# Patient Record
Sex: MALE | Race: WHITE | NOT HISPANIC OR LATINO | Employment: FULL TIME | URBAN - METROPOLITAN AREA
[De-identification: names, ages, dates, MRNs, and addresses within clinical notes are randomized per-mention and may not be internally consistent; named-entity substitution may affect disease eponyms.]

---

## 2017-02-10 ENCOUNTER — HOSPITAL ENCOUNTER (EMERGENCY)
Facility: HOSPITAL | Age: 52
Discharge: HOME | End: 2017-02-10
Attending: EMERGENCY MEDICINE
Payer: COMMERCIAL

## 2017-02-10 ENCOUNTER — APPOINTMENT (EMERGENCY)
Dept: RADIOLOGY | Facility: HOSPITAL | Age: 52
End: 2017-02-10
Payer: COMMERCIAL

## 2017-02-10 VITALS
RESPIRATION RATE: 18 BRPM | TEMPERATURE: 98.2 F | HEART RATE: 116 BPM | WEIGHT: 179 LBS | SYSTOLIC BLOOD PRESSURE: 170 MMHG | DIASTOLIC BLOOD PRESSURE: 97 MMHG | OXYGEN SATURATION: 98 %

## 2017-02-10 DIAGNOSIS — S61.419A HAND LACERATION: Primary | ICD-10-CM

## 2017-02-10 DIAGNOSIS — S00.93XA HEAD CONTUSION: ICD-10-CM

## 2017-02-10 PROCEDURE — 90715 TDAP VACCINE 7 YRS/> IM: CPT | Performed by: EMERGENCY MEDICINE

## 2017-02-10 PROCEDURE — 73130 X-RAY EXAM OF HAND: CPT

## 2017-02-10 PROCEDURE — A9270 NON-COVERED ITEM OR SERVICE: HCPCS | Performed by: EMERGENCY MEDICINE

## 2017-02-10 PROCEDURE — 90471 IMMUNIZATION ADMIN: CPT

## 2017-02-10 PROCEDURE — 99283 EMERGENCY DEPT VISIT LOW MDM: CPT

## 2017-02-10 RX ORDER — GINSENG 100 MG
1 CAPSULE ORAL ONCE
Status: COMPLETED | OUTPATIENT
Start: 2017-02-10 | End: 2017-02-10

## 2017-02-10 RX ORDER — DIAPER,BRIEF,INFANT-TODD,DISP
1 EACH MISCELLANEOUS 3 TIMES DAILY
Qty: 14 G | Refills: 0 | Status: SHIPPED | OUTPATIENT
Start: 2017-02-10 | End: 2020-01-28

## 2017-02-10 RX ORDER — IBUPROFEN 600 MG/1
600 TABLET ORAL EVERY 6 HOURS PRN
Qty: 30 TABLET | Refills: 0 | Status: SHIPPED | OUTPATIENT
Start: 2017-02-10 | End: 2020-01-28

## 2017-02-10 RX ORDER — AMOXICILLIN AND CLAVULANATE POTASSIUM 875; 125 MG/1; MG/1
1 TABLET, FILM COATED ORAL EVERY 12 HOURS
Qty: 14 TABLET | Refills: 0 | Status: SHIPPED | OUTPATIENT
Start: 2017-02-10 | End: 2017-02-17

## 2017-02-10 RX ORDER — LIDOCAINE HYDROCHLORIDE 20 MG/ML
5 INJECTION, SOLUTION INFILTRATION; PERINEURAL ONCE
Status: COMPLETED | OUTPATIENT
Start: 2017-02-10 | End: 2017-02-10

## 2017-02-10 RX ADMIN — BACITRACIN ZINC 1 SMALL APPLICATION: 500 OINTMENT TOPICAL at 03:26

## 2017-02-10 RX ADMIN — LIDOCAINE HYDROCHLORIDE 5 ML: 20 INJECTION, SOLUTION INFILTRATION; PERINEURAL at 03:26

## 2017-02-10 RX ADMIN — TETANUS TOXOID, REDUCED DIPHTHERIA TOXOID AND ACELLULAR PERTUSSIS VACCINE, ADSORBED 0.5 ML: 5; 2.5; 8; 8; 2.5 SUSPENSION INTRAMUSCULAR at 03:20

## 2020-01-22 ENCOUNTER — HOSPITAL ENCOUNTER (EMERGENCY)
Facility: HOSPITAL | Age: 55
Discharge: HOME | End: 2020-01-22
Attending: EMERGENCY MEDICINE
Payer: COMMERCIAL

## 2020-01-22 ENCOUNTER — APPOINTMENT (EMERGENCY)
Dept: RADIOLOGY | Facility: HOSPITAL | Age: 55
End: 2020-01-22
Payer: COMMERCIAL

## 2020-01-22 VITALS
SYSTOLIC BLOOD PRESSURE: 155 MMHG | HEART RATE: 74 BPM | RESPIRATION RATE: 18 BRPM | OXYGEN SATURATION: 98 % | DIASTOLIC BLOOD PRESSURE: 70 MMHG | BODY MASS INDEX: 27.77 KG/M2 | HEIGHT: 72 IN | WEIGHT: 205 LBS | TEMPERATURE: 96.8 F

## 2020-01-22 DIAGNOSIS — M62.838 MUSCLE SPASMS OF NECK: ICD-10-CM

## 2020-01-22 DIAGNOSIS — M54.2 NECK PAIN: Primary | ICD-10-CM

## 2020-01-22 PROCEDURE — 72125 CT NECK SPINE W/O DYE: CPT

## 2020-01-22 PROCEDURE — 96372 THER/PROPH/DIAG INJ SC/IM: CPT

## 2020-01-22 PROCEDURE — 99284 EMERGENCY DEPT VISIT MOD MDM: CPT

## 2020-01-22 PROCEDURE — 99283 EMERGENCY DEPT VISIT LOW MDM: CPT | Performed by: EMERGENCY MEDICINE

## 2020-01-22 RX ORDER — DIAZEPAM 5 MG/1
10 TABLET ORAL ONCE
Status: COMPLETED | OUTPATIENT
Start: 2020-01-22 | End: 2020-01-22

## 2020-01-22 RX ORDER — KETOROLAC TROMETHAMINE 30 MG/ML
15 INJECTION, SOLUTION INTRAMUSCULAR; INTRAVENOUS ONCE
Status: COMPLETED | OUTPATIENT
Start: 2020-01-22 | End: 2020-01-22

## 2020-01-22 RX ORDER — NAPROXEN 500 MG/1
500 TABLET ORAL 2 TIMES DAILY WITH MEALS
Qty: 14 TABLET | Refills: 0 | Status: SHIPPED | OUTPATIENT
Start: 2020-01-22 | End: 2020-01-29 | Stop reason: HOSPADM

## 2020-01-22 RX ORDER — ONDANSETRON 4 MG/1
4 TABLET, ORALLY DISINTEGRATING ORAL ONCE
Status: COMPLETED | OUTPATIENT
Start: 2020-01-22 | End: 2020-01-22

## 2020-01-22 RX ORDER — DIAZEPAM 5 MG/1
5 TABLET ORAL EVERY 8 HOURS PRN
Qty: 12 TABLET | Refills: 0 | Status: SHIPPED | OUTPATIENT
Start: 2020-01-22 | End: 2020-01-29 | Stop reason: HOSPADM

## 2020-01-22 RX ADMIN — DIAZEPAM 10 MG: 5 TABLET ORAL at 14:10

## 2020-01-22 RX ADMIN — KETOROLAC TROMETHAMINE 15 MG: 30 INJECTION, SOLUTION INTRAMUSCULAR at 14:11

## 2020-01-22 RX ADMIN — ONDANSETRON 4 MG: 4 TABLET, ORALLY DISINTEGRATING ORAL at 14:48

## 2020-01-22 NOTE — ED PROVIDER NOTES
History  Chief Complaint   Patient presents with    Neck Pain     Patient here with c/o bilateral neck pain that radiates diown his back and arms  Patient also feels nauseated  pain started yesterday  Patient took Motrin with no relief  Movement makes the pain worse  Patient presents for evaluation of neck pain  States started on Monday but was worse today  Taking Ibuprofen without relief  Denies numbness or weakness  Denies trauma or injury  Pain worse with movement  No history of prior issues with the neck  No fever or chills  History provided by:  Patient   used: No    Neck Pain       Prior to Admission Medications   Prescriptions Last Dose Informant Patient Reported? Taking?   bacitracin ointment   No No   Sig: Apply 1 g topically 3 (three) times a day for 7 days   ibuprofen (MOTRIN) 600 mg tablet   No No   Sig: Take 1 tablet by mouth every 6 (six) hours as needed for mild pain for up to 10 days      Facility-Administered Medications: None       History reviewed  No pertinent past medical history  Past Surgical History:   Procedure Laterality Date    APPENDECTOMY      SPLENECTOMY         History reviewed  No pertinent family history  I have reviewed and agree with the history as documented  Social History     Tobacco Use    Smoking status: Current Some Day Smoker     Packs/day: 1 00     Types: Cigarettes    Smokeless tobacco: Never Used   Substance Use Topics    Alcohol use: No    Drug use: No        Review of Systems   Musculoskeletal: Positive for neck pain  All other systems reviewed and are negative  Physical Exam  Physical Exam   Constitutional: He is oriented to person, place, and time  No distress  Eyes: Pupils are equal, round, and reactive to light  EOM are normal    Neck:       Cardiovascular: Normal rate, regular rhythm and intact distal pulses  Pulmonary/Chest: Effort normal and breath sounds normal  No respiratory distress     Neurological: He is alert and oriented to person, place, and time  No cranial nerve deficit or sensory deficit  He exhibits normal muscle tone  Coordination normal    Skin: Capillary refill takes less than 2 seconds  He is not diaphoretic  Nursing note and vitals reviewed  Vital Signs  ED Triage Vitals   Temperature Pulse Respirations Blood Pressure SpO2   01/22/20 1342 01/22/20 1342 01/22/20 1342 01/22/20 1342 01/22/20 1342   (!) 96 8 °F (36 °C) 89 18 (!) 187/96 98 %      Temp Source Heart Rate Source Patient Position - Orthostatic VS BP Location FiO2 (%)   01/22/20 1342 01/22/20 1502 01/22/20 1342 01/22/20 1502 --   Tympanic Monitor Sitting Right arm       Pain Score       01/22/20 1342       Worst Possible Pain           Vitals:    01/22/20 1342 01/22/20 1502 01/22/20 1543   BP: (!) 187/96 165/79 155/70   Pulse: 89 80 74   Patient Position - Orthostatic VS: Sitting Lying Lying         Visual Acuity      ED Medications  Medications   ketorolac (TORADOL) injection 15 mg (15 mg Intramuscular Given 1/22/20 1411)   diazepam (VALIUM) tablet 10 mg (10 mg Oral Given 1/22/20 1410)   ondansetron (ZOFRAN-ODT) dispersible tablet 4 mg (4 mg Oral Given 1/22/20 1448)       Diagnostic Studies  Results Reviewed     None                 CT cervical spine without contrast   Final Result by Carmela Moreno MD (01/22 1501)      No cervical spine fracture or traumatic malalignment  Multilevel degenerative disc disease and facet arthropathy  Workstation performed: [id number]                    Procedures  Procedures         ED Course                               MDM  Number of Diagnoses or Management Options  Muscle spasms of neck:   Neck pain:   Diagnosis management comments: Pulse 98% on RA indicating adequate oxygenation    Ct results discussed with patient  Improvement in pain after medications  Advised precautions for muscle relaxer          Amount and/or Complexity of Data Reviewed  Tests in the radiology section of CPT®: ordered and reviewed  Decide to obtain previous medical records or to obtain history from someone other than the patient: yes  Review and summarize past medical records: yes    Patient Progress  Patient progress: improved        Disposition  Final diagnoses:   Neck pain   Muscle spasms of neck     Time reflects when diagnosis was documented in both MDM as applicable and the Disposition within this note     Time User Action Codes Description Comment    1/22/2020  4:01 PM Hillary Hollow Add [M54 2] Neck pain     1/22/2020  4:01 PM Hillary Hollow Add [C97 133] Muscle spasms of neck       ED Disposition     ED Disposition Condition Date/Time Comment    Discharge Stable Wed Jan 22, 2020  4:01 PM Renee Orourke discharge to home/self care  Follow-up Information     Follow up With Specialties Details Why Contact Info    Melvi Robert MD Orthopedic Surgery In 1 week  [employer] [employer]  [phone]            Discharge Medication List as of 1/22/2020  4:03 PM      START taking these medications    Details   diazepam (VALIUM) 5 mg tablet Take 1 tablet (5 mg total) by mouth every 8 (eight) hours as needed for muscle spasms for up to 12 doses, Starting Wed 1/22/2020, Normal      naproxen (NAPROSYN) 500 mg tablet Take 1 tablet (500 mg total) by mouth 2 (two) times a day with meals for 7 days, Starting Wed 1/22/2020, Until Wed 1/29/2020, Normal         CONTINUE these medications which have NOT CHANGED    Details   bacitracin ointment Apply 1 g topically 3 (three) times a day for 7 days, Starting 2/10/2017, Until Fri 2/17/17, Print      ibuprofen (MOTRIN) 600 mg tablet Take 1 tablet by mouth every 6 (six) hours as needed for mild pain for up to 10 days, Starting 2/10/2017, Until Mon 2/20/17, Print           No discharge procedures on file      ED Provider  Electronically Signed by           Loki Fletcher,   01/22/20 2432

## 2020-01-22 NOTE — DISCHARGE INSTRUCTIONS
Muscle Spasm   WHAT YOU NEED TO KNOW:   A muscle spasm is a sudden contraction of any muscle or group of muscles  A muscle cramp is a painful muscle spasm  Muscle cramps commonly occur after intense exercise or during pregnancy  They may also be caused by certain medications, dehydration, low calcium or magnesium levels, or another medical condition  DISCHARGE INSTRUCTIONS:   Medicines: You may need the following:  · NSAIDs  help decrease swelling and pain or fever  This medicine is available with or without a doctor's order  NSAIDs can cause stomach bleeding or kidney problems in certain people  If you take blood thinner medicine, always ask your healthcare provider if NSAIDs are safe for you  Always read the medicine label and follow directions  · Take your medicine as directed  Contact your healthcare provider if you think your medicine is not helping or if you have side effects  Tell him of her if you are allergic to any medicine  Keep a list of the medicines, vitamins, and herbs you take  Include the amounts, and when and why you take them  Bring the list or the pill bottles to follow-up visits  Carry your medicine list with you in case of an emergency  Follow up with your healthcare provider as directed: You may need other tests or treatment  You may also be referred to a physical therapist or other specialist  Write down your questions so you remember to ask them during your visits  Self-care:   · Stretch  your muscle to help relieve the cramp  It may be helpful to keep your muscle in the stretched position until the cramp is gone  · Apply heat  to help decrease pain and muscle spasms  Apply heat on the area for 20 to 30 minutes every 2 hours for as many days as directed  · Apply ice  to help decrease swelling and pain  Ice may also help prevent tissue damage  Use an ice pack, or put crushed ice in a plastic bag   Cover it with a towel and place it on your muscle for 15 to 20 minutes every hour or as directed  · Drink more liquids  to help prevent muscle cramps caused by dehydration  Sports drinks may help replace electrolytes you lose through sweat during exercise  Ask your healthcare provider how much liquid to drink each day and which liquids are best for you  · Eat healthy foods , such as fruits, vegetables, whole grains, low-fat dairy products, and lean proteins (meat, beans, and fish)  If you are pregnant, ask your healthcare provider about foods that are high in magnesium and sodium  They may help to relieve cramps during pregnancy  · Massage your muscle  to help relieve the cramp  · Take frequent deep breaths  until the cramp feels better  Lie down while you take the deep breaths so you do not get dizzy or lightheaded  Contact your healthcare provider if:   · You have signs of dehydration, such as a headache, dark yellow urine, dry eyes or mouth, or a fast heartbeat  · You have questions or concerns about your condition or care  Return to the emergency department if:   · You have warmth, swelling, or redness in the cramping muscle  · You have frequent or unrelieved muscle cramps in several different muscles  · You have muscle cramps with numbness, tingling, and burning in your hands and feet  © 2017 [address] Information is for End User's use only and may not be sold, redistributed or otherwise used for commercial purposes  All illustrations and images included in CareNotes® are the copyrighted property of [employer] , [employer]  or Lonny Thapa  The above information is an  only  It is not intended as medical advice for individual conditions or treatments  Talk to your doctor, nurse or pharmacist before following any medical regimen to see if it is safe and effective for you

## 2020-01-28 ENCOUNTER — HOSPITAL ENCOUNTER (OUTPATIENT)
Facility: HOSPITAL | Age: 55
Setting detail: OBSERVATION
Discharge: HOME | End: 2020-01-29
Attending: EMERGENCY MEDICINE | Admitting: INTERNAL MEDICINE
Payer: COMMERCIAL

## 2020-01-28 ENCOUNTER — APPOINTMENT (EMERGENCY)
Dept: RADIOLOGY | Facility: HOSPITAL | Age: 55
End: 2020-01-28
Payer: COMMERCIAL

## 2020-01-28 VITALS
BODY MASS INDEX: 27.77 KG/M2 | WEIGHT: 205 LBS | HEART RATE: 89 BPM | DIASTOLIC BLOOD PRESSURE: 121 MMHG | SYSTOLIC BLOOD PRESSURE: 166 MMHG | HEIGHT: 72 IN

## 2020-01-28 DIAGNOSIS — R20.2 NUMBNESS AND TINGLING: Primary | ICD-10-CM

## 2020-01-28 DIAGNOSIS — M47.22 CERVICAL SPONDYLOSIS WITH RADICULOPATHY: Primary | ICD-10-CM

## 2020-01-28 DIAGNOSIS — R51.9 HEADACHE: ICD-10-CM

## 2020-01-28 DIAGNOSIS — R03.0 ELEVATED BLOOD PRESSURE READING WITHOUT DIAGNOSIS OF HYPERTENSION: ICD-10-CM

## 2020-01-28 DIAGNOSIS — R03.0 ELEVATED BLOOD PRESSURE READING: ICD-10-CM

## 2020-01-28 DIAGNOSIS — E78.5 HYPERLIPIDEMIA: ICD-10-CM

## 2020-01-28 DIAGNOSIS — R20.0 NUMBNESS AND TINGLING: Primary | ICD-10-CM

## 2020-01-28 DIAGNOSIS — R20.2 ARM PARESTHESIA, RIGHT: ICD-10-CM

## 2020-01-28 DIAGNOSIS — M54.2 NECK PAIN: ICD-10-CM

## 2020-01-28 PROBLEM — Z72.0 TOBACCO USE: Status: ACTIVE | Noted: 2020-01-28

## 2020-01-28 LAB
ALBUMIN SERPL BCP-MCNC: 4 G/DL (ref 3.5–5)
ALP SERPL-CCNC: 100 U/L (ref 46–116)
ALT SERPL W P-5'-P-CCNC: 32 U/L (ref 12–78)
ANION GAP SERPL CALCULATED.3IONS-SCNC: 7 MMOL/L (ref 4–13)
APTT PPP: 29 SECONDS (ref 23–37)
AST SERPL W P-5'-P-CCNC: 20 U/L (ref 5–45)
BASOPHILS # BLD AUTO: 0.13 THOUSANDS/ΜL (ref 0–0.1)
BASOPHILS NFR BLD AUTO: 1 % (ref 0–1)
BILIRUB SERPL-MCNC: 0.3 MG/DL (ref 0.2–1)
BUN SERPL-MCNC: 15 MG/DL (ref 5–25)
CALCIUM SERPL-MCNC: 9.2 MG/DL (ref 8.3–10.1)
CHLORIDE SERPL-SCNC: 103 MMOL/L (ref 100–108)
CO2 SERPL-SCNC: 30 MMOL/L (ref 21–32)
CREAT SERPL-MCNC: 1.07 MG/DL (ref 0.6–1.3)
EOSINOPHIL # BLD AUTO: 0.43 THOUSAND/ΜL (ref 0–0.61)
EOSINOPHIL NFR BLD AUTO: 5 % (ref 0–6)
ERYTHROCYTE [DISTWIDTH] IN BLOOD BY AUTOMATED COUNT: 13.7 % (ref 11.6–15.1)
GFR SERPL CREATININE-BSD FRML MDRD: 78 ML/MIN/1.73SQ M
GLUCOSE SERPL-MCNC: 134 MG/DL (ref 65–140)
HCT VFR BLD AUTO: 50.3 % (ref 36.5–49.3)
HGB BLD-MCNC: 16.7 G/DL (ref 12–17)
IMM GRANULOCYTES # BLD AUTO: 0.04 THOUSAND/UL (ref 0–0.2)
IMM GRANULOCYTES NFR BLD AUTO: 0 % (ref 0–2)
INR PPP: 0.92 (ref 0.91–1.09)
LYMPHOCYTES # BLD AUTO: 3.3 THOUSANDS/ΜL (ref 0.6–4.47)
LYMPHOCYTES NFR BLD AUTO: 34 % (ref 14–44)
MAGNESIUM SERPL-MCNC: 2 MG/DL (ref 1.6–2.6)
MCH RBC QN AUTO: 30.8 PG (ref 26.8–34.3)
MCHC RBC AUTO-ENTMCNC: 33.2 G/DL (ref 31.4–37.4)
MCV RBC AUTO: 93 FL (ref 82–98)
MONOCYTES # BLD AUTO: 1.1 THOUSAND/ΜL (ref 0.17–1.22)
MONOCYTES NFR BLD AUTO: 12 % (ref 4–12)
NEUTROPHILS # BLD AUTO: 4.58 THOUSANDS/ΜL (ref 1.85–7.62)
NEUTS SEG NFR BLD AUTO: 48 % (ref 43–75)
NRBC BLD AUTO-RTO: 0 /100 WBCS
PLATELET # BLD AUTO: 525 THOUSANDS/UL (ref 149–390)
PMV BLD AUTO: 9.6 FL (ref 8.9–12.7)
POTASSIUM SERPL-SCNC: 3.7 MMOL/L (ref 3.5–5.3)
PROT SERPL-MCNC: 8 G/DL (ref 6.4–8.2)
PROTHROMBIN TIME: 10 SECONDS (ref 9.8–12)
RBC # BLD AUTO: 5.43 MILLION/UL (ref 3.88–5.62)
SODIUM SERPL-SCNC: 140 MMOL/L (ref 136–145)
TROPONIN I SERPL-MCNC: <0.02 NG/ML
TSH SERPL DL<=0.05 MIU/L-ACNC: 2.41 UIU/ML (ref 0.36–3.74)
WBC # BLD AUTO: 9.58 THOUSAND/UL (ref 4.31–10.16)

## 2020-01-28 PROCEDURE — 36415 COLL VENOUS BLD VENIPUNCTURE: CPT | Performed by: EMERGENCY MEDICINE

## 2020-01-28 PROCEDURE — 80053 COMPREHEN METABOLIC PANEL: CPT | Performed by: EMERGENCY MEDICINE

## 2020-01-28 PROCEDURE — 99220 PR INITIAL OBSERVATION CARE/DAY 70 MINUTES: CPT | Performed by: INTERNAL MEDICINE

## 2020-01-28 PROCEDURE — 99203 OFFICE O/P NEW LOW 30 MIN: CPT | Performed by: ORTHOPAEDIC SURGERY

## 2020-01-28 PROCEDURE — 85025 COMPLETE CBC W/AUTO DIFF WBC: CPT | Performed by: EMERGENCY MEDICINE

## 2020-01-28 PROCEDURE — 70498 CT ANGIOGRAPHY NECK: CPT

## 2020-01-28 PROCEDURE — 83735 ASSAY OF MAGNESIUM: CPT | Performed by: EMERGENCY MEDICINE

## 2020-01-28 PROCEDURE — 85730 THROMBOPLASTIN TIME PARTIAL: CPT | Performed by: EMERGENCY MEDICINE

## 2020-01-28 PROCEDURE — 99285 EMERGENCY DEPT VISIT HI MDM: CPT | Performed by: EMERGENCY MEDICINE

## 2020-01-28 PROCEDURE — 84443 ASSAY THYROID STIM HORMONE: CPT | Performed by: INTERNAL MEDICINE

## 2020-01-28 PROCEDURE — 96374 THER/PROPH/DIAG INJ IV PUSH: CPT

## 2020-01-28 PROCEDURE — 85610 PROTHROMBIN TIME: CPT | Performed by: EMERGENCY MEDICINE

## 2020-01-28 PROCEDURE — 70496 CT ANGIOGRAPHY HEAD: CPT

## 2020-01-28 PROCEDURE — 99285 EMERGENCY DEPT VISIT HI MDM: CPT

## 2020-01-28 PROCEDURE — 71045 X-RAY EXAM CHEST 1 VIEW: CPT

## 2020-01-28 PROCEDURE — 93005 ELECTROCARDIOGRAM TRACING: CPT

## 2020-01-28 PROCEDURE — 84484 ASSAY OF TROPONIN QUANT: CPT | Performed by: EMERGENCY MEDICINE

## 2020-01-28 PROCEDURE — 1111F DSCHRG MED/CURRENT MED MERGE: CPT | Performed by: ORTHOPAEDIC SURGERY

## 2020-01-28 RX ORDER — HYDRALAZINE HYDROCHLORIDE 20 MG/ML
10 INJECTION INTRAMUSCULAR; INTRAVENOUS ONCE
Status: COMPLETED | OUTPATIENT
Start: 2020-01-28 | End: 2020-01-28

## 2020-01-28 RX ORDER — TIZANIDINE 2 MG/1
2 TABLET ORAL 3 TIMES DAILY
Status: DISCONTINUED | OUTPATIENT
Start: 2020-01-28 | End: 2020-01-29 | Stop reason: HOSPADM

## 2020-01-28 RX ORDER — HYDROMORPHONE HCL/PF 1 MG/ML
1 SYRINGE (ML) INJECTION ONCE
Status: COMPLETED | OUTPATIENT
Start: 2020-01-28 | End: 2020-01-28

## 2020-01-28 RX ORDER — ACETAMINOPHEN 325 MG/1
650 TABLET ORAL EVERY 6 HOURS PRN
Status: DISCONTINUED | OUTPATIENT
Start: 2020-01-28 | End: 2020-01-29 | Stop reason: HOSPADM

## 2020-01-28 RX ORDER — HYDROCODONE BITARTRATE AND ACETAMINOPHEN 5; 325 MG/1; MG/1
1 TABLET ORAL EVERY 4 HOURS PRN
Status: DISCONTINUED | OUTPATIENT
Start: 2020-01-28 | End: 2020-01-29 | Stop reason: HOSPADM

## 2020-01-28 RX ORDER — ASPIRIN 81 MG/1
81 TABLET ORAL DAILY
Status: DISCONTINUED | OUTPATIENT
Start: 2020-01-29 | End: 2020-01-29 | Stop reason: HOSPADM

## 2020-01-28 RX ORDER — HYDRALAZINE HYDROCHLORIDE 20 MG/ML
10 INJECTION INTRAMUSCULAR; INTRAVENOUS EVERY 4 HOURS PRN
Status: DISCONTINUED | OUTPATIENT
Start: 2020-01-28 | End: 2020-01-29 | Stop reason: HOSPADM

## 2020-01-28 RX ADMIN — TIZANIDINE 2 MG: 2 TABLET ORAL at 21:07

## 2020-01-28 RX ADMIN — IOHEXOL 85 ML: 350 INJECTION, SOLUTION INTRAVENOUS at 16:28

## 2020-01-28 RX ADMIN — HYDRALAZINE HYDROCHLORIDE 10 MG: 20 INJECTION INTRAMUSCULAR; INTRAVENOUS at 19:40

## 2020-01-28 RX ADMIN — ENOXAPARIN SODIUM 40 MG: 40 INJECTION SUBCUTANEOUS at 21:08

## 2020-01-28 RX ADMIN — LOSARTAN POTASSIUM: 50 TABLET, FILM COATED ORAL at 21:07

## 2020-01-28 RX ADMIN — HYDROMORPHONE HYDROCHLORIDE 1 MG: 1 INJECTION, SOLUTION INTRAMUSCULAR; INTRAVENOUS; SUBCUTANEOUS at 16:07

## 2020-01-28 NOTE — ED PROVIDER NOTES
History  Chief Complaint   Patient presents with    High Blood Pressure     Pt was seen at orthopedic today and was told to come to ED for high blood pressure  Pt c/o right eye burning, headache, and tingling down right arm   Headache     48-year-old male presents with right-sided facial pain burning radiating down his right neck into his right arm which he reports has weakness and tingling and pain  He was seen in the ER a few days ago had a CT of the cervical spine without contrast which was unremarkable discharged home on steroids and Flexeril  He says these does did not improve his symptoms so he went to see an orthopedist who evaluated him in the office thought he was hypertensive and sent into the ED for further evaluation management  Patient denies any slurred speech expressive aphasia trouble getting words out no coordination difficulty no ambulatory difficulty no headache no head trauma on anticoagulation no other symptoms  History provided by:  Patient   used: No        Prior to Admission Medications   Prescriptions Last Dose Informant Patient Reported? Taking?   diazepam (VALIUM) 5 mg tablet Past Week at Unknown time  No Yes   Sig: Take 1 tablet (5 mg total) by mouth every 8 (eight) hours as needed for muscle spasms for up to 12 doses   naproxen (NAPROSYN) 500 mg tablet 1/27/2020 at Unknown time  No Yes   Sig: Take 1 tablet (500 mg total) by mouth 2 (two) times a day with meals for 7 days      Facility-Administered Medications: None       Past Medical History:   Diagnosis Date    Hyperlipidemia        Past Surgical History:   Procedure Laterality Date    APPENDECTOMY      SPLENECTOMY         Family History   Problem Relation Age of Onset    Dementia Father      I have reviewed and agree with the history as documented      Social History     Tobacco Use    Smoking status: Current Some Day Smoker     Packs/day: 1 00     Types: Cigarettes    Smokeless tobacco: Never Used Substance Use Topics    Alcohol use: Never     Frequency: Never    Drug use: No        Review of Systems   All other systems reviewed and are negative  Physical Exam  Physical Exam   Constitutional: He is oriented to person, place, and time  He appears well-developed and well-nourished  HENT:   Head: Normocephalic and atraumatic  Eyes: Pupils are equal, round, and reactive to light  EOM are normal    Neck: Normal range of motion  Neck supple  Cardiovascular: Normal rate and regular rhythm  Pulmonary/Chest: Effort normal and breath sounds normal    Abdominal: Soft  Bowel sounds are normal    Musculoskeletal: Normal range of motion  Neurological: He is alert and oriented to person, place, and time  He displays normal reflexes  No cranial nerve deficit or sensory deficit  He exhibits abnormal muscle tone  Coordination normal    Right upper extremity: motor strength decreased compared to left upper extremity, axillary nerve intact, radial pulses intact  Skin: Skin is warm and dry  Psychiatric: He has a normal mood and affect  Nursing note and vitals reviewed        Vital Signs  ED Triage Vitals [01/28/20 1600]   Temperature Pulse Respirations Blood Pressure SpO2   98 6 °F (37 °C) 91 18 (!) 207/111 100 %      Temp Source Heart Rate Source Patient Position - Orthostatic VS BP Location FiO2 (%)   Tympanic Monitor Sitting Left arm --      Pain Score       8           Vitals:    01/29/20 0349 01/29/20 0800 01/29/20 1540 01/29/20 1649   BP: 117/76 131/84 137/85    Pulse: 80 75  77   Patient Position - Orthostatic VS: Lying Lying Lying          Visual Acuity  Visual Acuity      Most Recent Value   L Pupil Size (mm)  3   R Pupil Size (mm)  3          ED Medications  Medications   HYDROmorphone (DILAUDID) injection 1 mg (1 mg Intravenous Given 1/28/20 1607)   iohexol (OMNIPAQUE) 350 MG/ML injection (MULTI-DOSE) 85 mL (85 mL Intravenous Given 1/28/20 1628)   hydrALAZINE (APRESOLINE) injection 10 mg (10 mg Intravenous Given 1/28/20 1940)       Diagnostic Studies  Results Reviewed     Procedure Component Value Units Date/Time    TSH, 3rd generation [[id number]]  (Normal) Collected:  01/28/20 1603    Lab Status:  Final result Specimen:  Blood from Arm, Right Updated:  01/28/20 2122     TSH 3RD GENERATON 2 406 uIU/mL     Narrative:       Patients undergoing fluorescein dye angiography may retain small amounts of fluorescein in the body for 48-72 hours post procedure  Samples containing fluorescein can produce falsely depressed TSH values  If the patient had this procedure,a specimen should be resubmitted post fluorescein clearance        Troponin I [[id number]]  (Normal) Collected:  01/28/20 1603    Lab Status:  Final result Specimen:  Blood from Arm, Right Updated:  01/28/20 1630     Troponin I <0 02 ng/mL     Comprehensive metabolic panel [[id number]] Collected:  01/28/20 1603    Lab Status:  Final result Specimen:  Blood from Arm, Right Updated:  01/28/20 1626     Sodium 140 mmol/L      Potassium 3 7 mmol/L      Chloride 103 mmol/L      CO2 30 mmol/L      ANION GAP 7 mmol/L      BUN 15 mg/dL      Creatinine 1 07 mg/dL      Glucose 134 mg/dL      Calcium 9 2 mg/dL      AST 20 U/L      ALT 32 U/L      Alkaline Phosphatase 100 U/L      Total Protein 8 0 g/dL      Albumin 4 0 g/dL      Total Bilirubin 0 30 mg/dL      eGFR 78 ml/min/1 73sq m     Narrative:       [facility] guidelines for Chronic Kidney Disease (CKD):     Stage 1 with normal or high GFR (GFR > 90 mL/min/1 73 square meters)    Stage 2 Mild CKD (GFR = 60-89 mL/min/1 73 square meters)    Stage 3A Moderate CKD (GFR = 45-59 mL/min/1 73 square meters)    Stage 3B Moderate CKD (GFR = 30-44 mL/min/1 73 square meters)    Stage 4 Severe CKD (GFR = 15-29 mL/min/1 73 square meters)    Stage 5 End Stage CKD (GFR <15 mL/min/1 73 square meters)  Note: GFR calculation is accurate only with a steady state creatinine    Magnesium [[id number]]  (Normal) Collected:  01/28/20 1603    Lab Status:  Final result Specimen:  Blood from Arm, Right Updated:  01/28/20 1626     Magnesium 2 0 mg/dL     Protime-INR [[id number]]  (Normal) Collected:  01/28/20 1603    Lab Status:  Final result Specimen:  Blood from Arm, Right Updated:  01/28/20 1622     Protime 10 0 seconds      INR 0 92    APTT [[id number]]  (Normal) Collected:  01/28/20 1603    Lab Status:  Final result Specimen:  Blood from Arm, Right Updated:  01/28/20 1622     PTT 29 seconds     CBC and differential [[id number]]  (Abnormal) Collected:  01/28/20 1603    Lab Status:  Final result Specimen:  Blood from Arm, Right Updated:  01/28/20 1608     WBC 9 58 Thousand/uL      RBC 5 43 Million/uL      Hemoglobin 16 7 g/dL      Hematocrit 50 3 %      MCV 93 fL      MCH 30 8 pg      MCHC 33 2 g/dL      RDW 13 7 %      MPV 9 6 fL      Platelets 441 Thousands/uL      nRBC 0 /100 WBCs      Neutrophils Relative 48 %      Immat GRANS % 0 %      Lymphocytes Relative 34 %      Monocytes Relative 12 %      Eosinophils Relative 5 %      Basophils Relative 1 %      Neutrophils Absolute 4 58 Thousands/µL      Immature Grans Absolute 0 04 Thousand/uL      Lymphocytes Absolute 3 30 Thousands/µL      Monocytes Absolute 1 10 Thousand/µL      Eosinophils Absolute 0 43 Thousand/µL      Basophils Absolute 0 13 Thousands/µL                  MRI brain wo contrast   Final Result by Lambert Boxer, MD (01/29 1111)      No acute disease  Parenchymal changes consistent with mild degree of chronic small vessel disease  Workstation performed: [id number]         MRI cervical spine wo contrast   Final Result by Lambert Boxer, MD (01/29 8819)      Mid cervical spondylosis and osteoarthritis  Stenosis left C5-6 and left C6-7 foramen, correlate for left C6 and left C7 radiculitis respectively    Patient reported symptoms contralateral                Workstation performed: [id number]         XR chest 1 view   Final Result by Faby Myers MD (01/28 4034)      No acute cardiopulmonary disease  Workstation performed: [id number]         CTA head and neck with and without contrast   Final Result by Sharath Love MD (01/28 1710)      No acute intracranial disease  No large vessel flow restrictive disease within the head or neck                    Workstation performed: [id number]                    Procedures  ECG 12 Lead Documentation Only  Date/Time: 1/28/2020 4:07 PM  Performed by: Elvira Cheatham DO  Authorized by: Elvira Cheatham DO     ECG reviewed by me, the ED Provider: yes    Patient location:  ED  Previous ECG:     Previous ECG:  Unavailable    Comparison to cardiac monitor: Yes    Interpretation:     Interpretation: normal    Rate:     ECG rate assessment: normal    Rhythm:     Rhythm: sinus rhythm    Ectopy:     Ectopy: none    QRS:     QRS axis:  Normal  Conduction:     Conduction: normal    ST segments:     ST segments:  Normal  T waves:     T waves: normal               ED Course                               MDM  Number of Diagnoses or Management Options  Headache:   Numbness and tingling:      Amount and/or Complexity of Data Reviewed  Clinical lab tests: ordered and reviewed  Tests in the radiology section of CPT®: ordered and reviewed  Tests in the medicine section of CPT®: ordered and reviewed    Patient Progress  Patient progress: stable        Disposition  Final diagnoses:   Numbness and tingling   Headache     Time reflects when diagnosis was documented in both MDM as applicable and the Disposition within this note     Time User Action Codes Description Comment    1/28/2020  5:45 PM Nisha Wallace Add [R20 0,  R20 2] Numbness and tingling     1/28/2020  5:45 PM Nisha Wallace Add [R51] Headache     1/29/2020  5:56 PM Bita Marci Add [M54 2] Neck pain     1/29/2020  5:56 PM Mariana Hodge Add [R03 0] Elevated blood pressure reading without diagnosis of hypertension     1/29/2020  5:56 PM Mariana Hodge Add [E78 5] Hyperlipidemia 1/29/2020  5:56 PM Ranjit Ortez Add [R20 2] Arm paresthesia, right       ED Disposition     ED Disposition Condition Date/Time Comment    Admit Stable Tue Jan 28, 2020  5:45 PM          Follow-up Information     Follow up With Specialties Details Why Contact Info    Pastor Peewee MD Neurology Schedule an appointment as soon as possible for a visit in 8 week(s)  [address] [facility]      Wayne Rush MD Neurosurgery Schedule an appointment as soon as possible for a visit in 1 month(s)  [address] [facility]  [address] [address] [address] Schedule an appointment as soon as possible for a visit in 1 week(s)  [facility] [address] Erika Rocha MD Anesthesiology, Pain Medicine Schedule an appointment as soon as possible for a visit  [facility]  [phone]            Discharge Medication List as of 1/29/2020  6:05 PM      START taking these medications    Details   acetaminophen (TYLENOL) 325 mg tablet Take 2 tablets (650 mg total) by mouth every 6 (six) hours as needed for mild pain, headaches or fever, Starting Wed 1/29/2020, No Print      aspirin (ECOTRIN LOW STRENGTH) 81 mg EC tablet Take 1 tablet (81 mg total) by mouth daily, Starting Thu 1/30/2020, Normal      atorvastatin (LIPITOR) 40 mg tablet Take 1 tablet (40 mg total) by mouth daily with dinner, Starting Thu 1/30/2020, Normal      butalbital-acetaminophen-caffeine (FIORICET,ESGIC) -40 mg per tablet Take 1 tablet by mouth every 4 (four) hours as needed for headaches or migraine for up to 28 days, Starting Wed 1/29/2020, Until Wed 2/26/2020, Normal      losartan-hydrochlorothiazide (HYZAAR) 50-12 5 mg per tablet Take 1 tablet by mouth daily, Starting Wed 1/29/2020, Normal      verapamil (CALAN-SR) 120 mg CR tablet Take 1 tablet (120 mg total) by mouth daily with dinner for 29 doses, Starting Thu 1/30/2020, Until Fri 2/28/2020, Normal         STOP taking these medications       diazepam (VALIUM) 5 mg tablet Comments:   Reason for Stopping:         naproxen (NAPROSYN) 500 mg tablet Comments:   Reason for Stopping:             Outpatient Discharge Orders   Ambulatory referral to Physical Therapy   Standing Status: Future Standing Exp  Date: 01/29/21      Ambulatory referral to Pain Management   Standing Status: Future Standing Exp   Date: 01/29/21      Discharge Diet     Activity as tolerated     Call provider for:  severe uncontrolled pain     Call provider for:  persistent dizziness or light-headedness       ED Provider  Electronically Signed by           Darian Deshpande DO  01/29/20 2159

## 2020-01-28 NOTE — PROGRESS NOTES
Assessment/Plan:  1  Cervical spondylosis with radiculopathy  Ambulatory referral to Physical Therapy    CANCELED: Ambulatory referral to Physical Therapy   2  Elevated blood pressure reading  Ambulatory referral to [facility] has right cervical radiculopathy and diffuse degenerative changes on his cervical spine imaging  He does have some numbness into the right hand but appropriate strength  I would like for him to begin conservative measures of physical therapy for the next 6 weeks  He can continue with Tylenol muscle relaxer as needed  With his elevated blood pressure I do not think it is safe does prescribed prednisone or an NSAID at this time  He also had elevated blood pressure on 2 separate readings in the office today  He does not have a primary care physician at this time and is not on any blood pressure medication  He was also experiencing pain behind the I and increased headaches with the symptoms I did send him to the emergency room for evaluation  He is also given referral to a PCP to establish care  He will follow up with me in 6 weeks for the neck  Subjective:   Renee Orourke is a 53 y o  male who presents to the office for evaluation for neck pain  He denies any particular injury or trauma but states that he has had severe discomfort in his neck for the last 2 weeks  The pain was so severe he went to the emergency room and had a CT of the spine which showed diffuse degenerative changes  He is also experiencing discomfort down the right arm  He describes an intermittent numbness and tingling into his right hand  He denies any trauma  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 and states that he has not had any heavy lifting that is abnormal   His pain today is constant and sharp and aching throughout the neck  It worsens with any movement of his cervical spine and improves with rest   He was given muscle relaxers was helped slightly    He has also been taking Tylenol  He is also mentioning today that he has headaches and pain behind his right eye  He denies any chest pain at this time  He does not have a current primary care physician  Review of Systems   Constitutional: Negative for chills, fever and unexpected weight change  HENT: Negative for hearing loss, nosebleeds and sore throat  Eyes: Negative for pain, redness and visual disturbance  Respiratory: Negative for cough, shortness of breath and wheezing  Cardiovascular: Negative for chest pain, palpitations and leg swelling  Gastrointestinal: Negative for abdominal pain, nausea and vomiting  Endocrine: Negative for polyphagia and polyuria  Genitourinary: Negative for dysuria and hematuria  Musculoskeletal:        See HPI   Skin: Negative for rash and wound  Neurological: Negative for dizziness, numbness and headaches  Psychiatric/Behavioral: Negative for decreased concentration and suicidal ideas  The patient is not nervous/anxious  History reviewed  No pertinent past medical history  Past Surgical History:   Procedure Laterality Date    APPENDECTOMY      SPLENECTOMY         History reviewed  No pertinent family history      Social History     Occupational History    Not on file   Tobacco Use    Smoking status: Current Some Day Smoker     Packs/day: 1 00     Types: Cigarettes    Smokeless tobacco: Never Used   Substance and Sexual Activity    Alcohol use: No    Drug use: No    Sexual activity: Not on file         Current Outpatient Medications:     diazepam (VALIUM) 5 mg tablet, Take 1 tablet (5 mg total) by mouth every 8 (eight) hours as needed for muscle spasms for up to 12 doses, Disp: 12 tablet, Rfl: 0    naproxen (NAPROSYN) 500 mg tablet, Take 1 tablet (500 mg total) by mouth 2 (two) times a day with meals for 7 days, Disp: 14 tablet, Rfl: 0    bacitracin ointment, Apply 1 g topically 3 (three) times a day for 7 days, Disp: 14 g, Rfl: 0   ibuprofen (MOTRIN) 600 mg tablet, Take 1 tablet by mouth every 6 (six) hours as needed for mild pain for up to 10 days, Disp: 30 tablet, Rfl: 0    No Known Allergies    Objective:  Vitals:    01/28/20 1456   BP: (!) 167/119   Pulse: 92       Ortho Exam    Physical Exam   Constitutional: He is oriented to person, place, and time  He appears well-developed and well-nourished  HENT:   Head: Normocephalic and atraumatic  Eyes: Pupils are equal, round, and reactive to light  Conjunctivae are normal    Neck: Muscular tenderness present  Neck rigidity present  Decreased range of motion present  Significant tenderness to palpation over right trapezius musculature  Positive Spurling's maneuver on the right    Decreased sensation to light touch over right dorsal hand compared to left  Strength 5/5 bilaterally  Cardiovascular: Normal rate and intact distal pulses  Pulmonary/Chest: Effort normal  No respiratory distress  Musculoskeletal:   As noted in HPI   Neurological: He is alert and oriented to person, place, and time  Skin: Skin is warm and dry  Psychiatric: He has a normal mood and affect  His behavior is normal    Nursing note and vitals reviewed  I have personally reviewed pertinent films in PACS and my interpretation is as follows:  CT of the cervical spine dated 1/22/2020 demonstrates multilevel degenerative disc disease and facet joint osteoarthritis  No evidence of significant disc herniation

## 2020-01-28 NOTE — LETTER
[address] [facility] [address]  [place] [address]  Dept: [phone]    January 29, 2020     Patient: Paul Lei   YOB: 1965   Date of Visit: 1/28/2020       To Whom it May Concern:    Paul Lei is under my professional care  He was seen in the hospital from 1/28/2020   to 01/29/20  He may return to work tomorrow as tolerated  If you have any questions or concerns, please don't hesitate to call           Sincerely,          LAUREN Butler

## 2020-01-29 ENCOUNTER — APPOINTMENT (OUTPATIENT)
Dept: RADIOLOGY | Facility: HOSPITAL | Age: 55
End: 2020-01-29
Payer: COMMERCIAL

## 2020-01-29 ENCOUNTER — APPOINTMENT (OUTPATIENT)
Dept: NON INVASIVE DIAGNOSTICS | Facility: HOSPITAL | Age: 55
End: 2020-01-29
Payer: COMMERCIAL

## 2020-01-29 VITALS
BODY MASS INDEX: 26.34 KG/M2 | OXYGEN SATURATION: 97 % | HEART RATE: 77 BPM | SYSTOLIC BLOOD PRESSURE: 137 MMHG | DIASTOLIC BLOOD PRESSURE: 85 MMHG | TEMPERATURE: 98.1 F | RESPIRATION RATE: 17 BRPM | WEIGHT: 194.45 LBS | HEIGHT: 72 IN

## 2020-01-29 LAB
ALBUMIN SERPL BCP-MCNC: 3.3 G/DL (ref 3.5–5)
ALP SERPL-CCNC: 92 U/L (ref 46–116)
ALT SERPL W P-5'-P-CCNC: 29 U/L (ref 12–78)
ANION GAP SERPL CALCULATED.3IONS-SCNC: 10 MMOL/L (ref 4–13)
AST SERPL W P-5'-P-CCNC: 31 U/L (ref 5–45)
BILIRUB SERPL-MCNC: 0.4 MG/DL (ref 0.2–1)
BUN SERPL-MCNC: 17 MG/DL (ref 5–25)
CALCIUM SERPL-MCNC: 8.7 MG/DL (ref 8.3–10.1)
CHLORIDE SERPL-SCNC: 105 MMOL/L (ref 100–108)
CHOLEST SERPL-MCNC: 203 MG/DL (ref 50–200)
CO2 SERPL-SCNC: 22 MMOL/L (ref 21–32)
CREAT SERPL-MCNC: 0.93 MG/DL (ref 0.6–1.3)
ERYTHROCYTE [DISTWIDTH] IN BLOOD BY AUTOMATED COUNT: 13.8 % (ref 11.6–15.1)
EST. AVERAGE GLUCOSE BLD GHB EST-MCNC: 108 MG/DL
GFR SERPL CREATININE-BSD FRML MDRD: 92 ML/MIN/1.73SQ M
GLUCOSE P FAST SERPL-MCNC: 95 MG/DL (ref 65–99)
GLUCOSE SERPL-MCNC: 95 MG/DL (ref 65–140)
HBA1C MFR BLD: 5.4 % (ref 4.2–6.3)
HCT VFR BLD AUTO: 48.9 % (ref 36.5–49.3)
HDLC SERPL-MCNC: 40 MG/DL
HGB BLD-MCNC: 16.2 G/DL (ref 12–17)
LDLC SERPL CALC-MCNC: 134 MG/DL (ref 0–100)
MCH RBC QN AUTO: 30.4 PG (ref 26.8–34.3)
MCHC RBC AUTO-ENTMCNC: 33.1 G/DL (ref 31.4–37.4)
MCV RBC AUTO: 92 FL (ref 82–98)
NONHDLC SERPL-MCNC: 163 MG/DL
PLATELET # BLD AUTO: 508 THOUSANDS/UL (ref 149–390)
PMV BLD AUTO: 9.6 FL (ref 8.9–12.7)
POTASSIUM SERPL-SCNC: 4.4 MMOL/L (ref 3.5–5.3)
PROT SERPL-MCNC: 7.5 G/DL (ref 6.4–8.2)
RBC # BLD AUTO: 5.33 MILLION/UL (ref 3.88–5.62)
SODIUM SERPL-SCNC: 137 MMOL/L (ref 136–145)
TRIGL SERPL-MCNC: 143 MG/DL
WBC # BLD AUTO: 7.92 THOUSAND/UL (ref 4.31–10.16)

## 2020-01-29 PROCEDURE — 93306 TTE W/DOPPLER COMPLETE: CPT

## 2020-01-29 PROCEDURE — 97110 THERAPEUTIC EXERCISES: CPT

## 2020-01-29 PROCEDURE — 99204 OFFICE O/P NEW MOD 45 MIN: CPT | Performed by: PSYCHIATRY & NEUROLOGY

## 2020-01-29 PROCEDURE — 72141 MRI NECK SPINE W/O DYE: CPT

## 2020-01-29 PROCEDURE — 93306 TTE W/DOPPLER COMPLETE: CPT | Performed by: INTERNAL MEDICINE

## 2020-01-29 PROCEDURE — 97163 PT EVAL HIGH COMPLEX 45 MIN: CPT

## 2020-01-29 PROCEDURE — 85027 COMPLETE CBC AUTOMATED: CPT | Performed by: INTERNAL MEDICINE

## 2020-01-29 PROCEDURE — 80053 COMPREHEN METABOLIC PANEL: CPT | Performed by: INTERNAL MEDICINE

## 2020-01-29 PROCEDURE — 80061 LIPID PANEL: CPT | Performed by: INTERNAL MEDICINE

## 2020-01-29 PROCEDURE — 83036 HEMOGLOBIN GLYCOSYLATED A1C: CPT | Performed by: INTERNAL MEDICINE

## 2020-01-29 PROCEDURE — 99220 PR INITIAL OBSERVATION CARE/DAY 70 MINUTES: CPT | Performed by: NURSE PRACTITIONER

## 2020-01-29 PROCEDURE — 70551 MRI BRAIN STEM W/O DYE: CPT

## 2020-01-29 RX ORDER — ACETAMINOPHEN 325 MG/1
650 TABLET ORAL EVERY 6 HOURS PRN
Qty: 30 TABLET | Refills: 0
Start: 2020-01-29

## 2020-01-29 RX ORDER — BUTALBITAL, ACETAMINOPHEN AND CAFFEINE 50; 325; 40 MG/1; MG/1; MG/1
1 TABLET ORAL EVERY 4 HOURS PRN
Status: DISCONTINUED | OUTPATIENT
Start: 2020-01-29 | End: 2020-01-29

## 2020-01-29 RX ORDER — ATORVASTATIN CALCIUM 40 MG/1
40 TABLET, FILM COATED ORAL
Qty: 30 TABLET | Refills: 0 | Status: SHIPPED | OUTPATIENT
Start: 2020-01-30 | End: 2020-02-05 | Stop reason: SDUPTHER

## 2020-01-29 RX ORDER — LOSARTAN POTASSIUM AND HYDROCHLOROTHIAZIDE 12.5; 5 MG/1; MG/1
1 TABLET ORAL DAILY
Qty: 30 TABLET | Refills: 0 | Status: SHIPPED | OUTPATIENT
Start: 2020-01-29 | End: 2020-02-05 | Stop reason: SDUPTHER

## 2020-01-29 RX ORDER — ATORVASTATIN CALCIUM 40 MG/1
40 TABLET, FILM COATED ORAL
Status: DISCONTINUED | OUTPATIENT
Start: 2020-01-29 | End: 2020-01-29 | Stop reason: HOSPADM

## 2020-01-29 RX ORDER — BUTALBITAL, ACETAMINOPHEN AND CAFFEINE 50; 325; 40 MG/1; MG/1; MG/1
1 TABLET ORAL EVERY 4 HOURS PRN
Qty: 15 TABLET | Refills: 0 | Status: SHIPPED | OUTPATIENT
Start: 2020-01-29 | End: 2020-02-26

## 2020-01-29 RX ORDER — BUTALBITAL, ACETAMINOPHEN AND CAFFEINE 50; 325; 40 MG/1; MG/1; MG/1
1 TABLET ORAL EVERY 4 HOURS PRN
Status: DISCONTINUED | OUTPATIENT
Start: 2020-01-29 | End: 2020-01-29 | Stop reason: HOSPADM

## 2020-01-29 RX ORDER — ASPIRIN 81 MG/1
81 TABLET ORAL DAILY
Qty: 30 TABLET | Refills: 0 | Status: SHIPPED | OUTPATIENT
Start: 2020-01-30 | End: 2021-11-09

## 2020-01-29 RX ADMIN — TIZANIDINE 2 MG: 2 TABLET ORAL at 15:42

## 2020-01-29 RX ADMIN — VERAPAMIL HYDROCHLORIDE 120 MG: 120 TABLET, FILM COATED, EXTENDED RELEASE ORAL at 15:42

## 2020-01-29 RX ADMIN — LOSARTAN POTASSIUM: 50 TABLET, FILM COATED ORAL at 09:50

## 2020-01-29 RX ADMIN — ATORVASTATIN CALCIUM 40 MG: 40 TABLET, FILM COATED ORAL at 15:42

## 2020-01-29 RX ADMIN — TIZANIDINE 2 MG: 2 TABLET ORAL at 09:51

## 2020-01-29 RX ADMIN — ACETAMINOPHEN 650 MG: 325 TABLET, FILM COATED ORAL at 04:46

## 2020-01-29 RX ADMIN — ASPIRIN 81 MG: 81 TABLET, COATED ORAL at 09:51

## 2020-01-29 RX ADMIN — ACETAMINOPHEN 650 MG: 325 TABLET, FILM COATED ORAL at 14:32

## 2020-01-29 RX ADMIN — ENOXAPARIN SODIUM 40 MG: 40 INJECTION SUBCUTANEOUS at 09:51

## 2020-01-29 NOTE — PHYSICAL THERAPY NOTE
PT deferred at this time due to MRI  Will re-attempt as patient available     Luther Triplett [id number]

## 2020-01-29 NOTE — ASSESSMENT & PLAN NOTE
· The patient started smoking approximately 15 years ago  · Advised cessation    Nicotine patch to be ordered

## 2020-01-29 NOTE — ASSESSMENT & PLAN NOTE
· Likely cluster headache  · MRI of the brain no acute findings  · Patient seen by Neurology, appreciate input  Started verapamil EC 120mg p o  Daily with dinner, Fioricet p r n  Continue on discharge  Follow-up Neurology in 8-10 weeks

## 2020-01-29 NOTE — ASSESSMENT & PLAN NOTE
· History of hyperlipidemia lost to follow-up  · Lipid panel - , total cholesterol 203  · Started on Lipitor 40 mg p o  Daily  Continue on discharge    · Will need repeat lipid panel, CMP in 6 weeks

## 2020-01-29 NOTE — UTILIZATION REVIEW
Initial Clinical Review    Admission: Date/Time/Statement:   Orders Placed This Encounter   Procedures    Place in Observation (expected length of stay for this patient is less than two midnights)     Standing Status:   Standing     Number of Occurrences:   1     Order Specific Question:   Admitting Physician     Answer:   Godwin Vizcaino [[id number]]     Order Specific Question:   Level of Care     Answer:   Med Surg [[id number]]     ED Arrival Information     Expected Arrival Acuity Means of Arrival Escorted By Service Admission Type    - 1/28/2020 15:42 Urgent Walk-In Family Member Hospitalist Urgent    Arrival Complaint    high bp        Chief Complaint   Patient presents with    High Blood Pressure     Pt was seen at orthopedic today and was told to come to ED for high blood pressure  Pt c/o right eye burning, headache, and tingling down right arm   Headache     Assessment/Plan: 53 y o  male who presents with elevated blood pressure  The patient last saw his PCP 2012 and after his alf has not followed up for any medical care  Patient was initially seen in the emergency department 1/22/2020 for neck pain and was referred to orthopedics  During office visit today he was noted to have elevated blood pressures and due to paresthesias of right arm and headache he was referred to the emergency department where his blood pressures remain elevated  The patient's right eye pain has been present for the last two days  He denies any history of elevated blood pressures  He denies any active chest pain or shortness of breath at this time  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   At time of examination he is hungry and is requesting two (2) dylan ch  Elevated blood pressure reading without diagnosis of hypertension  Assessment & Plan  · Elevated blood pressure without history of hypertension  · Blood pressure elevated noted on ER visits dating back to 2017    · Has not had a PCP since 2012  · Will start losartan/HCT  Hydralazine p r n     Tobacco use  Assessment & Plan  · The patient started smoking approximately 15 years ago  · Advised cessation  Nicotine patch to be ordered  Headache  Assessment & Plan  · Headache musculoskeletal vs secondary to elevated blood pressure  · Tylenol p r n  Darletta Pac · Will check MRI of brain and have neurology evaluate  Arm paresthesia, right  Assessment & Plan  · Right arm paresthesias  Appears secondary to cervical radiculopathy  · Will check MRI of cervical spine  · Add tizanidine  · Avoiding NSAIDs secondary to elevated blood pressure  Hyperlipidemia  Assessment & Plan  · History of hyperlipidemia lost to follow-up  · Check lipid profile in a m      ED Triage Vitals [01/28/20 1600]   Temperature Pulse Respirations Blood Pressure SpO2   98 6 °F (37 °C) 91 18 (!) 207/111 100 %      Temp Source Heart Rate Source Patient Position - Orthostatic VS BP Location FiO2 (%)   Tympanic Monitor Sitting Left arm --      Pain Score       8        Wt Readings from Last 1 Encounters:   01/28/20 88 2 kg (194 lb 7 1 oz)     Additional Vital Signs:   01/29/20 0800  97 8 °F (36 6 °C)  75  17  131/84  96 %  None (Room air)  Lying   01/29/20 0349  98 °F (36 7 °C)  80  18  117/76  96 %  None (Room air)  Lying   01/28/20 2340  97 7 °F (36 5 °C)  73  18  128/74  95 %  None (Room air)  Lying   01/28/20 2044  97 5 °F (36 4 °C)  74  18  138/103Abnormal   98 %  None (Room air)  Lying   01/28/20 1940        191/100Abnormal          01/28/20 1915    65  18  182/107Abnormal   99 %  None (Room air)  Lying   01/28/20 1730        184/119Abnormal          01/28/20 1722        184/119Abnormal              Pertinent Labs/Diagnostic Test Results:   1/28/20 cxr No acute cardiopulmonary disease    Results from last 7 days   Lab Units 01/29/20  0449 01/28/20  1603   WBC Thousand/uL 7 92 9 58   HEMOGLOBIN g/dL 16 2 16 7   HEMATOCRIT % 48 9 50 3*   PLATELETS Thousands/uL 508* 525*   NEUTROS ABS Thousands/µL  --  4 58     Results from last 7 days   Lab Units 01/29/20  0449 01/28/20  1603   SODIUM mmol/L 137 140   POTASSIUM mmol/L 4 4 3 7   CHLORIDE mmol/L 105 103   CO2 mmol/L 22 30   ANION GAP mmol/L 10 7   BUN mg/dL 17 15   CREATININE mg/dL 0 93 1 07   EGFR ml/min/1 73sq m 92 78   CALCIUM mg/dL 8 7 9 2   MAGNESIUM mg/dL  --  2 0     Results from last 7 days   Lab Units 01/29/20  0449 01/28/20  1603   AST U/L 31 20   ALT U/L 29 32   ALK PHOS U/L 92 100   TOTAL PROTEIN g/dL 7 5 8 0   ALBUMIN g/dL 3 3* 4 0   TOTAL BILIRUBIN mg/dL 0 40 0 30         Results from last 7 days   Lab Units 01/29/20  0449 01/28/20  1603   GLUCOSE RANDOM mg/dL 95 134     Results from last 7 days   Lab Units 01/28/20  1603   TROPONIN I ng/mL <0 02         Results from last 7 days   Lab Units 01/28/20  1603   PROTIME seconds 10 0   INR  0 92   PTT seconds 29     Results from last 7 days   Lab Units 01/28/20  1603   TSH 3RD GENERATON uIU/mL 2 406     ED Treatment:   Medication Administration from 01/28/2020 1542 to 01/28/2020 2033       Date/Time Order Dose Route Action Action by Comments     01/28/2020 1607 HYDROmorphone (DILAUDID) injection 1 mg 1 mg Intravenous Given Maryjane Paget, RN      01/28/2020 1628 iohexol (OMNIPAQUE) 350 MG/ML injection (MULTI-DOSE) 85 mL 85 mL Intravenous Given Bishop Bacon      01/28/2020 1940 hydrALAZINE (APRESOLINE) injection 10 mg 10 mg Intravenous Given Farshad Sosa RN         Past Medical History:   Diagnosis Date    Hyperlipidemia      Present on Admission:   Elevated blood pressure reading without diagnosis of hypertension   Hyperlipidemia   Arm paresthesia, right   Headache   Tobacco use      Admitting Diagnosis: High blood pressure [I10]  Numbness and tingling [R20 0, R20 2]  Headache [R51]  Age/Sex: 53 y o  male  Admission Orders:  Observation  Tele mon  Mri brain   Mri cervical spine   Scheduled Medications:    Medications:  aspirin 81 mg Oral Daily   enoxaparin 40 mg Subcutaneous Q24H [address]   losartan potassium-hydrochlorothiazide (HYZAAR 50/12  5) combination  Oral Daily   nicotine 1 patch Transdermal Daily   tiZANidine 2 mg Oral TID     Continuous IV Infusions:     PRN Meds:    acetaminophen 650 mg Oral Q6H PRN   hydrALAZINE 10 mg Intravenous Q4H PRN   HYDROcodone-acetaminophen 1 tablet Oral Q4H PRN   magnesium hydroxide 30 mL Oral BID PRN       IP CONSULT TO NEUROLOGY    Network Utilization Review Department  [email or web address]  ATTENTION: Please call with any questions or concerns to [phone] and carefully listen to the prompts so that you are directed to the right person  All voicemails are confidential   Cande Monroe all requests for admission clinical reviews, approved or denied determinations and any other requests to dedicated fax number below belonging to the campus where the patient is receiving treatment   List of dedicated fax numbers for the Facilities:  [address] DENIALS (Administrative/Medical Necessity) [phone]   [address] (Maternity/NICU/Pediatrics) [phone]   Debbie List [phone]   Lawrence Juares [phone]   Miesha Boston [phone]   Anastasia Parr [phone]   [address] [place] [address] [address] [place] [address] [phone]   [facility]  [phone]   [address] [facility]  [address] [facility] [address] [facility] [phone]

## 2020-01-29 NOTE — PLAN OF CARE
Problem: CARDIOVASCULAR - ADULT  Goal: Maintains optimal cardiac output and hemodynamic stability  Description  INTERVENTIONS:  - Monitor I/O, vital signs and rhythm  - Monitor for S/S and trends of decreased cardiac output  - Administer and titrate ordered vasoactive medications to optimize hemodynamic stability  - Assess quality of pulses, skin color and temperature  - Assess for signs of decreased coronary artery perfusion  - Instruct patient to report change in severity of symptoms  Outcome: Progressing  Goal: Absence of cardiac dysrhythmias or at baseline rhythm  Description  INTERVENTIONS:  - Continuous cardiac monitoring, vital signs, obtain 12 lead EKG if ordered  - Administer antiarrhythmic and heart rate control medications as ordered  - Monitor electrolytes and administer replacement therapy as ordered  Outcome: Progressing     Problem: DISCHARGE PLANNING  Goal: Discharge to home or other facility with appropriate resources  Description  INTERVENTIONS:  - Identify barriers to discharge w/patient and caregiver  - Arrange for needed discharge resources and transportation as appropriate  - Identify discharge learning needs (meds, wound care, etc )  - Arrange for interpretive services to assist at discharge as needed  - Refer to Case Management Department for coordinating discharge planning if the patient needs post-hospital services based on physician/advanced practitioner order or complex needs related to functional status, cognitive ability, or social support system  Outcome: Progressing     Problem: Knowledge Deficit  Goal: Patient/family/caregiver demonstrates understanding of disease process, treatment plan, medications, and discharge instructions  Description  Complete learning assessment and assess knowledge base    Interventions:  - Provide teaching at level of understanding  - Provide teaching via preferred learning methods  Outcome: Progressing

## 2020-01-29 NOTE — ASSESSMENT & PLAN NOTE
· Right arm paresthesias  Appears secondary to cervical radiculopathy  · Will check MRI of cervical spine  · Add tizanidine    · Avoiding NSAIDs secondary to elevated blood pressure

## 2020-01-29 NOTE — H&P
H&P- Art Howe 1965, 53 y o  male MRN: [id number]  Unit/Bed#: ED 03 Encounter: [id number]  Primary Care Provider: No primary care provider on file  Date and time admitted to hospital: 1/28/2020  3:53 PM        Assessment and Plan  * Elevated blood pressure reading without diagnosis of hypertension  Assessment & Plan  · Elevated blood pressure without history of hypertension  · Blood pressure elevated noted on ER visits dating back to 2017  · Has not had a PCP since 2012  · Will start losartan/HCT  Hydralazine p r n       Tobacco use  Assessment & Plan  · The patient started smoking approximately 15 years ago  · Advised cessation  Nicotine patch to be ordered    Headache  Assessment & Plan  · Headache musculoskeletal vs secondary to elevated blood pressure  · Tylenol p r n  Zhang Campanile · Will check MRI of brain and have neurology evaluate  Arm paresthesia, right  Assessment & Plan  · Right arm paresthesias  Appears secondary to cervical radiculopathy  · Will check MRI of cervical spine  · Add tizanidine  · Avoiding NSAIDs secondary to elevated blood pressure    Hyperlipidemia  Assessment & Plan  · History of hyperlipidemia lost to follow-up  · Check lipid profile in a m  VTE Prophylaxis: Enoxaparin (Lovenox)  Code Status:  Level one full code  Anticipated Length of Stay:  Patient will be admitted on an Observation basis with an anticipated length of stay of  less than 2 midnights  Justification for Hospital Stay: Elevated blood pressure reading without diagnosis of hypertension  Total Time for Visit, including Counseling / Coordination of Care: NA mins  Greater than 50% of this total time spent on direct patient counseling and coordination of care  Chief Complaint:     Chief Complaint   Patient presents with    High Blood Pressure     Pt was seen at orthopedic today and was told to come to ED for high blood pressure  Pt c/o right eye burning, headache, and tingling down right arm   Headache     History of Present Illness:    Suzi Martínez is a 53 y o  male who presents with elevated blood pressure  The patient last saw his PCP 2012 and after his group home has not followed up for any medical care  Patient was initially seen in the emergency department 1/22/2020 for neck pain and was referred to orthopedics  During office visit today he was noted to have elevated blood pressures and due to paresthesias of right arm and headache he was referred to the emergency department where his blood pressures remain elevated  The patient's right eye pain has been present for the last two days  He denies any history of elevated blood pressures  He denies any active chest pain or shortness of breath at this time  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   At time of examination he is hungry and is requesting two (2) dylan ch  Review of Systems:  History obtained from chart review and the patient  General ROS: negative for - chills or fever  Psychological ROS: negative for - hallucinations or hostility  Ophthalmic ROS: positive for - eye pain  Respiratory ROS: negative for - cough or shortness of breath  Cardiovascular ROS: negative for - chest pain  Gastrointestinal ROS: negative for - heartburn or melena  Genito-Urinary ROS: negative for - dysuria  Musculoskeletal ROS: positive for - pain in neck and paresthesias of right arm  Neurological ROS: positive for - headache, right eye pain  Paresthesia of right arm  Otherwise, all other 12 point review of systems normal     Past Medical and Surgical History:   Past Medical History:   Diagnosis Date    Hyperlipidemia      Past Surgical History:   Procedure Laterality Date    APPENDECTOMY      SPLENECTOMY       Meds/Allergies: Allergies: No Known Allergies  Prior to Admission Medications   Prescriptions Last Dose Informant Patient Reported?  Taking?   diazepam (VALIUM) 5 mg tablet   No No   Sig: Take 1 tablet (5 mg total) by mouth every 8 (eight) hours as needed for muscle spasms for up to 12 doses   naproxen (NAPROSYN) 500 mg tablet   No No   Sig: Take 1 tablet (500 mg total) by mouth 2 (two) times a day with meals for 7 days      Facility-Administered Medications: None     Social History:     Social History     Socioeconomic History    Marital status: Legally      Spouse name: Not on file    Number of children: Not on file    Years of education: Not on file    Highest education level: Not on file   Occupational History    Occupation: supervisor     Employer: [employer]   Social Needs    Financial resource strain: Not on file    Food insecurity:     Worry: Not on file     Inability: Not on file    Transportation needs:     Medical: Not on file     Non-medical: Not on file   Tobacco Use    Smoking status: Current Some Day Smoker     Packs/day: 1 00     Types: Cigarettes    Smokeless tobacco: Never Used   Substance and Sexual Activity    Alcohol use: No    Drug use: No    Sexual activity: Not on file   Lifestyle    Physical activity:     Days per week: Not on file     Minutes per session: Not on file    Stress: Not on file   Relationships    Social connections:     Talks on phone: Not on file     Gets together: Not on file     Attends Congregation service: Not on file     Active member of club or organization: Not on file     Attends meetings of clubs or organizations: Not on file     Relationship status: Not on file    Intimate partner violence:     Fear of current or ex partner: Not on file     Emotionally abused: Not on file     Physically abused: Not on file     Forced sexual activity: Not on file   Other Topics Concern    Not on file   Social History Narrative    Not on file     Patient Pre-hospital Living Situation:   Patient Pre-hospital Level of Mobility:   Patient Pre-hospital Diet Restrictions:     Family History:  Family History   Problem Relation Age of Onset    Dementia Father Physical Exam:   Vitals:   Blood Pressure: (!) 191/100 (01/28/20 1940)  Pulse: 65 (01/28/20 1915)  Temperature: 98 6 °F (37 °C) (01/28/20 1600)  Temp Source: Tympanic (01/28/20 1600)  Respirations: 18 (01/28/20 1915)  Weight - Scale: 88 3 kg (194 lb 9 6 oz) (01/28/20 1600)  SpO2: 99 % (01/28/20 1915)    General appearance: alert, appears stated age and cooperative  Skin: Skin color, texture, turgor normal  No rashes or lesions  Head: Normocephalic, without obvious abnormality, atraumatic  Eyes: conjunctivae/corneas clear  PERRL, EOM's intact  Lungs: clear to auscultation bilaterally  Heart: regular rate and rhythm  Abdomen: soft, non-tender; bowel sounds normal; no masses,  no organomegaly  Back: range of motion limited by cervical tenderness  Extremities: extremities normal, atraumatic, no cyanosis or edema  Neurologic: Grossly normal  Psychiatric:  Appropriate mood    Lab Results: I have personally reviewed pertinent reports  Results from last 7 days   Lab Units 01/28/20  1603   WBC Thousand/uL 9 58   HEMOGLOBIN g/dL 16 7   HEMATOCRIT % 50 3*   PLATELETS Thousands/uL 525*   NEUTROS PCT % 48   LYMPHS PCT % 34   MONOS PCT % 12   EOS PCT % 5     Results from last 7 days   Lab Units 01/28/20  1603   SODIUM mmol/L 140   POTASSIUM mmol/L 3 7   CHLORIDE mmol/L 103   CO2 mmol/L 30   ANION GAP mmol/L 7   BUN mg/dL 15   CREATININE mg/dL 1 07   CALCIUM mg/dL 9 2   ALBUMIN g/dL 4 0   TOTAL BILIRUBIN mg/dL 0 30   ALK PHOS U/L 100   ALT U/L 32   AST U/L 20   EGFR ml/min/1 73sq m 78   GLUCOSE RANDOM mg/dL 134     Results from last 7 days   Lab Units 01/28/20  1603   INR  0 92     Results from last 7 days   Lab Units 01/28/20  1603   TROPONIN I ng/mL <0 02               Imaging: I have personally reviewed pertinent films in PACS  Cta Head And Neck With And Without Contrast  Result Date: 1/28/2020  Impression: No acute intracranial disease  No large vessel flow restrictive disease within the head or neck   Workstation performed: [id number]     Xr Chest 1 View  Result Date: 1/28/2020  Impression: No acute cardiopulmonary disease  Workstation performed: [id number]       EKG, Pathology, and Other Studies Reviewed on Admission:   EKG  Result Date: 01/28/20  Personally reviewed strips with impression of:  Normal sinus rhythm 84 bpm    Allscripts/ Epic Records Reviewed: Yes    ** Please Note: This note has been constructed using a voice recognition system   **

## 2020-01-29 NOTE — ASSESSMENT & PLAN NOTE
· Right arm paresthesias with mild weakness for about 1 month  Appears secondary to cervical radiculopathy  · Patient reports cervical spine pain for about 1 week  · CTA head and neck unremarkable  · MRI of cervical spine - Mid cervical spondylosis and osteoarthritis  Stenosis left C5-6 and left C6-7 foramen, correlate for left C6 and left C7 radiculitis respectively  · MRI of the brain - no acute disease  · 2D echo with bubble study - normal EF,no RWMA,G1DD,no PFO  · Added tizanidine on admission  · Avoiding NSAIDs secondary to elevated blood pressure  · Patient seen by Neurology, appreciate input  Recommend baby aspirin 81 mg p o  Daily, Lipitor 40 mg p o  Daily, outpatient neck and right arm PT, follow-up in 8-10 weeks  · Communicated with Neurosurgery about MRI CS findings, agreeable with pain management, outpatient PT,Follow up as outpatient  · Follow-up PCP in 1 week  Patient will contact [facility] post discharge

## 2020-01-29 NOTE — ASSESSMENT & PLAN NOTE
· Elevated blood pressure without history of hypertension  · Blood pressure elevated noted on ER visits dating back to 2017  · Has not had a PCP since 2012  · Will start losartan/HCT  Hydralazine sharif Jacobs

## 2020-01-29 NOTE — DISCHARGE INSTRUCTIONS
Continue aspirin, Lipitor, losartan/HCT, verapamil, Fioricet p r n  Monitor BP at home,goal BP < 140/90  Hold Losartan/HCT for SBP < 130  Outpatient PT  FOLLOW-UP PCP in 1 week, follow up neurology in 8-10 weeks, follow-up pain management ASAP post discharge, follow-up neurosurgery in 1 month      Recommend repeat lipid panel, CMP in 6 weeks

## 2020-01-29 NOTE — ASSESSMENT & PLAN NOTE
· Headache musculoskeletal vs secondary to elevated blood pressure  · Tylenol shairf Avery · Will check MRI of brain and have neurology evaluate

## 2020-01-29 NOTE — ASSESSMENT & PLAN NOTE
· Elevated blood pressure without history of hypertension  · Blood pressure elevated noted on ER visits dating back to 2017  · Has not had a PCP since 2012  · Started on losartan/HCT  Hydralazine sharif Rockwell · SBP's 130 today  · Continue losartan/HCT on discharge  Added verapamil for headache by Neurology  Recommend to monitor blood pressure at home,hold losartan/HCT for SBP < 130  Goal BP < 140/90

## 2020-01-29 NOTE — ASSESSMENT & PLAN NOTE
· The patient started smoking approximately 15 years ago  · Advised cessation    Continue nicotine patch

## 2020-01-29 NOTE — DISCHARGE SUMMARY
Discharge- Paul Lei 1965, 53 y o  male MRN: [id number]    Unit/Bed#: [address] [place] [address] Encounter: [id number]    Primary Care Provider: No primary care provider on file  Date and time admitted to hospital: 1/28/2020  3:53 PM        * Elevated blood pressure reading without diagnosis of hypertension  Assessment & Plan  · Elevated blood pressure without history of hypertension  · Blood pressure elevated noted on ER visits dating back to 2017  · Has not had a PCP since 2012  · Started on losartan/HCT  Hydralazine p r n  Ijeoma Arbour · SBP's 130 today  · Continue losartan/HCT on discharge  Added verapamil for headache by Neurology  Recommend to monitor blood pressure at home,hold losartan/HCT for SBP < 130  Goal BP < 140/90    Arm paresthesia, right  Assessment & Plan  · Right arm paresthesias with mild weakness  Appears secondary to cervical radiculopathy  · CTA head and neck unremarkable  · MRI of cervical spine - Mid cervical spondylosis and osteoarthritis  Stenosis left C5-6 and left C6-7 foramen, correlate for left C6 and left C7 radiculitis respectively  · MRI of the brain - no acute disease  · 2D echo with bubble study - normal EF,no RWMA,G1DD,no PFO  · Added tizanidine on admission  · Avoiding NSAIDs secondary to elevated blood pressure  · Patient seen by Neurology, appreciate input  Recommend baby aspirin 81 mg p o  Daily, Lipitor 40 mg p o  Daily, outpatient neck and right arm PT, follow-up in 8-10 weeks  · Communicated with Neurosurgery about MRI CS findings, agreeable with pain management, outpatient PT,Follow up as outpatient  · Follow-up PCP in 1 week  Patient will contact [facility] post discharge  Headache  Assessment & Plan  · Likely cluster headache  · MRI of the brain no acute findings  · Patient seen by Neurology, appreciate input  Started verapamil EC 120mg p o  Daily with dinner, Fioricet p r n  Continue on discharge  Follow-up Neurology in 8-10 weeks      Tobacco use  Assessment & Plan  · The patient started smoking approximately 15 years ago  · Advised cessation  Continue nicotine patch    Hyperlipidemia  Assessment & Plan  · History of hyperlipidemia lost to follow-up  · Lipid panel - , total cholesterol 203  · Started on Lipitor 40 mg p o  Daily  Continue on discharge  · Will need repeat lipid panel, CMP in 6 weeks        Discharging Physician / Practitioner: LAUREN Mccoy  PCP: No primary care provider on file  Admission Date: 1/28/2020  Discharge Date: 01/29/20    Reason for Admission: High Blood Pressure (Pt was seen at orthopedic today and was told to come to ED for high blood pressure    Pt c/o right eye burning, headache, and tingling down right arm ) and Headache        Resolved Problems  Date Reviewed: 1/29/2020    None          Consultations During Hospital Stay:  IP CONSULT TO NEUROLOGY    Procedures Performed:     · none    Significant Findings / Test Results:     · As below  Results from last 7 days   Lab Units 01/29/20  0449 01/28/20  1603   WBC Thousand/uL 7 92 9 58   HEMOGLOBIN g/dL 16 2 16 7   PLATELETS Thousands/uL 508* 525*     Results from last 7 days   Lab Units 01/29/20  0449 01/28/20  1603   SODIUM mmol/L 137 140   POTASSIUM mmol/L 4 4 3 7   CHLORIDE mmol/L 105 103   CO2 mmol/L 22 30   BUN mg/dL 17 15   CREATININE mg/dL 0 93 1 07   CALCIUM mg/dL 8 7 9 2   TOTAL BILIRUBIN mg/dL 0 40 0 30   ALK PHOS U/L 92 100   ALT U/L 29 32   AST U/L 31 20     Results from last 7 days   Lab Units 01/28/20  1603   INR  0 92     Results from last 7 days   Lab Units 01/28/20  1603   TROPONIN I ng/mL <0 02     Lab Results   Component Value Date/Time    HGBA1C 5 4 01/29/2020 04:49 AM             Blood Culture: No results found for: BLOODCX  Urine Culture: No results found for: URINECX  Sputum Culture: No components found for: SPUTUMCX  Wound Culture: No results found for: WOUNDCULT     MRI brain wo contrast   Final Result by Ethel Huffman MD (01/29 1508)      No acute disease  Parenchymal changes consistent with mild degree of chronic small vessel disease  Workstation performed: [id number]         MRI cervical spine wo contrast   Final Result by Adrian Reis MD (01/29 1531)      Mid cervical spondylosis and osteoarthritis  Stenosis left C5-6 and left C6-7 foramen, correlate for left C6 and left C7 radiculitis respectively  Patient reported symptoms contralateral                Workstation performed: [id number]         XR chest 1 view   Final Result by Guilherme Dallas MD (01/28 1653)      No acute cardiopulmonary disease  Workstation performed: [id number]         CTA head and neck with and without contrast   Final Result by Adrian Reis MD (01/28 1710)      No acute intracranial disease  No large vessel flow restrictive disease within the head or neck  Workstation performed: [id number]                Incidental Findings:   · none     Test Results Pending at Discharge (will require follow up):   · none     Outpatient Tests Requested:  · none    Complications:  none    Reason for Admission:   Chief Complaint   Patient presents with    High Blood Pressure     Pt was seen at orthopedic today and was told to come to ED for high blood pressure  Pt c/o right eye burning, headache, and tingling down right arm  [facility] Headache       [facility] Hospital Course:     Per HPI: Simon Atwood is a [age] [place] y o  male patient with a PMH of hyperlipidemia, nicotine abuse who originally presented to the hospital on 1/28/2020 due to hypertension, right arm paresthesia with weakness,neck pain,headache  MRI of the brain no acute findings  MRI cervical spine as above  Lipid panel showed ,   A1C normal   Patient was seen by Neurology, suspect old infarct even though MRI  brain was negative  Recommend baby aspirin, Lipitor 40mg, verapamil for cluster headache and Fioricet p r n    Recommend referred to pain management, neck and right arm PT on discharge  Patient was started on hyzaar on admission for hypertension  's today  Patient was seen by PT, recommend outpatient PT  Communicated with Neurosurgery, recommend outpatient follow-up in 1 month, refer to pain management on discharge  Patient is cleared for discharge by Neurology, follow-up in 8-10 weeks  Patient does not have a PCP currently  Will contact [facility] for follow up appointment post discharge  Platelet counts elevated  Suspect 2/2 splenectomy  Started baby aspirin  Advised patient to monitor BP at home  Hold Hyzaar for SBP < 130  Goal BP < 140/90  Advised smoking cessation  Spoke to patient and family at bedside, all questions answered  Hospital Course:    Please see above list of diagnoses and related plan for additional information  Condition at Discharge: good       Discharge Day Visit / Exam:     Subjective:  Patient reports right numbness tingling mild weakness for about 1 month  Reports cervical spine pain for about 1 week and was seen in ED here  Was taking naproxen at home with relief  Reports right sided headache  Vitals: Blood Pressure: 137/85 (01/29/20 1540)  Pulse: 77 (01/29/20 1649)  Temperature: 98 1 °F (36 7 °C) (01/29/20 1649)  Temp Source: Oral (01/29/20 0800)  Respirations: 17 (01/29/20 0800)  Height: 6' (182 9 cm) (01/28/20 2044)  Weight - Scale: 88 2 kg (194 lb 7 1 oz) (01/28/20 2044)  SpO2: 97 % (01/29/20 1649)  Exam:   Physical Exam   Constitutional: He is oriented to person, place, and time  He appears well-developed and well-nourished  HENT:   Head: Normocephalic and atraumatic  Eyes: Pupils are equal, round, and reactive to light  EOM are normal    Neck: Normal range of motion  Neck supple  No JVD present  No tracheal deviation present  No thyromegaly present  Cardiovascular: Normal rate and regular rhythm  No murmur heard  Pulmonary/Chest: Effort normal and breath sounds normal  No respiratory distress   He has no wheezes  He has no rales  Abdominal: Soft  Bowel sounds are normal  He exhibits no distension  There is no tenderness  There is no guarding  Musculoskeletal: He exhibits no edema, tenderness or deformity  Cervical spine tender to touch  Neurological: He is alert and oriented to person, place, and time  RUE str 4/5,rest of extremity 5/5, follows commands  Skin: Skin is warm and dry  Psychiatric: He has a normal mood and affect  Judgment normal    Nursing note and vitals reviewed  Discharge instructions/Information to patient and family:   See after visit summary for information provided to patient and family  Provisions for Follow-Up Care:  See after visit summary for information related to follow-up care and any pertinent home health orders  Disposition:     Home    Planned Readmission: no     Discharge Statement:  I spent 40 minutes discharging the patient  This time was spent on the day of discharge  I had direct contact with the patient on the day of discharge  Greater than 50% of the total time was spent examining patient, answering all patient questions, arranging and discussing plan of care with patient as well as directly providing post-discharge instructions  Additional time then spent on discharge activities  Discharge Medications:  See after visit summary for reconciled discharge medications provided to patient and family        ** Please Note: This note has been constructed using a voice recognition system **

## 2020-01-29 NOTE — CONSULTS
[facility] 38   Neurology Initial Consult      Information obtained from: Patient  Chief Complaint   Patient presents with    High Blood Pressure     Pt was seen at orthopedic today and was told to come to ED for high blood pressure  Pt c/o right eye burning, headache, and tingling down right arm   Headache         Assessment/Plan:      1  Cluster Headache  2  Hypertension   3  Hyperlipidemia  4  Incidental Cervical radiculopathy  5  Right-sided paresthesia  6  Tobacco use     -MRI of Brain pending  -MRI of Cervical Spine pending  -Echo with shunt/bubble study re: poss  CVA  -Zanaflex 2 mg 3 times a day  -Verapamil EC 120mg daily with dinner  -Fioricet Q4hrs PRN cluster/migraine headache Max dose of 6/day  -losartan-HCTZ 50/12 5 mg daily  -baby aspirin daily  -nicotine patch to continue  -PTOT evaluation  -Obtain PCP for health maintenance    HPI:  Mr Ramesh Troy this is a 76-year-old male with medical comorbidities of hypertension, hyperlipidemia, gout and splenectomy secondary trauma came to the ER with significant neck pain numbness and tingling in the right upper extremity as well as ongoing headache  Patient reports headache began approximately 1 month ago he has no history of frequent headaches in the past and reports it to be a right frontal headache that involves his right eye  Patient reports that on 01/22/2020 he developed severe neck pain which he came to the ER for treatment, received a CT scan which showed cervical disc degeneration and was advised to follow-up with orthopedics  It was noted at that time patient had high blood pressure  Patient went on 01/28/2020 to see orthopedic physician and was advised to come to the ER due to significantly high blood pressure with ongoing symptoms such as paresthesia and headache  Patient presented to the ER with blood pressure of 207/111 ongoing reports of headache right eye burning and numbness and tingling down the right arm    Patient admitted at that time for further evaluation  Currently patient reports slight headache this morning but notes received medication which seems to be helping  Patient reported to be a burning sometimes throbbing pain that leads to the eye watering  Patient denies visual disturbance, photophobia, phonophobia, gait disturbances, lightheaded dizzy, nausea or vomiting  Patient notes that the headache has been consistently the same for the last approximately one-month it is worse when he bends over and during variable position changes  Patient has been using Tylenol extra-strength for the headache which does not seem to be helping  Patient noted having some nausea and vomiting when the neck pain developed but has not had any recently  Currently patient has ongoing right-sided paresthesia to the upper posterior portion of the right arm from the shoulder to the elbow as well as bilateral fingers which he feels is related to the headaches  Noted ongoing neck pain with limited range of motion          Past Medical History:   Diagnosis Date    Hyperlipidemia      -hypertension:  Patient notes having had hypertension since 2008 without medication  -gout:  Patient is having fairly recent onset of gout went to local urgent care for tx  -pneumonia    Past Surgical History:   Procedure Laterality Date    APPENDECTOMY      SPLENECTOMY         No Known Allergies      Current Facility-Administered Medications:     acetaminophen (TYLENOL) tablet 650 mg, 650 mg, Oral, Q6H PRN, Elliot Navarro DO, 650 mg at 01/29/20 0446    aspirin (ECOTRIN LOW STRENGTH) EC tablet 81 mg, 81 mg, Oral, Daily, Elliot Navarro DO, 81 mg at 01/29/20 0951    enoxaparin (LOVENOX) subcutaneous injection 40 mg, 40 mg, Subcutaneous, Q24H [address], Elliot Navarro DO, 40 mg at 01/29/20 0951    hydrALAZINE (APRESOLINE) injection 10 mg, 10 mg, Intravenous, Q4H PRN, Shiavm Owens DO    HYDROcodone-acetaminophen (NORCO) 5-325 mg per tablet 1 tablet, 1 tablet, Oral, Q4H PRN, Natividad Ramirez DO    losartan potassium-hydrochlorothiazide (HYZAAR 50/12  5) combination, , Oral, Daily, Elliot Navarro,     magnesium hydroxide (MILK OF MAGNESIA) 400 mg/5 mL oral suspension 30 mL, 30 mL, Oral, BID PRN, Natividad Ramirez, DO    nicotine (NICODERM CQ) 7 mg/24hr TD 24 hr patch 1 patch, 1 patch, Transdermal, Daily, Elliot Navarro DO    tiZANidine (ZANAFLEX) tablet 2 mg, 2 mg, Oral, TID, Elliot Navarro DO, 2 mg at 01/29/20 1946    Social History     Socioeconomic History    Marital status: Legally      Spouse name: Not on file    Number of children: Not on file    Years of education: Not on file    Highest education level: Not on file   Occupational History    Occupation: supervisor     Employer: [employer]   Social Needs    Financial resource strain: Not on file    Food insecurity:     Worry: Not on file     Inability: Not on file    Transportation needs:     Medical: Not on file     Non-medical: Not on file   Tobacco Use    Smoking status: Current Some Day Smoker     Packs/day: 1 00     Types: Cigarettes    Smokeless tobacco: Never Used   Substance and Sexual Activity    Alcohol use: Never     Frequency: Never    Drug use: No    Sexual activity: Not on file   Lifestyle    Physical activity:     Days per week: Not on file     Minutes per session: Not on file    Stress: Not on file   Relationships    Social connections:     Talks on phone: Not on file     Gets together: Not on file     Attends Mu-ism service: Not on file     Active member of club or organization: Not on file     Attends meetings of clubs or organizations: Not on file     Relationship status: Not on file    Intimate partner violence:     Fear of current or ex partner: Not on file     Emotionally abused: Not on file     Physically abused: Not on file     Forced sexual activity: Not on file   Other Topics Concern    Not on file   Social History Narrative    Not on file       Family History   Problem Relation Age of Onset    Dementia Father      Notes additional family history of hypertension and coronary artery disease colon and lung cancers    Review of systems:  Please see HPI for positive symptoms  No fever, no chills, no weight change  Ocular: no blurred vision  HEENT:  No sore throat, earache, or congestion  COR:  No chest pain  No palpitations  Lungs:  no sob, wheezing,   GI:  no  nausea, no vomiting, no diarrhea, no constipation, no anorexia  :  No dysuria, frequency, or urgency  No hematuria  Musculoskeletal:  No joint pain or swelling or edema  Skin:  No rash or itching  Psychiatric:  no anxiety, no depression  Endocrine:  No polyuria or polydipsia  Physical examination:  Vitals:    01/29/20 0800   BP: 131/84   Pulse: 75   Resp: 17   Temp: 97 8 °F (36 6 °C)   SpO2: 96%       GENERAL APPEARANCE:  The patient is alert, oriented  He is in no acute distress  HEENT:  Head is normocephalic  The sinuses are otherwise nontender  Pupils are equal and reactive  NECK:   tender to palpation range of motion limited to rotation flexion and extension    HEART:  Regular rate and rhythm  LUNGS:  clear to auscultation  No crackles or wheezes are heard  ABDOMEN:  Soft, nontender, nondistended with good bowel sounds heard  EXTREMITIES:  Without cyanosis, clubbing or edema  Mental status: The patient is alert, attentive, and oriented  Speech is clear and fluent, good repetition, comprehension, and naming  Cranial nerves:  CN II: Visual fields are full to confrontation  Fundoscopic exam is normal with sharp discs and no vascular changes    Pupils are 3 mm and briskly reactive to light  CN III, IV, VI: At primary gaze, there is no eye deviation  CN V: Facial sensation is intact to pinprick in all 3 divisions bilaterally  Corneal responses are intact  CN VII: Face is symmetric with normal eye closure and smile    CN VIII: Hearing is normal to rubbing fingers  CN IX, X: Palate elevates symmetrically  Phonation is normal   CN XI: Head turning and shoulder shrug are intact, limited to range of motion restrictions 2nd pain  CN XII: Tongue is midline with normal movements and no atrophy  Motor: There is chief pronator drift of out-stretched arms on right  Muscle bulk and tone are decreased with right upper extremity and right dorsal extension  Patient noted he is afraid to give full effort due to pain  Muscle exam  Arm Right Left Leg Right Left   Deltoid 4+/5 5/5 Iliopsoas 5/5 5/5   Biceps 4+/5 5/5 Quads 5/5 5/5   Triceps 4+/5 5/5 Hamstrings 5/5 5/5   Wrist Extension   Ankle Dorsi Flexion 5/5 5/5   Wrist Flexion   Ankle Plantar Flexion 4+/5 5/5   Interossei   Ankle Eversion     APB   Ankle Inversion       Reflexes   RJ BJ TJ KJ AJ Plantars Kenyon's   Right 2+ 2+ 2+ 2+ 2+ Downgoing Not present   Left 2+ 2+ 2+ 2+ 2+ Downgoing Not present     Sensory:  Light touch, pinprick, cold sense, and vibration sense are intact in fingers and toes however diminished in the right upper and lower extremity  Coordination:  Rapid alternating movements and fine finger movements are intact  There is no dysmetria on finger-to-nose and heel-knee-shin  There are no abnormal or extraneous movements  Romberg negative    Gait/Stance:  heel/toe walking and tandem gait is wobbly    Lab Results   Component Value Date    WBC 7 92 01/29/2020    HGB 16 2 01/29/2020    HCT 48 9 01/29/2020    MCV 92 01/29/2020     (H) 01/29/2020     Lab Results   Component Value Date    HGBA1C 5 4 01/29/2020     Lab Results   Component Value Date    ALT 29 01/29/2020    AST 31 01/29/2020    ALKPHOS 92 01/29/2020     Lab Results   Component Value Date    CALCIUM 8 7 01/29/2020    K 4 4 01/29/2020    CO2 22 01/29/2020     01/29/2020    BUN 17 01/29/2020    CREATININE 0 93 01/29/2020     Cholesterol 203        Review of reports and notes reveal:  Radiology  Cta Head And Neck With And Without Contrast Result Date: 1/28/2020 No acute intracranial disease  No large vessel flow restrictive disease within the head or neck  Ct Cervical Spine Without Contrast Result Date: 1/22/2020 No cervical spine fracture or traumatic malalignment  Multilevel degenerative disc disease and facet arthropathy  Independent Interpretation of images or specimens:  MRI brain: No acute disease, parenchymal changes consistent with mild degree of chronic small vessel disease  MRI of cervical spine: Mid Cervical spondylosis and osteoarthritis  Stenosis of Left C5-6 andC6-7 foramen, correlate for Left C6 and C7 radiculitis respectively  Echo:   -LV EF 55-60%  -Bubble study done, no R to L shunt noted  -B/L atrium normal in size    Thank you for this consult  Total time of encounter: 70  More than 50% of time was spent in counseling and coordination of care of patient

## 2020-01-29 NOTE — PHYSICAL THERAPY NOTE
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 independent with driving    Restrictions/Precautions   Other Precautions Pain  (R cervical pain and radicular R UE pain and nmbness)   General   Additional Pertinent History chart reviewed, patient admitted withHTN, tingling and numbness and headache  Patient with one month history of R upper arm numbness, just saw ortho doctor yesterday and ordered for out patient PT for radicular pain  now with headache, R eye pain and burning and headache  Family/Caregiver Present Yes   Cognition   Overall Cognitive Status WFL   Arousal/Participation Cooperative   Orientation Level Oriented X4   Following Commands Follows all commands and directions without difficulty   RLE Assessment   RLE Assessment   (ROM WFL, strength 4/5)   LLE Assessment   LLE Assessment   (ROM WFL, strength 4/5)   Coordination   Movements are Fluid and Coordinated 0   Coordination and Movement Description decreased coordination with history of R knee effusion as per patient   Bed Mobility   Supine to Sit 7  Independent   Sit to Supine 7  Independent   Additional Comments cervical ROM with 75% restriciton to R SB and R rotation   Posture poor with forward head and rounded shoulders and head down posturing at times with sitting and standing   Transfers   Sit to Stand 7  Independent   Stand to Sit 7  Independent   Ambulation/Elevation Gait Assistance 7  Independent   Additional items Verbal cues   Assistive Device   (none)   Distance 150 feet with change in direction, antalgic at times due to R knee dysfunction   Stair Management Assistance 7  Independent   Stair Management Technique One rail R;Alternating pattern   Number of Stairs 6   Balance   Static Sitting Good   Dynamic Sitting Good   Static Standing Good   Dynamic Standing Good   Ambulatory Good   Higher level balance   (decreased with heel/toe walking)   Activity Tolerance   Activity Tolerance Patient limited by pain   Nurse Made Aware yes   Assessment   Prognosis Good   Problem List Decreased strength;Decreased range of motion;Decreased endurance; Impaired balance;Decreased mobility; Decreased coordination;Pain   Assessment Patient seen for Physical Therapy evaluation  Patient admitted with Elevated blood pressure reading without diagnosis of hypertension  Comorbidities affecting patient's physical performance include: cervical pain and R UE radicular pain, headache, tobacco use, HTN, hyperlipidemia  Patient now presents as independent with gait and transfers but with functional limitations due to cervical pain and R UE radicular pain/numbness  Patient able to begin to centralize cervical pain with cervical retractions and side bending and will benefit from continued PT  Personal factors affecting patient at time of initial evaluation include: lives in two story house, stairs to enter home, inability to perform dynamic tasks in community, limited home support, inability to perform current job functions, inability to perform caregiver support/tasks and inability to perform physical activity   Prior to admission, patient was independent with functional mobility without assistive device, independent with ADLS, independent with IADLS, living in a multi-level home, ambulating household distance, ambulating community distances, works part time and home with family assist   Please find objective findings from Physical Therapy assessment regarding body systems outlined above with impairments and limitations including weakness, decreased ROM, impaired balance, decreased endurance, impaired coordination, gait deviations, pain, decreased activity tolerance, decreased functional mobility tolerance, altered sensation and orthopedic restrictions  The Barthel Index was used as a functional outcome tool presenting with a score of 85 today indicating marked limitations of functional mobility and ADLS  Patient's clinical presentation is currently unstable/unpredictable as seen in patient's presentation of vital sign response, changing level of pain, increased fall risk, new onset of impairment of functional mobility, decreased endurance and new onset of weakness  Pt would benefit from continued Physical Therapy treatment to address deficits as defined above and maximize level of functional mobility  As demonstrated by objective findings, the assigned level of complexity for this evaluation is high  Goals   Patient Goals decrease pain   STG Expiration Date 02/05/20   Short Term Goal #1 improve posture to good 80 % of the time   Short Term Goal #2 centralize pain to cervical area from R UE, initiate Home exercise program   LTG Expiration Date 02/12/20   Long Term Goal #1 demonstrate improved cervical posture 100% of the time   Long Term Goal #2 independent home exercise program for cervical care   Plan   Treatment/Interventions ADL retraining; Therapeutic exercise; Endurance training;Patient/family training;Equipment eval/education;Gait training; Compensatory technique education   PT Frequency 2-3x/wk   Recommendation   Recommendation Outpatient PT   Barthel Index   Feeding 10   Bathing 5   Grooming Score 5   Dressing Score 5   Bladder Score 10   Bowels Score 10   Toilet Use Score 10   Transfers (Bed/Chair) Score 15   Mobility (Level Surface) Score 10   Stairs Score 5   Barthel Index Score 85   Licensure   [place] License Number  [facility] PT [id number]     PT TREATMENT  Time In:1140  Time Out:1150  Total Time:10min       S:  Patient with R cervical and R upper arm pain and numbness  O:  -education for proper posture and body mechanics completed with use of lumbar roll as needed  -exercise completed: cervical retractions, R cervical sidebending with beginnings of centralization of cervical pain  A:  Patient will benefit from continued PT in out patient setting for cervical radicular pain  P:  Out patient PT    Leeroy Avalos, PT

## 2020-01-30 ENCOUNTER — TRANSCRIBE ORDERS (OUTPATIENT)
Dept: NEUROSURGERY | Facility: CLINIC | Age: 55
End: 2020-01-30

## 2020-01-30 DIAGNOSIS — M54.2 NECK PAIN: Primary | ICD-10-CM

## 2020-01-30 LAB
ATRIAL RATE: 84 BPM
P AXIS: 32 DEGREES
PR INTERVAL: 166 MS
QRS AXIS: -6 DEGREES
QRSD INTERVAL: 92 MS
QT INTERVAL: 376 MS
QTC INTERVAL: 444 MS
T WAVE AXIS: 36 DEGREES
VENTRICULAR RATE: 84 BPM

## 2020-01-30 PROCEDURE — 93010 ELECTROCARDIOGRAM REPORT: CPT | Performed by: INTERNAL MEDICINE

## 2020-02-05 ENCOUNTER — OFFICE VISIT (OUTPATIENT)
Dept: FAMILY MEDICINE CLINIC | Facility: CLINIC | Age: 55
End: 2020-02-05
Payer: COMMERCIAL

## 2020-02-05 VITALS
SYSTOLIC BLOOD PRESSURE: 122 MMHG | WEIGHT: 189 LBS | DIASTOLIC BLOOD PRESSURE: 84 MMHG | TEMPERATURE: 98.6 F | HEIGHT: 72 IN | RESPIRATION RATE: 16 BRPM | HEART RATE: 96 BPM | BODY MASS INDEX: 25.6 KG/M2

## 2020-02-05 DIAGNOSIS — R20.2 ARM PARESTHESIA, RIGHT: ICD-10-CM

## 2020-02-05 DIAGNOSIS — Z12.5 PROSTATE CANCER SCREENING: ICD-10-CM

## 2020-02-05 DIAGNOSIS — R51.9 HEADACHE: ICD-10-CM

## 2020-02-05 DIAGNOSIS — Z12.11 COLON CANCER SCREENING: ICD-10-CM

## 2020-02-05 DIAGNOSIS — R51.9 NONINTRACTABLE HEADACHE, UNSPECIFIED CHRONICITY PATTERN, UNSPECIFIED HEADACHE TYPE: ICD-10-CM

## 2020-02-05 DIAGNOSIS — E78.5 HYPERLIPIDEMIA: ICD-10-CM

## 2020-02-05 DIAGNOSIS — I10 ESSENTIAL HYPERTENSION: Primary | ICD-10-CM

## 2020-02-05 DIAGNOSIS — M47.812 OSTEOARTHRITIS OF CERVICAL SPINE, UNSPECIFIED SPINAL OSTEOARTHRITIS COMPLICATION STATUS: ICD-10-CM

## 2020-02-05 PROCEDURE — 3008F BODY MASS INDEX DOCD: CPT | Performed by: FAMILY MEDICINE

## 2020-02-05 PROCEDURE — 3074F SYST BP LT 130 MM HG: CPT | Performed by: FAMILY MEDICINE

## 2020-02-05 PROCEDURE — 3079F DIAST BP 80-89 MM HG: CPT | Performed by: FAMILY MEDICINE

## 2020-02-05 PROCEDURE — 4004F PT TOBACCO SCREEN RCVD TLK: CPT | Performed by: FAMILY MEDICINE

## 2020-02-05 PROCEDURE — 1111F DSCHRG MED/CURRENT MED MERGE: CPT | Performed by: FAMILY MEDICINE

## 2020-02-05 PROCEDURE — 99204 OFFICE O/P NEW MOD 45 MIN: CPT | Performed by: FAMILY MEDICINE

## 2020-02-05 RX ORDER — LOSARTAN POTASSIUM AND HYDROCHLOROTHIAZIDE 12.5; 5 MG/1; MG/1
1 TABLET ORAL DAILY
Qty: 90 TABLET | Refills: 1 | Status: SHIPPED | OUTPATIENT
Start: 2020-02-05 | End: 2020-08-17

## 2020-02-05 RX ORDER — ATORVASTATIN CALCIUM 40 MG/1
40 TABLET, FILM COATED ORAL
Qty: 90 TABLET | Refills: 1 | Status: SHIPPED | OUTPATIENT
Start: 2020-02-05 | End: 2020-08-17

## 2020-02-05 NOTE — PROGRESS NOTES
Assessment/Plan:    No problem-specific Assessment & Plan notes found for this encounter  New pt  Cont bp meds, f/u 1m after labs  Update labs and psa  Tolerating lipitor, cont meds and diet  Cont to avoid tobacco  Headaches-neuro f/u  Studies reviewed, can hold off on neurosurgery eval but if continued pain despite PT, then Dr Micheal Montes De Oca may suggest Dr Deysi eMnendez    I will refill verapamil/hyzaar/lipitor and monitor     Diagnoses and all orders for this visit:    Essential hypertension  -     Comprehensive metabolic panel; Future  -     losartan-hydrochlorothiazide (HYZAAR) 50-12 5 mg per tablet; Take 1 tablet by mouth daily    Arm paresthesia, right    Nonintractable headache, unspecified chronicity pattern, unspecified headache type    Osteoarthritis of cervical spine, unspecified spinal osteoarthritis complication status    Colon cancer screening  -     Ambulatory referral to Gastroenterology; Future    Hyperlipidemia  -     Lipid Panel with Direct LDL reflex; Future  -     atorvastatin (LIPITOR) 40 mg tablet; Take 1 tablet (40 mg total) by mouth daily with dinner    Headache  -     verapamil (CALAN-SR) 120 mg CR tablet; Take 1 tablet (120 mg total) by mouth daily with dinner for 29 doses  -     Ambulatory referral to Neurology; Future    Prostate cancer screening  -     PSA, Total Screen; Future          Return in about 5 weeks (around 3/11/2020) for Recheck  Subjective:      Patient ID: Renee Orourke is a 53 y o  male      Chief Complaint   Patient presents with   [facility] Follow-up     ED f/u [facility]        HPI  Used to go to [facility]  Last medication 2012  Was in [facility] tob 1/29/20    Started on chol meds  nanetteine SVETA    Saw neuro    Plans PT  Been to ortho already    Works at [employer]    Tolerating meds  Working on [employer] better  Less [employer]    The following portions of the patient's history were reviewed and updated as appropriate: allergies, current medications, past family history, past medical history, past social history, past surgical history and problem list     Review of Systems   Constitutional: Negative for chills and fever  Respiratory: Negative for shortness of breath  Cardiovascular: Negative for chest pain  Musculoskeletal: Positive for arthralgias  Negative for myalgias  Neurological: Negative for dizziness  Current Outpatient Medications   Medication Sig Dispense Refill    acetaminophen (TYLENOL) 325 mg tablet Take 2 tablets (650 mg total) by mouth every 6 (six) hours as needed for mild pain, headaches or fever 30 tablet 0    aspirin (ECOTRIN LOW STRENGTH) 81 mg EC tablet Take 1 tablet (81 mg total) by mouth daily 30 tablet 0    atorvastatin (LIPITOR) 40 mg tablet Take 1 tablet (40 mg total) by mouth daily with dinner 90 tablet 1    butalbital-acetaminophen-caffeine (FIORICET,ESGIC) -40 mg per tablet Take 1 tablet by mouth every 4 (four) hours as needed for headaches or migraine for up to 28 days 15 tablet 0    losartan-hydrochlorothiazide (HYZAAR) 50-12 5 mg per tablet Take 1 tablet by mouth daily 90 tablet 1    verapamil (CALAN-SR) 120 mg CR tablet Take 1 tablet (120 mg total) by mouth daily with dinner for 29 doses 90 tablet 1     No current facility-administered medications for this visit  Objective:    /84   Pulse 96   Temp 98 6 °F (37 °C)   Resp 16   Ht 6' (1 829 m)   Wt 85 7 kg (189 lb)   BMI 25 63 kg/m²        Physical Exam   Constitutional: He appears well-developed  No distress  HENT:   Head: Normocephalic  Right Ear: External ear normal    Left Ear: External ear normal    Mouth/Throat: No oropharyngeal exudate  Eyes: Conjunctivae are normal  No scleral icterus  Neck: Neck supple  No thyromegaly present  Cardiovascular: Normal rate, regular rhythm and intact distal pulses  Pulmonary/Chest: Effort normal and breath sounds normal  No respiratory distress  Abdominal: Soft   Bowel sounds are normal  He exhibits no distension and no mass  Musculoskeletal: He exhibits no edema or deformity  Lymphadenopathy:     He has no cervical adenopathy  Neurological: He is alert  He exhibits normal muscle tone  Skin: Skin is warm and dry  Capillary refill takes less than 2 seconds  No pallor  Psychiatric: His behavior is normal  Thought content normal    Nursing note and vitals reviewed  BMI Counseling: Body mass index is 25 63 kg/m²  The BMI is above normal  Nutrition recommendations include moderation in carbohydrate intake  Exercise recommendations include exercising 3-5 times per week  No pharmacotherapy was ordered  Tobacco Cessation Counseling: Tobacco cessation counseling was provided  The patient is sincerely urged to quit consumption of tobacco  He is ready to quit tobacco  Medication options not discussed            Jayla Thomason, DO

## 2020-02-25 ENCOUNTER — EVALUATION (OUTPATIENT)
Dept: PHYSICAL THERAPY | Facility: CLINIC | Age: 55
End: 2020-02-25
Payer: COMMERCIAL

## 2020-02-25 DIAGNOSIS — M54.2 NECK PAIN: Primary | ICD-10-CM

## 2020-02-25 DIAGNOSIS — M47.22 CERVICAL SPONDYLOSIS WITH RADICULOPATHY: ICD-10-CM

## 2020-02-25 PROCEDURE — 97162 PT EVAL MOD COMPLEX 30 MIN: CPT | Performed by: PHYSICAL THERAPIST

## 2020-02-25 NOTE — PROGRESS NOTES
PT Evaluation     Today's date: 2020  Patient name: Aby Rowe  : 1965  MRN: [id number]  Referring provider: Tere Espino DO  Dx:   Encounter Diagnosis     ICD-10-CM    1  Neck pain M54 2    2  Cervical spondylosis with radiculopathy M47 22 Ambulatory referral to Physical Therapy       Start Time: 930  Stop Time: 1015  Total time in clinic (min): 45 minutes    Assessment  Assessment details: Aby Rowe is a [address] [place] y o  male who presents with pain, decreased strength, decreased ROM and postural  dysfunction  Due to these impairments, patient has difficulty performing ADL's, work-related activities, ambulation, lifting/carrying, reaching  Patient's clinical presentation is consistent with their referring diagnosis of Cervical spondylosis with radiculopathy  Patient has been educated in home exercise program and plan of care   Patient would benefit from skilled physical therapy services to address their aforementioned functional limitations and progress towards prior level of function and independence with home exercise program      Impairments: abnormal or restricted ROM, activity intolerance, impaired physical strength, lacks appropriate home exercise program, pain with function, poor posture  and poor body mechanics  Understanding of Dx/Px/POC: good   Prognosis: good    Goals  Short Term Goals to be accomplished in 3 weeks:  STG1: Pt will be I with HEP  STG2: Pt will be I with posture management  STG3: Pt will demo Cervical AROM >50% improvement  STG4: Pt will demo 1/2 MMT grade inc in cervical stabilizers and shoulder strength  STG5: Pt will deny symptom radiation beyond shoulder at <50% intensity   STG6: Pt will deny sleep disturbance due to pain     Long Term Goals to be accomplished in 6 weeks:   LTG1: Pt will demo cervical stab/shoulder strength to WNL as per PLOF  LTG2: Pt will return to work duties as per PLOF pain free  LTG3: Pt will demo cervical AROM WNL      Plan  Plan details:  HEP development, stretching, strengthening, A/AA/PROM, joint mobilizations, posture education, STM/MI as needed to reduce muscle tension, muscle reeducation, PLOC discussed and agreed upon with patient  Patient would benefit from: PT eval and skilled physical therapy  Planned modality interventions: cryotherapy and thermotherapy: hydrocollator packs  Planned therapy interventions: manual therapy, neuromuscular re-education, self care, therapeutic activities, therapeutic exercise and home exercise program  Frequency: 2x week  Duration in weeks: 6  Treatment plan discussed with: patient        Subjective Evaluation    History of Present Illness  Mechanism of injury: Pt has history of neck and back pain  At onset he was experiencing neck pian and difficulty lifting in R arm  Pain onset 20 "I was at work" and he reports pain upon returning home to work  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[employer], 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  He is often at his desk for majority of his day "always doing paperwork " Pt reports his pain is in central neck and radiates to his R hand  He reports mobility in his neck is improving  He often struggles to visually scan as his mobility is limited while driving  Pt reports his pain is constant  He is not taking medication for pain  Pt reports heat is helpful  Pt reports his pain wakes him when sleeping  "It hurts a lot when I'm walking around at work and looking around "  Quality of life: good    Pain  Current pain ratin  At best pain ratin  At worst pain rating: 10          Objective     Concurrent Complaints  Positive for disturbed sleep       Postural Observations  Seated posture: poor  Standing posture: poor  Correction of posture: has no consistent effect        Neurological Testing     Sensation   Cervical/Thoracic   Left   Intact: light touch    Right   Intact: light touch    Reflexes   Left   Biceps (C5/C6): trace (1+)  Brachioradialis (C6): trace (1+)  Triceps (C7): trace (1+)    Right   Biceps (C5/C6): trace (1+)  Brachioradialis (C6): trace (1+)  Triceps (C7): trace (1+)    Active Range of Motion   Cervical/Thoracic Spine       Cervical  Subcranial protraction:  WFL   Subcranial retraction:   Restriction level: moderate  Flexion:  with pain Restriction level: minimal  Extension:  with pain Restriction level: maximal  Left lateral flexion:  with pain Restriction level: moderate  Right lateral flexion:  with pain Restriction level moderate  Left rotation:  with pain Restriction level: moderate  Right rotation:  with pain Restriction level: moderate    Additional Active Range of Motion Details  R shldr Flexion AROM Mod loss, PROM full painful  R shldr Abd AROM Mod loss, PROM full painful    Strength/Myotome Testing     Additional Strength Details  R shldr giveway strength throughout R UE, 4/5 MMT pre break  L shldr strength 4+/5 MMT throughout    General Comments:      Cervical/Thoracic Comments  Baseline: Distal symptoms to elbow 5/10  Rep cervical ret: Dec/B 4/10  Rep cervical ret/ext: C/B to upper R trapezius      Flowsheet Rows      Most Recent Value   PT/OT G-Codes   Current Score  39   Projected Score  60   FOTO information reviewed  Yes             Precautions: Standard  HTN   Hx TIA      Manual  2/25                                                                                 Exercise Diary  1            Posture Edu 10'            C/S rep ret 10x            C/S rep ret/ext  5x                                                                                                                                                                                                                            HEP Edu/initiated                Modalities

## 2020-02-28 ENCOUNTER — OFFICE VISIT (OUTPATIENT)
Dept: PHYSICAL THERAPY | Facility: CLINIC | Age: 55
End: 2020-02-28
Payer: COMMERCIAL

## 2020-02-28 DIAGNOSIS — M47.22 CERVICAL SPONDYLOSIS WITH RADICULOPATHY: ICD-10-CM

## 2020-02-28 DIAGNOSIS — M54.2 NECK PAIN: Primary | ICD-10-CM

## 2020-02-28 PROCEDURE — 97110 THERAPEUTIC EXERCISES: CPT | Performed by: PHYSICAL THERAPIST

## 2020-02-28 NOTE — PROGRESS NOTES
Daily Note     Today's date: 2020  Patient name: Tesfaye Ramírez  : 1965  MRN: [id number]  Referring provider: Cassidy Santos DO  Dx:   Encounter Diagnosis     ICD-10-CM    1  Neck pain M54 2    2  Cervical spondylosis with radiculopathy M47 22        Start Time: 930  Stop Time: 1030  Total time in clinic (min): 60 minutes    Subjective: Pt reports he "over did it" at work and was lifting and carrying "too much" so his shoulder pain 6/10 today  Objective: See treatment diary below  HEP reviewed technique incorrect, reviewed, corrected, progressed  Major manual cueing thorughout  Assessment: Tolerated treatment well  Patient demo poor ongoing postyre and poor carryover between sessions  His general response is fair at this time however continues to demo poor carryover between tasks  Plan: Continue per plan of care  Precautions: Standard  HTN   Hx TIA      Manual                                                                                  Exercise Diary  1 2           Posture Edu 10' Rev           C/S rep ret 10x 2x10           C/S rep ret/ext  5x 5x5                                                                            Wall slides  10x                                                                                                                                             HEP Edu/initiated Rev/updated               Modalities

## 2020-03-06 ENCOUNTER — OFFICE VISIT (OUTPATIENT)
Dept: PHYSICAL THERAPY | Facility: CLINIC | Age: 55
End: 2020-03-06
Payer: COMMERCIAL

## 2020-03-06 DIAGNOSIS — M47.22 CERVICAL SPONDYLOSIS WITH RADICULOPATHY: ICD-10-CM

## 2020-03-06 DIAGNOSIS — M54.2 NECK PAIN: Primary | ICD-10-CM

## 2020-03-06 PROCEDURE — 97112 NEUROMUSCULAR REEDUCATION: CPT | Performed by: PHYSICAL THERAPIST

## 2020-03-06 NOTE — PROGRESS NOTES
Daily Note     Today's date: 3/6/2020  Patient name: Mundo Kuo  : 1965  MRN: [id number]  Referring provider: Gracia Kim DO  Dx:   Encounter Diagnosis     ICD-10-CM    1  Neck pain M54 2    2  Cervical spondylosis with radiculopathy M47 22        Start Time: 845  Stop Time: 930  Total time in clinic (min): 45 minutes    Subjective: Pt reports his pain is significantly reduced only reaching 3/10 and "work is going good " He attributes dec pain to "pie in the face exercise " Pt sees MD will faiza PT if MD request PT continuation  Objective: See treatment diary below  Cervical AROM: Mod loss R lateral flexion, R rot  Cervical AROM inc after rep retraction ext  Intro manual retraction to inc self generated force effect  Moderate muscle guarding with manual techniques  Manual cues needed for seated technique intermittently  Assessment: Tolerated treatment well  Patient demo ongoing poor posture however generally good response and centralization with repeated cervical motions in saggittal plane  Plan: Pt will cont wiht est POC with MD clearance next week  Precautions: Standard  HTN   Hx TIA      Manual   3/6          Supine C/S Ret clin OP   KT                                                                  Exercise Diary  1 2 3          Posture Edu 10' Rev rev          C/S rep ret 10x 2x10 seated and standin at wall with pillow 2x10          C/S rep ret/ext  5x 5x5 3x5                                                                           Wall slides  10x rev                                                                                                                                            HEP Edu/initiated Rev/updated rev              Modalities

## 2020-03-12 PROBLEM — R97.20 ELEVATED PSA: Status: ACTIVE | Noted: 2020-03-12

## 2020-03-12 LAB
ALBUMIN SERPL-MCNC: 4.5 G/DL (ref 3.8–4.9)
ALBUMIN/GLOB SERPL: 2 {RATIO} (ref 1.2–2.2)
ALP SERPL-CCNC: 108 IU/L (ref 39–117)
ALT SERPL-CCNC: 44 IU/L (ref 0–44)
AST SERPL-CCNC: 25 IU/L (ref 0–40)
BILIRUB SERPL-MCNC: 0.2 MG/DL (ref 0–1.2)
BUN SERPL-MCNC: 16 MG/DL (ref 6–24)
BUN/CREAT SERPL: 16 (ref 9–20)
CALCIUM SERPL-MCNC: 9.9 MG/DL (ref 8.7–10.2)
CHLORIDE SERPL-SCNC: 100 MMOL/L (ref 96–106)
CHOLEST SERPL-MCNC: 148 MG/DL (ref 100–199)
CO2 SERPL-SCNC: 24 MMOL/L (ref 20–29)
CREAT SERPL-MCNC: 1 MG/DL (ref 0.76–1.27)
GLOBULIN SER-MCNC: 2.3 G/DL (ref 1.5–4.5)
GLUCOSE SERPL-MCNC: 78 MG/DL (ref 65–99)
HDLC SERPL-MCNC: 42 MG/DL
LDLC SERPL CALC-MCNC: 70 MG/DL (ref 0–99)
MICRODELETION SYND BLD/T FISH: NORMAL
POTASSIUM SERPL-SCNC: 4.6 MMOL/L (ref 3.5–5.2)
PROT SERPL-MCNC: 6.8 G/DL (ref 6–8.5)
PSA SERPL-MCNC: 8.9 NG/ML (ref 0–4)
SL AMB EGFR AFRICAN AMERICAN: 98 ML/MIN/1.73
SL AMB EGFR NON AFRICAN AMERICAN: 84 ML/MIN/1.73
SODIUM SERPL-SCNC: 141 MMOL/L (ref 134–144)
TRIGL SERPL-MCNC: 182 MG/DL (ref 0–149)

## 2020-03-13 ENCOUNTER — TELEPHONE (OUTPATIENT)
Dept: UROLOGY | Facility: MEDICAL CENTER | Age: 55
End: 2020-03-13

## 2020-03-13 ENCOUNTER — OFFICE VISIT (OUTPATIENT)
Dept: FAMILY MEDICINE CLINIC | Facility: CLINIC | Age: 55
End: 2020-03-13
Payer: COMMERCIAL

## 2020-03-13 VITALS
TEMPERATURE: 97.8 F | OXYGEN SATURATION: 98 % | RESPIRATION RATE: 16 BRPM | HEART RATE: 95 BPM | HEIGHT: 72 IN | DIASTOLIC BLOOD PRESSURE: 90 MMHG | BODY MASS INDEX: 26.55 KG/M2 | WEIGHT: 196 LBS | SYSTOLIC BLOOD PRESSURE: 124 MMHG

## 2020-03-13 DIAGNOSIS — Z72.0 TOBACCO USE: ICD-10-CM

## 2020-03-13 DIAGNOSIS — I10 ESSENTIAL HYPERTENSION: Primary | ICD-10-CM

## 2020-03-13 DIAGNOSIS — E78.5 HYPERLIPIDEMIA, UNSPECIFIED HYPERLIPIDEMIA TYPE: ICD-10-CM

## 2020-03-13 DIAGNOSIS — R97.20 ELEVATED PSA: ICD-10-CM

## 2020-03-13 PROCEDURE — 3074F SYST BP LT 130 MM HG: CPT | Performed by: FAMILY MEDICINE

## 2020-03-13 PROCEDURE — 99214 OFFICE O/P EST MOD 30 MIN: CPT | Performed by: FAMILY MEDICINE

## 2020-03-13 PROCEDURE — 3008F BODY MASS INDEX DOCD: CPT | Performed by: FAMILY MEDICINE

## 2020-03-13 PROCEDURE — 4004F PT TOBACCO SCREEN RCVD TLK: CPT | Performed by: FAMILY MEDICINE

## 2020-03-13 PROCEDURE — 3080F DIAST BP >= 90 MM HG: CPT | Performed by: FAMILY MEDICINE

## 2020-03-13 NOTE — TELEPHONE ENCOUNTER
Spoke with patient  Patient scheduled for 3/17/20 at 10:15 in the [address] [place] [address] office with Dr Justina Fung  Patient provided with office address and location

## 2020-03-13 NOTE — TELEPHONE ENCOUNTER
Please Triage -   New Patient-     What is the reason for the patients appointment? Elevated PSA 8 9 referred by PCP Dr Lovely Nelson       Do we accept the patient's insurance or is the patient Self-Pay? Provider:  [address]    Plan: [employer]  Member ID#: [id number]  Group#:[id number]  Subscriber:   Phone#: [phone]  Location:      Has the patient had any previous urologist(s)? NO       Have patient records been requested? NO       Has the patient had any outside testing done? YES       What is the patients location preference for an office visit? [facility]      Does the patient have a personal history of cancer? NO      (If no cancer hx   ) Is the patient ok with seeing Advanced Practitioner?  YES    Patient can be reached at : [phone]

## 2020-03-13 NOTE — PROGRESS NOTES
Assessment/Plan:    No problem-specific Assessment & Plan notes found for this encounter  htn better  Proceed with colonoscopy  Opts to see urology rather than repeat psa in 6m  htn improved, cont TLC efforts  Plans to smoke less     Diagnoses and all orders for this visit:    Essential hypertension  -     Comprehensive metabolic panel; Future    Hyperlipidemia, unspecified hyperlipidemia type    Tobacco use  -     CT lung screening program; Future    Elevated PSA  -     Ambulatory referral to Urology; Future        Return in about 6 months (around 9/13/2020) for Annual physical     Subjective:      Patient ID: Art Howe is a 53 y o  male  Chief Complaint   Patient presents with    Follow-up     follow up on blood pressure [id number]        HPI  Took this am bp 120/90 one time  Taking meds everyday  Taking all meds  No leg swelling  No constipation  Urine flow good  No prior PSA   Now 8 9  6 cigs/d currently, working on it    The following portions of the patient's history were reviewed and updated as appropriate: allergies, current medications, past family history, past medical history, past social history, past surgical history and problem list     Review of Systems   Respiratory: Negative for shortness of breath  Cardiovascular: Negative for chest pain  Neurological: Negative for dizziness           Current Outpatient Medications   Medication Sig Dispense Refill    acetaminophen (TYLENOL) 325 mg tablet Take 2 tablets (650 mg total) by mouth every 6 (six) hours as needed for mild pain, headaches or fever 30 tablet 0    aspirin (ECOTRIN LOW STRENGTH) 81 mg EC tablet Take 1 tablet (81 mg total) by mouth daily 30 tablet 0    atorvastatin (LIPITOR) 40 mg tablet Take 1 tablet (40 mg total) by mouth daily with dinner 90 tablet 1    losartan-hydrochlorothiazide (HYZAAR) 50-12 5 mg per tablet Take 1 tablet by mouth daily 90 tablet 1    verapamil (CALAN-SR) 120 mg CR tablet Take 1 tablet (120 mg total) by mouth daily with dinner for 29 doses 90 tablet 1     No current facility-administered medications for this visit  Objective:    /90   Pulse 95   Temp 97 8 °F (36 6 °C)   Resp 16   Ht 6' (1 829 m)   Wt 88 9 kg (196 lb)   SpO2 98%   BMI 26 58 kg/m²        Physical Exam   Constitutional: He appears well-developed  No distress  HENT:   Head: Normocephalic  Right Ear: External ear normal    Left Ear: External ear normal    Mouth/Throat: No oropharyngeal exudate  Eyes: Conjunctivae are normal  No scleral icterus  Neck: Neck supple  Cardiovascular: Normal rate, regular rhythm and intact distal pulses  Pulmonary/Chest: Effort normal and breath sounds normal  No respiratory distress  Abdominal: Soft  Bowel sounds are normal  He exhibits no mass  There is no tenderness  Musculoskeletal: He exhibits no edema or deformity  Lymphadenopathy:     He has no cervical adenopathy  Neurological: He is alert  Skin: Skin is warm and dry  No pallor  Psychiatric: His behavior is normal  Thought content normal    Nursing note and vitals reviewed                Josselyn Freitas DO

## 2020-03-16 NOTE — PATIENT INSTRUCTIONS
Prostate Biopsy   WHAT YOU NEED TO KNOW:   What do I need to know about a prostate biopsy? A prostate biopsy is a procedure to remove samples of tissue from your prostate gland  The prostate is a male sex gland that makes fluid found in semen  It is located just below the bladder  After the samples are removed, they are sent to a lab and tested for cancer  How do I prepare for a prostate biopsy? · Your healthcare provider will talk to you about how to prepare for this procedure  He may tell you not to eat or drink anything after midnight on the day of your surgery  You will need to stop taking blood thinners several days before your procedure  Examples of blood thinners include warfarin and NSAIDs  You may also need to stop taking herbal supplements before your procedure  Your healthcare provider will tell you what other medicines to take or not take on the day of your procedure  · You will be given an antibiotic to help prevent a bacterial infection  You may need to give yourself an enema (liquid medicine put in your rectum) to help empty your bowel before your procedure  What will happen during a prostate biopsy? · You may need to lie on your side with your knees pulled up toward your chest  Numbing cream or gel may be put into your rectum, or numbing medicine may be injected near your prostate  You may instead be given general anesthesia to keep you asleep and free from pain during the procedure  A biopsy sample may be taken through your rectum, urethra, or perineum  The perineum is the area between your scrotum and rectum  Most of the time, biopsy samples are taken through the rectum  · If your healthcare provider takes a sample through your rectum, he will insert a small ultrasound probe into your rectum  The ultrasound shows pictures of your prostate on a monitor, and is used to help guide the biopsy needle   Your healthcare provider will push the biopsy needle through the wall of your rectum and into your prostate gland  Your healthcare provider will remove between 6 to 12 samples of tissue from different areas of your prostate gland  The samples will be sent to a lab and tested for cancer  What will happen after a prostate biopsy? You may feel soreness at the biopsy site  You may need to take antibiotics for up to 2 days after your procedure to help prevent an infection  You may have some bleeding from your rectum  You may also have blood in your urine, bowel movements, or semen  What are the risks of a prostate biopsy? You may bleed more than expected or get an infection in your urinary tract or prostate gland  The infection may spread to your blood and the rest of your body  Your bladder may not empty completely when you urinate  You may need a catheter to help empty your bladder for a short period of time  Cancer cells may be missed during your biopsy procedure  You may need another prostate biopsy to check for cancer again  CARE AGREEMENT:   You have the right to help plan your care  Learn about your health condition and how it may be treated  Discuss treatment options with your caregivers to decide what care you want to receive  You always have the right to refuse treatment  The above information is an  only  It is not intended as medical advice for individual conditions or treatments  Talk to your doctor, nurse or pharmacist before following any medical regimen to see if it is safe and effective for you  © 2017 [employer] Information is for End User's use only and may not be sold, redistributed or otherwise used for commercial purposes  All illustrations and images included in CareNotes® are the copyrighted property of [employer]  or [employer]

## 2020-03-16 NOTE — PROGRESS NOTES
Problem List Items Addressed This Visit        Other    Elevated PSA - Primary    Relevant Orders    POCT urine dip            Discussion:    I discussed with the patient the discovery of the PSA molecule and its original use in determining the return of prostate cancer after definitive therapy  I described the normal function of the PSA molecule in the reproductive process and also discussed the detection of PSA in the blood  We discussed the controversial history of PSA screening for prostate cancer in the [place] as well as the risk of over detection and over treatment of prostate cancer by way of PSA screening  The patient understands that PSA blood testing is an imperfect way to screen for prostate cancer and that elevated PSA levels in the blood may also be caused by infection, inflammation, prostatic trauma or manipulation, urological procedures, or by benign prostatic enlargement  The role of the digital rectal examination in prostate cancer screening was also discussed and I discussed with him that there is large interobserver variability in the findings of digital rectal examination  We discussed the continued workup of elevated PSA or abnormal digital rectal examination in the form of the performance of a prostate biopsy  The preparation for, and steps of, an office-based transrectal ultrasound of prostate biopsy were described to the patient  Benefits of obtaining tissue for pathologic analysis were discussed with him and the risks of prostate biopsy were also discussed at length  These risks include but are not limited to infection, bleeding, pain, sepsis with need for admission to hospital, risk of change in sexual function, and risk of diagnosis with prostate cancer  Alternatives to prostate biopsy in the form of continued PSA and JENNIFER surveillance were also offered to him  All of his questions and concerns were answered and addressed with regard to that detailed above      His prostate is roughly 40 grams and smooth without nodules, he does have a family history of prostate cancer in his father, as such he wishes to proceed with biopsy  He will stop his aspirin a preparation intake antibiotics and enema as per usual       Assessment and plan:       Please see problem oriented charting for the assessment plan of today's urological complaints    Corina Dumont MD      Chief Complaint     Chief Complaint   Patient presents with    Elevated PSA         History of Present Illness     Jean Miller is a 53 y o  gentleman referred to me in consultation by Dr Jensen Page for elevated PSA  A copy of today's consultation has been sent to the referring provider  With regard to this complaint it is localized to the prostate  The quality is described as asymptomatic and the severity of this complaint is described as mild  These symptoms have been present for weeks and the timing is ongoing  Previous treatments include none and previous work-up includes evaluation by his primary care provider  The patient mentions nothing as aggravating and alleviating factors, respectively  The following associated signs and symptoms are mentioned:  Some anxiety regarding potentially having prostate cancer  His father does have a history of prostate cancer, he is unsure what treatment that he had  The patient does state that if he were to have prostate cancer he would want curative treatment if possible  No other urologic complaints today  The following portions of the patient's history were reviewed and updated as appropriate: allergies, current medications, past family history, past medical history, past social history, past surgical history and problem list             Detailed Urologic History     - please refer to HPI    Review of Systems     Review of Systems   Constitutional: Negative  HENT: Negative  Eyes: Negative  Respiratory: Negative  Cardiovascular: Negative  Gastrointestinal: Negative  Endocrine: Negative  Genitourinary: Negative  Musculoskeletal: Negative  Skin: Negative  Allergic/Immunologic: Negative  Neurological: Negative  Hematological: Negative  Psychiatric/Behavioral: Negative  Allergies     No Known Allergies    Physical Exam     Physical Exam   Constitutional: He is oriented to person, place, and time  He appears well-developed and well-nourished  No distress  HENT:   Head: Normocephalic and atraumatic  Eyes: Right eye exhibits no discharge  Left eye exhibits no discharge  Neck: No tracheal deviation present  Cardiovascular: Intact distal pulses  Pulmonary/Chest: Effort normal  No stridor  No respiratory distress  Abdominal: Soft  He exhibits no distension and no mass  There is no tenderness  There is no rebound and no guarding  No hernia  Genitourinary:   Genitourinary Comments: Prostate is 40 grams and smooth without nodules   Musculoskeletal: He exhibits no edema, tenderness or deformity  Neurological: He is alert and oriented to person, place, and time  No cranial nerve deficit  Coordination normal    Skin: Skin is warm and dry  No rash noted  He is not diaphoretic  No erythema  No pallor  Psychiatric: He has a normal mood and affect  His behavior is normal  Judgment and thought content normal    Nursing note and vitals reviewed            Vital Signs  Vitals:    03/17/20 1009   Weight: 88 kg (194 lb)   Height: 6' (1 829 m)         Current Medications       Current Outpatient Medications:     acetaminophen (TYLENOL) 325 mg tablet, Take 2 tablets (650 mg total) by mouth every 6 (six) hours as needed for mild pain, headaches or fever, Disp: 30 tablet, Rfl: 0    aspirin (ECOTRIN LOW STRENGTH) 81 mg EC tablet, Take 1 tablet (81 mg total) by mouth daily, Disp: 30 tablet, Rfl: 0    atorvastatin (LIPITOR) 40 mg tablet, Take 1 tablet (40 mg total) by mouth daily with dinner, Disp: 90 tablet, Rfl: 1   losartan-hydrochlorothiazide (HYZAAR) 50-12 5 mg per tablet, Take 1 tablet by mouth daily, Disp: 90 tablet, Rfl: 1    verapamil (CALAN-SR) 120 mg CR tablet, Take 1 tablet (120 mg total) by mouth daily with dinner for 29 doses, Disp: 90 tablet, Rfl: 1      Active Problems     Patient Active Problem List   Diagnosis    Essential hypertension    Hyperlipidemia    Arm paresthesia, right    Headache    Tobacco use    Osteoarthritis of cervical spine    Colon cancer screening    Elevated PSA         Past Medical History     Past Medical History:   Diagnosis Date    Hyperlipidemia          Surgical History     Past Surgical History:   Procedure Laterality Date    APPENDECTOMY      SPLENECTOMY           Family History     Family History   Problem Relation Age of Onset    Dementia Father          Social History     Social History     Social History     Tobacco Use   Smoking Status Current Some Day Smoker    Packs/day: 1 00    Types: Cigarettes   Smokeless Tobacco Never Used         Pertinent Lab Values     Lab Results   Component Value Date    CREATININE 1 00 03/11/2020       Lab Results   Component Value Date    PSA 8 9 (H) 03/11/2020             Pertinent Imaging      There is no pertinent urological imaging for my review

## 2020-03-17 ENCOUNTER — TELEPHONE (OUTPATIENT)
Dept: UROLOGY | Facility: CLINIC | Age: 55
End: 2020-03-17

## 2020-03-17 ENCOUNTER — CONSULT (OUTPATIENT)
Dept: UROLOGY | Facility: CLINIC | Age: 55
End: 2020-03-17
Payer: COMMERCIAL

## 2020-03-17 VITALS
DIASTOLIC BLOOD PRESSURE: 64 MMHG | WEIGHT: 194 LBS | HEIGHT: 72 IN | SYSTOLIC BLOOD PRESSURE: 112 MMHG | BODY MASS INDEX: 26.28 KG/M2

## 2020-03-17 DIAGNOSIS — Z72.0 TOBACCO USE: ICD-10-CM

## 2020-03-17 DIAGNOSIS — R97.20 ELEVATED PSA: Primary | ICD-10-CM

## 2020-03-17 LAB
SL AMB  POCT GLUCOSE, UA: NORMAL
SL AMB LEUKOCYTE ESTERASE,UA: NORMAL
SL AMB POCT BILIRUBIN,UA: NORMAL
SL AMB POCT BLOOD,UA: NORMAL
SL AMB POCT CLARITY,UA: CLEAR
SL AMB POCT COLOR,UA: YELLOW
SL AMB POCT KETONES,UA: NORMAL
SL AMB POCT NITRITE,UA: NORMAL
SL AMB POCT PH,UA: 7.5
SL AMB POCT SPECIFIC GRAVITY,UA: 1
SL AMB POCT URINE PROTEIN: NORMAL
SL AMB POCT UROBILINOGEN: 0.2

## 2020-03-17 PROCEDURE — 81002 URINALYSIS NONAUTO W/O SCOPE: CPT | Performed by: UROLOGY

## 2020-03-17 PROCEDURE — 99204 OFFICE O/P NEW MOD 45 MIN: CPT | Performed by: UROLOGY

## 2020-03-17 RX ORDER — CIPROFLOXACIN 500 MG/1
500 TABLET, FILM COATED ORAL EVERY 12 HOURS SCHEDULED
Qty: 2 TABLET | Refills: 0 | Status: SHIPPED | OUTPATIENT
Start: 2020-03-17 | End: 2020-03-18

## 2020-03-17 NOTE — TELEPHONE ENCOUNTER
Called and spoke with patient at this time  He reports he was 'worried about the antibiotics' as they weren't called in right away, but he already picked them up  Patient had no further questions  Confirmed his arrival time for tomorrow's prostate biopsy appointment

## 2020-03-17 NOTE — TELEPHONE ENCOUNTER
Please call patient back about his antibiotics for his biopsy for tomorrow  He can be reached at [phone]

## 2020-03-17 NOTE — LETTER
March 17, 2020     Kirk Gilman DO  [address] [facility] [place] [address]    Patient: Pierre Ocasio   YOB: 1965   Date of Visit: 3/17/2020       Dear Dr Jordy Garcia: Thank you for referring Pierre Ocasio to me for evaluation  Below are my notes for this consultation  If you have questions, please do not hesitate to call me  I look forward to following your patient along with you  Sincerely,        Wade Milian MD        CC: No Recipients  Wade Milian MD  3/17/2020 10:32 AM  Sign at close encounter       Problem List Items Addressed This Visit        Other    Elevated PSA - Primary    Relevant Orders    POCT urine dip            Discussion:    I discussed with the patient the discovery of the PSA molecule and its original use in determining the return of prostate cancer after definitive therapy  I described the normal function of the PSA molecule in the reproductive process and also discussed the detection of PSA in the blood  We discussed the controversial history of PSA screening for prostate cancer in the [place] as well as the risk of over detection and over treatment of prostate cancer by way of PSA screening  The patient understands that PSA blood testing is an imperfect way to screen for prostate cancer and that elevated PSA levels in the blood may also be caused by infection, inflammation, prostatic trauma or manipulation, urological procedures, or by benign prostatic enlargement  The role of the digital rectal examination in prostate cancer screening was also discussed and I discussed with him that there is large interobserver variability in the findings of digital rectal examination  We discussed the continued workup of elevated PSA or abnormal digital rectal examination in the form of the performance of a prostate biopsy  The preparation for, and steps of, an office-based transrectal ultrasound of prostate biopsy were described to the patient  Benefits of obtaining tissue for pathologic analysis were discussed with him and the risks of prostate biopsy were also discussed at length  These risks include but are not limited to infection, bleeding, pain, sepsis with need for admission to hospital, risk of change in sexual function, and risk of diagnosis with prostate cancer  Alternatives to prostate biopsy in the form of continued PSA and JENNIFER surveillance were also offered to him  All of his questions and concerns were answered and addressed with regard to that detailed above  His prostate is roughly 40 grams and smooth without nodules, he does have a family history of prostate cancer in his father, as such he wishes to proceed with biopsy  He will stop his aspirin a preparation intake antibiotics and enema as per usual       Assessment and plan:       Please see problem oriented charting for the assessment plan of today's urological complaints    Ely Oneil MD      Chief Complaint     Chief Complaint   Patient presents with    Elevated PSA         History of Present Illness     Lemuel Tomlinson is a 53 y o  gentleman referred to me in consultation by Dr Adrian Coreas for elevated PSA  A copy of today's consultation has been sent to the referring provider  With regard to this complaint it is localized to the prostate  The quality is described as asymptomatic and the severity of this complaint is described as mild  These symptoms have been present for weeks and the timing is ongoing  Previous treatments include none and previous work-up includes evaluation by his primary care provider  The patient mentions nothing as aggravating and alleviating factors, respectively  The following associated signs and symptoms are mentioned:  Some anxiety regarding potentially having prostate cancer  His father does have a history of prostate cancer, he is unsure what treatment that he had    The patient does state that if he were to have prostate cancer he would want curative treatment if possible  No other urologic complaints today  The following portions of the patient's history were reviewed and updated as appropriate: allergies, current medications, past family history, past medical history, past social history, past surgical history and problem list             Detailed Urologic History     - please refer to HPI    Review of Systems     Review of Systems   Constitutional: Negative  HENT: Negative  Eyes: Negative  Respiratory: Negative  Cardiovascular: Negative  Gastrointestinal: Negative  Endocrine: Negative  Genitourinary: Negative  Musculoskeletal: Negative  Skin: Negative  Allergic/Immunologic: Negative  Neurological: Negative  Hematological: Negative  Psychiatric/Behavioral: Negative  Allergies     No Known Allergies    Physical Exam     Physical Exam   Constitutional: He is oriented to person, place, and time  He appears well-developed and well-nourished  No distress  HENT:   Head: Normocephalic and atraumatic  Eyes: Right eye exhibits no discharge  Left eye exhibits no discharge  Neck: No tracheal deviation present  Cardiovascular: Intact distal pulses  Pulmonary/Chest: Effort normal  No stridor  No respiratory distress  Abdominal: Soft  He exhibits no distension and no mass  There is no tenderness  There is no rebound and no guarding  No hernia  Genitourinary:   Genitourinary Comments: Prostate is 40 grams and smooth without nodules   Musculoskeletal: He exhibits no edema, tenderness or deformity  Neurological: He is alert and oriented to person, place, and time  No cranial nerve deficit  Coordination normal    Skin: Skin is warm and dry  No rash noted  He is not diaphoretic  No erythema  No pallor  Psychiatric: He has a normal mood and affect  His behavior is normal  Judgment and thought content normal    Nursing note and vitals reviewed            Vital Signs  Vitals:    03/17/20 1009   Weight: 88 kg (194 lb)   Height: 6' (1 829 m)         Current Medications       Current Outpatient Medications:     acetaminophen (TYLENOL) 325 mg tablet, Take 2 tablets (650 mg total) by mouth every 6 (six) hours as needed for mild pain, headaches or fever, Disp: 30 tablet, Rfl: 0    aspirin (ECOTRIN LOW STRENGTH) 81 mg EC tablet, Take 1 tablet (81 mg total) by mouth daily, Disp: 30 tablet, Rfl: 0    atorvastatin (LIPITOR) 40 mg tablet, Take 1 tablet (40 mg total) by mouth daily with dinner, Disp: 90 tablet, Rfl: 1    losartan-hydrochlorothiazide (HYZAAR) 50-12 5 mg per tablet, Take 1 tablet by mouth daily, Disp: 90 tablet, Rfl: 1    verapamil (CALAN-SR) 120 mg CR tablet, Take 1 tablet (120 mg total) by mouth daily with dinner for 29 doses, Disp: 90 tablet, Rfl: 1      Active Problems     Patient Active Problem List   Diagnosis    Essential hypertension    Hyperlipidemia    Arm paresthesia, right    Headache    Tobacco use    Osteoarthritis of cervical spine    Colon cancer screening    Elevated PSA         Past Medical History     Past Medical History:   Diagnosis Date    Hyperlipidemia          Surgical History     Past Surgical History:   Procedure Laterality Date    APPENDECTOMY      SPLENECTOMY           Family History     Family History   Problem Relation Age of Onset    Dementia Father          Social History     Social History     Social History     Tobacco Use   Smoking Status Current Some Day Smoker    Packs/day: 1 00    Types: Cigarettes   Smokeless Tobacco Never Used         Pertinent Lab Values     Lab Results   Component Value Date    CREATININE 1 00 03/11/2020       Lab Results   Component Value Date    PSA 8 9 (H) 03/11/2020             Pertinent Imaging      There is no pertinent urological imaging for my review

## 2020-03-17 NOTE — TELEPHONE ENCOUNTER
Per DV, Return in about 2 weeks (around 3/31/2020) for prostate biopsy  Please assist with scheduling

## 2020-03-17 NOTE — PROGRESS NOTES
Office TRUS-guided Prostate Biopsy Procedure Note    Indication    Elevated PSA    Informed consent   The risks, benefits and alternatives to TRUS-guided prostate biopsy were conveyed to the patient prior to performing the procedure  A discussion of the risks of the procedure included, but was not limited to: pain, hematuria, hematochezia, hematospermia, infection, and the possibility of a non-diagnostic biopsy  The patient was given the opportunity to have his questions answered and there was no perceived barrier to education  Antibiotic prophylaxis   The patient received the following antibiotics at least 30 minutes prior to undergoing biopsy: Ciprofloxacin 500 mg PO  The patient was instructed to continue taking the antibiotics as prescribed for a total of 1 days, including the day of biopsy  Rectal cleansing  The patient was instructed to perform an evacuating rectal enema 1-2 hours prior to biopsy  Local anesthesia  Topical 2% lidocaine jelly was applied liberally to the anus and rectum and allowed to dwell for at least 5 min prior to starting the procedure  After insertion of the TRUS probe, 10 mL of 2% lidocaine solution was injected with ultrasound guidance at the  junction of the prostate and seminal vesicles  The anesthetic was allowed to dwell for at least 2 minutes prior to biopsy  Transrectal ultrasonography  The patient was placed in the left lateral decubitus position  After an attentive digital rectal examination, a 7 5 mHz sidefire ultrasound probe was gently inserted into the rectum and biplanar imaging of the prostate was done with the findings noted below  Images were taken of any abnormal findings and also to document prostate size      Bladder  The bladder base appeared normal     Prostate      Ultrasound size measurements:  -Volume:  124 89 cm3    Ultrasound findings:  -Cysts: None  -Masses: None  -Median lobe: absent    Clinical stage (assuming a positive biopsy): -T1c     TRUS-guided needle biopsy  Using an 18 gauge biopsy needle and ultrasound guidance, the following biopsies were taken:    1 core(s) from the left lateral base  1 core(s) from the left lateral mid-gland  1 core(s) from the right middle base  1 core(s) from the right lateral base  1 core(s) from the left lateral mid-gland  1 core(s) from the left middle mid-gland  1 core(s) from the right middle mid-gland  1 core(s) from the right lateral mid-gland  1 core(s) from the left lateral apex  1 core(s) from the left middle apex  1 core(s) from the right middle apex  1 core(s) from the right lateral apex    Total number of cores: 12                Complications  There were no procedural complications  Disposition  The patient was dismissed to home    Post-procedure instructions: Today he underwent an uncomplicated transrectal ultrasound-guided biopsy of the prostate, following a periprosthetic nerve block  I reviewed the normal postprocedure a course including bleeding per rectum, hematuria, and hematospermia  I instructed him to complete his course of antibiotics as prescribed  Instructed him to call with fever greater than 101, chills, nausea, vomiting, and poorly controlled pain  His followup was scheduled in approximately 2 weeks' time to review the pathology  Biopsy prostate     Date/Time 3/18/2020 10:14 AM     Performed by  Dm Calvo MD     Authorized by Dm Calvo MD      Universal Protocol Consent: Verbal consent obtained  Written consent obtained    Risks and benefits: risks, benefits and alternatives were discussed  Consent given by: patient  Patient understanding: patient states understanding of the procedure being performed  Patient consent: the patient's understanding of the procedure matches consent given  Procedure consent: procedure consent matches procedure scheduled  Relevant documents: relevant documents present and verified  Test results: test results available and properly labeled  Site marked: the operative site was not marked  Radiology Images displayed and confirmed  If images not available, report reviewed: imaging studies available  Required items: required blood products, implants, devices, and special equipment available  Patient identity confirmed: verbally with patient and provided demographic data        Preparation: Patient was prepped and draped in the usual sterile fashion  Local anesthesia used: yes     Anesthesia   Local anesthesia used: yes  Local Anesthetic: lidocaine 2% without epinephrine     Sedation   Patient sedated: no        Specimen: yes    Culture: no   Procedure Details   Procedure Notes: Office TRUS-guided Prostate Biopsy Procedure Note    Indication    Elevated PSA    Informed consent   The risks, benefits and alternatives to TRUS-guided prostate biopsy were conveyed to the patient prior to performing the procedure  A discussion of the risks of the procedure included, but was not limited to: pain, hematuria, hematochezia, hematospermia, infection, and the possibility of a non-diagnostic biopsy  The patient was given the opportunity to have his questions answered and there was no perceived barrier to education  Antibiotic prophylaxis   The patient received the following antibiotics at least 30 minutes prior to undergoing biopsy: Ciprofloxacin 500 mg PO  The patient was instructed to continue taking the antibiotics as prescribed for a total of 1 days, including the day of biopsy  Rectal cleansing  The patient was instructed to perform an evacuating rectal enema 1-2 hours prior to biopsy  Local anesthesia  Topical 2% lidocaine jelly was applied liberally to the anus and rectum and allowed to dwell for at least 5 min prior to starting the procedure  After insertion of the TRUS probe, 10 mL of 2% lidocaine solution was injected with ultrasound guidance at the  junction of the prostate and seminal vesicles   The anesthetic was allowed to dwell for at least 2 minutes prior to biopsy  Transrectal ultrasonography  The patient was placed in the left lateral decubitus position  After an attentive digital rectal examination, a 7 5 mHz sidefire ultrasound probe was gently inserted into the rectum and biplanar imaging of the prostate was done with the findings noted below  Images were taken of any abnormal findings and also to document prostate size  Bladder  The bladder base appeared normal     Prostate      Ultrasound size measurements:  -Volume:  124 89 cm3    Ultrasound findings:  -Cysts: None  -Masses: None  -Median lobe: absent    Clinical stage (assuming a positive biopsy):   -T1c     TRUS-guided needle biopsy  Using an 18 gauge biopsy needle and ultrasound guidance, the following biopsies were taken:    1 core(s) from the left lateral base  1 core(s) from the left lateral mid-gland  1 core(s) from the right middle base  1 core(s) from the right lateral base  1 core(s) from the left lateral mid-gland  1 core(s) from the left middle mid-gland  1 core(s) from the right middle mid-gland  1 core(s) from the right lateral mid-gland  1 core(s) from the left lateral apex  1 core(s) from the left middle apex  1 core(s) from the right middle apex  1 core(s) from the right lateral apex    Total number of cores: 12                Complications  There were no procedural complications  Disposition  The patient was dismissed to home    Post-procedure instructions: Today he underwent an uncomplicated transrectal ultrasound-guided biopsy of the prostate, following a periprosthetic nerve block  I reviewed the normal postprocedure a course including bleeding per rectum, hematuria, and hematospermia  I instructed him to complete his course of antibiotics as prescribed  Instructed him to call with fever greater than 101, chills, nausea, vomiting, and poorly controlled pain   His followup was scheduled in approximately 2 weeks' time to review the pathology    Patient tolerance: Patient tolerated the procedure well with no immediate complications

## 2020-03-17 NOTE — PATIENT INSTRUCTIONS
Prostate Biopsy   WHAT YOU NEED TO KNOW:   A prostate biopsy is a procedure to remove samples of tissue from your prostate gland  The prostate is a male sex gland that makes fluid found in semen  It is located just below the bladder  After the samples are removed, they are sent to a lab and tested for cancer  DISCHARGE INSTRUCTIONS:   Seek care immediately if:   · You have heavy bleeding from your rectum  · You urinate very little or not at all  · You have pain from your procedure that gets worse, even after you take pain medicine  Contact your healthcare provider if:   · You have a fever or chills  · You feel pain or burning when you urinate  · Your urine is cloudy or smells bad  · You have questions or concerns about your condition or care  Medicines:  · Medicines  can help decrease pain  You may need medicine to prevent or treat a bacterial infection  Ask how to take pain medicine safely  · Take your medicine as directed  Contact your healthcare provider if you think your medicine is not helping or if you have side effects  Tell him or her if you are allergic to any medicine  Keep a list of the medicines, vitamins, and herbs you take  Include the amounts, and when and why you take them  Bring the list or the pill bottles to follow-up visits  Carry your medicine list with you in case of an emergency  Follow up with your healthcare provider or urologist as directed: You may need to return for more tests or procedures  Write down your questions so you remember to ask them during your visits  © 2017 [employer] Information is for End User's use only and may not be sold, redistributed or otherwise used for commercial purposes  All illustrations and images included in CareNotes® are the copyrighted property of [employer]  or [employer]  The above information is an  only   It is not intended as medical advice for individual conditions or treatments  Talk to your doctor, nurse or pharmacist before following any medical regimen to see if it is safe and effective for you

## 2020-03-18 ENCOUNTER — PROCEDURE VISIT (OUTPATIENT)
Dept: UROLOGY | Facility: CLINIC | Age: 55
End: 2020-03-18
Payer: COMMERCIAL

## 2020-03-18 VITALS
HEART RATE: 79 BPM | BODY MASS INDEX: 26.28 KG/M2 | DIASTOLIC BLOOD PRESSURE: 98 MMHG | HEIGHT: 72 IN | WEIGHT: 194 LBS | SYSTOLIC BLOOD PRESSURE: 156 MMHG

## 2020-03-18 DIAGNOSIS — R97.20 ELEVATED PSA: Primary | ICD-10-CM

## 2020-03-18 PROCEDURE — G0416 PROSTATE BIOPSY, ANY MTHD: HCPCS | Performed by: PATHOLOGY

## 2020-03-18 PROCEDURE — 3008F BODY MASS INDEX DOCD: CPT | Performed by: INTERNAL MEDICINE

## 2020-03-18 PROCEDURE — 76942 ECHO GUIDE FOR BIOPSY: CPT | Performed by: UROLOGY

## 2020-03-18 PROCEDURE — 88344 IMHCHEM/IMCYTCHM EA MLT ANTB: CPT | Performed by: PATHOLOGY

## 2020-03-18 PROCEDURE — 55700 PR BIOPSY OF PROSTATE,NEEDLE/PUNCH: CPT | Performed by: UROLOGY

## 2020-03-18 NOTE — LETTER
March 18, 2020     Kirk Gilman, DO  [facility] [place] [address]    Patient: Pierre Ocasio   YOB: 1965   Date of Visit: 3/18/2020       Dear Dr Jordy Garcia: Thank you for referring Pierre Ocasio to me for evaluation  Below are my notes for this consultation  If you have questions, please do not hesitate to call me  I look forward to following your patient along with you  Sincerely,        Wade Milian MD        CC: No Recipients  Wade Milian MD  3/18/2020 10:15 AM  Sign at close encounter  Office TRUS-guided Prostate Biopsy Procedure Note    Indication    Elevated PSA    Informed consent   The risks, benefits and alternatives to TRUS-guided prostate biopsy were conveyed to the patient prior to performing the procedure  A discussion of the risks of the procedure included, but was not limited to: pain, hematuria, hematochezia, hematospermia, infection, and the possibility of a non-diagnostic biopsy  The patient was given the opportunity to have his questions answered and there was no perceived barrier to education  Antibiotic prophylaxis   The patient received the following antibiotics at least 30 minutes prior to undergoing biopsy: Ciprofloxacin 500 mg PO  The patient was instructed to continue taking the antibiotics as prescribed for a total of 1 days, including the day of biopsy  Rectal cleansing  The patient was instructed to perform an evacuating rectal enema 1-2 hours prior to biopsy  Local anesthesia  Topical 2% lidocaine jelly was applied liberally to the anus and rectum and allowed to dwell for at least 5 min prior to starting the procedure  After insertion of the TRUS probe, 10 mL of 2% lidocaine solution was injected with ultrasound guidance at the  junction of the prostate and seminal vesicles  The anesthetic was allowed to dwell for at least 2 minutes prior to biopsy      Transrectal ultrasonography  The patient was placed in the left lateral decubitus position  After an attentive digital rectal examination, a 7 5 mHz sidefire ultrasound probe was gently inserted into the rectum and biplanar imaging of the prostate was done with the findings noted below  Images were taken of any abnormal findings and also to document prostate size  Bladder  The bladder base appeared normal     Prostate      Ultrasound size measurements:  -Volume:  124 89 cm3    Ultrasound findings:  -Cysts: None  -Masses: None  -Median lobe: absent    Clinical stage (assuming a positive biopsy):   -T1c     TRUS-guided needle biopsy  Using an 18 gauge biopsy needle and ultrasound guidance, the following biopsies were taken:    1 core(s) from the left lateral base  1 core(s) from the left lateral mid-gland  1 core(s) from the right middle base  1 core(s) from the right lateral base  1 core(s) from the left lateral mid-gland  1 core(s) from the left middle mid-gland  1 core(s) from the right middle mid-gland  1 core(s) from the right lateral mid-gland  1 core(s) from the left lateral apex  1 core(s) from the left middle apex  1 core(s) from the right middle apex  1 core(s) from the right lateral apex    Total number of cores: 12                Complications  There were no procedural complications  Disposition  The patient was dismissed to home    Post-procedure instructions: Today he underwent an uncomplicated transrectal ultrasound-guided biopsy of the prostate, following a periprosthetic nerve block  I reviewed the normal postprocedure a course including bleeding per rectum, hematuria, and hematospermia  I instructed him to complete his course of antibiotics as prescribed  Instructed him to call with fever greater than 101, chills, nausea, vomiting, and poorly controlled pain  His followup was scheduled in approximately 2 weeks' time to review the pathology            Biopsy prostate     Date/Time 3/18/2020 10:14 AM     Performed by  Marcelo Morfin MD Authorized by Tresa Loaiza MD      Universal Protocol Consent: Verbal consent obtained  Written consent obtained  Risks and benefits: risks, benefits and alternatives were discussed  Consent given by: patient  Patient understanding: patient states understanding of the procedure being performed  Patient consent: the patient's understanding of the procedure matches consent given  Procedure consent: procedure consent matches procedure scheduled  Relevant documents: relevant documents present and verified  Test results: test results available and properly labeled  Site marked: the operative site was not marked  Radiology Images displayed and confirmed  If images not available, report reviewed: imaging studies available  Required items: required blood products, implants, devices, and special equipment available  Patient identity confirmed: verbally with patient and provided demographic data        Preparation: Patient was prepped and draped in the usual sterile fashion  Local anesthesia used: yes     Anesthesia   Local anesthesia used: yes  Local Anesthetic: lidocaine 2% without epinephrine     Sedation   Patient sedated: no        Specimen: yes    Culture: no   Procedure Details   Procedure Notes: Office TRUS-guided Prostate Biopsy Procedure Note    Indication    Elevated PSA    Informed consent   The risks, benefits and alternatives to TRUS-guided prostate biopsy were conveyed to the patient prior to performing the procedure  A discussion of the risks of the procedure included, but was not limited to: pain, hematuria, hematochezia, hematospermia, infection, and the possibility of a non-diagnostic biopsy  The patient was given the opportunity to have his questions answered and there was no perceived barrier to education  Antibiotic prophylaxis   The patient received the following antibiotics at least 30 minutes prior to undergoing biopsy: Ciprofloxacin 500 mg PO   The patient was instructed to continue taking the antibiotics as prescribed for a total of 1 days, including the day of biopsy  Rectal cleansing  The patient was instructed to perform an evacuating rectal enema 1-2 hours prior to biopsy  Local anesthesia  Topical 2% lidocaine jelly was applied liberally to the anus and rectum and allowed to dwell for at least 5 min prior to starting the procedure  After insertion of the TRUS probe, 10 mL of 2% lidocaine solution was injected with ultrasound guidance at the  junction of the prostate and seminal vesicles  The anesthetic was allowed to dwell for at least 2 minutes prior to biopsy  Transrectal ultrasonography  The patient was placed in the left lateral decubitus position  After an attentive digital rectal examination, a 7 5 mHz sidefire ultrasound probe was gently inserted into the rectum and biplanar imaging of the prostate was done with the findings noted below  Images were taken of any abnormal findings and also to document prostate size  Bladder  The bladder base appeared normal     Prostate      Ultrasound size measurements:  -Volume:  124 89 cm3    Ultrasound findings:  -Cysts: None  -Masses: None  -Median lobe: absent    Clinical stage (assuming a positive biopsy):   -T1c     TRUS-guided needle biopsy  Using an 18 gauge biopsy needle and ultrasound guidance, the following biopsies were taken:    1 core(s) from the left lateral base  1 core(s) from the left lateral mid-gland  1 core(s) from the right middle base  1 core(s) from the right lateral base  1 core(s) from the left lateral mid-gland  1 core(s) from the left middle mid-gland  1 core(s) from the right middle mid-gland  1 core(s) from the right lateral mid-gland  1 core(s) from the left lateral apex  1 core(s) from the left middle apex  1 core(s) from the right middle apex    1 core(s) from the right lateral apex    Total number of cores: 12                Complications  There were no procedural complications  Disposition  The patient was dismissed to home    Post-procedure instructions: Today he underwent an uncomplicated transrectal ultrasound-guided biopsy of the prostate, following a periprosthetic nerve block  I reviewed the normal postprocedure a course including bleeding per rectum, hematuria, and hematospermia  I instructed him to complete his course of antibiotics as prescribed  Instructed him to call with fever greater than 101, chills, nausea, vomiting, and poorly controlled pain  His followup was scheduled in approximately 2 weeks' time to review the pathology    Patient tolerance: Patient tolerated the procedure well with no immediate complications

## 2020-03-29 NOTE — PROGRESS NOTES
Virtual Regular Visit    Problem List Items Addressed This Visit        Other    Elevated PSA - Primary    Relevant Orders    PSA, total and free      Other Visit Diagnoses     History of needle biopsy of prostate with negative result        Relevant Orders    PSA, total and free    Benign prostatic hyperplasia with urinary obstruction        Relevant Medications    finasteride (PROSCAR) 5 mg tablet        Assessment/plan/discussion:    Bro Gross has done well after his biopsy, he did have some bleeding after the biopsy, in addition to worsening difficulties urinating  These have been getting better over time, I did review with him that he has a 124 gram prostate and that he will likely benefit from finasteride going forward  We reviewed the risks and benefits of this medication and this was sent to his pharmacy  I did tell him that there is a roughly 15% chance that his negative prostate biopsy results are false negatives, as such he will require long-term PSA follow-up which he is amenable to  I have ordered a PSA, free and total to be performed in 6 months, of note we do expect this to decreased by potentially 50% given that we are starting a 5 alpha reductase inhibitor today  He does have some signs of inflammation and prostatitis on his prostate biopsy, this is also a potentially benign cause of PSA elevation, as is his very large prostate  All of his questions and concerns were answered and addressed, he will see us in 6 months with a PSA free and total prior         Reason for visit is review prostate biopsy results, history of enlarged prostate, history of elevated PSA    Encounter provider Lesli Cesar MD    Provider located at [address] [place] [id number]      Recent Visits  No visits were found meeting these conditions     Showing recent visits within past 7 days and meeting all other requirements Today's Visits  Date Type Provider Dept   03/30/20 Telemedicine Sirisha Rosales MD [facility] today's visits and meeting all other requirements     Future Appointments  No visits were found meeting these conditions  Showing future appointments within next 150 days and meeting all other requirements        The patient was identified by name and date of birth  Lara Acevedo was informed that this is a telemedicine visit and that the visit is being conducted through [employer]  My office door was closed  No one else was in the room  He acknowledged consent and understanding of privacy and security of the video platform  The patient has agreed to participate and understands they can discontinue the visit at any time  Please note that today's visit is being conducted virtually due to the COVID-19 outbreak    Patient is aware this is a billable service  Subjective  Lara Acevedo is a 53 y o  male with a history of elevated PSA as well as lower urinary tract symptoms  His prostate biopsy volume was seen to be 124 grams, indicating significant enlargement of the prostate volume  He did well after his biopsy, no fevers, no chills, some slight bleeding, this is improving, he has been having more difficulty urinating since undergoing biopsy  I counseled him on his options for difficulty urinating, including the addition of a 5 alpha reductase inhibitor, and he wishes to start this medication  No other urologic complaints today  He is amenable to ongoing PSA follow-up      The following portions of the patient's history were reviewed and updated as appropriate: allergies, current medications, past family history, past medical history, past social history, past surgical history and problem list       Past Medical History:   Diagnosis Date    Hyperlipidemia        Past Surgical History:   Procedure Laterality Date    APPENDECTOMY      SPLENECTOMY         Current Outpatient Medications   Medication Sig Dispense Refill    acetaminophen (TYLENOL) 325 mg tablet Take 2 tablets (650 mg total) by mouth every 6 (six) hours as needed for mild pain, headaches or fever 30 tablet 0    aspirin (ECOTRIN LOW STRENGTH) 81 mg EC tablet Take 1 tablet (81 mg total) by mouth daily 30 tablet 0    atorvastatin (LIPITOR) 40 mg tablet Take 1 tablet (40 mg total) by mouth daily with dinner 90 tablet 1    bisacodyl (FLEET) 10 MG/30ML ENEM Insert 30 mL (10 mg total) into the rectum once for 1 dose Use this enema the morning of your prostate biopsy to cleanse your rectum of stool burden  1 enema 0    finasteride (PROSCAR) 5 mg tablet Take 1 tablet (5 mg total) by mouth daily 90 tablet 3    losartan-hydrochlorothiazide (HYZAAR) 50-12 5 mg per tablet Take 1 tablet by mouth daily 90 tablet 1    verapamil (CALAN-SR) 120 mg CR tablet Take 1 tablet (120 mg total) by mouth daily with dinner for 29 doses 90 tablet 1     No current facility-administered medications for this visit  No Known Allergies    Review of Systems   Constitutional: Negative  HENT: Negative  Eyes: Negative  Respiratory: Negative  Cardiovascular: Negative  Gastrointestinal: Negative  Endocrine: Negative  Genitourinary: Positive for difficulty urinating and frequency  Musculoskeletal: Negative  Skin: Negative  Allergic/Immunologic: Negative  Neurological: Negative  Hematological: Negative  Psychiatric/Behavioral: Negative  Physical Exam the patient does not have access to a video camera, however he did sound well, he was speaking with full sentences, and without evidence of shortness of breath or increased work of breathing    I spent 15 minutes with the patient during this visit

## 2020-03-29 NOTE — PATIENT INSTRUCTIONS
PROSTATE CANCER SCREENING OVERVIEW    Prostate cancer screening involves testing for prostate cancer in men who have no symptoms of the disease  This testing can find cancer at an early stage  However, medical experts agree that prostate cancer screening should not be routinely ordered for all men and that screening can lead to both benefits and harms  This article is designed to review the advantages and disadvantages of prostate cancer screening  You should talk with your health care provider to decide what is best in your individual situation  WHAT IS PROSTATE CANCER? Prostate cancer is a cancer of the prostate, a small gland in men that is located below the bladder and in front of the rectum (figure 1)  The prostate produces fluid that helps carry sperm during ejaculation  Although many men are diagnosed with prostate cancer, most of them do not die from their cancer  Prostate cancer often grows so slowly that many men die of other causes before they even develop symptoms of prostate cancer  PROSTATE CANCER RISK FACTORS  Age -- All men are at risk for prostate cancer, but the risk greatly increases with older age  Prostate cancer is rarely found in men younger than 48years old  Ethnic background --  men develop prostate cancer more often than white and  men   men also are more likely to die of prostate cancer than white or  men  Family medical history -- Men who have a first-degree relative (a father or brother) with prostate cancer are more likely to develop the disease  Men with female relatives with breast cancer related to the breast cancer gene (BRCA) may also be more likely to develop prostate cancer  Diet -- A diet high in animal fat or low in vegetables may increase a man's risk of prostate cancer      PROSTATE CANCER SCREENING TESTS  Prostate cancer screening involves blood test that measures prostate-specific antigen (PSA)  Prostate-specific antigen (PSA) -- PSA is a protein produced by the prostate  The PSA test measures the amount of PSA in a sample of blood  Although many men with prostate cancer have an elevated PSA concentration, a high level does not necessarily mean there is a cancer  The most common cause for an elevated PSA is benign prostatic hyperplasia (BPH), a noncancerous enlargement of the prostate  Other causes include prostate infection (prostatitis), trauma (bicycle riding), and sexual activity  You should avoid ejaculating or riding a bike for at least 48 hours before having a PSA test  (See "Patient education: Benign prostatic hyperplasia (BPH) (Beyond the Basics)"  )    Rectal examination -- A rectal examination is sometimes recommended, along with measurement of the PSA, to screen for prostate cancer  However, studies have not shown that rectal examination is an effective screening test for prostate cancer when used alone  If the PSA test is positive -- A positive PSA test is not a reason to panic; noncancerous conditions are the most common causes for an abnormal test, particularly for PSA tests  On the other hand, a positive test should not be ignored  The first step in evaluating an elevated PSA is usually to repeat the test   You should avoid ejaculating and riding a bike for at least 48 hours before repeating the test  If the PSA remains elevated, a prostate biopsy or other testing is usually recommended  Prostate biopsy -- A prostate biopsy involves having a rectal ultrasound and use of a needle to obtain tissue samples from the prostate gland  The biopsy is usually performed in the office by a urologist (a doctor who specializes in treatment of urinary, bladder, and prostate issues)  After the procedure, most men feel sore and you may see some blood in the urine or semen, this can last for up to 4-6 weeks  Biopsies can rarely cause serious infections   Sometimes biopsies are guided by magnetic resonance imaging (MRI)  PROS AND CONS OF PROSTATE CANCER SCREENING    There are a number of arguments for and against prostate cancer screening  Arguments for screening -- Experts in favor of prostate cancer screening cite the following arguments:    ?Results from a large  study of prostate cancer screening found that men who had prostate-specific antigen (PSA) testing had a 20 percent lower chance of dying from prostate cancer after 13 years compared with men who did not have prostate cancer screening [1]  Men who had PSA testing also had a 30 percent lower chance of developing metastatic disease (cancer that has spread to other parts of the body) [2]  In another  study of prostate cancer screening, the mortality benefit was even larger to prostate cancer screening  (the Vanderbilt trial)    ? A substantial number of men die from prostate cancer every year and many more suffer from the complications of advanced disease  ? For men with an aggressive prostate cancer, the best chance for curing it is by finding it at an early stage and then treating it with surgery or radiation  Studies have shown that men who have prostate cancer detected by PSA screening tend to have earlier-stage cancer than men who have a cancer detected by other means  (See "Patient education: Treatment for advanced prostate cancer (Beyond the Basics)" and "Patient education: Prostate cancer treatment; stage I to III cancer (Beyond the Basics)" )    ? The five-year survival for men who have prostate cancer confined to the prostate gland (early stage) is nearly 100 percent; this drops to 27 percent for men whose cancer has spread to other areas of the body  However, many early-stage cancers are not aggressive, and the five-year survival for those will be nearly 100 percent even without any treatment [3]  ?The available screening tests are not perfect, but they are easy to perform and have fair accuracy    Arguments against screening -- Other arguments have also been made against screening:    ?Even though the  study found a benefit of prostate cancer screening, PSA testing prevented only about one prostate cancer death for every 1000 screened men after 13 years [1]  Furthermore, 76 percent of men with an abnormal PSA who had a prostate biopsy did not have prostate cancer  However, in the [place] study, the number needed to screen and treat was much lower indicating that prostate cancer screening is ineffective screening measure which decreases the morbidity and mortality of prostate cancer  ?Many prostate cancers detected with screening are unlikely to cause death or disability  Thus, a number of men will be diagnosed with cancer and potentially suffer the side effects of cancer treatment for cancers that never would have been found without prostate cancer screening  In other words, even if screening finds a cancer early, it is not clear in all cases that the cancer must be treated  IS PROSTATE CANCER SCREENING RIGHT FOR ME? The answer to this question is not the same for everyone  The table includes some questions you can ask yourself when weighing the potential risk and benefits of screening (table 1)  Professional organizations -- Major medical associations and societies, including the [employer] Task Force [4], American Cancer Society [5], American Urological Association [6], and many  cancer societies, agree that men should discuss screening with their health care providers  Men should be informed about the benefits and risks of prostate cancer screening and treatment and make decisions that best reflect their personal values and preferences  Age to first consider screening -- Screening discussions should begin at age 48 years for men at average risk for developing prostate cancer   Men with risk factors for prostate cancer (such as black men or men with a father or brother who had prostate cancer) may want to begin screening discussions at age 36 to 39 years  How often to perform screening -- Once screening begins, it should occur every two to four years (if continued testing is desired) and should include a PSA blood test   Age to stop screening -- Guidelines suggest stopping screening after age 71, though some experts would continue offering screening to very healthy men beyond that age  Screening not recommended -- Screening is generally not recommended for men whose life expectancy, or ability to undergo curative treatment, is limited by serious health problems  In these situations, the potential benefits of screening are outweighed by the likely harms  WHERE TO GET MORE INFORMATION  Your healthcare provider is the best source of information for questions and concerns related to your medical problem  This article will be updated as needed on our web site (www [facility]/patients)  Related topics for patients, as well as selected articles written for healthcare professionals, are also available  Some of the most relevant are listed below  Patient level information -- [employer] offers two types of patient education materials  The Basics -- The Basics patient education pieces answer the four or five key questions a patient might have about a given condition  These articles are best for patients who want a general overview and who prefer short, easy-to-read materials  Patient education: Prostate cancer screening (PSA tests) (The Basics)  Patient education: Prostate cancer (The Basics)  Patient education: Cancer screening (The Basics)  Patient education: Choosing treatment for low-risk localized prostate cancer (The Basics)  Beyond the Basics -- Beyond the Basics patient education pieces are longer, more sophisticated, and more detailed  These articles are best for patients who want in-depth information and are comfortable with some medical jargon    Patient education: Treatment for advanced prostate cancer (Beyond the Basics)  Patient education: Prostate cancer treatment; stage I to III cancer (Beyond the Basics)  Patient education: Benign prostatic hyperplasia (BPH) (Beyond the Basics)  Professional level information -- Professional level articles are designed to keep doctors and other health professionals up-to-date on the latest medical findings  These articles are thorough, long, and complex, and they contain multiple references to the research on which they are based  Professional level articles are best for people who are comfortable with a lot of medical terminology and who want to read the same materials their doctors are reading  Measurement of prostate-specific antigen  Screening for prostate cancer  The following organizations also provide reliable health information  ? [employer] Flood, Gisell  3-818-3-CANCER  (www cancer gov/types/prostate)  ? American Society for Clinical Oncology (patient information website)  (www cancer  net)  ? [address] [place] [address] (patient and caregiver information website)  (www nccn org/patients)  ? [address]  1-687-BVG-2345  ([employer])  ? [employer]  (www [employer] gov/healthtopics  html)  ? [employer] Prostate Cancer Education and Support  (www ustoo  org/Detection-PSA-And-JENNIFER)

## 2020-03-30 ENCOUNTER — TELEMEDICINE (OUTPATIENT)
Dept: UROLOGY | Facility: CLINIC | Age: 55
End: 2020-03-30
Payer: COMMERCIAL

## 2020-03-30 DIAGNOSIS — R97.20 ELEVATED PSA: Primary | ICD-10-CM

## 2020-03-30 DIAGNOSIS — Z98.890 HISTORY OF NEEDLE BIOPSY OF PROSTATE WITH NEGATIVE RESULT: ICD-10-CM

## 2020-03-30 DIAGNOSIS — N13.8 BENIGN PROSTATIC HYPERPLASIA WITH URINARY OBSTRUCTION: ICD-10-CM

## 2020-03-30 DIAGNOSIS — N40.1 BENIGN PROSTATIC HYPERPLASIA WITH URINARY OBSTRUCTION: ICD-10-CM

## 2020-03-30 PROCEDURE — 99213 OFFICE O/P EST LOW 20 MIN: CPT | Performed by: UROLOGY

## 2020-03-30 RX ORDER — FINASTERIDE 5 MG/1
5 TABLET, FILM COATED ORAL DAILY
Qty: 90 TABLET | Refills: 3 | Status: SHIPPED | OUTPATIENT
Start: 2020-03-30 | End: 2021-03-15

## 2020-03-30 NOTE — LETTER
March 30, 2020     Melanie RicheyerDO  [facility] [place] [address]    Patient: Mundo Kuo   YOB: 1965   Date of Visit: 3/30/2020       Dear Dr Kolby Corado: Thank you for referring Mundo Kuo to me for evaluation  Below are my notes for this consultation  If you have questions, please do not hesitate to call me  I look forward to following your patient along with you  Sincerely,        Jimmy Siddiqui MD        CC: No Recipients  Jimmy Siddiqui MD  3/30/2020  2:38 PM  Sign at close encounter    Virtual Regular Visit    Problem List Items Addressed This Visit        Other    Elevated PSA - Primary    Relevant Orders    PSA, total and free      Other Visit Diagnoses     History of needle biopsy of prostate with negative result        Relevant Orders    PSA, total and free    Benign prostatic hyperplasia with urinary obstruction        Relevant Medications    finasteride (PROSCAR) 5 mg tablet        Assessment/plan/discussion:    Michael Vasquez has done well after his biopsy, he did have some bleeding after the biopsy, in addition to worsening difficulties urinating  These have been getting better over time, I did review with him that he has a 124 gram prostate and that he will likely benefit from finasteride going forward  We reviewed the risks and benefits of this medication and this was sent to his pharmacy  I did tell him that there is a roughly 15% chance that his negative prostate biopsy results are false negatives, as such he will require long-term PSA follow-up which he is amenable to  I have ordered a PSA, free and total to be performed in 6 months, of note we do expect this to decreased by potentially 50% given that we are starting a 5 alpha reductase inhibitor today  He does have some signs of inflammation and prostatitis on his prostate biopsy, this is also a potentially benign cause of PSA elevation, as is his very large prostate      All of his questions and concerns were answered and addressed, he will see us in 6 months with a PSA free and total prior         Reason for visit is review prostate biopsy results, history of enlarged prostate, history of elevated PSA    Encounter provider Lesli Cesar MD    Provider located at [address] [place] [id number]      Recent Visits  No visits were found meeting these conditions  Showing recent visits within past 7 days and meeting all other requirements     Today's Visits  Date Type Provider Dept   03/30/20 Telemedicine Lesli Cesar MD [facility] today's visits and meeting all other requirements     Future Appointments  No visits were found meeting these conditions  Showing future appointments within next 150 days and meeting all other requirements        The patient was identified by name and date of birth  Mary Johnson was informed that this is a telemedicine visit and that the visit is being conducted through [employer]  My office door was closed  No one else was in the room  He acknowledged consent and understanding of privacy and security of the video platform  The patient has agreed to participate and understands they can discontinue the visit at any time  Please note that today's visit is being conducted virtually due to the COVID-19 outbreak    Patient is aware this is a billable service  Subjective  Mary Johnson is a 53 y o  male with a history of elevated PSA as well as lower urinary tract symptoms  His prostate biopsy volume was seen to be 124 grams, indicating significant enlargement of the prostate volume  He did well after his biopsy, no fevers, no chills, some slight bleeding, this is improving, he has been having more difficulty urinating since undergoing biopsy    I counseled him on his options for difficulty urinating, including the addition of a 5 alpha reductase inhibitor, and he wishes to start this medication  No other urologic complaints today  He is amenable to ongoing PSA follow-up  The following portions of the patient's history were reviewed and updated as appropriate: allergies, current medications, past family history, past medical history, past social history, past surgical history and problem list       Past Medical History:   Diagnosis Date    Hyperlipidemia        Past Surgical History:   Procedure Laterality Date    APPENDECTOMY      SPLENECTOMY         Current Outpatient Medications   Medication Sig Dispense Refill    acetaminophen (TYLENOL) 325 mg tablet Take 2 tablets (650 mg total) by mouth every 6 (six) hours as needed for mild pain, headaches or fever 30 tablet 0    aspirin (ECOTRIN LOW STRENGTH) 81 mg EC tablet Take 1 tablet (81 mg total) by mouth daily 30 tablet 0    atorvastatin (LIPITOR) 40 mg tablet Take 1 tablet (40 mg total) by mouth daily with dinner 90 tablet 1    bisacodyl (FLEET) 10 MG/30ML ENEM Insert 30 mL (10 mg total) into the rectum once for 1 dose Use this enema the morning of your prostate biopsy to cleanse your rectum of stool burden  1 enema 0    finasteride (PROSCAR) 5 mg tablet Take 1 tablet (5 mg total) by mouth daily 90 tablet 3    losartan-hydrochlorothiazide (HYZAAR) 50-12 5 mg per tablet Take 1 tablet by mouth daily 90 tablet 1    verapamil (CALAN-SR) 120 mg CR tablet Take 1 tablet (120 mg total) by mouth daily with dinner for 29 doses 90 tablet 1     No current facility-administered medications for this visit  No Known Allergies    Review of Systems   Constitutional: Negative  HENT: Negative  Eyes: Negative  Respiratory: Negative  Cardiovascular: Negative  Gastrointestinal: Negative  Endocrine: Negative  Genitourinary: Positive for difficulty urinating and frequency  Musculoskeletal: Negative  Skin: Negative  Allergic/Immunologic: Negative      Neurological: Negative  Hematological: Negative  Psychiatric/Behavioral: Negative  Physical Exam the patient does not have access to a video camera, however he did sound well, he was speaking with full sentences, and without evidence of shortness of breath or increased work of breathing    I spent 15 minutes with the patient during this visit

## 2020-04-22 ENCOUNTER — TELEPHONE (OUTPATIENT)
Dept: GASTROENTEROLOGY | Facility: AMBULARY SURGERY CENTER | Age: 55
End: 2020-04-22

## 2020-04-24 ENCOUNTER — TELEMEDICINE (OUTPATIENT)
Dept: GASTROENTEROLOGY | Facility: AMBULARY SURGERY CENTER | Age: 55
End: 2020-04-24
Payer: COMMERCIAL

## 2020-04-24 DIAGNOSIS — Z11.59 SPECIAL SCREENING EXAMINATION FOR VIRAL DISEASE: ICD-10-CM

## 2020-04-24 DIAGNOSIS — K92.1 HEMATOCHEZIA: ICD-10-CM

## 2020-04-24 DIAGNOSIS — K21.9 GASTROESOPHAGEAL REFLUX DISEASE, ESOPHAGITIS PRESENCE NOT SPECIFIED: ICD-10-CM

## 2020-04-24 DIAGNOSIS — Z12.11 COLON CANCER SCREENING: Primary | ICD-10-CM

## 2020-04-24 PROCEDURE — 99213 OFFICE O/P EST LOW 20 MIN: CPT | Performed by: INTERNAL MEDICINE

## 2020-04-28 ENCOUNTER — TELEPHONE (OUTPATIENT)
Dept: GASTROENTEROLOGY | Facility: AMBULARY SURGERY CENTER | Age: 55
End: 2020-04-28

## 2020-04-29 ENCOUNTER — HOSPITAL ENCOUNTER (OUTPATIENT)
Dept: RADIOLOGY | Facility: HOSPITAL | Age: 55
Discharge: HOME | End: 2020-04-29
Attending: FAMILY MEDICINE
Payer: COMMERCIAL

## 2020-04-29 DIAGNOSIS — Z72.0 TOBACCO USE: ICD-10-CM

## 2020-04-29 PROCEDURE — G0297 LDCT FOR LUNG CA SCREEN: HCPCS

## 2020-05-01 PROBLEM — J43.8 OTHER EMPHYSEMA (HCC): Status: ACTIVE | Noted: 2020-05-01

## 2020-06-01 DIAGNOSIS — Z12.11 SPECIAL SCREENING FOR MALIGNANT NEOPLASMS, COLON: Primary | ICD-10-CM

## 2020-06-05 ENCOUNTER — DOCUMENTATION (OUTPATIENT)
Dept: URGENT CARE | Facility: CLINIC | Age: 55
End: 2020-06-05

## 2020-06-05 DIAGNOSIS — Z11.59 SPECIAL SCREENING EXAMINATION FOR VIRAL DISEASE: ICD-10-CM

## 2020-06-05 PROCEDURE — U0003 INFECTIOUS AGENT DETECTION BY NUCLEIC ACID (DNA OR RNA); SEVERE ACUTE RESPIRATORY SYNDROME CORONAVIRUS 2 (SARS-COV-2) (CORONAVIRUS DISEASE [COVID-19]), AMPLIFIED PROBE TECHNIQUE, MAKING USE OF HIGH THROUGHPUT TECHNOLOGIES AS DESCRIBED BY CMS-2020-01-R: HCPCS

## 2020-06-07 LAB — SARS-COV-2 RNA SPEC QL NAA+PROBE: NOT DETECTED

## 2020-06-08 ENCOUNTER — ANESTHESIA EVENT (OUTPATIENT)
Dept: GASTROENTEROLOGY | Facility: AMBULARY SURGERY CENTER | Age: 55
End: 2020-06-08

## 2020-06-09 ENCOUNTER — HOSPITAL ENCOUNTER (OUTPATIENT)
Dept: GASTROENTEROLOGY | Facility: AMBULARY SURGERY CENTER | Age: 55
Setting detail: OUTPATIENT SURGERY
Discharge: HOME | End: 2020-06-09
Attending: INTERNAL MEDICINE
Payer: COMMERCIAL

## 2020-06-09 ENCOUNTER — ANESTHESIA (OUTPATIENT)
Dept: GASTROENTEROLOGY | Facility: AMBULARY SURGERY CENTER | Age: 55
End: 2020-06-09

## 2020-06-09 VITALS
RESPIRATION RATE: 18 BRPM | HEIGHT: 72 IN | HEART RATE: 64 BPM | TEMPERATURE: 96.6 F | WEIGHT: 187 LBS | BODY MASS INDEX: 25.33 KG/M2 | SYSTOLIC BLOOD PRESSURE: 113 MMHG | DIASTOLIC BLOOD PRESSURE: 69 MMHG | OXYGEN SATURATION: 99 %

## 2020-06-09 DIAGNOSIS — K21.9 GASTROESOPHAGEAL REFLUX DISEASE, ESOPHAGITIS PRESENCE NOT SPECIFIED: ICD-10-CM

## 2020-06-09 DIAGNOSIS — Z12.11 COLON CANCER SCREENING: ICD-10-CM

## 2020-06-09 PROCEDURE — 43239 EGD BIOPSY SINGLE/MULTIPLE: CPT | Performed by: INTERNAL MEDICINE

## 2020-06-09 PROCEDURE — 88305 TISSUE EXAM BY PATHOLOGIST: CPT | Performed by: PATHOLOGY

## 2020-06-09 PROCEDURE — 45385 COLONOSCOPY W/LESION REMOVAL: CPT | Performed by: INTERNAL MEDICINE

## 2020-06-09 PROCEDURE — NC001 PR NO CHARGE: Performed by: INTERNAL MEDICINE

## 2020-06-09 RX ORDER — PANTOPRAZOLE SODIUM 40 MG/1
40 TABLET, DELAYED RELEASE ORAL DAILY
Qty: 90 TABLET | Refills: 0 | Status: SHIPPED | OUTPATIENT
Start: 2020-06-09 | End: 2020-09-02 | Stop reason: SDUPTHER

## 2020-06-09 RX ORDER — PROPOFOL 10 MG/ML
INJECTION, EMULSION INTRAVENOUS CONTINUOUS PRN
Status: DISCONTINUED | OUTPATIENT
Start: 2020-06-09 | End: 2020-06-09 | Stop reason: SURG

## 2020-06-09 RX ORDER — GLYCOPYRROLATE 0.2 MG/ML
INJECTION INTRAMUSCULAR; INTRAVENOUS AS NEEDED
Status: DISCONTINUED | OUTPATIENT
Start: 2020-06-09 | End: 2020-06-09 | Stop reason: SURG

## 2020-06-09 RX ORDER — PROPOFOL 10 MG/ML
INJECTION, EMULSION INTRAVENOUS AS NEEDED
Status: DISCONTINUED | OUTPATIENT
Start: 2020-06-09 | End: 2020-06-09 | Stop reason: SURG

## 2020-06-09 RX ORDER — SODIUM CHLORIDE, SODIUM LACTATE, POTASSIUM CHLORIDE, CALCIUM CHLORIDE 600; 310; 30; 20 MG/100ML; MG/100ML; MG/100ML; MG/100ML
125 INJECTION, SOLUTION INTRAVENOUS CONTINUOUS
Status: DISCONTINUED | OUTPATIENT
Start: 2020-06-09 | End: 2020-06-13 | Stop reason: HOSPADM

## 2020-06-09 RX ADMIN — GLYCOPYRROLATE 0.2 MG: 0.2 INJECTION, SOLUTION INTRAMUSCULAR; INTRAVENOUS at 08:58

## 2020-06-09 RX ADMIN — PHENYLEPHRINE HYDROCHLORIDE 100 MCG: 10 INJECTION INTRAVENOUS at 08:56

## 2020-06-09 RX ADMIN — PROPOFOL 100 MG: 10 INJECTION, EMULSION INTRAVENOUS at 08:41

## 2020-06-09 RX ADMIN — SODIUM CHLORIDE, SODIUM LACTATE, POTASSIUM CHLORIDE, AND CALCIUM CHLORIDE 125 ML/HR: .6; .31; .03; .02 INJECTION, SOLUTION INTRAVENOUS at 08:22

## 2020-06-09 RX ADMIN — PROPOFOL 150 MCG/KG/MIN: 10 INJECTION, EMULSION INTRAVENOUS at 08:41

## 2020-06-23 ENCOUNTER — TELEPHONE (OUTPATIENT)
Dept: UROLOGY | Facility: MEDICAL CENTER | Age: 55
End: 2020-06-23

## 2020-06-24 DIAGNOSIS — R31.0 GROSS HEMATURIA: Primary | ICD-10-CM

## 2020-08-15 DIAGNOSIS — R51.9 HEADACHE: ICD-10-CM

## 2020-08-15 DIAGNOSIS — I10 ESSENTIAL HYPERTENSION: ICD-10-CM

## 2020-08-15 DIAGNOSIS — E78.5 HYPERLIPIDEMIA: ICD-10-CM

## 2020-08-17 RX ORDER — ATORVASTATIN CALCIUM 40 MG/1
TABLET, FILM COATED ORAL
Qty: 90 TABLET | Refills: 1 | Status: SHIPPED | OUTPATIENT
Start: 2020-08-17 | End: 2021-01-19 | Stop reason: HOSPADM

## 2020-08-17 RX ORDER — LOSARTAN POTASSIUM AND HYDROCHLOROTHIAZIDE 12.5; 5 MG/1; MG/1
TABLET ORAL
Qty: 90 TABLET | Refills: 1 | Status: SHIPPED | OUTPATIENT
Start: 2020-08-17 | End: 2020-12-29

## 2020-08-31 DIAGNOSIS — K21.9 GASTROESOPHAGEAL REFLUX DISEASE, ESOPHAGITIS PRESENCE NOT SPECIFIED: ICD-10-CM

## 2020-09-02 RX ORDER — PANTOPRAZOLE SODIUM 40 MG/1
TABLET, DELAYED RELEASE ORAL
Qty: 90 TABLET | Refills: 0 | Status: SHIPPED | OUTPATIENT
Start: 2020-09-02 | End: 2020-12-28 | Stop reason: SDUPTHER

## 2020-09-04 ENCOUNTER — TELEPHONE (OUTPATIENT)
Dept: NEUROLOGY | Facility: CLINIC | Age: 55
End: 2020-09-04

## 2020-09-04 NOTE — TELEPHONE ENCOUNTER
Received Fax requesting Medical Records with signed Shakir Hira  Scanned into chart and faxed to [phone] on 9/4/20

## 2020-09-14 LAB
ALBUMIN SERPL-MCNC: 4.7 G/DL (ref 3.8–4.9)
ALBUMIN/GLOB SERPL: 1.7 {RATIO} (ref 1.2–2.2)
ALP SERPL-CCNC: 132 IU/L (ref 39–117)
ALT SERPL-CCNC: 25 IU/L (ref 0–44)
AST SERPL-CCNC: 21 IU/L (ref 0–40)
BILIRUB SERPL-MCNC: 0.5 MG/DL (ref 0–1.2)
BUN SERPL-MCNC: 14 MG/DL (ref 6–24)
BUN/CREAT SERPL: 11 (ref 9–20)
CALCIUM SERPL-MCNC: 10.4 MG/DL (ref 8.7–10.2)
CHLORIDE SERPL-SCNC: 99 MMOL/L (ref 96–106)
CO2 SERPL-SCNC: 25 MMOL/L (ref 20–29)
CREAT SERPL-MCNC: 1.26 MG/DL (ref 0.76–1.27)
GLOBULIN SER-MCNC: 2.7 G/DL (ref 1.5–4.5)
GLUCOSE SERPL-MCNC: 103 MG/DL (ref 65–99)
POTASSIUM SERPL-SCNC: 4.6 MMOL/L (ref 3.5–5.2)
PROT SERPL-MCNC: 7.4 G/DL (ref 6–8.5)
SL AMB EGFR AFRICAN AMERICAN: 74 ML/MIN/1.73
SL AMB EGFR NON AFRICAN AMERICAN: 64 ML/MIN/1.73
SODIUM SERPL-SCNC: 136 MMOL/L (ref 134–144)

## 2020-09-15 ENCOUNTER — OFFICE VISIT (OUTPATIENT)
Dept: FAMILY MEDICINE CLINIC | Facility: CLINIC | Age: 55
End: 2020-09-15
Payer: COMMERCIAL

## 2020-09-15 VITALS
HEART RATE: 72 BPM | WEIGHT: 198 LBS | HEIGHT: 71 IN | SYSTOLIC BLOOD PRESSURE: 122 MMHG | TEMPERATURE: 96.8 F | RESPIRATION RATE: 18 BRPM | BODY MASS INDEX: 27.72 KG/M2 | DIASTOLIC BLOOD PRESSURE: 76 MMHG

## 2020-09-15 DIAGNOSIS — Z00.00 HEALTHCARE MAINTENANCE: Primary | ICD-10-CM

## 2020-09-15 DIAGNOSIS — I10 ESSENTIAL HYPERTENSION: ICD-10-CM

## 2020-09-15 DIAGNOSIS — Z72.0 TOBACCO USE: ICD-10-CM

## 2020-09-15 DIAGNOSIS — D12.6 TUBULAR ADENOMA OF COLON: ICD-10-CM

## 2020-09-15 DIAGNOSIS — E78.5 HYPERLIPIDEMIA, UNSPECIFIED HYPERLIPIDEMIA TYPE: ICD-10-CM

## 2020-09-15 DIAGNOSIS — K22.70 BARRETT'S ESOPHAGUS WITHOUT DYSPLASIA: ICD-10-CM

## 2020-09-15 DIAGNOSIS — R73.03 PREDIABETES: ICD-10-CM

## 2020-09-15 DIAGNOSIS — Z23 IMMUNIZATION DUE: ICD-10-CM

## 2020-09-15 PROCEDURE — 90682 RIV4 VACC RECOMBINANT DNA IM: CPT

## 2020-09-15 PROCEDURE — 90471 IMMUNIZATION ADMIN: CPT

## 2020-09-15 PROCEDURE — 99396 PREV VISIT EST AGE 40-64: CPT | Performed by: FAMILY MEDICINE

## 2020-09-15 PROCEDURE — 3725F SCREEN DEPRESSION PERFORMED: CPT | Performed by: FAMILY MEDICINE

## 2020-09-15 NOTE — PROGRESS NOTES
Assessment/Plan:    No problem-specific Assessment & Plan notes found for this encounter  barretts on egd, repeat 2y  Tubular adenomas in colon, repeat 1y  Prostate bx neg  cpe done  q6m f/u  tob discouraged    Htn/chol stable  Use of statins for the purposes of CVD/CVA risks reduction was advised according to USPSTF guidelines along with appropriate continued liver monitoring  cpe today     Diagnoses and all orders for this visit:    Healthcare maintenance    Essential hypertension    Hyperlipidemia, unspecified hyperlipidemia type  -     Lipid Panel with Direct LDL reflex; Future    Tobacco use    Prediabetes  -     Comprehensive metabolic panel; Future  -     Hemoglobin A1C; Future    Immunization due  -     influenza vaccine, quadrivalent, recombinant, PF, 0 5 mL, for patients 18 yr+ (FLUBLOK)    Crews's esophagus without dysplasia    Tubular adenoma of colon          Return in about 6 months (around 3/15/2021) for Recheck  Subjective:      Patient ID: Rupa Solis is a 53 y o  male  Chief Complaint   Patient presents with    Physical Exam     [facility] l[facility]       HPI  Taking all meds  Follows diet, avoid fried, sea salt, exercising, no wt loss, no smaller in waist  Urine flow good  3 cigs/d, not every day  Prediabetes new  Now 79  Saw urology for elevated psa, dx with BPH, bx neg    The following portions of the patient's history were reviewed and updated as appropriate: allergies, current medications, past family history, past medical history, past social history, past surgical history and problem list     Review of Systems   Respiratory: Negative for shortness of breath  Gastrointestinal: Negative for abdominal pain           Current Outpatient Medications   Medication Sig Dispense Refill    acetaminophen (TYLENOL) 325 mg tablet Take 2 tablets (650 mg total) by mouth every 6 (six) hours as needed for mild pain, headaches or fever 30 tablet 0    aspirin (ECOTRIN LOW STRENGTH) 81 mg EC tablet Take 1 tablet (81 mg total) by mouth daily (Patient taking differently: Take 81 mg by mouth every morning ) 30 tablet 0    atorvastatin (LIPITOR) 40 mg tablet TAKE 1 TABLET BY MOUTH DAILY WITH DINNER 90 tablet 1    finasteride (PROSCAR) 5 mg tablet Take 1 tablet (5 mg total) by mouth daily (Patient taking differently: Take 5 mg by mouth every morning ) 90 tablet 3    losartan-hydrochlorothiazide (HYZAAR) 50-12 5 mg per tablet TAKE 1 TABLET BY MOUTH EVERY DAY 90 tablet 1    pantoprazole (PROTONIX) 40 mg tablet TAKE 1 TABLET BY MOUTH EVERY DAY 90 tablet 0    verapamil (CALAN-SR) 120 mg CR tablet TAKE 1 TABLET (120 MG TOTAL) BY MOUTH DAILY WITH DINNER 90 tablet 1     No current facility-administered medications for this visit  Objective:    /76   Pulse 72   Temp (!) 96 8 °F (36 °C)   Resp 18   Ht 5' 10 5" (1 791 m)   Wt 89 8 kg (198 lb)   BMI 28 01 kg/m²        Physical Exam  Vitals signs and nursing note reviewed  Constitutional:       Appearance: Normal appearance  He is well-developed  He is obese  He is not ill-appearing  HENT:      Head: Normocephalic  Right Ear: Tympanic membrane normal       Left Ear: Tympanic membrane normal    Eyes:      General: No scleral icterus  Conjunctiva/sclera: Conjunctivae normal    Neck:      Musculoskeletal: Neck supple  Cardiovascular:      Rate and Rhythm: Normal rate and regular rhythm  Heart sounds: No murmur  Pulmonary:      Effort: Pulmonary effort is normal  No respiratory distress  Abdominal:      Palpations: Abdomen is soft  There is no mass  Tenderness: There is no abdominal tenderness  Musculoskeletal:         General: No deformity  Skin:     General: Skin is warm and dry  Coloration: Skin is not jaundiced or pale  Neurological:      Mental Status: He is alert  Motor: No weakness  Gait: Gait normal    Psychiatric:         Behavior: Behavior normal          Thought Content:  Thought content normal          BMI Counseling: Body mass index is 28 01 kg/m²  The BMI is above normal  Nutrition recommendations include decreasing portion sizes and moderation in carbohydrate intake  Exercise recommendations include exercising 3-5 times per week  No pharmacotherapy was ordered              Stan Carver DO

## 2020-09-28 ENCOUNTER — TELEPHONE (OUTPATIENT)
Dept: UROLOGY | Facility: CLINIC | Age: 55
End: 2020-09-28

## 2020-09-28 ENCOUNTER — TELEPHONE (OUTPATIENT)
Dept: UROLOGY | Facility: MEDICAL CENTER | Age: 55
End: 2020-09-28

## 2020-09-28 NOTE — TELEPHONE ENCOUNTER
Attempted to call, and left a detailed message for patient, ok per communication consent  Reminded patient to get urine studies done prior to upcoming appointment on 10/02  Office number provided in message

## 2020-09-28 NOTE — TELEPHONE ENCOUNTER
Patient called to get his lab order faxed to [facility]   Sent to fax number [phone] and patient email on file

## 2020-10-02 ENCOUNTER — OFFICE VISIT (OUTPATIENT)
Dept: UROLOGY | Facility: CLINIC | Age: 55
End: 2020-10-02
Payer: COMMERCIAL

## 2020-10-02 VITALS
HEART RATE: 80 BPM | TEMPERATURE: 97.9 F | BODY MASS INDEX: 27.22 KG/M2 | RESPIRATION RATE: 18 BRPM | SYSTOLIC BLOOD PRESSURE: 120 MMHG | WEIGHT: 201 LBS | DIASTOLIC BLOOD PRESSURE: 72 MMHG | HEIGHT: 72 IN

## 2020-10-02 DIAGNOSIS — N40.1 BENIGN PROSTATIC HYPERPLASIA WITH LOWER URINARY TRACT SYMPTOMS, SYMPTOM DETAILS UNSPECIFIED: ICD-10-CM

## 2020-10-02 DIAGNOSIS — R97.20 ELEVATED PSA: Primary | ICD-10-CM

## 2020-10-02 LAB
APPEARANCE UR: CLEAR
BACTERIA UR CULT: NORMAL
BACTERIA URNS QL MICRO: NORMAL
BILIRUB UR QL STRIP: NEGATIVE
COLOR UR: YELLOW
EPI CELLS #/AREA URNS HPF: NORMAL /HPF (ref 0–10)
GLUCOSE UR QL: NEGATIVE
HGB UR QL STRIP: NEGATIVE
KETONES UR QL STRIP: NEGATIVE
LEUKOCYTE ESTERASE UR QL STRIP: NEGATIVE
Lab: NO GROWTH
MICRO URNS: NORMAL
MICRO URNS: NORMAL
MUCOUS THREADS URNS QL MICRO: PRESENT
NITRITE UR QL STRIP: NEGATIVE
PH UR STRIP: 6.5 [PH] (ref 5–7.5)
PROT UR QL STRIP: NEGATIVE
RBC #/AREA URNS HPF: NORMAL /HPF (ref 0–2)
SP GR UR: 1.01 (ref 1–1.03)
UROBILINOGEN UR STRIP-ACNC: 0.2 MG/DL (ref 0.2–1)
WBC #/AREA URNS HPF: NORMAL /HPF (ref 0–5)

## 2020-10-02 PROCEDURE — 99213 OFFICE O/P EST LOW 20 MIN: CPT | Performed by: PHYSICIAN ASSISTANT

## 2020-10-02 PROCEDURE — 4004F PT TOBACCO SCREEN RCVD TLK: CPT | Performed by: PHYSICIAN ASSISTANT

## 2020-10-02 PROCEDURE — 3074F SYST BP LT 130 MM HG: CPT | Performed by: PHYSICIAN ASSISTANT

## 2020-10-02 PROCEDURE — 3078F DIAST BP <80 MM HG: CPT | Performed by: PHYSICIAN ASSISTANT

## 2020-10-05 ENCOUNTER — TELEPHONE (OUTPATIENT)
Dept: UROLOGY | Facility: MEDICAL CENTER | Age: 55
End: 2020-10-05

## 2020-10-05 RX ORDER — TAMSULOSIN HYDROCHLORIDE 0.4 MG/1
0.4 CAPSULE ORAL
Qty: 30 CAPSULE | Refills: 3 | Status: SHIPPED | OUTPATIENT
Start: 2020-10-05 | End: 2021-03-15 | Stop reason: SDUPTHER

## 2020-11-06 ENCOUNTER — TELEPHONE (OUTPATIENT)
Dept: GASTROENTEROLOGY | Facility: AMBULARY SURGERY CENTER | Age: 55
End: 2020-11-06

## 2020-12-28 ENCOUNTER — TELEPHONE (OUTPATIENT)
Dept: GASTROENTEROLOGY | Facility: AMBULARY SURGERY CENTER | Age: 55
End: 2020-12-28

## 2020-12-28 DIAGNOSIS — K21.9 GASTROESOPHAGEAL REFLUX DISEASE: ICD-10-CM

## 2020-12-28 RX ORDER — PANTOPRAZOLE SODIUM 40 MG/1
40 TABLET, DELAYED RELEASE ORAL DAILY
Qty: 90 TABLET | Refills: 0 | Status: SHIPPED | OUTPATIENT
Start: 2020-12-28 | End: 2020-12-30

## 2020-12-29 ENCOUNTER — OFFICE VISIT (OUTPATIENT)
Dept: FAMILY MEDICINE CLINIC | Facility: CLINIC | Age: 55
End: 2020-12-29
Payer: COMMERCIAL

## 2020-12-29 VITALS
BODY MASS INDEX: 27.22 KG/M2 | SYSTOLIC BLOOD PRESSURE: 106 MMHG | OXYGEN SATURATION: 94 % | DIASTOLIC BLOOD PRESSURE: 60 MMHG | RESPIRATION RATE: 20 BRPM | HEART RATE: 96 BPM | WEIGHT: 201 LBS | HEIGHT: 72 IN | TEMPERATURE: 98.2 F

## 2020-12-29 DIAGNOSIS — R55 SYNCOPE, UNSPECIFIED SYNCOPE TYPE: Primary | ICD-10-CM

## 2020-12-29 DIAGNOSIS — M25.552 HIP PAIN, BILATERAL: ICD-10-CM

## 2020-12-29 DIAGNOSIS — I10 ESSENTIAL HYPERTENSION: ICD-10-CM

## 2020-12-29 DIAGNOSIS — M25.551 HIP PAIN, BILATERAL: ICD-10-CM

## 2020-12-29 DIAGNOSIS — E78.5 HYPERLIPIDEMIA, UNSPECIFIED HYPERLIPIDEMIA TYPE: ICD-10-CM

## 2020-12-29 DIAGNOSIS — Z72.0 TOBACCO USE: ICD-10-CM

## 2020-12-29 PROBLEM — M79.10 MYALGIA: Status: ACTIVE | Noted: 2020-12-29

## 2020-12-29 PROCEDURE — 3078F DIAST BP <80 MM HG: CPT | Performed by: FAMILY MEDICINE

## 2020-12-29 PROCEDURE — 4004F PT TOBACCO SCREEN RCVD TLK: CPT | Performed by: FAMILY MEDICINE

## 2020-12-29 PROCEDURE — 3008F BODY MASS INDEX DOCD: CPT | Performed by: FAMILY MEDICINE

## 2020-12-29 PROCEDURE — 3074F SYST BP LT 130 MM HG: CPT | Performed by: FAMILY MEDICINE

## 2020-12-29 PROCEDURE — 99214 OFFICE O/P EST MOD 30 MIN: CPT | Performed by: FAMILY MEDICINE

## 2020-12-29 RX ORDER — LOSARTAN POTASSIUM 50 MG/1
50 TABLET ORAL DAILY
Qty: 90 TABLET | Refills: 1 | Status: SHIPPED | OUTPATIENT
Start: 2020-12-29 | End: 2021-01-29

## 2020-12-30 DIAGNOSIS — K21.9 GASTROESOPHAGEAL REFLUX DISEASE: ICD-10-CM

## 2020-12-30 DIAGNOSIS — K22.70 BARRETT'S ESOPHAGUS WITHOUT DYSPLASIA: Primary | ICD-10-CM

## 2020-12-30 RX ORDER — PANTOPRAZOLE SODIUM 40 MG/1
40 TABLET, DELAYED RELEASE ORAL DAILY
Qty: 90 TABLET | Refills: 3 | Status: ON HOLD | OUTPATIENT
Start: 2020-12-30 | End: 2021-01-19 | Stop reason: SDUPTHER

## 2020-12-30 RX ORDER — PANTOPRAZOLE SODIUM 40 MG/1
TABLET, DELAYED RELEASE ORAL
Qty: 1 TABLET | Refills: 0 | OUTPATIENT
Start: 2020-12-30

## 2020-12-31 ENCOUNTER — HOSPITAL ENCOUNTER (OUTPATIENT)
Dept: RADIOLOGY | Facility: HOSPITAL | Age: 55
Discharge: HOME | End: 2020-12-31
Attending: FAMILY MEDICINE
Payer: COMMERCIAL

## 2020-12-31 ENCOUNTER — TRANSCRIBE ORDERS (OUTPATIENT)
Dept: ADMINISTRATIVE | Facility: HOSPITAL | Age: 55
End: 2020-12-31

## 2020-12-31 DIAGNOSIS — M25.551 HIP PAIN, BILATERAL: ICD-10-CM

## 2020-12-31 DIAGNOSIS — M25.552 HIP PAIN, BILATERAL: ICD-10-CM

## 2020-12-31 PROCEDURE — 73522 X-RAY EXAM HIPS BI 3-4 VIEWS: CPT

## 2021-01-06 PROBLEM — M16.0 OSTEOARTHRITIS, HIP, BILATERAL: Status: ACTIVE | Noted: 2021-01-06

## 2021-01-15 ENCOUNTER — APPOINTMENT (EMERGENCY)
Dept: RADIOLOGY | Facility: HOSPITAL | Age: 56
DRG: 812 | End: 2021-01-15
Payer: COMMERCIAL

## 2021-01-15 ENCOUNTER — HOSPITAL ENCOUNTER (INPATIENT)
Facility: HOSPITAL | Age: 56
LOS: 4 days | Discharge: HOME | DRG: 812 | End: 2021-01-19
Attending: EMERGENCY MEDICINE | Admitting: FAMILY MEDICINE
Payer: COMMERCIAL

## 2021-01-15 DIAGNOSIS — Z72.0 TOBACCO USE: ICD-10-CM

## 2021-01-15 DIAGNOSIS — D64.9 SYMPTOMATIC ANEMIA: Primary | ICD-10-CM

## 2021-01-15 DIAGNOSIS — D50.9 IRON DEFICIENCY ANEMIA, UNSPECIFIED IRON DEFICIENCY ANEMIA TYPE: ICD-10-CM

## 2021-01-15 DIAGNOSIS — K22.70 BARRETT'S ESOPHAGUS WITHOUT DYSPLASIA: ICD-10-CM

## 2021-01-15 PROBLEM — N40.0 BPH (BENIGN PROSTATIC HYPERPLASIA): Status: ACTIVE | Noted: 2021-01-15

## 2021-01-15 LAB
ABO GROUP BLD: NORMAL
ABO GROUP BLD: NORMAL
ALBUMIN SERPL BCP-MCNC: 3.6 G/DL (ref 3.5–5)
ALP SERPL-CCNC: 97 U/L (ref 46–116)
ALT SERPL W P-5'-P-CCNC: 38 U/L (ref 12–78)
ANION GAP SERPL CALCULATED.3IONS-SCNC: 10 MMOL/L (ref 4–13)
AST SERPL W P-5'-P-CCNC: 19 U/L (ref 5–45)
ATRIAL RATE: 85 BPM
BASOPHILS # BLD AUTO: 0.11 THOUSANDS/ΜL (ref 0–0.1)
BASOPHILS NFR BLD AUTO: 2 % (ref 0–1)
BILIRUB SERPL-MCNC: 0.2 MG/DL (ref 0.2–1)
BLD GP AB SCN SERPL QL: NEGATIVE
BUN SERPL-MCNC: 18 MG/DL (ref 5–25)
CALCIUM SERPL-MCNC: 8.6 MG/DL (ref 8.3–10.1)
CHLORIDE SERPL-SCNC: 104 MMOL/L (ref 100–108)
CO2 SERPL-SCNC: 25 MMOL/L (ref 21–32)
CREAT SERPL-MCNC: 1.16 MG/DL (ref 0.6–1.3)
D DIMER PPP FEU-MCNC: <0.27 UG/ML FEU
EOSINOPHIL # BLD AUTO: 0.55 THOUSAND/ΜL (ref 0–0.61)
EOSINOPHIL NFR BLD AUTO: 9 % (ref 0–6)
ERYTHROCYTE [DISTWIDTH] IN BLOOD BY AUTOMATED COUNT: 15.1 % (ref 11.6–15.1)
FERRITIN SERPL-MCNC: 3 NG/ML (ref 8–388)
FLUAV RNA RESP QL NAA+PROBE: NEGATIVE
FLUBV RNA RESP QL NAA+PROBE: NEGATIVE
FOLATE SERPL-MCNC: 7.8 NG/ML (ref 3.1–17.5)
GFR SERPL CREATININE-BSD FRML MDRD: 70 ML/MIN/1.73SQ M
GLUCOSE SERPL-MCNC: 106 MG/DL (ref 65–140)
HCT VFR BLD AUTO: 23.3 % (ref 36.5–49.3)
HCT VFR BLD AUTO: 25.3 % (ref 36.5–49.3)
HGB BLD-MCNC: 6.4 G/DL (ref 12–17)
HGB BLD-MCNC: 7.1 G/DL (ref 12–17)
IMM GRANULOCYTES # BLD AUTO: 0.02 THOUSAND/UL (ref 0–0.2)
IMM GRANULOCYTES NFR BLD AUTO: 0 % (ref 0–2)
IRON SATN MFR SERPL: 1 %
IRON SERPL-MCNC: 6 UG/DL (ref 65–175)
LDH SERPL-CCNC: 203 U/L (ref 81–234)
LYMPHOCYTES # BLD AUTO: 2.14 THOUSANDS/ΜL (ref 0.6–4.47)
LYMPHOCYTES NFR BLD AUTO: 34 % (ref 14–44)
MCH RBC QN AUTO: 19.9 PG (ref 26.8–34.3)
MCHC RBC AUTO-ENTMCNC: 27.5 G/DL (ref 31.4–37.4)
MCV RBC AUTO: 73 FL (ref 82–98)
MONOCYTES # BLD AUTO: 0.73 THOUSAND/ΜL (ref 0.17–1.22)
MONOCYTES NFR BLD AUTO: 12 % (ref 4–12)
NEUTROPHILS # BLD AUTO: 2.72 THOUSANDS/ΜL (ref 1.85–7.62)
NEUTS SEG NFR BLD AUTO: 43 % (ref 43–75)
NRBC BLD AUTO-RTO: 0 /100 WBCS
NT-PROBNP SERPL-MCNC: 84 PG/ML
P AXIS: 38 DEGREES
PLATELET # BLD AUTO: 766 THOUSANDS/UL (ref 149–390)
PMV BLD AUTO: 8.9 FL (ref 8.9–12.7)
POTASSIUM SERPL-SCNC: 3.6 MMOL/L (ref 3.5–5.3)
PR INTERVAL: 172 MS
PROT SERPL-MCNC: 7.2 G/DL (ref 6.4–8.2)
QRS AXIS: 37 DEGREES
QRSD INTERVAL: 90 MS
QT INTERVAL: 372 MS
QTC INTERVAL: 442 MS
RBC # BLD AUTO: 3.21 MILLION/UL (ref 3.88–5.62)
RETICS # AUTO: NORMAL 10*3/UL (ref 14356–105094)
RETICS # CALC: 1.28 % (ref 0.37–1.87)
RH BLD: NEGATIVE
RH BLD: NEGATIVE
RSV RNA RESP QL NAA+PROBE: NEGATIVE
SARS-COV-2 RNA RESP QL NAA+PROBE: NEGATIVE
SODIUM SERPL-SCNC: 139 MMOL/L (ref 136–145)
SPECIMEN EXPIRATION DATE: NORMAL
T WAVE AXIS: 49 DEGREES
TIBC SERPL-MCNC: 489 UG/DL (ref 250–450)
TROPONIN I SERPL-MCNC: <0.02 NG/ML
TROPONIN I SERPL-MCNC: <0.02 NG/ML
VENTRICULAR RATE: 85 BPM
VIT B12 SERPL-MCNC: 264 PG/ML (ref 100–900)
WBC # BLD AUTO: 6.27 THOUSAND/UL (ref 4.31–10.16)

## 2021-01-15 PROCEDURE — 70450 CT HEAD/BRAIN W/O DYE: CPT

## 2021-01-15 PROCEDURE — 30233N1 TRANSFUSION OF NONAUTOLOGOUS RED BLOOD CELLS INTO PERIPHERAL VEIN, PERCUTANEOUS APPROACH: ICD-10-PCS | Performed by: EMERGENCY MEDICINE

## 2021-01-15 PROCEDURE — 85018 HEMOGLOBIN: CPT | Performed by: FAMILY MEDICINE

## 2021-01-15 PROCEDURE — G1004 CDSM NDSC: HCPCS

## 2021-01-15 PROCEDURE — 84484 ASSAY OF TROPONIN QUANT: CPT | Performed by: PHYSICIAN ASSISTANT

## 2021-01-15 PROCEDURE — 36415 COLL VENOUS BLD VENIPUNCTURE: CPT | Performed by: PHYSICIAN ASSISTANT

## 2021-01-15 PROCEDURE — 93010 ELECTROCARDIOGRAM REPORT: CPT | Performed by: INTERNAL MEDICINE

## 2021-01-15 PROCEDURE — 36430 TRANSFUSION BLD/BLD COMPNT: CPT

## 2021-01-15 PROCEDURE — 99223 1ST HOSP IP/OBS HIGH 75: CPT | Performed by: FAMILY MEDICINE

## 2021-01-15 PROCEDURE — P9016 RBC LEUKOCYTES REDUCED: HCPCS

## 2021-01-15 PROCEDURE — 83550 IRON BINDING TEST: CPT | Performed by: PHYSICIAN ASSISTANT

## 2021-01-15 PROCEDURE — 86920 COMPATIBILITY TEST SPIN: CPT

## 2021-01-15 PROCEDURE — 93005 ELECTROCARDIOGRAM TRACING: CPT

## 2021-01-15 PROCEDURE — C9113 INJ PANTOPRAZOLE SODIUM, VIA: HCPCS | Performed by: FAMILY MEDICINE

## 2021-01-15 PROCEDURE — 85379 FIBRIN DEGRADATION QUANT: CPT | Performed by: PHYSICIAN ASSISTANT

## 2021-01-15 PROCEDURE — 84484 ASSAY OF TROPONIN QUANT: CPT | Performed by: FAMILY MEDICINE

## 2021-01-15 PROCEDURE — 83540 ASSAY OF IRON: CPT | Performed by: PHYSICIAN ASSISTANT

## 2021-01-15 PROCEDURE — 86900 BLOOD TYPING SEROLOGIC ABO: CPT | Performed by: PHYSICIAN ASSISTANT

## 2021-01-15 PROCEDURE — 0241U HB NFCT DS VIR RESP RNA 4 TRGT: CPT | Performed by: PHYSICIAN ASSISTANT

## 2021-01-15 PROCEDURE — 85014 HEMATOCRIT: CPT | Performed by: FAMILY MEDICINE

## 2021-01-15 PROCEDURE — 99291 CRITICAL CARE FIRST HOUR: CPT | Performed by: PHYSICIAN ASSISTANT

## 2021-01-15 PROCEDURE — 82728 ASSAY OF FERRITIN: CPT | Performed by: PHYSICIAN ASSISTANT

## 2021-01-15 PROCEDURE — 85045 AUTOMATED RETICULOCYTE COUNT: CPT | Performed by: PHYSICIAN ASSISTANT

## 2021-01-15 PROCEDURE — 86901 BLOOD TYPING SEROLOGIC RH(D): CPT | Performed by: PHYSICIAN ASSISTANT

## 2021-01-15 PROCEDURE — 85025 COMPLETE CBC W/AUTO DIFF WBC: CPT | Performed by: PHYSICIAN ASSISTANT

## 2021-01-15 PROCEDURE — 80053 COMPREHEN METABOLIC PANEL: CPT | Performed by: PHYSICIAN ASSISTANT

## 2021-01-15 PROCEDURE — 82746 ASSAY OF FOLIC ACID SERUM: CPT | Performed by: PHYSICIAN ASSISTANT

## 2021-01-15 PROCEDURE — 99285 EMERGENCY DEPT VISIT HI MDM: CPT

## 2021-01-15 PROCEDURE — 83880 ASSAY OF NATRIURETIC PEPTIDE: CPT | Performed by: PHYSICIAN ASSISTANT

## 2021-01-15 PROCEDURE — 83615 LACTATE (LD) (LDH) ENZYME: CPT | Performed by: FAMILY MEDICINE

## 2021-01-15 PROCEDURE — 71045 X-RAY EXAM CHEST 1 VIEW: CPT

## 2021-01-15 PROCEDURE — 82607 VITAMIN B-12: CPT | Performed by: PHYSICIAN ASSISTANT

## 2021-01-15 PROCEDURE — 86850 RBC ANTIBODY SCREEN: CPT | Performed by: PHYSICIAN ASSISTANT

## 2021-01-15 RX ORDER — SODIUM CHLORIDE 9 MG/ML
75 INJECTION, SOLUTION INTRAVENOUS CONTINUOUS
Status: DISCONTINUED | OUTPATIENT
Start: 2021-01-15 | End: 2021-01-19 | Stop reason: HOSPADM

## 2021-01-15 RX ORDER — TAMSULOSIN HYDROCHLORIDE 0.4 MG/1
0.4 CAPSULE ORAL
Status: DISCONTINUED | OUTPATIENT
Start: 2021-01-16 | End: 2021-01-19 | Stop reason: HOSPADM

## 2021-01-15 RX ORDER — FINASTERIDE 5 MG/1
5 TABLET, FILM COATED ORAL EVERY MORNING
Status: DISCONTINUED | OUTPATIENT
Start: 2021-01-16 | End: 2021-01-15

## 2021-01-15 RX ORDER — NICOTINE 21 MG/24HR
1 PATCH, TRANSDERMAL 24 HOURS TRANSDERMAL DAILY
Status: DISCONTINUED | OUTPATIENT
Start: 2021-01-16 | End: 2021-01-19 | Stop reason: HOSPADM

## 2021-01-15 RX ORDER — ACETAMINOPHEN 325 MG/1
650 TABLET ORAL EVERY 6 HOURS PRN
Status: DISCONTINUED | OUTPATIENT
Start: 2021-01-15 | End: 2021-01-19 | Stop reason: HOSPADM

## 2021-01-15 RX ORDER — PANTOPRAZOLE SODIUM 40 MG/1
40 INJECTION, POWDER, FOR SOLUTION INTRAVENOUS EVERY 12 HOURS SCHEDULED
Status: DISCONTINUED | OUTPATIENT
Start: 2021-01-15 | End: 2021-01-19 | Stop reason: HOSPADM

## 2021-01-15 RX ADMIN — SODIUM CHLORIDE 75 ML/HR: 0.9 INJECTION, SOLUTION INTRAVENOUS at 20:36

## 2021-01-15 RX ADMIN — PANTOPRAZOLE SODIUM 40 MG: 40 INJECTION, POWDER, FOR SOLUTION INTRAVENOUS at 22:29

## 2021-01-15 RX ADMIN — ACETAMINOPHEN 650 MG: 325 TABLET, FILM COATED ORAL at 23:46

## 2021-01-15 NOTE — LETTER
[address] [facility] [address]  [address] [address]armiento  [place] [address]  Dept: [phone]    January 19, 2021     Patient: Di Rojas   YOB: 1965   Date of Visit: 1/15/2021       To Whom it May Concern:    Di Rojas is under my professional care  He was seen in the hospital from 1/15/2021   to 01/19/21  He may return to work on February 1st without limitations  If you have any questions or concerns, please don't hesitate to call           Sincerely,          Chris Jesus MD

## 2021-01-15 NOTE — ED PROVIDER NOTES
History  Chief Complaint   Patient presents with    Shortness of Breath     intermittent shortness of breath and chest heaviness for a couple of weeks, also periodic leg pains in different parts of legs  had exam by dr Erica Can a couple of weeks ago  no ekg ordered  pt states passed out a couple of weeks ago     64 y/o male, h/o HTN/HLD/GERD etc, presenting today with shortness of breath intermittently over the past 4 weeks  Patient states that when he becomes short of breath he will also get diaphoretic  He has had 2 syncopal episodes, 1 occurring 2 weeks ago after he stood up quickly and felt lightheaded and another occurrence yesterday while he was at work where he suddenly became diaphoretic and had chest pain and tightness  Patient states that he then blacked out, unsure if he hit his head  Denies nausea,vomiting, diarrhea, constipation, abdominal pain, flank pain, numbness, paresthesias, changes in urination etc          Prior to Admission Medications   Prescriptions Last Dose Informant Patient Reported?  Taking?   acetaminophen (TYLENOL) 325 mg tablet  Self No No   Sig: Take 2 tablets (650 mg total) by mouth every 6 (six) hours as needed for mild pain, headaches or fever   aspirin (ECOTRIN LOW STRENGTH) 81 mg EC tablet  Self No No   Sig: Take 1 tablet (81 mg total) by mouth daily   atorvastatin (LIPITOR) 40 mg tablet Not Taking at Unknown time Self No No   Sig: TAKE 1 TABLET BY MOUTH DAILY WITH DINNER   Patient not taking: Reported on 1/15/2021   finasteride (PROSCAR) 5 mg tablet   No No   Sig: Take 1 tablet (5 mg total) by mouth daily   Patient taking differently: Take 5 mg by mouth every morning    losartan (COZAAR) 50 mg tablet   No No   Sig: Take 1 tablet (50 mg total) by mouth daily   pantoprazole (PROTONIX) 40 mg tablet   No No   Sig: Take 1 tablet (40 mg total) by mouth daily 30-60 min before breakfast   tamsulosin (FLOMAX) 0 4 mg   No No   Sig: Take 1 capsule (0 4 mg total) by mouth daily with dinner   verapamil (CALAN-SR) 120 mg CR tablet  Self No No   Sig: TAKE 1 TABLET (120 MG TOTAL) BY MOUTH DAILY WITH DINNER      Facility-Administered Medications: None       Past Medical History:   Diagnosis Date    Colon polyp     Elevated PSA     Enlarged prostate     Full dentures     GERD (gastroesophageal reflux disease)     Hemorrhoid     Hyperlipidemia     Hypertension     Sleep apnea     No CPAP    Smoker     Wears glasses        Past Surgical History:   Procedure Laterality Date    APPENDECTOMY      COLONOSCOPY      EGD      FOOT SURGERY Right     bone removed    OTHER SURGICAL HISTORY      bone removal right arm-abnormal growth of surface bone    PROSTATE BIOPSY      SPLENECTOMY      ruptured       Family History   Problem Relation Age of Onset    Dementia Father     Heart disease Mother     Hypertension Mother     Hyperlipidemia Mother     Hypertension Sister     Hypertension Brother     No Known Problems Daughter     No Known Problems Son     Cancer Paternal Grandfather         prostate    No Known Problems Son      I have reviewed and agree with the history as documented  E-Cigarette/Vaping    E-Cigarette Use Never User      E-Cigarette/Vaping Substances     Social History     Tobacco Use    Smoking status: Current Some Day Smoker     Packs/day: 0 50     Years: 10 00     Pack years: 5 00     Types: Cigarettes    Smokeless tobacco: Never Used   Substance Use Topics    Alcohol use: Never     Frequency: Never    Drug use: No       Review of Systems   Constitutional: Positive for fatigue  Negative for activity change, appetite change, chills, diaphoresis, fever and unexpected weight change  HENT: Negative  Eyes: Negative  Respiratory: Positive for chest tightness and shortness of breath  Negative for apnea, cough, choking, wheezing and stridor  Cardiovascular: Positive for chest pain  Negative for palpitations and leg swelling  Gastrointestinal: Negative  Genitourinary: Negative  Musculoskeletal: Negative  Skin: Negative  Neurological: Positive for syncope, weakness and light-headedness  Negative for dizziness, tremors, seizures, facial asymmetry, speech difficulty, numbness and headaches  All other systems reviewed and are negative  Physical Exam  Physical Exam  Vitals signs and nursing note reviewed  Constitutional:       General: He is not in acute distress  Appearance: He is well-developed  He is not diaphoretic  HENT:      Head: Normocephalic and atraumatic  Right Ear: External ear normal       Left Ear: External ear normal       Nose: Nose normal       Mouth/Throat:      Pharynx: No oropharyngeal exudate  Eyes:      General: No scleral icterus  Right eye: No discharge  Left eye: No discharge  Conjunctiva/sclera: Conjunctivae normal       Pupils: Pupils are equal, round, and reactive to light  Neck:      Musculoskeletal: Normal range of motion and neck supple  Cardiovascular:      Rate and Rhythm: Normal rate and regular rhythm  Pulses: Normal pulses  Heart sounds: Normal heart sounds  No murmur  No friction rub  No gallop  Pulmonary:      Effort: Pulmonary effort is normal  No respiratory distress  Breath sounds: Normal breath sounds  No stridor  No wheezing, rhonchi or rales  Comments: spo2 is 100% indicating adequate oxygenation  Clear BS in all lung fields  Chest:      Chest wall: No tenderness  Abdominal:      General: Abdomen is flat  Bowel sounds are normal  There is no distension  Palpations: Abdomen is soft  There is no mass  Tenderness: There is no abdominal tenderness  There is no guarding or rebound  Hernia: No hernia is present  Genitourinary:     Rectum: Guaiac result negative  Comments: Rectal exam performed with Mateus Anderson RN in room    No stool in the rectal vault, guaiac was negative  Musculoskeletal:         General: No swelling, tenderness, deformity or signs of injury  Right lower leg: No edema  Left lower leg: No edema  Comments: No calf swelling or asymmetries  No tenderness    Lymphadenopathy:      Cervical: No cervical adenopathy  Skin:     General: Skin is warm and dry  Capillary Refill: Capillary refill takes less than 2 seconds  Coloration: Skin is pale  Findings: No erythema or rash  Neurological:      General: No focal deficit present  Mental Status: He is alert and oriented to person, place, and time  Mental status is at baseline  Psychiatric:         Mood and Affect: Mood normal          Behavior: Behavior normal          Thought Content: Thought content normal          Judgment: Judgment normal          Vital Signs  ED Triage Vitals [01/15/21 1303]   Temperature Pulse Respirations Blood Pressure SpO2   97 6 °F (36 4 °C) 93 (!) 24 139/78 100 %      Temp Source Heart Rate Source Patient Position - Orthostatic VS BP Location FiO2 (%)   Tympanic Monitor Sitting Right arm --      Pain Score       9           Vitals:    01/15/21 1303 01/15/21 1508   BP: 139/78 126/57   Pulse: 93 79   Patient Position - Orthostatic VS: Sitting Sitting         Visual Acuity      ED Medications  Medications - No data to display    Diagnostic Studies  Results Reviewed     Procedure Component Value Units Date/Time    COVID19, Influenza A/B, RSV PCR, [facility] [[id number]]  (Normal) Collected: 01/15/21 1407    Lab Status: Final result Specimen: Nasopharyngeal Swab Updated: 01/15/21 1507     SARS-CoV-2 Negative     INFLUENZA A PCR Negative     INFLUENZA B PCR Negative     RSV PCR Negative    Narrative: This test has been authorized by FDA under an EUA (Emergency Use Assay) for use by authorized laboratories  Clinical caution and judgement should be used with the interpretation of these results with consideration of the clinical impression and other laboratory testing    Testing reported as "Positive" or "Negative" has been proven to be accurate according to standard laboratory validation requirements  All testing is performed with control materials showing appropriate reactivity at standard intervals  Folate [[id number]] Collected: 01/15/21 1407    Lab Status: In process Specimen: Blood from Arm, Right Updated: 01/15/21 1447    Ferritin [[id number]] Collected: 01/15/21 1407    Lab Status: In process Specimen: Blood from Arm, Right Updated: 01/15/21 1447    Iron Saturation % [[id number]] Collected: 01/15/21 1407    Lab Status: In process Specimen: Blood from Arm, Right Updated: 01/15/21 1447    Vitamin B12 [[id number]] Collected: 01/15/21 1407    Lab Status:  In process Specimen: Blood from Arm, Right Updated: 01/15/21 1447    NT-BNP PRO [[id number]]  (Normal) Collected: 01/15/21 1407    Lab Status: Final result Specimen: Blood from Arm, Right Updated: 01/15/21 1443     NT-proBNP 84 pg/mL     Reticulocytes [[id number]]     Lab Status: No result Specimen: Blood     Troponin I [[id number]]  (Normal) Collected: 01/15/21 1407    Lab Status: Final result Specimen: Blood from Arm, Right Updated: 01/15/21 1441     Troponin I <0 02 ng/mL     Comprehensive metabolic panel [[id number]] Collected: 01/15/21 1407    Lab Status: Final result Specimen: Blood from Arm, Right Updated: 01/15/21 1436     Sodium 139 mmol/L      Potassium 3 6 mmol/L      Chloride 104 mmol/L      CO2 25 mmol/L      ANION GAP 10 mmol/L      BUN 18 mg/dL      Creatinine 1 16 mg/dL      Glucose 106 mg/dL      Calcium 8 6 mg/dL      AST 19 U/L      ALT 38 U/L      Alkaline Phosphatase 97 U/L      Total Protein 7 2 g/dL      Albumin 3 6 g/dL      Total Bilirubin 0 20 mg/dL      eGFR 70 ml/min/1 73sq m     Narrative:      [place] guidelines for Chronic Kidney Disease (CKD):     Stage 1 with normal or high GFR (GFR > 90 mL/min/1 73 square meters)    Stage 2 Mild CKD (GFR = 60-89 mL/min/1 73 square meters)    Stage 3A Moderate CKD (GFR = 45-59 mL/min/1 73 square meters)    Stage 3B Moderate CKD (GFR = 30-44 mL/min/1 73 square meters)    Stage 4 Severe CKD (GFR = 15-29 mL/min/1 73 square meters)    Stage 5 End Stage CKD (GFR <15 mL/min/1 73 square meters)  Note: GFR calculation is accurate only with a steady state creatinine    D-Dimer [[id number]]  (Normal) Collected: 01/15/21 1407    Lab Status: Final result Specimen: Blood from Arm, Right Updated: 01/15/21 1432     D-Dimer, Quant <0 27 ug/ml FEU     CBC and differential [[id number]]  (Abnormal) Collected: 01/15/21 1407    Lab Status: Final result Specimen: Blood from Arm, Right Updated: 01/15/21 1423     WBC 6 27 Thousand/uL      RBC 3 21 Million/uL      Hemoglobin 6 4 g/dL      Hematocrit 23 3 %      MCV 73 fL      MCH 19 9 pg      MCHC 27 5 g/dL      RDW 15 1 %      MPV 8 9 fL      Platelets 150 Thousands/uL      nRBC 0 /100 WBCs      Neutrophils Relative 43 %      Immat GRANS % 0 %      Lymphocytes Relative 34 %      Monocytes Relative 12 %      Eosinophils Relative 9 %      Basophils Relative 2 %      Neutrophils Absolute 2 72 Thousands/µL      Immature Grans Absolute 0 02 Thousand/uL      Lymphocytes Absolute 2 14 Thousands/µL      Monocytes Absolute 0 73 Thousand/µL      Eosinophils Absolute 0 55 Thousand/µL      Basophils Absolute 0 11 Thousands/µL                  CT head without contrast   Final Result by Leandro Evans MD (01/15 1435)      No acute intracranial CT abnormality              Workstation performed: [id number]         XR chest 1 view    (Results Pending)              Procedures  CriticalCare Time  Performed by: Shannon Faye PA-C  Authorized by: Shannon Faye PA-C     Critical care provider statement:     Critical care time (minutes):  45    Critical care time was exclusive of:  Separately billable procedures and treating other patients    Critical care was necessary to treat or prevent imminent or life-threatening deterioration of the following conditions:  Circulatory failure Critical care was time spent personally by me on the following activities:  Blood draw for specimens, obtaining history from patient or surrogate, development of treatment plan with patient or surrogate, discussions with consultants, discussions with primary provider, evaluation of patient's response to treatment, examination of patient, ordering and performing treatments and interventions, ordering and review of laboratory studies, ordering and review of radiographic studies, re-evaluation of patient's condition and review of old charts    I assumed direction of critical care for this patient from another provider in my specialty: yes               ED Course  ED Course as of Jesus 15 1541   Fri Jesus 15, 2021   1435 Patient has a negative guaiac however poor sample  No stool in the rectal vault      1436 Nurse aware of orders and low Hg      1442 Paged hospitalist                                 SBIRT 20yo+      Most Recent Value   SBIRT (24 yo +)   In order to provide better care to our patients, we are screening all of our patients for alcohol and drug use  Would it be okay to ask you these screening questions? No Filed at: 01/15/2021 1411                    MDM  Number of Diagnoses or Management Options  Symptomatic anemia:   Diagnosis management comments: Blood consent obtained from the patient  He has not had any rectal bleeding or dark sticky stools  Has had worsening fatigue and shortness of breath accompanied with chest pain over the past 4 weeks that has worsened over the past 2 days and has also had multiple syncopal episodes  Will admit for symptomatic anemia         Amount and/or Complexity of Data Reviewed  Clinical lab tests: ordered and reviewed  Tests in the radiology section of CPT®: ordered and reviewed  Review and summarize past medical records: yes  Discuss the patient with other providers: yes (Dr Stephany Gomez)  Independent visualization of images, tracings, or specimens: yes        Disposition  Final diagnoses:   Symptomatic anemia     Time reflects when diagnosis was documented in both MDM as applicable and the Disposition within this note     Time User Action Codes Description Comment    1/15/2021  3:31 PM Rosita Silva [D64 9] Symptomatic anemia       ED Disposition     ED Disposition Condition Date/Time Comment    Admit Stable Fri Jesus 15, 2021  3:31 PM Case was discussed with Dr Nuvia Beckford and the patient's admission status was agreed to be Admission Status: inpatient status to the service of Dr Nuvia Beckford   Follow-up Information    None         Patient's Medications   Discharge Prescriptions    No medications on file     No discharge procedures on file      PDMP Review     None          ED Provider  Electronically Signed by           Arlin Vasquez PA-C  [facility]15/21 7020 [facility]  01/15/21 2341

## 2021-01-15 NOTE — H&P
History and Physical - [facility]    Patient Information: David Torrez 63 y o  male MRN: [id number]  Unit/Bed#: ED 12 Encounter: [id number]  Admitting Physician: Sophia Thompson DO  PCP: Pedro Weiss DO  Date of Admission:  01/15/21        Hospital Problem List:     Principal Problem:    Symptomatic anemia  Active Problems:    Essential hypertension    Tobacco use    Gastroesophageal reflux disease    BPH (benign prostatic hyperplasia)      Assessment/Plan:    * Symptomatic anemia  Assessment & Plan  Patient presents with 4 weeks of progressive dyspnea on exertion along with some chest tightness due to dyspnea, lightheadedness and 2 brief episodes of syncope  Hemoglobin on arrival 6 4   16 2 -1 year ago  Patient has been ordered 2 unit PRBC transfusion  Repeat H/H 2 hours post transfusion  Get repeat lab work in a   Rectal exam done in the ER was negative  Patient had EGD and colonoscopy in May of 2020 and was recommended to get repeat colonoscopy in 1 year  Check hemolysis smear, Katlyn, LDH, haptoglobin, iron panel  GI consult as he may need colonoscopy sooner due to anemia    BPH (benign prostatic hyperplasia)  Assessment & Plan  Continue Proscar    Gastroesophageal reflux disease  Assessment & Plan  Patient will be on IV Protonix b i d  For now    Tobacco use  Assessment & Plan  Smoking cessation  Nicotine patch    Essential hypertension  Assessment & Plan  Hold antihypertensives for now as blood pressures are low normal          VTE Prophylaxis: Pharmacologic VTE Prophylaxis contraindicated due to Symptomatic anemia  / sequential compression device   Code Status: Level 1 - Full Code    Anticipated Length of Stay:  Patient will be admitted on an Inpatient basis with an anticipated length of stay of  atleast 2 midnights  Justification for Hospital Stay:  Symptomatic anemia    Total Time for Visit, including Counseling / Coordination of Care: 45 minutes    Greater than 50% of this total time spent on direct patient counseling and coordination of care  Chief Complaint:     Shortness of Breath (intermittent shortness of breath and chest heaviness for a couple of weeks, also periodic leg pains in different parts of legs  had exam by dr Darling Guerrero a couple of weeks ago  no ekg ordered  pt states passed out a couple of weeks ago)    of Present Illness:    Emelina Olivo is a 63 y o  male who presents with progressive worsening shortness of breath with exertion over the last 4 weeks  Patient reports 1 episode of syncope about 2 weeks ago after he stood up quickly and felt lightheaded  Patient saw his primary care doctor and was notified that his blood pressure was on the low side and states that he was advised to hold this losartan  Patient had another episode of syncope yesterday while he was at work  Denies any head trauma  States that these episodes lasted for a few seconds  Patient also reports some occasional numbness of his fingers  Reports chest tightness due to shortness of breath  Denies any melena, hematemesis, hematuria  Reports occasional hemorrhoidal bleeding which is no worse than his baseline    Review of Systems:    Review of Systems   Constitutional: Negative for appetite change, chills and fever  HENT: Negative for congestion and trouble swallowing  Eyes: Negative for photophobia and visual disturbance  Respiratory: Positive for chest tightness and shortness of breath  Cardiovascular: Negative for leg swelling  Gastrointestinal: Positive for anal bleeding (occasional)  Negative for abdominal pain, diarrhea, nausea and vomiting  Genitourinary: Positive for difficulty urinating (chronic)  Negative for dysuria, frequency and hematuria  Musculoskeletal: Negative for back pain  Skin: Negative for wound  Neurological: Positive for syncope, light-headedness and headaches  Hematological: Does not bruise/bleed easily     Psychiatric/Behavioral: Negative for agitation  Past Medical and Surgical History:     Past Medical History:   Diagnosis Date    Colon polyp     Elevated PSA     Enlarged prostate     Full dentures     GERD (gastroesophageal reflux disease)     Hemorrhoid     Hyperlipidemia     Hypertension     Sleep apnea     No CPAP    Smoker     Wears glasses        Past Surgical History:   Procedure Laterality Date    APPENDECTOMY      COLONOSCOPY      EGD      FOOT SURGERY Right     bone removed    OTHER SURGICAL HISTORY      bone removal right arm-abnormal growth of surface bone    PROSTATE BIOPSY      SPLENECTOMY      ruptured       Meds/Allergies:    PTA meds:   Prior to Admission Medications   Prescriptions Last Dose Informant Patient Reported?  Taking?   acetaminophen (TYLENOL) 325 mg tablet  Self No No   Sig: Take 2 tablets (650 mg total) by mouth every 6 (six) hours as needed for mild pain, headaches or fever   aspirin (ECOTRIN LOW STRENGTH) 81 mg EC tablet  Self No No   Sig: Take 1 tablet (81 mg total) by mouth daily   atorvastatin (LIPITOR) 40 mg tablet Not Taking at Unknown time Self No No   Sig: TAKE 1 TABLET BY MOUTH DAILY WITH DINNER   Patient not taking: Reported on 1/15/2021   finasteride (PROSCAR) 5 mg tablet   No No   Sig: Take 1 tablet (5 mg total) by mouth daily   Patient taking differently: Take 5 mg by mouth every morning    losartan (COZAAR) 50 mg tablet   No No   Sig: Take 1 tablet (50 mg total) by mouth daily   pantoprazole (PROTONIX) 40 mg tablet   No No   Sig: Take 1 tablet (40 mg total) by mouth daily 30-60 min before breakfast   tamsulosin (FLOMAX) 0 4 mg   No No   Sig: Take 1 capsule (0 4 mg total) by mouth daily with dinner   verapamil (CALAN-SR) 120 mg CR tablet  Self No No   Sig: TAKE 1 TABLET (120 MG TOTAL) BY MOUTH DAILY WITH DINNER      Facility-Administered Medications: None       Allergies: No Known Allergies  History:     Marital Status: Legally      Substance Use History:   Social History Substance and Sexual Activity   Alcohol Use Never    Frequency: Never     Social History     Tobacco Use   Smoking Status Current Some Day Smoker    Packs/day: 0 50    Years: 10 00    Pack years: 5 00    Types: Cigarettes   Smokeless Tobacco Never Used     Social History     Substance and Sexual Activity   Drug Use No       Family History:    Family History   Problem Relation Age of Onset    Dementia Father     Heart disease Mother     Hypertension Mother     Hyperlipidemia Mother     Hypertension Sister     Hypertension Brother     No Known Problems Daughter     No Known Problems Son     Cancer Paternal Grandfather         prostate    No Known Problems Son        Physical Exam:     Vitals:   Blood Pressure: 117/72 (01/15/21 1730)  Pulse: 78 (01/15/21 1730)  Temperature: 98 °F (36 7 °C) (01/15/21 1723)  Temp Source: Tympanic (01/15/21 1723)  Respirations: 18 (01/15/21 1730)  Weight - Scale: 91 6 kg (202 lb) (01/15/21 1303)  SpO2: 99 % (01/15/21 1730)    Physical Exam  Constitutional:       General: He is not in acute distress  Appearance: He is well-developed  He is not diaphoretic  HENT:      Head: Normocephalic and atraumatic  Eyes:      General: No scleral icterus  Cardiovascular:      Rate and Rhythm: Normal rate and regular rhythm  Pulmonary:      Effort: Pulmonary effort is normal  No respiratory distress  Breath sounds: Normal breath sounds  No wheezing or rales  Abdominal:      General: Bowel sounds are normal  There is no distension  Palpations: Abdomen is soft  Tenderness: There is no abdominal tenderness  Musculoskeletal:      Right lower leg: No edema  Left lower leg: No edema  Skin:     Comments: No ecchymoses noted on back, abdomen, chest   Neurological:      General: No focal deficit present  Mental Status: He is alert and oriented to person, place, and time  Lab Results: I have personally reviewed pertinent reports  Results from last 7 days   Lab Units 01/15/21  1407   WBC Thousand/uL 6 27   HEMOGLOBIN g/dL 6 4*   HEMATOCRIT % 23 3*   PLATELETS Thousands/uL 766*   NEUTROS PCT % 43   LYMPHS PCT % 34   MONOS PCT % 12   EOS PCT % 9*     Results from last 7 days   Lab Units 01/15/21  1407   POTASSIUM mmol/L 3 6   CHLORIDE mmol/L 104   CO2 mmol/L 25   BUN mg/dL 18   CREATININE mg/dL 1 16   CALCIUM mg/dL 8 6   ALK PHOS U/L 97   ALT U/L 38   AST U/L 19           Imaging: I have personally reviewed pertinent reports  Ct Head Without Contrast    Result Date: 1/15/2021  Narrative: CT BRAIN - WITHOUT CONTRAST INDICATION:   Syncope, recurrent syncope x 2  COMPARISON:  Head CT 1/28/2020 TECHNIQUE:  CT examination of the brain was performed  In addition to axial images, sagittal and coronal 2D reformatted images were created and submitted for interpretation  Radiation dose length product (DLP) for this visit:  921 72 mGy-cm   This examination, like all CT scans performed in the [facility], was performed utilizing techniques to minimize radiation dose exposure, including the use of iterative  reconstruction and automated exposure control  IMAGE QUALITY:  Diagnostic  FINDINGS: PARENCHYMA:  No intracranial masslike lesion, mass effect or midline shift  No CT signs of acute infarction  No acute parenchymal hemorrhage  VENTRICLES AND EXTRA-AXIAL SPACES:  Normal for the patient's age  VISUALIZED ORBITS AND PARANASAL SINUSES:  Unremarkable  CALVARIUM AND EXTRACRANIAL SOFT TISSUES:  Normal      Impression: No acute intracranial CT abnormality  Workstation performed: [id number]     Xr Chest 1 View    Result Date: 1/15/2021  Narrative: CHEST INDICATION:  Shortness of breath  COMPARISON:  1/28/2020, CT chest 4/29/2020 EXAM PERFORMED/VIEWS:  XR CHEST 1 VIEW FINDINGS:  Monitoring leads and clips project over the chest  Cardiomediastinal silhouette appears unremarkable  The lungs are clear    No pneumothorax or pleural effusion  Osseous structures appear within normal limits for patient age  Surgical clips left upper quadrant  Impression: No acute cardiopulmonary disease  Workstation performed: [id number]     Xr Hips Bilateral 3-4 Vw W Pelvis If Performed    Result Date: 1/6/2021  Narrative: BILATERAL HIPS AND PELVIS INDICATION:   M25 551: Pain in right hip M25 552: Pain in left hip  COMPARISON:  None VIEWS:  XR HIPS BILATERAL 3-4 VW W PELVIS IF PERFORMED Images: 5  FINDINGS: The bony pelvis appears intact  Visualized lower lumbar spine is unremarkable  LEFT HIP: There is no acute fracture or dislocation  Mild left hip osteoarthritis is seen  No lytic or blastic osseous lesion  Soft tissues are unremarkable  RIGHT HIP: There is no acute fracture or dislocation  Mild right hip osteoarthritis is seen  No lytic or blastic osseous lesion  Soft tissues are unremarkable  Impression: No acute osseous abnormality  Degenerative changes as described  Workstation performed: [id number]       XR chest 1 view   Final Result      No acute cardiopulmonary disease  Workstation performed: [id number]         CT head without contrast   Final Result      No acute intracranial CT abnormality  Workstation performed: [id number]             EKG, Pathology, and Other Studies Reviewed on Admission:   · No ekg noted    Allscripts/EPIC Records Reviewed: Yes     ** Please Note: "This note has been constructed using a voice recognition system  Therefore there may be syntax, spelling, and/or grammatical errors   Please call if you have any questions  "**

## 2021-01-15 NOTE — ED NOTES
Attempted to perform orthostatics with patient, got very dizzy when standing        Parag Leslie RN  01/15/21 4635

## 2021-01-15 NOTE — ED NOTES
Dinner order placed for patient, per patient "whatever you want, as long as it doesn't have cheese"     Libby Chou, RN  01/15/21 9659

## 2021-01-16 LAB
ABO GROUP BLD BPU: NORMAL
ANION GAP SERPL CALCULATED.3IONS-SCNC: 9 MMOL/L (ref 4–13)
BPU ID: NORMAL
BUN SERPL-MCNC: 16 MG/DL (ref 5–25)
CALCIUM SERPL-MCNC: 8.4 MG/DL (ref 8.3–10.1)
CHLORIDE SERPL-SCNC: 105 MMOL/L (ref 100–108)
CO2 SERPL-SCNC: 24 MMOL/L (ref 21–32)
CREAT SERPL-MCNC: 1.12 MG/DL (ref 0.6–1.3)
CROSSMATCH: NORMAL
ERYTHROCYTE [DISTWIDTH] IN BLOOD BY AUTOMATED COUNT: 18 % (ref 11.6–15.1)
GFR SERPL CREATININE-BSD FRML MDRD: 73 ML/MIN/1.73SQ M
GLUCOSE SERPL-MCNC: 87 MG/DL (ref 65–140)
HCT VFR BLD AUTO: 25.7 % (ref 36.5–49.3)
HCT VFR BLD AUTO: 26.8 % (ref 36.5–49.3)
HGB BLD-MCNC: 7.3 G/DL (ref 12–17)
HGB BLD-MCNC: 7.5 G/DL (ref 12–17)
MAGNESIUM SERPL-MCNC: 1.9 MG/DL (ref 1.6–2.6)
MCH RBC QN AUTO: 21.2 PG (ref 26.8–34.3)
MCHC RBC AUTO-ENTMCNC: 28.4 G/DL (ref 31.4–37.4)
MCV RBC AUTO: 75 FL (ref 82–98)
PLATELET # BLD AUTO: 690 THOUSANDS/UL (ref 149–390)
PMV BLD AUTO: 9.1 FL (ref 8.9–12.7)
POTASSIUM SERPL-SCNC: 3.8 MMOL/L (ref 3.5–5.3)
RBC # BLD AUTO: 3.45 MILLION/UL (ref 3.88–5.62)
SODIUM SERPL-SCNC: 138 MMOL/L (ref 136–145)
UNIT DISPENSE STATUS: NORMAL
UNIT PRODUCT CODE: NORMAL
UNIT RH: NORMAL
WBC # BLD AUTO: 8.66 THOUSAND/UL (ref 4.31–10.16)

## 2021-01-16 PROCEDURE — C9113 INJ PANTOPRAZOLE SODIUM, VIA: HCPCS | Performed by: FAMILY MEDICINE

## 2021-01-16 PROCEDURE — 80048 BASIC METABOLIC PNL TOTAL CA: CPT | Performed by: FAMILY MEDICINE

## 2021-01-16 PROCEDURE — 85027 COMPLETE CBC AUTOMATED: CPT | Performed by: FAMILY MEDICINE

## 2021-01-16 PROCEDURE — 85014 HEMATOCRIT: CPT | Performed by: FAMILY MEDICINE

## 2021-01-16 PROCEDURE — 99222 1ST HOSP IP/OBS MODERATE 55: CPT | Performed by: INTERNAL MEDICINE

## 2021-01-16 PROCEDURE — 83735 ASSAY OF MAGNESIUM: CPT | Performed by: FAMILY MEDICINE

## 2021-01-16 PROCEDURE — 99232 SBSQ HOSP IP/OBS MODERATE 35: CPT | Performed by: FAMILY MEDICINE

## 2021-01-16 PROCEDURE — 85018 HEMOGLOBIN: CPT | Performed by: FAMILY MEDICINE

## 2021-01-16 RX ORDER — POLYETHYLENE GLYCOL 3350 17 G/17G
238 POWDER, FOR SOLUTION ORAL ONCE
Status: COMPLETED | OUTPATIENT
Start: 2021-01-17 | End: 2021-01-17

## 2021-01-16 RX ADMIN — TAMSULOSIN HYDROCHLORIDE 0.4 MG: 0.4 CAPSULE ORAL at 16:17

## 2021-01-16 RX ADMIN — ACETAMINOPHEN 650 MG: 325 TABLET, FILM COATED ORAL at 21:56

## 2021-01-16 RX ADMIN — SODIUM CHLORIDE 75 ML/HR: 0.9 INJECTION, SOLUTION INTRAVENOUS at 22:03

## 2021-01-16 RX ADMIN — SODIUM CHLORIDE 75 ML/HR: 0.9 INJECTION, SOLUTION INTRAVENOUS at 08:07

## 2021-01-16 RX ADMIN — PANTOPRAZOLE SODIUM 40 MG: 40 INJECTION, POWDER, FOR SOLUTION INTRAVENOUS at 21:56

## 2021-01-16 RX ADMIN — IRON SUCROSE 200 MG: 20 INJECTION, SOLUTION INTRAVENOUS at 10:59

## 2021-01-16 RX ADMIN — PANTOPRAZOLE SODIUM 40 MG: 40 INJECTION, POWDER, FOR SOLUTION INTRAVENOUS at 08:07

## 2021-01-16 RX ADMIN — ACETAMINOPHEN 650 MG: 325 TABLET, FILM COATED ORAL at 08:07

## 2021-01-16 NOTE — PLAN OF CARE
Problem: Potential for Falls  Goal: Patient will remain free of falls  Description: INTERVENTIONS:  - Assess patient frequently for physical needs  -  Identify cognitive and physical deficits and behaviors that affect risk of falls    -  [place] fall precautions as indicated by assessment   - Educate patient/family on patient safety including physical limitations  - Instruct patient to call for assistance with activity based on assessment  - Modify environment to reduce risk of injury  - Consider OT/PT consult to assist with strengthening/mobility  Outcome: Progressing     Problem: Prexisting or High Potential for Compromised Skin Integrity  Goal: Skin integrity is maintained or improved  Description: INTERVENTIONS:  - Identify patients at risk for skin breakdown  - Assess and monitor skin integrity  - Assess and monitor nutrition and hydration status  - Monitor labs   - Assess for incontinence   - Turn and reposition patient  - Assist with mobility/ambulation  - Relieve pressure over bony prominences  - Avoid friction and shearing  - Provide appropriate hygiene as needed including keeping skin clean and dry  - Evaluate need for skin moisturizer/barrier cream  - Collaborate with interdisciplinary team   - Patient/family teaching  - Consider wound care consult   Outcome: Progressing     Problem: PAIN - ADULT  Goal: Verbalizes/displays adequate comfort level or baseline comfort level  Description: Interventions:  - Encourage patient to monitor pain and request assistance  - Assess pain using appropriate pain scale  - Administer analgesics based on type and severity of pain and evaluate response  - Implement non-pharmacological measures as appropriate and evaluate response  - Consider cultural and social influences on pain and pain management  - Notify physician/advanced practitioner if interventions unsuccessful or patient reports new pain  Outcome: Progressing     Problem: HEMATOLOGIC - ADULT  Goal: Maintains hematologic stability  Description: INTERVENTIONS  - Assess for signs and symptoms of bleeding or hemorrhage  - Monitor labs  - Administer supportive blood products/factors as ordered and appropriate  Outcome: Progressing

## 2021-01-16 NOTE — ASSESSMENT & PLAN NOTE
Patient presents with 4 weeks of progressive dyspnea on exertion along with some chest tightness due to dyspnea, lightheadedness and 2 brief episodes of syncope  Hemoglobin on arrival 6 4   16 2 -1 year ago  Patient has received total of 2 units PRBC transfusion with improved hemoglobin  Iron panel shows iron 6, ferritin 3, iron sat 1, TIBC 489, suggestive of iron deficiency anemia  Patient was given 3 dose of IV Venofer during the hospital stay  Rectal exam done in the ER was negative  Patient had EGD and colonoscopy in May of 2020 and was recommended to get repeat colonoscopy in 1 year  LDH, vitamin B12, folate level within normal limits  Colonoscopy showed 1 sessile polyp measuring 5-9 millimeters in the descending colon which was completely moved and 1 clip was placed, external and internal hemorrhoids  EGD:  3 millimeter angiitis Ophelia and 4th part of duodenum with no bleeding which was treated with APC  Follow-up with GI for outpatient capsule endoscopy     celiac serologies were also tested which are pending at the current time  Patient will also follow-up with Hematology to receive Venofer 200 milligram IV weekly for 4 doses  Repeat H and H in 3 days

## 2021-01-16 NOTE — PLAN OF CARE
Problem: Potential for Falls  Goal: Patient will remain free of falls  Description: INTERVENTIONS:  - Assess patient frequently for physical needs  -  Identify cognitive and physical deficits and behaviors that affect risk of falls    -  [place] fall precautions as indicated by assessment   - Educate patient/family on patient safety including physical limitations  - Instruct patient to call for assistance with activity based on assessment  - Modify environment to reduce risk of injury  - Consider OT/PT consult to assist with strengthening/mobility  Outcome: Progressing     Problem: Prexisting or High Potential for Compromised Skin Integrity  Goal: Skin integrity is maintained or improved  Description: INTERVENTIONS:  - Identify patients at risk for skin breakdown  - Assess and monitor skin integrity  - Assess and monitor nutrition and hydration status  - Monitor labs   - Assess for incontinence   - Turn and reposition patient  - Assist with mobility/ambulation  - Relieve pressure over bony prominences  - Avoid friction and shearing  - Provide appropriate hygiene as needed including keeping skin clean and dry  - Evaluate need for skin moisturizer/barrier cream  - Collaborate with interdisciplinary team   - Patient/family teaching  - Consider wound care consult   Outcome: Progressing     Problem: PAIN - ADULT  Goal: Verbalizes/displays adequate comfort level or baseline comfort level  Description: Interventions:  - Encourage patient to monitor pain and request assistance  - Assess pain using appropriate pain scale  - Administer analgesics based on type and severity of pain and evaluate response  - Implement non-pharmacological measures as appropriate and evaluate response  - Consider cultural and social influences on pain and pain management  - Notify physician/advanced practitioner if interventions unsuccessful or patient reports new pain  Outcome: Progressing     Problem: HEMATOLOGIC - ADULT  Goal: Maintains hematologic stability  Description: INTERVENTIONS  - Assess for signs and symptoms of bleeding or hemorrhage  - Monitor labs  - Administer supportive blood products/factors as ordered and appropriate  Outcome: Progressing

## 2021-01-16 NOTE — PROGRESS NOTES
[facility] Progress Note  Patient: Reinaldo Jose 63 y o  male   MRN: [id number]  PCP: Maik Kumar DO  Unit/Bed#: 2 [place] [address] Encounter: [id number]  Date Of Visit: 01/16/21    Problem List:    Principal Problem:    Symptomatic anemia  Active Problems:    Essential hypertension    Tobacco use    Gastroesophageal reflux disease    BPH (benign prostatic hyperplasia)      Assessment & Plan:    * Symptomatic anemia  Assessment & Plan  Patient presents with 4 weeks of progressive dyspnea on exertion along with some chest tightness due to dyspnea, lightheadedness and 2 brief episodes of syncope  Hemoglobin on arrival 6 4   16 2 -1 year ago  Patient been 1 unit PRBC transfusion  Repeat Hb today 7 3  Iron panel shows iron 6, ferritin 3, iron sat 1, TIBC 489, suggestive of iron deficiency anemia  Patient started on IV venofer daily  Will need hematology eval as well  Get repeat lab work later today and again in a m  Rectal exam done in the ER was negative  Patient had EGD and colonoscopy in May of 2020 and was recommended to get repeat colonoscopy in 1 year  Pending hemolysis smear, Katlyn, LDH, haptoglobin  GI consult as he may need colonoscopy sooner due to anemia    BPH (benign prostatic hyperplasia)  Assessment & Plan  Continue Proscar  Gastroesophageal reflux disease  Assessment & Plan  Patient on IV Protonix b i d  For now    Tobacco use  Assessment & Plan  Smoking cessation  Nicotine patch  Essential hypertension  Assessment & Plan  antihypertensives were held as some blood pressures were on low normal side yesterday  BPs stable        VTE Pharmacologic Prophylaxis:   Pharmacologic: Pharmacologic VTE Prophylaxis contraindicated due to Severe anemia  Mechanical VTE Prophylaxis in Place: Yes    Patient Centered Rounds: I have performed bedside rounds with nursing staff today      Discussions with Specialists or Other Care Team Provider: yes - GI    Education and Discussions with Family / Patient: yes    Time Spent for Care: 30 minutes  More than 50% of total time spent on counseling and coordination of care as described above  Current Length of Stay: 1 day(s)    Current Patient Status: Inpatient   Certification Statement: The patient will continue to require additional inpatient hospital stay due to Severe anemia requiring IV Venofer, GI evaluation    Discharge Plan: home    Code Status: Level 1 - Full Code      Subjective:   Patient reports feeling better today  Denies any nausea, vomiting    Objective:     Vitals:   Temp (24hrs), Av °F (36 7 °C), Min:97 3 °F (36 3 °C), Max:98 3 °F (36 8 °C)    Temp:  [97 3 °F (36 3 °C)-98 3 °F (36 8 °C)] 98 2 °F (36 8 °C)  HR:  [57-93] 77  Resp:  [17-24] 20  BP: (105-139)/(57-81) 131/81  SpO2:  [94 %-100 %] 96 %  Body mass index is 27 4 kg/m²  Input and Output Summary (last 24 hours): Intake/Output Summary (Last 24 hours) at 2021 0936  Last data filed at 2021 0648  Gross per 24 hour   Intake 1365 ml   Output 780 ml   Net 585 ml       Physical Exam:     Physical Exam  Constitutional:       General: He is not in acute distress  Appearance: He is well-developed  He is not diaphoretic  HENT:      Head: Normocephalic and atraumatic  Eyes:      General:         Right eye: No discharge  Left eye: No discharge  Cardiovascular:      Rate and Rhythm: Normal rate and regular rhythm  Pulmonary:      Effort: Pulmonary effort is normal  No respiratory distress  Breath sounds: Normal breath sounds  No wheezing or rales  Abdominal:      General: Bowel sounds are normal  There is no distension  Palpations: Abdomen is soft  Tenderness: There is abdominal tenderness (mild epigastric)  There is no guarding  Musculoskeletal:      Right lower leg: No edema  Left lower leg: No edema  Neurological:      Mental Status: He is alert and oriented to person, place, and time           Additional Data: Labs:    Results from last 7 days   Lab Units 01/16/21  0459  01/15/21  1407   WBC Thousand/uL 8 66  --  6 27   HEMOGLOBIN g/dL 7 3*   < > 6 4*   HEMATOCRIT % 25 7*   < > 23 3*   PLATELETS Thousands/uL 690*  --  766*   NEUTROS PCT %  --   --  43   LYMPHS PCT %  --   --  34   MONOS PCT %  --   --  12   EOS PCT %  --   --  9*    < > = values in this interval not displayed  Results from last 7 days   Lab Units 01/16/21  0459 01/15/21  1407   POTASSIUM mmol/L 3 8 3 6   CHLORIDE mmol/L 105 104   CO2 mmol/L 24 25   BUN mg/dL 16 18   CREATININE mg/dL 1 12 1 16   CALCIUM mg/dL 8 4 8 6   ALK PHOS U/L  --  97   ALT U/L  --  38   AST U/L  --  19           * I Have Reviewed All Lab Data Listed Above  * Additional Pertinent Lab Tests Reviewed: [facility] Admission Reviewed      Imaging:  Imaging Reports Reviewed Today Include: cT head, CXR  Imaging Personally Reviewed by Myself Includes:  N/A    Recent Cultures (last 7 days):           Last 24 Hours Medication List:   Current Facility-Administered Medications   Medication Dose Route Frequency Provider Last Rate    acetaminophen  650 mg Oral Q6H PRN Jetty LAUREN Cruz      iron sucrose  200 mg Intravenous Daily Love Revankar, DO      nicotine  1 patch Transdermal Daily Love Revankar, DO      pantoprazole  40 mg Intravenous Q12H [address] Love Revankar, DO      pneumococcal 23-valent polysaccharide vaccine  0 5 mL Subcutaneous Prior to discharge Love Revankar, DO      sodium chloride  75 mL/hr Intravenous Continuous Love Revankar, DO 75 mL/hr (01/16/21 0807)    tamsulosin  0 4 mg Oral Daily With Brittney-Francois Revankar, DO            Today, Patient Was Seen By: Jose Eduardo Valles DO    ** Please Note: "This note has been constructed using a voice recognition system  Therefore there may be syntax, spelling, and/or grammatical errors   Please call if you have any questions  "**

## 2021-01-16 NOTE — UTILIZATION REVIEW
Initial Clinical Review    Admission: Date/Time/Statement:   Admission Orders (From admission, onward)     Ordered        01/15/21 1531  Inpatient Admission  Once                   Orders Placed This Encounter   Procedures    Inpatient Admission     Standing Status:   Standing     Number of Occurrences:   1     Order Specific Question:   Level of Care     Answer:   Med Surg [16]     Order Specific Question:   Estimated length of stay     Answer:   More than 2 Midnights     Order Specific Question:   Certification     Answer:   I certify that inpatient services are medically necessary for this patient for a duration of greater than two midnights  See H&P and MD Progress Notes for additional information about the patient's course of treatment  ED Arrival Information     Expected Arrival Acuity Means of Arrival Escorted By Service Admission Type    - 1/15/2021 12:50 Emergent Walk-In Spouse General Medicine Emergency    Arrival Complaint    leg pain, breathing problems, right arm pain        Chief Complaint   Patient presents with    Shortness of Breath     intermittent shortness of breath and chest heaviness for a couple of weeks, also periodic leg pains in different parts of legs  had exam by dr Olga Graham a couple of weeks ago  no ekg ordered  pt states passed out a couple of weeks ago     Assessment/Plan:     63year old male presents to ed from home for evaluation and treatment of shortness of breath, chest heaviness  HE reports 2 episodes of syncope  PMHX:  HTM, GERD  Clinical assessment significant for skin pale, hb 6 4, negative guaiac   Admit to inpatient of anemia of unknown origin ( previous hb 1 year ago was 16 2)  Plan includes:  Trend hb, transfuse 2 units, consult gastroenterology, obtain iron panel, iv iron sucrose, iv fluids 75/hr, iv protonix, clear liquid diet, orthostatic bp, stool for occult blood        1-15  3:15 pm   Episode of dizziness when attempting orthostatics      ED Triage Vitals [01/15/21 1303]   97 6 °F (36 4 °C) 93 (!) 24 139/78 100 %      Tympanic Monitor         Pain Score       9          01/15/21 91 6 kg (202 lb)     Additional Vital Signs:    Date/Time  Temp  Pulse  Resp  BP  MAP (mmHg)  SpO2  O2 Device   01/16/21 08:05:36  98 2 °F (36 8 °C)  77    131/81  98  96 %     01/16/21 03:34:31  98 1 °F (36 7 °C)  73  20  118/68  85  94 %     01/15/21 23:17:27  98 3 °F (36 8 °C)  57  18  109/70  83  98 %     01/15/21 2021  98 1 °F (36 7 °C)  75  20  105/71    98 %  None (Room air)   01/15/21 20:20:37  98 1 °F (36 7 °C)  71  20  105/71  82  98 %     01/15/21 18:48:18  97 3 °F (36 3 °C)  Abnormal   65  18  106/72  83  98 %  None (Room air)   01/15/21 1800    74  17  116/70    99 %  None (Room air)   01/15/21 1730    78  18  117/72  90  99 %     01/15/21 1723  98 °F (36 7 °C)  59  18  106/63         01/15/21 1707  97 9 °F (36 6 °C)  83  19  105/65    99 %  None (Room air)   01/15/21 1508    79  19  126/57    100 %  None (Room air)                   Pertinent Labs/Diagnostic Test Results:     Collection Time Result Time Vent R Atrial R VT Int  QRSD Int  QT Int  QTC Int  P Axis QRS Axis T Wave Ax    01/15/21 13:02:57 01/15/21 14:26:59 85 85 172 90 372 442 38 37 49         Normal sinus rhythm   Normal ECG   When compared with ECG of 28-JAN-2020 16:01,   No significant change was found         XR chest 1 view    (01/15 1615)      No acute cardiopulmonary disease  CT head without contrast    (01/15 1435)      No acute intracranial CT abnormality          Results from last 7 days   Lab Units 01/15/21  1407   SARS-COV-2  Negative     Results from last 7 days   Lab Units 01/16/21  0459 01/15/21  2233 01/15/21  1407   WBC Thousand/uL 8 66  --  6 27   HEMOGLOBIN g/dL 7 3* 7 1* 6 4*   HEMATOCRIT % 25 7* 25 3* 23 3*   PLATELETS Thousands/uL 690*  --  766*   NEUTROS ABS Thousands/µL  --   --  2 72     Results from last 7 days   Lab Units 01/15/21  1454   RETIC CT ABS  40,600   RETIC CT PCT % 1 28     Results from last 7 days   Lab Units 01/16/21  0459 01/15/21  1407   SODIUM mmol/L 138 139   POTASSIUM mmol/L 3 8 3 6   CHLORIDE mmol/L 105 104   CO2 mmol/L 24 25   ANION GAP mmol/L 9 10   BUN mg/dL 16 18   CREATININE mg/dL 1 12 1 16   EGFR ml/min/1 73sq m 73 70   CALCIUM mg/dL 8 4 8 6   MAGNESIUM mg/dL 1 9  --      Results from last 7 days   Lab Units 01/15/21  1407   AST U/L 19   ALT U/L 38   ALK PHOS U/L 97   TOTAL PROTEIN g/dL 7 2   ALBUMIN g/dL 3 6   TOTAL BILIRUBIN mg/dL 0 20         Results from last 7 days   Lab Units 01/16/21  0459 01/15/21  1407   GLUCOSE RANDOM mg/dL 87 106     Results from last 7 days   Lab Units 01/15/21  2042 01/15/21  1407   TROPONIN I ng/mL <0 02 <0 02     Results from last 7 days   Lab Units 01/15/21  1407   D-DIMER QUANTITATIVE ug/ml FEU <0 27         Results from last 7 days   Lab Units 01/15/21  1407   NT-PRO BNP pg/mL 84     Results from last 7 days   Lab Units 01/15/21  1407   FERRITIN ng/mL 3*         Results from last 7 days   Lab Units 01/15/21  1407   INFLUENZA A PCR  Negative   INFLUENZA B PCR  Negative   RSV PCR  Negative     ED Treatment:   Medication Administration from 01/15/2021 1250 to 01/15/2021 1810     None        Past Medical History:   Diagnosis    Colon polyp    Elevated PSA    Enlarged prostate    Full dentures    GERD (gastroesophageal reflux disease)    Hemorrhoid    Hyperlipidemia    Hypertension    Sleep apnea    No CPAP    Smoker    Wears glasses     Present on Admission:   Essential hypertension   Gastroesophageal reflux disease   Symptomatic anemia   Tobacco use   BPH (benign prostatic hyperplasia)      Admitting Diagnosis:   Shortness of breath [R06 02]  Symptomatic anemia [D64 9]      Age/Sex: 63 y o  male     Admission Orders:    Scheduled Medications:  iron sucrose, 200 mg, Intravenous, Daily  nicotine, 1 patch, Transdermal, Daily  pantoprazole, 40 mg, Intravenous, Q12H KATIUSKA  tamsulosin, 0 4 mg, Oral, Daily With Dinner      Continuous IV Infusions:  sodium chloride, 75 mL/hr, Intravenous, Continuous      PRN Meds:  acetaminophen, 650 mg, Oral, Q6H PRN  pneumococcal 23-valent polysaccharide vaccine, 0 5 mL, Subcutaneous, Prior to discharge        IP CONSULT TO GASTROENTEROLOGY    Network Utilization Review Department  ATTENTION: Please call with any questions or concerns to [phone] and carefully listen to the prompts so that you are directed to the right person  All voicemails are confidential   Lucaslysaleem Wolfe all requests for admission clinical reviews, approved or denied determinations and any other requests to dedicated fax number below belonging to the campus where the patient is receiving treatment   List of dedicated fax numbers for the Facilities:  [address] DENIALS (Administrative/Medical Necessity) [phone]   [address] (Maternity/NICU/Pediatrics) [phone] [address] [place] [address] Dr Ledezma [facility] Michi Malin 1274 (  [address]) [address] [facility] [address] [place] [address] [place] [address] [phone]

## 2021-01-16 NOTE — CONSULTS
Consultation - [facility]) [facility]  Madalyn Leger 63 y o  male MRN: [id number]  Unit/Bed#: 2 [place] [address] Encounter: [id number]        Consults    ASSESSMENT/PLAN:       1  Symptomatic iron deficiency anemia without any overt bleeding-tested heme-negative in the emergency room  Hemoglobin went from 16/1 year ago to 6 4 on this admission  He has received 2 units of blood transfusion with most recent hemoglobin being 7 3  Suspect etiology is likely multifactorial, would recommend Hematology workup as well, awaiting results of the hemolysis labs  Regardless, given suboptimal prep on the colonoscopy, the degree of blood loss, would recommend repeat endoscopic evaluation at this time   -will proceed with both EGD and colonoscopy on Monday  If both EGD and colonoscopy are unremarkable, would recommend small bowel evaluation with capsule endoscopy  -would also recommend Hematology evaluation at this time   -continue pantoprazole 40 mg on daily basis  -continue to monitor the color of the stool   -can start on GERD diet today, clear liquid diet tomorrow in anticipation of EGD and colonoscopy on Monday  The plan was discussed with the patient and he agrees  ______________________________________________________________________    Reason for Consult / Principal Problem: Symptomatic anemia    HPI: Madalyn Leger is a 63y o  year old male who presents with Symptomatic anemia  50-year-old male with history of hypertension, hyperlipidemia, presented to the hospital with worsening shortness of breath and dyspnea on exertion for the past 4 weeks  Patient also reported 1 episode of syncope several weeks ago and has been feeling lightheaded  He was seen by his PCP who informed him that his blood pressure was low  He had a 2nd episode of dizziness yesterday bring him to the ER  Does report chest tightness but denies any hematemesis, coffee-ground emesis or melena  Patient denies any new medications    Does not recall any significant NSAID use  He underwent screening colonoscopy and EGD for symptoms of GERD by me in June of 2020  EGD was notable for moderate gastritis and irregular squamocolumnar junction  Patient was recommended PPI  Biopsies did not show any evidence of Crews's esophagus however given the irregular squamocolumnar junction, repeat EGD was recommended at 2 year interval     He also underwent colonoscopy with removal of 3 tubular adenomatous polyps and several hyperplastic polyps  The prep of the colonoscopy was limited the patient was recommended a repeat colonoscopy in 1 year interval   He denies any weight loss, no fever, chills  Review of Systems: The remainder of the review of systems was negative except for the pertinent positives noted in HPI       Historical Information   Past Medical History:   Diagnosis Date    Colon polyp     Elevated PSA     Enlarged prostate     Full dentures     GERD (gastroesophageal reflux disease)     Hemorrhoid     Hyperlipidemia     Hypertension     Sleep apnea     No CPAP    Smoker     Wears glasses      Past Surgical History:   Procedure Laterality Date    APPENDECTOMY      COLONOSCOPY      EGD      FOOT SURGERY Right     bone removed    OTHER SURGICAL HISTORY      bone removal right arm-abnormal growth of surface bone    PROSTATE BIOPSY      SPLENECTOMY      ruptured     Social History   Social History     Substance and Sexual Activity   Alcohol Use Never    Frequency: Never     Social History     Substance and Sexual Activity   Drug Use No     Social History     Tobacco Use   Smoking Status Current Some Day Smoker    Packs/day: 0 50    Years: 10 00    Pack years: 5 00    Types: Cigarettes   Smokeless Tobacco Never Used     Family History   Problem Relation Age of Onset    Dementia Father     Heart disease Mother     Hypertension Mother     Hyperlipidemia Mother     Hypertension Sister     Hypertension Brother     No Known Problems Daughter     No Known Problems Son     Cancer Paternal Grandfather         prostate    No Known Problems Son        Meds/Allergies     Medications Prior to Admission   Medication    acetaminophen (TYLENOL) 325 mg tablet    aspirin (ECOTRIN LOW STRENGTH) 81 mg EC tablet    finasteride (PROSCAR) 5 mg tablet    losartan (COZAAR) 50 mg tablet    pantoprazole (PROTONIX) 40 mg tablet    tamsulosin (FLOMAX) 0 4 mg    verapamil (CALAN-SR) 120 mg CR tablet    atorvastatin (LIPITOR) 40 mg tablet     Current Facility-Administered Medications   Medication Dose Route Frequency    acetaminophen (TYLENOL) tablet 650 mg  650 mg Oral Q6H PRN    iron sucrose (VENOFER) 200 mg in sodium chloride 0 9 % 100 mL IVPB  200 mg Intravenous Daily    nicotine (NICODERM CQ) 14 mg/24hr TD 24 hr patch 1 patch  1 patch Transdermal Daily    pantoprazole (PROTONIX) injection 40 mg  40 mg Intravenous Q12H [facility]    pneumococcal 23-valent polysaccharide vaccine (PNEUMOVAX-23) injection 0 5 mL  0 5 mL Subcutaneous Prior to discharge    sodium chloride 0 9 % infusion  75 mL/hr Intravenous Continuous    tamsulosin (FLOMAX) capsule 0 4 mg  0 4 mg Oral Daily With Dinner       No Known Allergies    Objective     Blood pressure 131/81, pulse 77, temperature 98 2 °F (36 8 °C), temperature source Oral, resp  rate 18, weight 91 6 kg (202 lb), SpO2 96 %        Intake/Output Summary (Last 24 hours) at 1/16/2021 1400  Last data filed at 1/16/2021 0648  Gross per 24 hour   Intake 1365 ml   Output 780 ml   Net 585 ml       PHYSICAL EXAM     GEN: well nourished, well developed, no acute distress  HEENT: anicteric, MMM, no cervical or supraclavicular lymphadenopathy  CV: RRR, no m/r/g  CHEST: CTA b/l, no WRR  ABD: +BS, soft, NT/ND, no hepatosplenomegaly  EXT: no c/c/e  SKIN: no rashes,  NEURO: aaox3    Lab Results:   Admission on 01/15/2021   Component Date Value    WBC 01/15/2021 6 27     RBC 01/15/2021 3 21*    Hemoglobin 01/15/2021 6 4*    Hematocrit 01/15/2021 23 3*    MCV 01/15/2021 73*    MCH 01/15/2021 19 9*    MCHC 01/15/2021 27 5*    RDW 01/15/2021 15 1     MPV 01/15/2021 8 9     Platelets 18/62/4691 766*    nRBC 01/15/2021 0     Neutrophils Relative 01/15/2021 43     Immat GRANS % 01/15/2021 0     Lymphocytes Relative 01/15/2021 34     Monocytes Relative 01/15/2021 12     Eosinophils Relative 01/15/2021 9*    Basophils Relative 01/15/2021 2*    Neutrophils Absolute 01/15/2021 2 72     Immature Grans Absolute 01/15/2021 0 02     Lymphocytes Absolute 01/15/2021 2 14     Monocytes Absolute 01/15/2021 0 73     Eosinophils Absolute 01/15/2021 0 55     Basophils Absolute 01/15/2021 0 11*    Sodium 01/15/2021 139     Potassium 01/15/2021 3 6     Chloride 01/15/2021 104     CO2 01/15/2021 25     ANION GAP 01/15/2021 10     BUN 01/15/2021 18     Creatinine 01/15/2021 1 16     Glucose 01/15/2021 106     Calcium 01/15/2021 8 6     AST 01/15/2021 19     ALT 01/15/2021 38     Alkaline Phosphatase 01/15/2021 97     Total Protein 01/15/2021 7 2     Albumin 01/15/2021 3 6     Total Bilirubin 01/15/2021 0 20     eGFR 01/15/2021 70     Troponin I 01/15/2021 <0 02     NT-proBNP 01/15/2021 84     D-Dimer, Quant 01/15/2021 <0 27     SARS-CoV-2 01/15/2021 Negative     INFLUENZA A PCR 01/15/2021 Negative     INFLUENZA B PCR 01/15/2021 Negative     RSV PCR 01/15/2021 Negative     Ventricular Rate 01/15/2021 85     Atrial Rate 01/15/2021 85     DC Interval 01/15/2021 172     QRSD Interval 01/15/2021 90     QT Interval 01/15/2021 372     QTC Interval 01/15/2021 442     P Axis 01/15/2021 38     QRS Axis 01/15/2021 37     T Wave Axis 01/15/2021 49     ABO Grouping 01/15/2021 B     Rh Factor 01/15/2021 Negative     Antibody Screen 01/15/2021 Negative     Specimen Expiration Date 01/15/2021 [id number]     Unit Product Code 01/16/2021 [id number]     Unit Number 01/16/2021 [id number]     Unit ABO 01/16/2021 B     Unit [facility] 01/16/2021 NEG     Crossmatch 01/16/2021 Compatible     Unit Dispense Status 01/16/2021 Presumed Trans     ABO Grouping 01/15/2021 B     Rh Factor 01/15/2021 Negative     Retic Ct Abs 01/15/2021 40,600     Retic Ct Pct 01/15/2021 1 28     Folate 01/15/2021 7 8     Vitamin B-12 01/15/2021 264     Iron Saturation 01/15/2021 1     TIBC 01/15/2021 489*    Iron 01/15/2021 6*    Ferritin 01/15/2021 3*    LD 01/15/2021 203     Troponin I 01/15/2021 <0 02     Hemoglobin 01/15/2021 7 1*    Hematocrit 01/15/2021 25 3*    Sodium 01/16/2021 138     Potassium 01/16/2021 3 8     Chloride 01/16/2021 105     CO2 01/16/2021 24     ANION GAP 01/16/2021 9     BUN 01/16/2021 16     Creatinine 01/16/2021 1 12     Glucose 01/16/2021 87     Calcium 01/16/2021 8 4     eGFR 01/16/2021 73     Magnesium 01/16/2021 1 9     WBC 01/16/2021 8 66     RBC 01/16/2021 3 45*    Hemoglobin 01/16/2021 7 3*    Hematocrit 01/16/2021 25 7*    MCV 01/16/2021 75*    MCH 01/16/2021 21 2*    MCHC 01/16/2021 28 4*    RDW 01/16/2021 18 0*    Platelets 32/90/9123 690*    MPV 01/16/2021 9 1      Imaging Studies: I have personally reviewed pertinent films in PACS

## 2021-01-16 NOTE — ASSESSMENT & PLAN NOTE
antihypertensives were held initially as some blood pressures were on low normal side      Blood pressures have improved and medications were restarted  Continue Cozaar and verapamil

## 2021-01-17 LAB
ANION GAP SERPL CALCULATED.3IONS-SCNC: 8 MMOL/L (ref 4–13)
BUN SERPL-MCNC: 12 MG/DL (ref 5–25)
CALCIUM SERPL-MCNC: 7.8 MG/DL (ref 8.3–10.1)
CHLORIDE SERPL-SCNC: 108 MMOL/L (ref 100–108)
CO2 SERPL-SCNC: 24 MMOL/L (ref 21–32)
CREAT SERPL-MCNC: 1.03 MG/DL (ref 0.6–1.3)
ERYTHROCYTE [DISTWIDTH] IN BLOOD BY AUTOMATED COUNT: 17.8 % (ref 11.6–15.1)
GFR SERPL CREATININE-BSD FRML MDRD: 81 ML/MIN/1.73SQ M
GLUCOSE SERPL-MCNC: 84 MG/DL (ref 65–140)
HCT VFR BLD AUTO: 25.7 % (ref 36.5–49.3)
HCT VFR BLD AUTO: 29.9 % (ref 36.5–49.3)
HGB BLD-MCNC: 7.2 G/DL (ref 12–17)
HGB BLD-MCNC: 8.5 G/DL (ref 12–17)
MCH RBC QN AUTO: 20.9 PG (ref 26.8–34.3)
MCHC RBC AUTO-ENTMCNC: 28 G/DL (ref 31.4–37.4)
MCV RBC AUTO: 75 FL (ref 82–98)
PLATELET # BLD AUTO: 657 THOUSANDS/UL (ref 149–390)
PMV BLD AUTO: 9.2 FL (ref 8.9–12.7)
POTASSIUM SERPL-SCNC: 3.7 MMOL/L (ref 3.5–5.3)
RBC # BLD AUTO: 3.44 MILLION/UL (ref 3.88–5.62)
SODIUM SERPL-SCNC: 140 MMOL/L (ref 136–145)
WBC # BLD AUTO: 7.01 THOUSAND/UL (ref 4.31–10.16)

## 2021-01-17 PROCEDURE — P9016 RBC LEUKOCYTES REDUCED: HCPCS

## 2021-01-17 PROCEDURE — 85018 HEMOGLOBIN: CPT | Performed by: PHYSICIAN ASSISTANT

## 2021-01-17 PROCEDURE — 30233N1 TRANSFUSION OF NONAUTOLOGOUS RED BLOOD CELLS INTO PERIPHERAL VEIN, PERCUTANEOUS APPROACH: ICD-10-PCS | Performed by: FAMILY MEDICINE

## 2021-01-17 PROCEDURE — 99232 SBSQ HOSP IP/OBS MODERATE 35: CPT | Performed by: FAMILY MEDICINE

## 2021-01-17 PROCEDURE — 85014 HEMATOCRIT: CPT | Performed by: PHYSICIAN ASSISTANT

## 2021-01-17 PROCEDURE — C9113 INJ PANTOPRAZOLE SODIUM, VIA: HCPCS | Performed by: FAMILY MEDICINE

## 2021-01-17 PROCEDURE — 80048 BASIC METABOLIC PNL TOTAL CA: CPT | Performed by: FAMILY MEDICINE

## 2021-01-17 PROCEDURE — 85027 COMPLETE CBC AUTOMATED: CPT | Performed by: FAMILY MEDICINE

## 2021-01-17 RX ORDER — FINASTERIDE 5 MG/1
5 TABLET, FILM COATED ORAL EVERY MORNING
Status: DISCONTINUED | OUTPATIENT
Start: 2021-01-17 | End: 2021-01-19 | Stop reason: HOSPADM

## 2021-01-17 RX ORDER — ACETAMINOPHEN 325 MG/1
650 TABLET ORAL ONCE
Status: COMPLETED | OUTPATIENT
Start: 2021-01-17 | End: 2021-01-17

## 2021-01-17 RX ADMIN — PANTOPRAZOLE SODIUM 40 MG: 40 INJECTION, POWDER, FOR SOLUTION INTRAVENOUS at 09:06

## 2021-01-17 RX ADMIN — FINASTERIDE 5 MG: 5 TABLET, FILM COATED ORAL at 11:33

## 2021-01-17 RX ADMIN — TAMSULOSIN HYDROCHLORIDE 0.4 MG: 0.4 CAPSULE ORAL at 17:39

## 2021-01-17 RX ADMIN — ACETAMINOPHEN 650 MG: 325 TABLET, FILM COATED ORAL at 09:07

## 2021-01-17 RX ADMIN — ACETAMINOPHEN 650 MG: 325 TABLET, FILM COATED ORAL at 17:38

## 2021-01-17 RX ADMIN — IRON SUCROSE 200 MG: 20 INJECTION, SOLUTION INTRAVENOUS at 09:08

## 2021-01-17 RX ADMIN — POLYETHYLENE GLYCOL 3350 238 G: 17 POWDER, FOR SOLUTION ORAL at 16:12

## 2021-01-17 RX ADMIN — PANTOPRAZOLE SODIUM 40 MG: 40 INJECTION, POWDER, FOR SOLUTION INTRAVENOUS at 21:49

## 2021-01-17 NOTE — PROGRESS NOTES
[facility] Progress Note  Patient: Art Howe 63 y o  male   MRN: [id number]  PCP: Tamiko Mireles DO  Unit/Bed#: 2 [place] [address] Encounter: [id number]  Date Of Visit: 01/17/21    Problem List:    Principal Problem:    Symptomatic anemia  Active Problems:    Essential hypertension    Tobacco use    Gastroesophageal reflux disease    BPH (benign prostatic hyperplasia)      Assessment & Plan:    * Symptomatic anemia  Assessment & Plan  Patient presents with 4 weeks of progressive dyspnea on exertion along with some chest tightness due to dyspnea, lightheadedness and 2 brief episodes of syncope  Hemoglobin on arrival 6 4   16 2 -1 year ago  Patient been 1 unit PRBC transfusion  Repeat Hb today 7 2  Patient still with some shortness of breath and lightheadedness when getting out of bed  Will transfuse 1 unit PRBC today  Iron panel shows iron 6, ferritin 3, iron sat 1, TIBC 489, suggestive of iron deficiency anemia  Patient started on IV venofer daily  Will need hematology eval as well  Get repeat lab work later today and again in a m  Rectal exam done in the ER was negative  Patient had EGD and colonoscopy in May of 2020 and was recommended to get repeat colonoscopy in 1 year  Pending hemolysis smear, Katlyn, LDH, haptoglobin  GI consult - plan is for EGD, colonoscopy for further evaluation of anemia    BPH (benign prostatic hyperplasia)  Assessment & Plan  Continue flomax    Gastroesophageal reflux disease  Assessment & Plan  Patient on IV Protonix b i d  For now  Tobacco use  Assessment & Plan  Smoking cessation  Nicotine patch    Essential hypertension  Assessment & Plan  antihypertensives were held as some blood pressures were on low normal side    BPs stable        VTE Pharmacologic Prophylaxis:   Pharmacologic: Pharmacologic VTE Prophylaxis contraindicated due to Severe anemia  Mechanical VTE Prophylaxis in Place: Yes    Patient Centered Rounds: I have performed bedside rounds with nursing staff today  Discussions with Specialists or Other Care Team Provider: yes - GI    Education and Discussions with Family / Patient: yes    Time Spent for Care: 35 min  More than 50% of total time spent on counseling and coordination of care as described above  Current Length of Stay: 2 day(s)    Current Patient Status: Inpatient   Certification Statement: The patient will continue to require additional inpatient hospital stay due to Severe anemia requiring IV Venofer, GI evaluation    Discharge Plan: home    Code Status: Level 1 - Full Code      Subjective:   Still with some shortness of breath all the chest tightness has resolved  Overall feels better compared to before  Lightheadedness when getting out of bed    Objective:     Vitals:   Temp (24hrs), Av 8 °F (36 6 °C), Min:97 3 °F (36 3 °C), Max:98 1 °F (36 7 °C)    Temp:  [97 3 °F (36 3 °C)-98 1 °F (36 7 °C)] 97 3 °F (36 3 °C)  HR:  [62-80] 62  Resp:  [16-18] 18  BP: (111-145)/(66-86) 145/86  SpO2:  [94 %-99 %] 99 %  Body mass index is 27 4 kg/m²  Input and Output Summary (last 24 hours): Intake/Output Summary (Last 24 hours) at 2021 1544  Last data filed at 2021 1401  Gross per 24 hour   Intake 1043 75 ml   Output 2700 ml   Net -1656 25 ml       Physical Exam:     Physical Exam  Constitutional:       General: He is not in acute distress  Appearance: He is well-developed  He is not diaphoretic  HENT:      Head: Normocephalic and atraumatic  Eyes:      General:         Right eye: No discharge  Left eye: No discharge  Cardiovascular:      Rate and Rhythm: Normal rate and regular rhythm  Pulmonary:      Effort: Pulmonary effort is normal  No respiratory distress  Breath sounds: Normal breath sounds  No wheezing or rales  Abdominal:      General: Bowel sounds are normal  There is no distension  Palpations: Abdomen is soft  Tenderness: There is abdominal tenderness (mild epigastric)   There is no guarding  Musculoskeletal:      Right lower leg: No edema  Left lower leg: No edema  Neurological:      Mental Status: He is alert and oriented to person, place, and time  Additional Data:     Labs:    Results from last 7 days   Lab Units 01/17/21  0512  01/15/21  1407   WBC Thousand/uL 7 01   < > 6 27   HEMOGLOBIN g/dL 7 2*   < > 6 4*   HEMATOCRIT % 25 7*   < > 23 3*   PLATELETS Thousands/uL 657*   < > 766*   NEUTROS PCT %  --   --  43   LYMPHS PCT %  --   --  34   MONOS PCT %  --   --  12   EOS PCT %  --   --  9*    < > = values in this interval not displayed  Results from last 7 days   Lab Units 01/17/21  0512  01/15/21  1407   POTASSIUM mmol/L 3 7   < > 3 6   CHLORIDE mmol/L 108   < > 104   CO2 mmol/L 24   < > 25   BUN mg/dL 12   < > 18   CREATININE mg/dL 1 03   < > 1 16   CALCIUM mg/dL 7 8*   < > 8 6   ALK PHOS U/L  --   --  97   ALT U/L  --   --  38   AST U/L  --   --  19    < > = values in this interval not displayed  * I Have Reviewed All Lab Data Listed Above  * Additional Pertinent Lab Tests Reviewed:  [facility] Admission Reviewed      Imaging:  Imaging Reports Reviewed Today Include: cT head, CXR  Imaging Personally Reviewed by Myself Includes:  N/A    Recent Cultures (last 7 days):           Last 24 Hours Medication List:   Current Facility-Administered Medications   Medication Dose Route Frequency Provider Last Rate    acetaminophen  650 mg Oral Q6H PRN Meriam Pouch, CRNP      acetaminophen  650 mg Oral Once Love Revankar, DO      finasteride  5 mg Oral QAM Love Revankar, DO      iron sucrose  200 mg Intravenous Daily Love Revankar, DO 0 mg (01/16/21 1159)    nicotine  1 patch Transdermal Daily Love Revankar, DO      pantoprazole  40 mg Intravenous Q12H [address] Love Revankar, DO      pneumococcal 23-valent polysaccharide vaccine  0 5 mL Subcutaneous Prior to discharge Love Revankar, DO      polyethylene glycol  238 g Oral Once Chauncey Orozco MD      sodium chloride  75 mL/hr Intravenous Continuous Love Jerez DO 75 mL/hr (01/16/21 2203)    tamsulosin  0 4 mg Oral Daily With Dinner Love Jerez DO            Today, Patient Was Seen By: Jose Velez DO    ** Please Note: "This note has been constructed using a voice recognition system  Therefore there may be syntax, spelling, and/or grammatical errors   Please call if you have any questions  "**

## 2021-01-18 ENCOUNTER — ANESTHESIA (INPATIENT)
Dept: PERIOP | Facility: HOSPITAL | Age: 56
DRG: 812 | End: 2021-01-18
Payer: COMMERCIAL

## 2021-01-18 ENCOUNTER — ANESTHESIA EVENT (INPATIENT)
Dept: PERIOP | Facility: HOSPITAL | Age: 56
DRG: 812 | End: 2021-01-18
Payer: COMMERCIAL

## 2021-01-18 ENCOUNTER — APPOINTMENT (INPATIENT)
Dept: PERIOP | Facility: HOSPITAL | Age: 56
DRG: 812 | End: 2021-01-18
Attending: INTERNAL MEDICINE
Payer: COMMERCIAL

## 2021-01-18 VITALS — HEART RATE: 94 BPM

## 2021-01-18 LAB
ABO GROUP BLD BPU: NORMAL
ANION GAP SERPL CALCULATED.3IONS-SCNC: 8 MMOL/L (ref 4–13)
BPU ID: NORMAL
BUN SERPL-MCNC: 9 MG/DL (ref 5–25)
CALCIUM SERPL-MCNC: 8.2 MG/DL (ref 8.3–10.1)
CHLORIDE SERPL-SCNC: 108 MMOL/L (ref 100–108)
CO2 SERPL-SCNC: 25 MMOL/L (ref 21–32)
CREAT SERPL-MCNC: 1.07 MG/DL (ref 0.6–1.3)
CROSSMATCH: NORMAL
ERYTHROCYTE [DISTWIDTH] IN BLOOD BY AUTOMATED COUNT: 19 % (ref 11.6–15.1)
GFR SERPL CREATININE-BSD FRML MDRD: 77 ML/MIN/1.73SQ M
GLUCOSE SERPL-MCNC: 83 MG/DL (ref 65–140)
HCT VFR BLD AUTO: 28.9 % (ref 36.5–49.3)
HGB BLD-MCNC: 8.2 G/DL (ref 12–17)
MCH RBC QN AUTO: 21.6 PG (ref 26.8–34.3)
MCHC RBC AUTO-ENTMCNC: 28.4 G/DL (ref 31.4–37.4)
MCV RBC AUTO: 76 FL (ref 82–98)
PLATELET # BLD AUTO: 603 THOUSANDS/UL (ref 149–390)
PMV BLD AUTO: 8.8 FL (ref 8.9–12.7)
POTASSIUM SERPL-SCNC: 3.8 MMOL/L (ref 3.5–5.3)
RBC # BLD AUTO: 3.79 MILLION/UL (ref 3.88–5.62)
SODIUM SERPL-SCNC: 141 MMOL/L (ref 136–145)
UNIT DISPENSE STATUS: NORMAL
UNIT PRODUCT CODE: NORMAL
UNIT RH: NORMAL
WBC # BLD AUTO: 8.62 THOUSAND/UL (ref 4.31–10.16)

## 2021-01-18 PROCEDURE — 99231 SBSQ HOSP IP/OBS SF/LOW 25: CPT | Performed by: INTERNAL MEDICINE

## 2021-01-18 PROCEDURE — 99232 SBSQ HOSP IP/OBS MODERATE 35: CPT | Performed by: FAMILY MEDICINE

## 2021-01-18 PROCEDURE — 88305 TISSUE EXAM BY PATHOLOGIST: CPT | Performed by: PATHOLOGY

## 2021-01-18 PROCEDURE — 45385 COLONOSCOPY W/LESION REMOVAL: CPT | Performed by: INTERNAL MEDICINE

## 2021-01-18 PROCEDURE — 0DBM8ZX EXCISION OF DESCENDING COLON, VIA NATURAL OR ARTIFICIAL OPENING ENDOSCOPIC, DIAGNOSTIC: ICD-10-PCS | Performed by: INTERNAL MEDICINE

## 2021-01-18 PROCEDURE — 0DJ08ZZ INSPECTION OF UPPER INTESTINAL TRACT, VIA NATURAL OR ARTIFICIAL OPENING ENDOSCOPIC: ICD-10-PCS | Performed by: INTERNAL MEDICINE

## 2021-01-18 PROCEDURE — C9113 INJ PANTOPRAZOLE SODIUM, VIA: HCPCS | Performed by: FAMILY MEDICINE

## 2021-01-18 PROCEDURE — 44369 SMALL BOWEL ENDOSCOPY: CPT | Performed by: INTERNAL MEDICINE

## 2021-01-18 PROCEDURE — 85027 COMPLETE CBC AUTOMATED: CPT | Performed by: FAMILY MEDICINE

## 2021-01-18 PROCEDURE — 80048 BASIC METABOLIC PNL TOTAL CA: CPT | Performed by: FAMILY MEDICINE

## 2021-01-18 RX ORDER — PROPOFOL 10 MG/ML
INJECTION, EMULSION INTRAVENOUS CONTINUOUS PRN
Status: DISCONTINUED | OUTPATIENT
Start: 2021-01-18 | End: 2021-01-19

## 2021-01-18 RX ORDER — ONDANSETRON 2 MG/ML
4 INJECTION INTRAMUSCULAR; INTRAVENOUS ONCE AS NEEDED
Status: CANCELLED | OUTPATIENT
Start: 2021-01-18

## 2021-01-18 RX ORDER — GLYCOPYRROLATE 0.2 MG/ML
INJECTION INTRAMUSCULAR; INTRAVENOUS AS NEEDED
Status: DISCONTINUED | OUTPATIENT
Start: 2021-01-18 | End: 2021-01-19

## 2021-01-18 RX ORDER — PROPOFOL 10 MG/ML
INJECTION, EMULSION INTRAVENOUS AS NEEDED
Status: DISCONTINUED | OUTPATIENT
Start: 2021-01-18 | End: 2021-01-19

## 2021-01-18 RX ORDER — SODIUM CHLORIDE, SODIUM LACTATE, POTASSIUM CHLORIDE, CALCIUM CHLORIDE 600; 310; 30; 20 MG/100ML; MG/100ML; MG/100ML; MG/100ML
INJECTION, SOLUTION INTRAVENOUS CONTINUOUS PRN
Status: DISCONTINUED | OUTPATIENT
Start: 2021-01-18 | End: 2021-01-19

## 2021-01-18 RX ORDER — LOSARTAN POTASSIUM 50 MG/1
50 TABLET ORAL DAILY
Status: DISCONTINUED | OUTPATIENT
Start: 2021-01-18 | End: 2021-01-19 | Stop reason: HOSPADM

## 2021-01-18 RX ADMIN — FINASTERIDE 5 MG: 5 TABLET, FILM COATED ORAL at 08:13

## 2021-01-18 RX ADMIN — PANTOPRAZOLE SODIUM 40 MG: 40 INJECTION, POWDER, FOR SOLUTION INTRAVENOUS at 21:43

## 2021-01-18 RX ADMIN — LIDOCAINE HYDROCHLORIDE 40 MG: 20 INJECTION, SOLUTION INTRAVENOUS at 12:57

## 2021-01-18 RX ADMIN — GLYCOPYRROLATE 0.2 MG: 0.2 INJECTION, SOLUTION INTRAMUSCULAR; INTRAVENOUS at 12:53

## 2021-01-18 RX ADMIN — PROPOFOL 140 MCG/KG/MIN: 10 INJECTION, EMULSION INTRAVENOUS at 12:57

## 2021-01-18 RX ADMIN — SODIUM CHLORIDE 75 ML/HR: 0.9 INJECTION, SOLUTION INTRAVENOUS at 04:45

## 2021-01-18 RX ADMIN — IRON SUCROSE 200 MG: 20 INJECTION, SOLUTION INTRAVENOUS at 08:13

## 2021-01-18 RX ADMIN — PANTOPRAZOLE SODIUM 40 MG: 40 INJECTION, POWDER, FOR SOLUTION INTRAVENOUS at 08:13

## 2021-01-18 RX ADMIN — SODIUM CHLORIDE, SODIUM LACTATE, POTASSIUM CHLORIDE, AND CALCIUM CHLORIDE: .6; .31; .03; .02 INJECTION, SOLUTION INTRAVENOUS at 13:59

## 2021-01-18 RX ADMIN — LOSARTAN POTASSIUM 50 MG: 50 TABLET, FILM COATED ORAL at 16:16

## 2021-01-18 RX ADMIN — VERAPAMIL HYDROCHLORIDE 120 MG: 120 TABLET, FILM COATED, EXTENDED RELEASE ORAL at 16:15

## 2021-01-18 RX ADMIN — TAMSULOSIN HYDROCHLORIDE 0.4 MG: 0.4 CAPSULE ORAL at 16:15

## 2021-01-18 RX ADMIN — SODIUM CHLORIDE, SODIUM LACTATE, POTASSIUM CHLORIDE, AND CALCIUM CHLORIDE: .6; .31; .03; .02 INJECTION, SOLUTION INTRAVENOUS at 12:52

## 2021-01-18 RX ADMIN — SODIUM CHLORIDE 75 ML/HR: 0.9 INJECTION, SOLUTION INTRAVENOUS at 21:55

## 2021-01-18 RX ADMIN — PROPOFOL 150 MG: 10 INJECTION, EMULSION INTRAVENOUS at 12:57

## 2021-01-18 NOTE — CONSULTS
Yan Burgos  1965    HEMATOLOGY/ONCOLOGY CONSULTATION REPORT    DISCUSSION/SUMMARY:    62-year-old male admitted with worsening shortness of breath and dyspnea on exertion  Workup is consistent with iron deficiency anemia  Patient has been seen by GI and workup is in process  It needs to be determined if the anemia is from chronic blood loss or inadequate absorption  For the time being, the hemoglobin level can be monitored  Hematology will discuss IV iron with the patient which can begin while he is admitted and continue in the outpatient setting  Anything is possible but the recent laboratory test results are not (at all) consistent with hemolysis  Will follow with you  Please do not hesitate to contact me if you have any questions or need additional information  Thank you for this consult     _________________________________________________________________________________    Chief Complaint   Patient presents with    Shortness of Breath     intermittent shortness of breath and chest heaviness for a couple of weeks, also periodic leg pains in different parts of legs  had exam by dr Acosta Prudent a couple of weeks ago  no ekg ordered  pt states passed out a couple of weeks ago     History of Present Illness:  62-year-old male admitted on January 15, 2021 with progressive shortness of breath and progressive lower extremity achiness  Part of the workup demonstrated anemia  Mr Isabell Hood was off the floor      Review of systems:  Not obtainable at this time    Patient Active Problem List   Diagnosis    Essential hypertension    Hyperlipidemia    Arm paresthesia, right    Headache    Tobacco use    Osteoarthritis of cervical spine    Colon cancer screening    Elevated PSA    Gastroesophageal reflux disease    Hematochezia    Other emphysema ([address] )    Prediabetes    Healthcare maintenance    Crews's esophagus without dysplasia    Tubular adenoma of colon    Syncope    Myalgia    Hip pain, bilateral    Osteoarthritis, hip, bilateral    Symptomatic anemia    BPH (benign prostatic hyperplasia)     Past Medical History:   Diagnosis Date    Colon polyp     Elevated PSA     Enlarged prostate     Full dentures     GERD (gastroesophageal reflux disease)     Hemorrhoid     Hyperlipidemia     Hypertension     Sleep apnea     No CPAP    Smoker     Wears glasses      Past Surgical History:   Procedure Laterality Date    APPENDECTOMY      COLONOSCOPY      EGD      FOOT SURGERY Right     bone removed    OTHER SURGICAL HISTORY      bone removal right arm-abnormal growth of surface bone    PROSTATE BIOPSY      SPLENECTOMY      ruptured     Family History   Problem Relation Age of Onset    Dementia Father     Heart disease Mother     Hypertension Mother     Hyperlipidemia Mother     Hypertension Sister     Hypertension Brother     No Known Problems Daughter     No Known Problems Son     Cancer Paternal Grandfather         prostate    No Known Problems Son      Social History     Socioeconomic History    Marital status: Legally      Spouse name: Not on file    Number of children: Not on file    Years of education: Not on file    Highest education level: Not on file   Occupational History    Occupation: supervisor     Employer: [employer]   Social Needs    Financial resource strain: Not on file    Food insecurity     Worry: Not on file     Inability: Not on file    Transportation needs     Medical: Not on file     Non-medical: Not on file   Tobacco Use    Smoking status: Current Some Day Smoker     Packs/day: 0 50     Years: 10 00     Pack years: 5 00     Types: Cigarettes    Smokeless tobacco: Never Used   Substance and Sexual Activity    Alcohol use: Never     Frequency: Never    Drug use: No    Sexual activity: Not on file   Lifestyle    Physical activity     Days per week: Not on file     Minutes per session: Not on file    Stress: Not on file Relationships    Social connections     Talks on phone: Not on file     Gets together: Not on file     Attends Restorationism service: Not on file     Active member of club or organization: Not on file     Attends meetings of clubs or organizations: Not on file     Relationship status: Not on file    Intimate partner violence     Fear of current or ex partner: Not on file     Emotionally abused: Not on file     Physically abused: Not on file     Forced sexual activity: Not on file   Other Topics Concern    Not on file   Social History Narrative    Not on file       Current Facility-Administered Medications:     acetaminophen (TYLENOL) tablet 650 mg, 650 mg, Oral, Q6H PRN, Meriam Pouch, CRNP, 650 mg at 01/17/21 0907    finasteride (PROSCAR) tablet 5 mg, 5 mg, Oral, QAM, Love Revankar, DO, 5 mg at 01/18/21 0813    losartan (COZAAR) tablet 50 mg, 50 mg, Oral, Daily, Love Revankar, DO    nicotine (NICODERM CQ) 14 mg/24hr TD 24 hr patch 1 patch, 1 patch, Transdermal, Daily, Love Revankar, DO    pantoprazole (PROTONIX) injection 40 mg, 40 mg, Intravenous, Q12H [place], Love Revankar, DO, 40 mg at 01/18/21 0813    pneumococcal 23-valent polysaccharide vaccine (PNEUMOVAX-23) injection 0 5 mL, 0 5 mL, Subcutaneous, Prior to discharge, Love Revankar, DO    sodium chloride 0 9 % infusion, 75 mL/hr, Intravenous, Continuous, Love Revankar, DO, Last Rate: 75 mL/hr at 01/18/21 0445, 75 mL/hr at 01/18/21 0445    tamsulosin (FLOMAX) capsule 0 4 mg, 0 4 mg, Oral, Daily With Dinner, Love Revankar, DO, 0 4 mg at 01/17/21 1739    verapamil (CALAN-SR) CR tablet 120 mg, 120 mg, Oral, Daily With Dinner, Love Revankar, DO    No Known Allergies    Vitals:    01/18/21 0811   BP: 124/78   Pulse: 60   Resp: 18   Temp: 98 2 °F (36 8 °C)   SpO2: 96%   Rest of physical exam deferred    Labs      01/18/2021 BUN = 9 creatinine = 1 07    01/15/2021 reticulocyte count = 1 28% LDH = 203 vitamin B12 = 264 folate = 7 8 iron = 6 = decreased TIBC = 489 = decreased iron saturation = 1% ferritin = 3 = decreased D-dimer < 0 2 calcium = 8 6 AST = 19 ALT = 38 alkaline phosphatase = 97 total bilirubin = 0 27    Imaging    01/15/2021 CT scan of the head without contrast   Impression stated no acute intracranial CT abnormality  01/15/2021 chest x-ray one view  Impression stated no acute cardiopulmonary disease

## 2021-01-18 NOTE — ANESTHESIA POSTPROCEDURE EVALUATION
Post-Op Assessment Note    CV Status:  Stable  Pain Score: 0    Pain management: adequate     Mental Status:  Sleepy   Hydration Status:  Stable and euvolemic   PONV Controlled:  None   Airway Patency:  Patent       Staff: CRNA         No complications documented      /61 (01/18/21 1402)    Temp  36 0C   Pulse 91 (01/18/21 1402)   Resp 16 (01/18/21 1402)    SpO2 100 % (01/18/21 1402)

## 2021-01-18 NOTE — PROGRESS NOTES
[facility] Progress Note  Patient: Simon Atwood 63 y o  male   MRN: [id number]  PCP: Sandoval Rivas DO  Unit/Bed#: [address] [place] [address] Encounter: [id number]  Date Of Visit: 01/18/21    Problem List:    Principal Problem:    Symptomatic anemia  Active Problems:    Essential hypertension    Tobacco use    Gastroesophageal reflux disease    BPH (benign prostatic hyperplasia)      Assessment & Plan:    * Symptomatic anemia  Assessment & Plan  Patient presents with 4 weeks of progressive dyspnea on exertion along with some chest tightness due to dyspnea, lightheadedness and 2 brief episodes of syncope  Hemoglobin on arrival 6 4   16 2 -1 year ago  Patient has received total of 2 units PRBC transfusion  Repeat Hb today 8 2  Iron panel shows iron 6, ferritin 3, iron sat 1, TIBC 489, suggestive of iron deficiency anemia  Patient started on IV venofer daily  hematology eval pending  Get repeat lab work again in a   Rectal exam done in the ER was negative  Patient had EGD and colonoscopy in May of 2020 and was recommended to get repeat colonoscopy in 1 year  LDH, vitamin B12, folate level within normal limits  Pending hemolysis smear, Katlyn, haptoglobin  GI consult - plan is for EGD, colonoscopy today for further evaluation of anemia    BPH (benign prostatic hyperplasia)  Assessment & Plan  Continue flomax, proscar    Gastroesophageal reflux disease  Assessment & Plan  Patient on IV Protonix b i d  For now    Tobacco use  Assessment & Plan  Smoking cessation  Nicotine patch  Essential hypertension  Assessment & Plan  antihypertensives were held initially as some blood pressures were on low normal side  Blood pressures have improved    Restart Cozaar and verapamil as he uses at home        VTE Pharmacologic Prophylaxis:   Pharmacologic: Pharmacologic VTE Prophylaxis contraindicated due to Severe anemia  Mechanical VTE Prophylaxis in Place: Yes    Patient Centered Rounds: I have performed bedside rounds with nursing staff today  Discussions with Specialists or Other Care Team Provider: yes - GI    Education and Discussions with Family / Patient: yes    Time Spent for Care: 30 min  More than 50% of total time spent on counseling and coordination of care as described above  Current Length of Stay: 3 day(s)    Current Patient Status: Inpatient   Certification Statement: The patient will continue to require additional inpatient hospital stay due to Severe anemia requiring EGD/colonoscopy, IV Venofer, hematology eval    Discharge Plan: home    Code Status: Level 1 - Full Code      Subjective:   Reports feeling better  Shortness of breath has significantly improved  No further chest tightness  Objective:     Vitals:   Temp (24hrs), Av 1 °F (36 7 °C), Min:97 3 °F (36 3 °C), Max:99 1 °F (37 3 °C)    Temp:  [97 3 °F (36 3 °C)-99 1 °F (37 3 °C)] 98 2 °F (36 8 °C)  HR:  [60-74] 60  Resp:  [17-18] 18  BP: (124-151)/() 124/78  SpO2:  [96 %-99 %] 96 %  Body mass index is 27 4 kg/m²  Input and Output Summary (last 24 hours): Intake/Output Summary (Last 24 hours) at 2021 0939  Last data filed at 2021 0601  Gross per 24 hour   Intake 1720 ml   Output 1500 ml   Net 220 ml       Physical Exam:     Physical Exam  Constitutional:       General: He is not in acute distress  Appearance: He is well-developed  He is not diaphoretic  HENT:      Head: Normocephalic and atraumatic  Eyes:      General:         Right eye: No discharge  Left eye: No discharge  Cardiovascular:      Rate and Rhythm: Normal rate and regular rhythm  Pulmonary:      Effort: Pulmonary effort is normal  No respiratory distress  Breath sounds: Normal breath sounds  No wheezing or rales  Abdominal:      General: Bowel sounds are normal  There is no distension  Palpations: Abdomen is soft  Tenderness: There is no abdominal tenderness  Musculoskeletal:      Right lower leg: No edema  Left lower leg: No edema  Neurological:      Mental Status: He is alert and oriented to person, place, and time  Additional Data:     Labs:    Results from last 7 days   Lab Units 01/18/21  0443  01/15/21  1407   WBC Thousand/uL 8 62   < > 6 27   HEMOGLOBIN g/dL 8 2*   < > 6 4*   HEMATOCRIT % 28 9*   < > 23 3*   PLATELETS Thousands/uL 603*   < > 766*   NEUTROS PCT %  --   --  43   LYMPHS PCT %  --   --  34   MONOS PCT %  --   --  12   EOS PCT %  --   --  9*    < > = values in this interval not displayed  Results from last 7 days   Lab Units 01/18/21  0443  01/15/21  1407   POTASSIUM mmol/L 3 8   < > 3 6   CHLORIDE mmol/L 108   < > 104   CO2 mmol/L 25   < > 25   BUN mg/dL 9   < > 18   CREATININE mg/dL 1 07   < > 1 16   CALCIUM mg/dL 8 2*   < > 8 6   ALK PHOS U/L  --   --  97   ALT U/L  --   --  38   AST U/L  --   --  19    < > = values in this interval not displayed  * I Have Reviewed All Lab Data Listed Above  * Additional Pertinent Lab Tests Reviewed:  Marvin Gold Admission Reviewed      Imaging:  Imaging Reports Reviewed Today Include: cT head, CXR  Imaging Personally Reviewed by Myself Includes:  N/A    Recent Cultures (last 7 days):           Last 24 Hours Medication List:   Current Facility-Administered Medications   Medication Dose Route Frequency Provider Last Rate    acetaminophen  650 mg Oral Q6H PRN LAUREN Garcia      finasteride  5 mg Oral QAM Love Revankar, DO      losartan  50 mg Oral Daily Love Revankar, DO      nicotine  1 patch Transdermal Daily Love Revankar, DO      pantoprazole  40 mg Intravenous Q12H [address] Love Revankar, DO      pneumococcal 23-valent polysaccharide vaccine  0 5 mL Subcutaneous Prior to discharge Love Revankar, DO      sodium chloride  75 mL/hr Intravenous Continuous Love Revankar, DO 75 mL/hr (01/18/21 0445)    tamsulosin  0 4 mg Oral Daily With [employer] Revankar, DO      verapamil  120 mg Oral Daily With Yennifer Wilson DO            Today, Patient Was Seen By: Felicia Woodard DO    ** Please Note: "This note has been constructed using a voice recognition system  Therefore there may be syntax, spelling, and/or grammatical errors   Please call if you have any questions  "**

## 2021-01-18 NOTE — ANESTHESIA PREPROCEDURE EVALUATION
Procedure:  COLONOSCOPY  EGD WITH PUSH ENTEROSCOPY    Relevant Problems   ANESTHESIA (within normal limits)      CARDIO   (+) Essential hypertension   (+) Hyperlipidemia      ENDO (within normal limits)      GI/HEPATIC   (+) Gastroesophageal reflux disease      /RENAL   (+) BPH (benign prostatic hyperplasia)      HEMATOLOGY   (+) Symptomatic anemia      MUSCULOSKELETAL   (+) Osteoarthritis of cervical spine   (+) Osteoarthritis, hip, bilateral      NEURO/PSYCH   (+) Arm paresthesia, right   (+) Headache      PULMONARY   (+) Other emphysema (HCC)        Physical Exam    Airway    Mallampati score: I  TM Distance: >3 FB  Neck ROM: full     Dental   lower dentures and upper dentures,     Cardiovascular  Rhythm: regular, Rate: normal,     Pulmonary  Breath sounds clear to auscultation,     Other Findings        Anesthesia Plan  ASA Score- 4     Anesthesia Type- IV sedation with anesthesia with ASA Monitors  Additional Monitors:   Airway Plan:           Plan Factors-    Chart reviewed  EKG reviewed  Existing labs reviewed  Patient summary reviewed  Patient is a current smoker  Patient instructed to abstain from smoking on day of procedure  Patient did not smoke on day of surgery  Obstructive sleep apnea risk education given perioperatively  Induction- intravenous  Postoperative Plan-     Informed Consent- Anesthetic plan and risks discussed with patient  I personally reviewed this patient with the CRNA  Discussed and agreed on the Anesthesia Plan with the CRNA  Moe Dubose

## 2021-01-19 ENCOUNTER — TRANSITIONAL CARE MANAGEMENT (OUTPATIENT)
Dept: FAMILY MEDICINE CLINIC | Facility: CLINIC | Age: 56
End: 2021-01-19

## 2021-01-19 ENCOUNTER — TELEPHONE (OUTPATIENT)
Dept: SURGICAL ONCOLOGY | Facility: CLINIC | Age: 56
End: 2021-01-19

## 2021-01-19 VITALS
DIASTOLIC BLOOD PRESSURE: 68 MMHG | HEIGHT: 72 IN | HEART RATE: 77 BPM | WEIGHT: 202 LBS | SYSTOLIC BLOOD PRESSURE: 111 MMHG | BODY MASS INDEX: 27.36 KG/M2 | TEMPERATURE: 98.5 F | RESPIRATION RATE: 16 BRPM | OXYGEN SATURATION: 95 %

## 2021-01-19 DIAGNOSIS — D50.0 IRON DEFICIENCY ANEMIA DUE TO CHRONIC BLOOD LOSS: Primary | ICD-10-CM

## 2021-01-19 DIAGNOSIS — D64.9 SYMPTOMATIC ANEMIA: ICD-10-CM

## 2021-01-19 LAB
ANION GAP SERPL CALCULATED.3IONS-SCNC: 10 MMOL/L (ref 4–13)
BUN SERPL-MCNC: 10 MG/DL (ref 5–25)
CALCIUM SERPL-MCNC: 8.4 MG/DL (ref 8.3–10.1)
CHLORIDE SERPL-SCNC: 108 MMOL/L (ref 100–108)
CO2 SERPL-SCNC: 22 MMOL/L (ref 21–32)
CREAT SERPL-MCNC: 1.11 MG/DL (ref 0.6–1.3)
ERYTHROCYTE [DISTWIDTH] IN BLOOD BY AUTOMATED COUNT: 19.4 % (ref 11.6–15.1)
GFR SERPL CREATININE-BSD FRML MDRD: 74 ML/MIN/1.73SQ M
GLUCOSE SERPL-MCNC: 86 MG/DL (ref 65–140)
HCT VFR BLD AUTO: 27.5 % (ref 36.5–49.3)
HGB BLD-MCNC: 7.6 G/DL (ref 12–17)
MCH RBC QN AUTO: 21.5 PG (ref 26.8–34.3)
MCHC RBC AUTO-ENTMCNC: 27.6 G/DL (ref 31.4–37.4)
MCV RBC AUTO: 78 FL (ref 82–98)
PLATELET # BLD AUTO: 569 THOUSANDS/UL (ref 149–390)
PMV BLD AUTO: 9 FL (ref 8.9–12.7)
POTASSIUM SERPL-SCNC: 3.4 MMOL/L (ref 3.5–5.3)
RBC # BLD AUTO: 3.54 MILLION/UL (ref 3.88–5.62)
SODIUM SERPL-SCNC: 140 MMOL/L (ref 136–145)
WBC # BLD AUTO: 13.1 THOUSAND/UL (ref 4.31–10.16)

## 2021-01-19 PROCEDURE — 90471 IMMUNIZATION ADMIN: CPT | Performed by: FAMILY MEDICINE

## 2021-01-19 PROCEDURE — 99231 SBSQ HOSP IP/OBS SF/LOW 25: CPT | Performed by: PHYSICIAN ASSISTANT

## 2021-01-19 PROCEDURE — 90732 PPSV23 VACC 2 YRS+ SUBQ/IM: CPT | Performed by: FAMILY MEDICINE

## 2021-01-19 PROCEDURE — 80048 BASIC METABOLIC PNL TOTAL CA: CPT | Performed by: FAMILY MEDICINE

## 2021-01-19 PROCEDURE — C9113 INJ PANTOPRAZOLE SODIUM, VIA: HCPCS | Performed by: FAMILY MEDICINE

## 2021-01-19 PROCEDURE — 99232 SBSQ HOSP IP/OBS MODERATE 35: CPT | Performed by: INTERNAL MEDICINE

## 2021-01-19 PROCEDURE — 86677 HELICOBACTER PYLORI ANTIBODY: CPT | Performed by: INTERNAL MEDICINE

## 2021-01-19 PROCEDURE — 85027 COMPLETE CBC AUTOMATED: CPT | Performed by: FAMILY MEDICINE

## 2021-01-19 PROCEDURE — 99239 HOSP IP/OBS DSCHRG MGMT >30: CPT | Performed by: INTERNAL MEDICINE

## 2021-01-19 RX ORDER — POTASSIUM CHLORIDE 20 MEQ/1
40 TABLET, EXTENDED RELEASE ORAL ONCE
Status: COMPLETED | OUTPATIENT
Start: 2021-01-19 | End: 2021-01-19

## 2021-01-19 RX ORDER — PANTOPRAZOLE SODIUM 40 MG/1
40 TABLET, DELAYED RELEASE ORAL 2 TIMES DAILY
Qty: 90 TABLET | Refills: 0 | Status: SHIPPED | OUTPATIENT
Start: 2021-01-19 | End: 2021-08-14 | Stop reason: SDUPTHER

## 2021-01-19 RX ORDER — NICOTINE 21 MG/24HR
1 PATCH, TRANSDERMAL 24 HOURS TRANSDERMAL DAILY
Qty: 28 PATCH | Refills: 0 | Status: SHIPPED | OUTPATIENT
Start: 2021-01-20 | End: 2021-04-25

## 2021-01-19 RX ORDER — SODIUM CHLORIDE 9 MG/ML
20 INJECTION, SOLUTION INTRAVENOUS ONCE
Status: CANCELLED | OUTPATIENT
Start: 2021-01-25

## 2021-01-19 RX ADMIN — LOSARTAN POTASSIUM 50 MG: 50 TABLET, FILM COATED ORAL at 08:32

## 2021-01-19 RX ADMIN — FINASTERIDE 5 MG: 5 TABLET, FILM COATED ORAL at 08:32

## 2021-01-19 RX ADMIN — PNEUMOCOCCAL VACCINE POLYVALENT 0.5 ML
25; 25; 25; 25; 25; 25; 25; 25; 25; 25; 25; 25; 25; 25; 25; 25; 25; 25; 25; 25; 25; 25; 25 INJECTION, SOLUTION INTRAMUSCULAR; SUBCUTANEOUS at 14:15

## 2021-01-19 RX ADMIN — ACETAMINOPHEN 650 MG: 325 TABLET, FILM COATED ORAL at 05:00

## 2021-01-19 RX ADMIN — PANTOPRAZOLE SODIUM 40 MG: 40 INJECTION, POWDER, FOR SOLUTION INTRAVENOUS at 08:32

## 2021-01-19 RX ADMIN — POTASSIUM CHLORIDE 40 MEQ: 1500 TABLET, EXTENDED RELEASE ORAL at 09:37

## 2021-01-19 NOTE — PROGRESS NOTES
Progress Note - Di Rojas 63 y o  male MRN: [id number]    Unit/Bed#: 2 [place] [address] Encounter: [id number]        Subjective:     Patient reports that he is passing gas, but no BM since bowel preparation  ROS: As noted in the HPI, otherwise all others negative  Objective:     Vitals: Blood pressure 111/68, pulse 77, temperature 98 5 °F (36 9 °C), temperature source Oral, resp  rate 16, height 6' (1 829 m), weight 91 6 kg (202 lb), SpO2 95 %  ,Body mass index is 27 4 kg/m²  Intake/Output Summary (Last 24 hours) at 1/19/2021 0942  Last data filed at 1/19/2021 0503  Gross per 24 hour   Intake 1000 ml   Output 400 ml   Net 600 ml       Physical Exam:     General Appearance: Alert and oriented x 3  In no respiratory distress  Lungs: Clear to auscultation bilaterally, no rales or rhonchi  Heart: Regular rate and rhythm, S1, S2 normal, no murmur, click, rub or gallop  Abdomen: Soft, non-tender, mildly distended; bowel sounds normal; no masses or no organomegaly  Extremities: No cyanosis, edema    Invasive Devices     Peripheral Intravenous Line            Peripheral IV 01/17/21 Right Forearm 1 day                Lab Results:  Results from last 7 days   Lab Units 01/19/21  0506  01/15/21  1407   WBC Thousand/uL 13 10*   < > 6 27   HEMOGLOBIN g/dL 7 6*   < > 6 4*   HEMATOCRIT % 27 5*   < > 23 3*   PLATELETS Thousands/uL 569*   < > 766*   NEUTROS PCT %  --   --  43   LYMPHS PCT %  --   --  34   MONOS PCT %  --   --  12   EOS PCT %  --   --  9*    < > = values in this interval not displayed  Results from last 7 days   Lab Units 01/19/21  0506  01/15/21  1407   POTASSIUM mmol/L 3 4*   < > 3 6   CHLORIDE mmol/L 108   < > 104   CO2 mmol/L 22   < > 25   BUN mg/dL 10   < > 18   CREATININE mg/dL 1 11   < > 1 16   CALCIUM mg/dL 8 4   < > 8 6   ALK PHOS U/L  --   --  97   ALT U/L  --   --  38   AST U/L  --   --  19    < > = values in this interval not displayed                 Imaging Studies: I have personally reviewed pertinent imaging studies  Ct Head Without Contrast    Result Date: 1/15/2021  Impression: No acute intracranial CT abnormality  Workstation performed: [id number]     Xr Chest 1 View    Result Date: 1/15/2021  Impression: No acute cardiopulmonary disease  Workstation performed: [id number]         Assessment and Plan:     1) Symptomatic iron deficiency anemia - Hemoglobin on admission 6 4  He has not experienced any overt GI blood loss to his knowledge  Yesterday, we performed EGD with push enteroscopy and colonoscopy  EGD with a single possible AVM in the duodenum that was treated  Colonoscopy was poor prep, but no active bleeding was seen  Hematology was consulted during this admission, we appreciate their recommendations  His hemoglobin today is 7 6    He has received a total of 2 units of packed red blood cells since admission    - VCE as an outpatient  - Follow-up with heme as an outpatient for IV iron   - Continue to monitor hemoglobin closely and transfuse as necessary with a goal hemoglobin above 7-8

## 2021-01-19 NOTE — TELEPHONE ENCOUNTER
New Patient Encounter    New Patient Intake Form   Patient Details:  Paul Lei  1965  [id number]    Background Information:  [facility] [place] starts by opening a telephone encounter and gathering the following information   Who is calling to schedule? If not self, relationship to patient? Charissa Mcleod   Referring Provider DERICK MUÑOZ f/u   What is the diagnosis? anemia   Is this diagnosis confirmed? Yes   When was the diagnosis? 1/2021   Is there a confirmed diagnosis from a biopsy/tissue reviewed by pathology? Were outside slides requested? No   Is patient aware of diagnosis? Yes   Is there a personal history and what kind? Is there a family history and what kind? Reason for visit? New Diagnosis   Have you had any imaging or labs done? If so: when, where? yes  [facility]   Are records in [employer]? yes   If patient has a prior history of breast cancer were old records obtained? NA   Was the patient told to bring a disk? no   Does the patient smoke or Vape? no   If yes, how many packs or cartridges per day? Scheduling Information:   Preferred [place]:  Lennie Vasquez     Are there any dates/time the patient cannot be seen? Miscellaneous:    After completing the above information, please route to Financial Counselor and the appropriate Nurse Navigator for review

## 2021-01-19 NOTE — DISCHARGE INSTRUCTIONS
Anemia   WHAT YOU NEED TO KNOW:   Anemia is a low number of red blood cells or a low amount of hemoglobin in your red blood cells  Hemoglobin is a protein that helps carry oxygen throughout your body  Red blood cells use iron to create hemoglobin  Anemia may develop if your body does not have enough iron  It may also develop if your body does not make enough red blood cells or they die faster than your body can make them  DISCHARGE INSTRUCTIONS:   Call 911 or have someone call 911 for any of the following:   · You lose consciousness  · You have severe chest pain  Seek care immediately if:   · You have dark or bloody bowel movements  Contact your healthcare provider if:   · Your symptoms are worse, even after treatment  · You have questions or concerns about your condition or care  Medicines:   · Iron or folic acid supplements  help increase your red blood cell and hemoglobin levels  · Vitamin B12 injections  may help boost your red blood cell level and decrease your symptoms  Ask your healthcare provider how to inject B12  · Take your medicine as directed  Contact your healthcare provider if you think your medicine is not helping or if you have side effects  Tell him of her if you are allergic to any medicine  Keep a list of the medicines, vitamins, and herbs you take  Include the amounts, and when and why you take them  Bring the list or the pill bottles to follow-up visits  Carry your medicine list with you in case of an emergency  Prevent anemia:  Eat healthy foods rich in iron and vitamin C  Nuts, meat, dark leafy green vegetables, and beans are high in iron and protein  Vitamin C helps your body absorb iron  Foods rich in vitamin C include oranges and other citrus fruits  Ask your healthcare provider for a list of other foods that are high in iron or vitamin C  Ask if you need to be on a special diet     Follow up with your healthcare provider as directed:  Write down your questions so you remember to ask them during your visits  © Copyright [address] Information is for End User's use only and may not be sold, redistributed or otherwise used for commercial purposes  All illustrations and images included in CareNotes® are the copyrighted property of A D A M [employer]  or Kush Mora  The above information is an  only  It is not intended as medical advice for individual conditions or treatments  Talk to your doctor, nurse or pharmacist before following any medical regimen to see if it is safe and effective for you

## 2021-01-19 NOTE — PLAN OF CARE
Problem: Potential for Falls  Goal: Patient will remain free of falls  Description: INTERVENTIONS:  - Assess patient frequently for physical needs  -  Identify cognitive and physical deficits and behaviors that affect risk of falls    -  [place] fall precautions as indicated by assessment   - Educate patient/family on patient safety including physical limitations  - Instruct patient to call for assistance with activity based on assessment  - Modify environment to reduce risk of injury  - Consider OT/PT consult to assist with strengthening/mobility  Outcome: Progressing     Problem: Prexisting or High Potential for Compromised Skin Integrity  Goal: Skin integrity is maintained or improved  Description: INTERVENTIONS:  - Identify patients at risk for skin breakdown  - Assess and monitor skin integrity  - Assess and monitor nutrition and hydration status  - Monitor labs   - Assess for incontinence   - Turn and reposition patient  - Assist with mobility/ambulation  - Relieve pressure over bony prominences  - Avoid friction and shearing  - Provide appropriate hygiene as needed including keeping skin clean and dry  - Evaluate need for skin moisturizer/barrier cream  - Collaborate with interdisciplinary team   - Patient/family teaching  - Consider wound care consult   Outcome: Progressing     Problem: PAIN - ADULT  Goal: Verbalizes/displays adequate comfort level or baseline comfort level  Description: Interventions:  - Encourage patient to monitor pain and request assistance  - Assess pain using appropriate pain scale  - Administer analgesics based on type and severity of pain and evaluate response  - Implement non-pharmacological measures as appropriate and evaluate response  - Consider cultural and social influences on pain and pain management  - Notify physician/advanced practitioner if interventions unsuccessful or patient reports new pain  Outcome: Progressing     Problem: HEMATOLOGIC - ADULT  Goal: Maintains hematologic stability  Description: INTERVENTIONS  - Assess for signs and symptoms of bleeding or hemorrhage  - Monitor labs  - Administer supportive blood products/factors as ordered and appropriate  Outcome: Progressing

## 2021-01-19 NOTE — PROGRESS NOTES
Medical Oncology/Hematology Progress Note  Paul Lei, 63 y o , 1965  2 Mets[address] 68 206/2 Metsa 68 206, [id number]  01/19/21    Assessment and Plan:    1  Iron Deficiency Anemia  Venofer 200 mg IV daily x 3 days has been implemented  I had an extensive discussion today with the patient regarding diagnosis of iron deficiency anemia  With patient's hemoglobin in the 7 gram/deciliter range and with recent episodes of syncope, I do not believe it would be in the patient's best interest for him to push himself after discharge  I recommended that he not return to work until February 1st     IV iron supplementation is absolutely necessary as the patient has no iron stores  Three doses of IV Venofer having given I do not recommend additional IV iron at this time as these doses or three days in a row  Patient will need some time for his body to absorb a medication that has been given to him  I will make his 1st outpatient IV Venofer appointment for January 25th/26th to continue with supplementation  Regimen:  Venofer 200 mg IV weekly x 4 doses  H/H with each infusion  Follow up in the office in 2 weeks/blood work observation will continue off of IV infusions  Patient was advised to go for CBC on January 21st to continue to monitor hemoglobin  If patient is symptomatic such as shortness of breath with ambulation repeat syncopal episodes, phone call to the office would be necessary for subsequent blood work  As long as the patient is not bleeding, hemoglobin should start rising with the amount of iron that has been given to the patient  2  Thrombocytosis reactive + history of splenectomy, due to traumatic causes  Patient's platelet count is elevated which would be reactive in iron deficiency anemia  However, patient's baseline is also mildly elevated which is likely secondary to history of splenectomy    I reviewed this with the patient today no additional workup is necessary at this time       _____________________________________________________________    Reason for Consultation:  Iron deficiency anemia    Chief Complaint   Patient presents with    Shortness of Breath     intermittent shortness of breath and chest heaviness for a couple of weeks, also periodic leg pains in different parts of legs  had exam by dr Ruthy Khoury a couple of weeks ago  no ekg ordered  pt states passed out a couple of weeks ago       History of present illness: This is a 65-year-old male who was admitted to the emergency room after having several weeks of shortness of breath, three episodes of syncope at home  Patient's past medical history significant for 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  Patient had been previously worked up for abnormalities by primary care doctor who recommended that the patient follow-up with GI as well as with Cardiology for a stress test   Unfortunately, patient's symptoms progressed and he presented to the emergency room  Blood work demonstrated hemoglobin in the 7 gram/deciliter range  Moreover, the patient was found to have a significant iron deficiency with a ferritin of three and an iron saturation of 1%  Patient underwent three Venofer infusions daily in the hospital   Patient's hemoglobin is in the 7 gram/deciliter range  Patient does not remember any bowel changes, blood in the toilet  Patient does note that he had a colonoscopy and endoscopy in May that was negative for any abnormalities  GI has seen the patient in the hospital   Patient underwent colonoscopy in EGD that demonstrated questionable AVM in the small intestine  This area was cauterized  This area was not bleeding  Capsule endoscopy will be recommended as an outpatient  Interval history:  Patient has not spent much time out of bed  Previously, very weak  Worried about performance status in the event she goes home      Review of Systems   Constitutional: Positive for activity change and fatigue  Negative for appetite change and fever  HENT: Negative for nosebleeds  Respiratory: Negative for cough, choking and shortness of breath  Cardiovascular: Negative for chest pain, palpitations and leg swelling  Gastrointestinal: Positive for abdominal pain (Mild left-sided abdominal pain no guarding  )  Negative for abdominal distention, anal bleeding, blood in stool, constipation, diarrhea, nausea and vomiting  Endocrine: Negative for cold intolerance  Genitourinary: Negative for hematuria  Musculoskeletal: Negative for myalgias  Skin: Negative for color change, pallor and rash  Allergic/Immunologic: Negative for immunocompromised state  Neurological: Negative for headaches  Hematological: Negative for adenopathy  Does not bruise/bleed easily  All other systems reviewed and are negative  All other review of systems were negative      Past medical history:   Past Medical History:   Diagnosis Date    Colon polyp     Elevated PSA     Enlarged prostate     Full dentures     GERD (gastroesophageal reflux disease)     Hemorrhoid     Hyperlipidemia     Hypertension     Sleep apnea     No CPAP    Smoker     Wears glasses      Past surgical history:   Past Surgical History:   Procedure Laterality Date    APPENDECTOMY      COLONOSCOPY      EGD      FOOT SURGERY Right     bone removed    OTHER SURGICAL HISTORY      bone removal right arm-abnormal growth of surface bone    PROSTATE BIOPSY      SPLENECTOMY      ruptured       Allergies: No Known Allergies    Home medications:   Medications Prior to Admission   Medication    acetaminophen (TYLENOL) 325 mg tablet    aspirin (ECOTRIN LOW STRENGTH) 81 mg EC tablet    finasteride (PROSCAR) 5 mg tablet    losartan (COZAAR) 50 mg tablet    pantoprazole (PROTONIX) 40 mg tablet    tamsulosin (FLOMAX) 0 4 mg    verapamil (CALAN-SR) 120 mg CR tablet    atorvastatin (LIPITOR) 40 mg tablet       Hospital medications: Current Facility-Administered Medications:     acetaminophen (TYLENOL) tablet 650 mg, 650 mg, Oral, Q6H PRN, Brinda Maloney, JOESPHNP, 650 mg at 01/19/21 0500    finasteride (PROSCAR) tablet 5 mg, 5 mg, Oral, QAM, Love Revankar, DO, 5 mg at 01/19/21 0832    losartan (COZAAR) tablet 50 mg, 50 mg, Oral, Daily, Love Revankar, DO, 50 mg at 01/19/21 0832    nicotine (NICODERM CQ) 14 mg/24hr TD 24 hr patch 1 patch, 1 patch, Transdermal, Daily, Love Revankar, DO    pantoprazole (PROTONIX) injection 40 mg, 40 mg, Intravenous, Q12H [facility], Love Revankar, DO, 40 mg at 01/19/21 0832    pneumococcal 23-valent polysaccharide vaccine (PNEUMOVAX-23) injection 0 5 mL, 0 5 mL, Subcutaneous, Prior to discharge, Love Revankar, DO    potassium chloride (K-DUR,KLOR-CON) CR tablet 40 mEq, 40 mEq, Oral, Once, May Bonilla MD    sodium chloride 0 9 % infusion, 75 mL/hr, Intravenous, Continuous, Love Revankar, DO, Last Rate: 75 mL/hr at 01/18/21 2155, 75 mL/hr at 01/18/21 2155    tamsulosin (FLOMAX) capsule 0 4 mg, 0 4 mg, Oral, Daily With Dinner, Love Revankar, DO, 0 4 mg at 01/18/21 1615    verapamil (CALAN-SR) CR tablet 120 mg, 120 mg, Oral, Daily With Dinner, Love Revankar, DO, 120 mg at 01/18/21 1615    Facility-Administered Medications Ordered in Other Encounters:     glycopyrrolate (ROBINUL) injection, , , PRN, Ike Taylor CRNA, 0 2 mg at 01/18/21 1253    lactated ringers infusion, , , Continuous PRN, Ike Taylor CRNA, New Bag at 01/18/21 1359    lidocaine (cardiac) injection, , , PRN, Ike Taylor CRNA, 40 mg at 01/18/21 1257    propofol (DIPRIVAN) 1000 mg in 100 mL infusion (premix), , Intravenous, Continuous PRN, Ike Taylor CRNA, Stopped at 01/18/21 1351    propofol (DIPRIVAN) 200 MG/20ML bolus injection, , Intravenous, PRN, Ike Taylor CRNA, 150 mg at 01/18/21 1257    Social history:   Social History     Tobacco Use    Smoking status: Current Some Day Smoker     Packs/day: 0 50     Years: 10 00     Pack years: 5 00     Types: Cigarettes    Smokeless tobacco: Never Used   Substance Use Topics    Alcohol use: Never     Frequency: Never    Drug use: No       Family history:   Family History   Problem Relation Age of Onset    Dementia Father     Heart disease Mother     Hypertension Mother     Hyperlipidemia Mother     Hypertension Sister     Hypertension Brother     No Known Problems Daughter     No Known Problems Son     Cancer Paternal Grandfather         prostate    No Known Problems Son        Vitals:  Vitals:    01/19/21 0757   BP: 111/68   Pulse: 77   Resp: 16   Temp: 98 5 °F (36 9 °C)   SpO2: 95%       Physical Exam    Labs and Pertinent Reports Reviewed    Component      Latest Ref Rng & Units 1/15/2021 1/16/2021 1/16/2021 1/17/2021          10:33 PM  4:59 AM  3:51 PM  5:12 AM   Hemoglobin      12 0 - 17 0 g/dL 7 1 (L) 7 3 (L) 7 5 (L) 7 2 (L)   HCT      36 5 - 49 3 % 25 3 (L) 25 7 (L) 26 8 (L) 25 7 (L)     Component      Latest Ref Rng & Units 1/17/2021 1/18/2021 1/19/2021          11:31 PM     Hemoglobin      12 0 - 17 0 g/dL 8 5 (L) 8 2 (L) 7 6 (L)   HCT      36 5 - 49 3 % 29 9 (L) 28 9 (L) 27 5 (L)     1/15/2021:  reticulocyte count = 1 28% LDH = 203 vitamin B12 = 264 folate = 7 8 iron = 6 = decreased TIBC = 489 = decreased iron saturation = 1% ferritin = 3 = decreased D-dimer < 0 2 calcium = 8 6 AST = 19 ALT = 38 alkaline phosphatase = 97 total bilirubin = 0 27    Please note: This report has been generated by voice recognition software system  Therefore, there may be syntax, spelling and/or grammatical errors  Please call if you have any questions

## 2021-01-19 NOTE — CASE MANAGEMENT
LOS: 3 DAYS  UNPLANNED RA RISK SCORE: 10  RA: NO  BUNDLE: NO    CM met with patient and discussed dc planning and the role of cm  Patient with his girlfriend and his son  He is independent of all ADl's and declines the need for any services at discharge  He works full time and drives himself to all appointments and to work  He has no DME  No POA or LW and he is not interested in info at this time  Has med coverage and uses the [facility] in [place]  His PCP is Dr Emeli Lowry  He will have a ride home with his girlfriend at [place]      CM to follow

## 2021-01-19 NOTE — NURSING NOTE
Patient IV removed  No bleeding noted  Patient readied for discharge with PCA supervision  Patient AVS reviewed in detail  Patient instructed to stop taking lipitor  No questions at this time  RN reviewed all instructions with patient, including when to call the primary care provider and signs and symptoms of anemia, included in AVS  Patient verbalized understanding  All follow up appointments reviewed with patient  Patient wheeled down for discharge via wheelchair with PCA

## 2021-01-19 NOTE — DISCHARGE SUMMARY
[facility] 1965[facility] 63 [facility]  [facility] MRN: [id number]    Unit/Bed#: [address] [place] [address] Encounter: [id number]    Primary Care Provider: Jayla Thomason DO   Date and time admitted to hospital: 1/15/2021  1:04 PM        * Symptomatic anemia  Assessment & Plan  Patient presents with 4 weeks of progressive dyspnea on exertion along with some chest tightness due to dyspnea, lightheadedness and 2 brief episodes of syncope  Hemoglobin on arrival 6 4   16 2 -1 year ago  Patient has received total of 2 units PRBC transfusion with improved hemoglobin  Iron panel shows iron 6, ferritin 3, iron sat 1, TIBC 489, suggestive of iron deficiency anemia  Patient was given 3 dose of IV Venofer during the hospital stay  Rectal exam done in the ER was negative  Patient had EGD and colonoscopy in May of 2020 and was recommended to get repeat colonoscopy in 1 year  LDH, vitamin B12, folate level within normal limits  Colonoscopy showed 1 sessile polyp measuring 5-9 millimeters in the descending colon which was completely moved and 1 clip was placed, external and internal hemorrhoids  EGD:  3 millimeter angiitis Ophelia and 4th part of duodenum with no bleeding which was treated with APC  Follow-up with GI for outpatient capsule endoscopy  celiac serologies were also tested which are pending at the current time  Patient will also follow-up with Hematology to receive Venofer 200 milligram IV weekly for 4 doses  Repeat H and H in 3 days    Essential hypertension  Assessment & Plan  antihypertensives were held initially as some blood pressures were on low normal side  Blood pressures have improved and medications were restarted  Continue Cozaar and verapamil    BPH (benign prostatic hyperplasia)  Assessment & Plan  Continue flomax, proscar    Gastroesophageal reflux disease  Assessment & Plan  Continue Protonix b i d  Tobacco use  Assessment & Plan  Smoking cessation  Nicotine patch ordered            Discharging Physician / Practitioner: Donny Shankar MD  PCP: Max Soria DO  Admission Date:   Admission Orders (From admission, onward)     Ordered        01/15/21 1531  Inpatient Admission  Once                   Discharge Date: 01/19/21    Resolved Problems  Date Reviewed: 1/19/2021    None          Consultations During Hospital Stay:  · Hematology/oncology and Gastroenterology    Procedures Performed:   · Colonoscopy:  1 sessile polyp measuring 5-9 millimeter in the descending colon which was removed EN bloc and 1 clip was placed  Small flat external internal hemorrhoids  · EGD with push enteroscopy-3 millimeter angiectasia in the 4th part of the duodenum which was treated with APC       Outpatient Tests Requested:  · CBC in 3 days  Follow-up with GI for outpatient capsule endoscopy  Follow-up with hematology for outpatient iron infusions    Complications:  None    Reason for Admission:  Shortness of breath    Hospital Course:     Emelina Olivo is a 63 y o  male patient with past medical history of hypertension, hyperlipidemia, GERD, BPH who originally presented to the hospital on 1/15/2021 due to progressively worsening shortness of breath on exertion, episode of syncope about 2 weeks before admission  In the ED patient's workup showed a hemoglobin level of 6 4  Patient was given 2 units of PRBC transfusion  Patient was seen in consult with GI  Patient's iron panel showed significant iron deficiency anemia and patient was given IV Venofer during the hospital stay  Patient later underwent EGD and colonoscopy which showed no evidence of active bleeding  Patient's hemoglobin improved post transfusion but did drop to 7 6  Patient continued remained stable without any further symptoms  Patient was discharged home with outpatient follow-up with GI and Hematology  Please see above list of diagnoses and related plan for additional information       Condition at Discharge: stable     Discharge Day Visit / Exam:     Subjective:  Patient is feeling well  Denies any headache, dizziness, shortness of breath, chest pain  Vitals: Blood Pressure: 111/68 (01/19/21 0757)  Pulse: 77 (01/19/21 0757)  Temperature: 98 5 °F (36 9 °C) (01/19/21 0757)  Temp Source: Oral (01/19/21 0757)  Respirations: 16 (01/19/21 0757)  Height: 6' (182 9 cm) (01/18/21 1142)  Weight - Scale: 91 6 kg (202 lb) (01/18/21 1142)  SpO2: 95 % (01/19/21 0757)  Exam:   Physical Exam  Constitutional:       Appearance: Normal appearance  HENT:      Head: Normocephalic and atraumatic  Eyes:      Extraocular Movements: Extraocular movements intact  Pupils: Pupils are equal, round, and reactive to light  Neck:      Musculoskeletal: Normal range of motion and neck supple  Cardiovascular:      Rate and Rhythm: Normal rate and regular rhythm  Heart sounds: No murmur  No gallop  Pulmonary:      Effort: Pulmonary effort is normal       Breath sounds: Normal breath sounds  Abdominal:      General: Bowel sounds are normal       Palpations: Abdomen is soft  Tenderness: There is no abdominal tenderness  Musculoskeletal: Normal range of motion  General: No swelling or deformity  Skin:     General: Skin is warm and dry  Neurological:      General: No focal deficit present  Mental Status: He is alert  Discussion with Family:  Significant other- Elvira Sotelo    Discharge instructions/Information to patient and family:   See after visit summary for information provided to patient and family  Provisions for Follow-Up Care:  See after visit summary for information related to follow-up care and any pertinent home health orders  Disposition:     Home      Planned Readmission: No     Discharge Statement:  I spent 35 minutes discharging the patient  This time was spent on the day of discharge  I had direct contact with the patient on the day of discharge   Greater than 50% of the total time was spent examining patient, answering all patient questions, arranging and discussing plan of care with patient as well as directly providing post-discharge instructions  Additional time then spent on discharge activities  Discharge Medications:  See after visit summary for reconciled discharge medications provided to patient and family        ** Please Note: This note has been constructed using a voice recognition system **

## 2021-01-20 LAB
H PYLORI IGG SER IA-ACNC: 0.67 INDEX VALUE (ref 0–0.79)
H PYLORI IGM SER-ACNC: <9 UNITS (ref 0–8.9)

## 2021-01-21 ENCOUNTER — TELEPHONE (OUTPATIENT)
Dept: GASTROENTEROLOGY | Facility: AMBULARY SURGERY CENTER | Age: 56
End: 2021-01-21

## 2021-01-21 NOTE — TELEPHONE ENCOUNTER
Hello,    Pt called back for results  Results were given patient demonstrated understanding       Thank you

## 2021-01-21 NOTE — TELEPHONE ENCOUNTER
----- Message from [facility], MD sent at 1/20/2021  1:51 PM EST -----  Please let patient know that colonoscopy biopsy show:  -1 polyp that was removed which is precancerous but benign    Recommend repeat colonoscopy in 1 year due to poor bowel prep      Thank you

## 2021-01-22 ENCOUNTER — TELEPHONE (OUTPATIENT)
Dept: HEMATOLOGY ONCOLOGY | Facility: MEDICAL CENTER | Age: 56
End: 2021-01-22

## 2021-01-22 LAB
BASOPHILS # BLD AUTO: 0.2 X10E3/UL (ref 0–0.2)
BASOPHILS NFR BLD AUTO: 3 %
EOSINOPHIL # BLD AUTO: 0.7 X10E3/UL (ref 0–0.4)
EOSINOPHIL NFR BLD AUTO: 8 %
ERYTHROCYTE [DISTWIDTH] IN BLOOD BY AUTOMATED COUNT: 22.2 % (ref 11.6–15.4)
HCT VFR BLD AUTO: 32.5 % (ref 37.5–51)
HGB BLD-MCNC: 9.8 G/DL (ref 13–17.7)
LYMPHOCYTES # BLD AUTO: 1.7 X10E3/UL (ref 0.7–3.1)
LYMPHOCYTES NFR BLD AUTO: 21 %
MCH RBC QN AUTO: 23 PG (ref 26.6–33)
MCHC RBC AUTO-ENTMCNC: 30.2 G/DL (ref 31.5–35.7)
MCV RBC AUTO: 76 FL (ref 79–97)
MONOCYTES # BLD AUTO: 0.9 X10E3/UL (ref 0.1–0.9)
MONOCYTES NFR BLD AUTO: 11 %
MORPHOLOGY BLD-IMP: ABNORMAL
NEUTROPHILS # BLD AUTO: 4.7 X10E3/UL (ref 1.4–7)
NEUTROPHILS NFR BLD AUTO: 57 %
PLATELET # BLD AUTO: 585 X10E3/UL (ref 150–450)
RBC # BLD AUTO: 4.26 X10E6/UL (ref 4.14–5.8)
WBC # BLD AUTO: 8.2 X10E3/UL (ref 3.4–10.8)

## 2021-01-25 ENCOUNTER — TELEPHONE (OUTPATIENT)
Dept: HEMATOLOGY ONCOLOGY | Facility: CLINIC | Age: 56
End: 2021-01-25

## 2021-01-25 ENCOUNTER — OFFICE VISIT (OUTPATIENT)
Dept: HEMATOLOGY ONCOLOGY | Facility: MEDICAL CENTER | Age: 56
End: 2021-01-25
Payer: COMMERCIAL

## 2021-01-25 ENCOUNTER — HOSPITAL ENCOUNTER (OUTPATIENT)
Dept: RADIOLOGY | Facility: HOSPITAL | Age: 56
Discharge: HOME | End: 2021-01-25
Payer: COMMERCIAL

## 2021-01-25 ENCOUNTER — HOSPITAL ENCOUNTER (OUTPATIENT)
Dept: INFUSION CENTER | Facility: HOSPITAL | Age: 56
Discharge: HOME | End: 2021-01-25
Attending: INTERNAL MEDICINE
Payer: COMMERCIAL

## 2021-01-25 VITALS
HEART RATE: 104 BPM | HEIGHT: 72 IN | DIASTOLIC BLOOD PRESSURE: 88 MMHG | WEIGHT: 202 LBS | BODY MASS INDEX: 27.36 KG/M2 | OXYGEN SATURATION: 99 % | TEMPERATURE: 96.2 F | RESPIRATION RATE: 18 BRPM | SYSTOLIC BLOOD PRESSURE: 130 MMHG

## 2021-01-25 DIAGNOSIS — M79.89 PAIN AND SWELLING OF RIGHT UPPER EXTREMITY: ICD-10-CM

## 2021-01-25 DIAGNOSIS — M79.601 PAIN AND SWELLING OF RIGHT UPPER EXTREMITY: ICD-10-CM

## 2021-01-25 DIAGNOSIS — D75.838 SECONDARY THROMBOCYTOSIS: ICD-10-CM

## 2021-01-25 DIAGNOSIS — D64.9 SYMPTOMATIC ANEMIA: ICD-10-CM

## 2021-01-25 DIAGNOSIS — D50.0 IRON DEFICIENCY ANEMIA DUE TO CHRONIC BLOOD LOSS: Primary | ICD-10-CM

## 2021-01-25 LAB
ACANTHOCYTES BLD QL SMEAR: PRESENT
ANISOCYTOSIS BLD QL SMEAR: PRESENT
BASOPHILS # BLD MANUAL: 0.26 THOUSAND/UL (ref 0–0.1)
BASOPHILS NFR MAR MANUAL: 4 % (ref 0–1)
EOSINOPHIL # BLD MANUAL: 0.19 THOUSAND/UL (ref 0–0.4)
EOSINOPHIL NFR BLD MANUAL: 3 % (ref 0–6)
ERYTHROCYTE [DISTWIDTH] IN BLOOD BY AUTOMATED COUNT: 23.6 % (ref 11.6–15.1)
HCT VFR BLD AUTO: 37.2 % (ref 36.5–49.3)
HGB BLD-MCNC: 10.4 G/DL (ref 12–17)
HYPERCHROMIA BLD QL SMEAR: PRESENT
LYMPHOCYTES # BLD AUTO: 2.43 THOUSAND/UL (ref 0.6–4.47)
LYMPHOCYTES # BLD AUTO: 38 % (ref 14–44)
MCH RBC QN AUTO: 22.3 PG (ref 26.8–34.3)
MCHC RBC AUTO-ENTMCNC: 28 G/DL (ref 31.4–37.4)
MCV RBC AUTO: 80 FL (ref 82–98)
MICROCYTES BLD QL AUTO: PRESENT
MONOCYTES # BLD AUTO: 0.89 THOUSAND/UL (ref 0–1.22)
MONOCYTES NFR BLD: 14 % (ref 4–12)
NEUTROPHILS # BLD MANUAL: 2.62 THOUSAND/UL (ref 1.85–7.62)
NEUTS BAND NFR BLD MANUAL: 2 % (ref 0–8)
NEUTS SEG NFR BLD AUTO: 39 % (ref 43–75)
NRBC BLD AUTO-RTO: 0 /100 WBCS
PLATELET # BLD AUTO: 597 THOUSANDS/UL (ref 149–390)
PLATELET BLD QL SMEAR: ABNORMAL
PMV BLD AUTO: 9.4 FL (ref 8.9–12.7)
POIKILOCYTOSIS BLD QL SMEAR: PRESENT
RBC # BLD AUTO: 4.67 MILLION/UL (ref 3.88–5.62)
RBC MORPH BLD: PRESENT
SCHISTOCYTES BLD QL SMEAR: PRESENT
TOTAL CELLS COUNTED SPEC: 100
WBC # BLD AUTO: 6.39 THOUSAND/UL (ref 4.31–10.16)

## 2021-01-25 PROCEDURE — 85027 COMPLETE CBC AUTOMATED: CPT | Performed by: PHYSICIAN ASSISTANT

## 2021-01-25 PROCEDURE — 99215 OFFICE O/P EST HI 40 MIN: CPT | Performed by: PHYSICIAN ASSISTANT

## 2021-01-25 PROCEDURE — 4004F PT TOBACCO SCREEN RCVD TLK: CPT | Performed by: PHYSICIAN ASSISTANT

## 2021-01-25 PROCEDURE — 85007 BL SMEAR W/DIFF WBC COUNT: CPT | Performed by: PHYSICIAN ASSISTANT

## 2021-01-25 PROCEDURE — 96365 THER/PROPH/DIAG IV INF INIT: CPT

## 2021-01-25 PROCEDURE — 93971 EXTREMITY STUDY: CPT | Performed by: SURGERY

## 2021-01-25 PROCEDURE — 93971 EXTREMITY STUDY: CPT

## 2021-01-25 RX ORDER — SODIUM CHLORIDE 9 MG/ML
20 INJECTION, SOLUTION INTRAVENOUS ONCE
Status: COMPLETED | OUTPATIENT
Start: 2021-01-25 | End: 2021-01-25

## 2021-01-25 RX ORDER — SODIUM CHLORIDE 9 MG/ML
20 INJECTION, SOLUTION INTRAVENOUS ONCE
Status: CANCELLED | OUTPATIENT
Start: 2021-02-01

## 2021-01-25 RX ADMIN — SODIUM CHLORIDE 20 ML/HR: 9 INJECTION, SOLUTION INTRAVENOUS at 12:50

## 2021-01-25 RX ADMIN — IRON SUCROSE 200 MG: 20 INJECTION, SOLUTION INTRAVENOUS at 12:51

## 2021-01-25 NOTE — PLAN OF CARE
Problem: Potential for Falls  Goal: Patient will remain free of falls  Description: INTERVENTIONS:  - Assess patient frequently for physical needs  -  Identify cognitive and physical deficits and behaviors that affect risk of falls    -  [place] fall precautions as indicated by assessment   - Educate patient/family on patient safety including physical limitations  - Instruct patient to call for assistance with activity based on assessment  - Modify environment to reduce risk of injury  Outcome: Progressing

## 2021-01-25 NOTE — PROGRESS NOTES
[address] HEMATOLOGY ONCOLOGY [facility]  [facility] [address]  Progress Note  Wily Penaloza, 1965, [id number]  1/25/2021    Assessment/Plan:  1  Iron deficiency anemia due to chronic blood loss;  2  Symptomatic anemia  Patient continues to feel symptomatic with weakness, muscle ache and pain  Patient notes that he was very exhausted after cleaning his house  Patient and I reviewed that he should be doing very light work at this time  Blood work will be drawn with each infusion  Patient is scheduled for a capsule endoscopy, in the 1st week of February  Patient will be starting on Venofer infusions today  Patient understands that it could take a couple weeks before he feels back to his baseline  Also explained to the patient that over time, his hemoglobin will come up  He just needs to be patient  We will check back in with him in four weeks  We will have serial H&H is completed during this period of observation  As long as hemoglobin hematocrit continue to elevate than weekly blood lower main not necessarily be indicated  Regimen:  Venofer 200 mg IV weekly x 4 doses  H/H with each infusion  3  Secondary thrombocytosis  Due to history to history of splenectomy plus reactive thrombocytosis due to blood-loss anemia/iron deficiency  Patient will have H&H is drawn with each infusion  Patient will come back to the office in approximately one month  We will restrain the patient's platelet count in approximately six weeks with full CBC  This will be arranged upon follow-up  4  Pain and swelling of right upper extremity  Patient complains of right upper extremity swelling and tenderness since the removal of IV  It is possible that the patient has a provoked clot in this extremity  There is no pitting edema in extremity but I am concern for underlying dysfunction  Patient will go for stat ultrasound today      Obviously the right side should be avoided with IV infusion today  - VAS upper limb venous duplex scan, unilateral/limited; Future    The patient is scheduled for follow-up in approximately 4 weeks with Dr Marisela Sullivan  Patient voiced agreement and understanding to the above  Patient knows to call the Hematology/Oncology office with any questions and concerns regarding the above  Goals and Barriers:    Current Goal:   Prolong Survival from Cancer  Barriers: None  Patient's Capacity to Self Care:  Patient able to self care   -------------------------------------------------------------------------------------------------------    Chief Complaint   Patient presents with    Follow-up     Iron deficiency anemia       History of present illness/Cancer History: This is a 50-year-old male who was admitted to the emergency room after having several weeks of shortness of breath, three episodes of syncope at home in January 2021  Patient's past medical history significant for 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      Patient had been previously worked up for abnormalities by primary care doctor who recommended that the patient follow-up with GI as well as with Cardiology for a stress test   Unfortunately, patient's symptoms progressed and he presented to the emergency room  Blood work demonstrated hemoglobin in the 7 gram/deciliter range  Moreover, the patient was found to have a significant iron deficiency with a ferritin of three and an iron saturation of 1%  Patient underwent three Venofer infusions daily in the hospital   Patient's hemoglobin is in the 7 gram/deciliter range        Patient does not remember any bowel changes, blood in the toilet  Patient does note that he had a colonoscopy and endoscopy in May that was negative for any abnormalities      GI has seen the patient in the hospital   Patient underwent colonoscopy in EGD that demonstrated questionable AVM in the small intestine    This area was cauterized  This area was not bleeding  Capsule endoscopy will be recommended as an outpatient  Interval history:   Continued fatigue  Patient felt really good on Thursday but over the weekend over did it and now has more symptoms  Blood work is due to be drawn today during iron infusion  Review of Systems   Constitutional: Positive for fatigue  Negative for activity change, appetite change, chills, fever and unexpected weight change  HENT: Negative for hearing loss, mouth sores, nosebleeds, sore throat, trouble swallowing and voice change  Eyes: Negative for visual disturbance  Respiratory: Negative for cough and shortness of breath  Cardiovascular: Negative for chest pain, palpitations and leg swelling  Gastrointestinal: Negative for abdominal pain, blood in stool, constipation, diarrhea, nausea and vomiting  Endocrine: Negative for cold intolerance  Genitourinary: Negative for decreased urine volume, dysuria and hematuria  Musculoskeletal: Negative for arthralgias and myalgias  Skin: Negative for rash  Neurological: Negative for dizziness, weakness, numbness and headaches  Hematological: Negative for adenopathy  Does not bruise/bleed easily  Psychiatric/Behavioral: Negative for dysphoric mood           Current Outpatient Medications:     acetaminophen (TYLENOL) 325 mg tablet, Take 2 tablets (650 mg total) by mouth every 6 (six) hours as needed for mild pain, headaches or fever, Disp: 30 tablet, Rfl: 0    aspirin (ECOTRIN LOW STRENGTH) 81 mg EC tablet, Take 1 tablet (81 mg total) by mouth daily, Disp: 30 tablet, Rfl: 0    losartan (COZAAR) 50 mg tablet, Take 1 tablet (50 mg total) by mouth daily, Disp: 90 tablet, Rfl: 1    nicotine (NICODERM CQ) 14 mg/24hr TD 24 hr patch, Place 1 patch on the skin daily, Disp: 28 patch, Rfl: 0    pantoprazole (PROTONIX) 40 mg tablet, Take 1 tablet (40 mg total) by mouth 2 (two) times a day 30-60 min before breakfast, Disp: 90 tablet, Rfl: 0    tamsulosin (FLOMAX) 0 4 mg, Take 1 capsule (0 4 mg total) by mouth daily with dinner, Disp: 30 capsule, Rfl: 3    verapamil (CALAN-SR) 120 mg CR tablet, TAKE 1 TABLET (120 MG TOTAL) BY MOUTH DAILY WITH DINNER, Disp: 90 tablet, Rfl: 1    finasteride (PROSCAR) 5 mg tablet, Take 1 tablet (5 mg total) by mouth daily (Patient taking differently: Take 5 mg by mouth every morning Pt said he is still taking this), Disp: 90 tablet, Rfl: 3    No Known Allergies    Advance Directive and Living Will:        Objective:   /88 (BP Location: Left arm, Patient Position: Sitting, Cuff Size: Adult)   Pulse 104   Temp (!) 96 2 °F (35 7 °C) (Temporal)   Resp 18   Ht 6' (1 829 m)   Wt 91 6 kg (202 lb)   SpO2 99%   BMI 27 40 kg/m²   Wt Readings from Last 6 Encounters:   01/25/21 91 6 kg (202 lb)   01/18/21 91 6 kg (202 lb)   12/29/20 91 2 kg (201 lb)   10/02/20 91 2 kg (201 lb)   09/15/20 89 8 kg (198 lb)   06/08/20 84 8 kg (187 lb)       Physical Exam  Constitutional:       General: He is not in acute distress  Appearance: He is well-developed  HENT:      Head: Normocephalic and atraumatic  Right Ear: External ear normal       Left Ear: External ear normal       Nose: Nose normal    Eyes:      General: No scleral icterus  Conjunctiva/sclera: Conjunctivae normal    Cardiovascular:      Rate and Rhythm: Normal rate  Pulmonary:      Effort: No respiratory distress  Abdominal:      General: There is no distension  Palpations: Abdomen is soft  Musculoskeletal:         General: Swelling (right mid to proximal forearm) present  Skin:     Findings: No rash (on exposed skin  )  Neurological:      Mental Status: He is alert and oriented to person, place, and time  Psychiatric:         Thought Content:  Thought content normal          Pertinent Laboratory Results and Imaging Review:  Orders Only on 01/21/2021   Component Date Value Ref Range Status    White Blood Cell Count 01/21/2021 8 2 3 4 - 10 8 x10E3/uL Final    Red Blood Cell Count 01/21/2021 4 26  4 14 - 5 80 x10E6/uL Final    Comment: Polychromasia present  Microcytes present  Hypochromasia present  Anisocytosis present   Hemoglobin 01/21/2021 9 8* 13 0 - 17 7 g/dL Final    HCT 01/21/2021 32 5* 37 5 - 51 0 % Final    MCV 01/21/2021 76* 79 - 97 fL Final    MCH 01/21/2021 23 0* 26 6 - 33 0 pg Final    MCHC 01/21/2021 30 2* 31 5 - 35 7 g/dL Final    RDW 01/21/2021 22 2* 11 6 - 15 4 % Final    Platelet Count 10/41/4377 585* 150 - 450 x10E3/uL Final    Neutrophils 01/21/2021 57  Not Estab  % Final    Lymphocytes 01/21/2021 21  Not Estab  % Final    Monocytes 01/21/2021 11  Not Estab  % Final    Eosinophils 01/21/2021 8  Not Estab  % Final    Basophils PCT 01/21/2021 3  Not Estab  % Final    Neutrophils (Absolute) 01/21/2021 4 7  1 4 - 7 0 x10E3/uL Final    Lymphocytes (Absolute) 01/21/2021 1 7  0 7 - 3 1 x10E3/uL Final    Monocytes (Absolute) 01/21/2021 0 9  0 1 - 0 9 x10E3/uL Final    Eosinophils (Absolute) 01/21/2021 0 7* 0 0 - 0 4 x10E3/uL Final    Basophils ABS 01/21/2021 0 2  0 0 - 0 2 x10E3/uL Final    Hematology Comments 01/21/2021 Note:   Final    Comment: Manual differential was performed  A hand-written panel/profile was received from your office  In  accordance with the [facility] Ambiguous Test Code Policy dated July 5609, we have assigned CBC with Differential/Platelet, Test Code  #[id number] to this request  If this is not the testing you wished to  receive on this specimen, please contact the [facility] Department to clarify the test order  We  appreciate your business  Admission on 01/15/2021, Discharged on 01/19/2021   Component Date Value Ref Range Status    WBC 01/15/2021 6 27  4 31 - 10 16 Thousand/uL Final    RBC 01/15/2021 3 21* 3 88 - 5 62 Million/uL Final    Hemoglobin 01/15/2021 6 4* 12 0 - 17 0 g/dL Final    Verified by repeat analysis    This result has been called to DR CARMENCITA CARRANZA by Dario Siddiqui on 01 15 2021 at 1423, and has been read back   Hematocrit 01/15/2021 23 3* 36 5 - 49 3 % Final    MCV 01/15/2021 73* 82 - 98 fL Final    MCH 01/15/2021 19 9* 26 8 - 34 3 pg Final    MCHC 01/15/2021 27 5* 31 4 - 37 4 g/dL Final    RDW 01/15/2021 15 1  11 6 - 15 1 % Final    MPV 01/15/2021 8 9  8 9 - 12 7 fL Final    Platelets 57/14/1630 766* 149 - 390 Thousands/uL Final    nRBC 01/15/2021 0  /100 WBCs Final    Neutrophils Relative 01/15/2021 43  43 - 75 % Final    Immat GRANS % 01/15/2021 0  0 - 2 % Final    Lymphocytes Relative 01/15/2021 34  14 - 44 % Final    Monocytes Relative 01/15/2021 12  4 - 12 % Final    Eosinophils Relative 01/15/2021 9* 0 - 6 % Final    Basophils Relative 01/15/2021 2* 0 - 1 % Final    Neutrophils Absolute 01/15/2021 2 72  1 85 - 7 62 Thousands/µL Final    Immature Grans Absolute 01/15/2021 0 02  0 00 - 0 20 Thousand/uL Final    Lymphocytes Absolute 01/15/2021 2 14  0 60 - 4 47 Thousands/µL Final    Monocytes Absolute 01/15/2021 0 73  0 17 - 1 22 Thousand/µL Final    Eosinophils Absolute 01/15/2021 0 55  0 00 - 0 61 Thousand/µL Final    Basophils Absolute 01/15/2021 0 11* 0 00 - 0 10 Thousands/µL Final    Sodium 01/15/2021 139  136 - 145 mmol/L Final    Potassium 01/15/2021 3 6  3 5 - 5 3 mmol/L Final    Chloride 01/15/2021 104  100 - 108 mmol/L Final    CO2 01/15/2021 25  21 - 32 mmol/L Final    ANION GAP 01/15/2021 10  4 - 13 mmol/L Final    BUN 01/15/2021 18  5 - 25 mg/dL Final    Creatinine 01/15/2021 1 16  0 60 - 1 30 mg/dL Final    Standardized to IDMS reference method    Glucose 01/15/2021 106  65 - 140 mg/dL Final    If the patient is fasting, the ADA then defines impaired fasting glucose as > 100 mg/dL and diabetes as > or equal to 123 mg/dL  Specimen collection should occur prior to Sulfasalazine administration due to the potential for falsely depressed results   Specimen collection should occur prior to Sulfapyridine administration due to the potential for falsely elevated results   Calcium 01/15/2021 8 6  8 3 - 10 1 mg/dL Final    AST 01/15/2021 19  5 - 45 U/L Final    Specimen collection should occur prior to Sulfasalazine administration due to the potential for falsely depressed results   ALT 01/15/2021 38  12 - 78 U/L Final    Specimen collection should occur prior to Sulfasalazine administration due to the potential for falsely depressed results   Alkaline Phosphatase 01/15/2021 97  46 - 116 U/L Final    Total Protein 01/15/2021 7 2  6 4 - 8 2 g/dL Final    Albumin 01/15/2021 3 6  3 5 - 5 0 g/dL Final    Total Bilirubin 01/15/2021 0 20  0 20 - 1 00 mg/dL Final    Use of this assay is not recommended for patients undergoing treatment with eltrombopag due to the potential for falsely elevated results   eGFR 01/15/2021 70  ml/min/1 73sq m Final    Troponin I 01/15/2021 <0 02  <=0 04 ng/mL Final    Autovalidation override  [employer] Chemistry analyzer 99% cutoff is > 0 04 ng/mL in network labs     o cTnI 99% cutoff is useful only when applied to patients in the clinical setting of myocardial ischemia   o cTnI 99% cutoff should be interpreted in the context of clinical history, ECG findings and possibly cardiac imaging to establish correct diagnosis  o cTnI 99% cutoff may be suggestive but clearly not indicative of a coronary event without the clinical setting of myocardial ischemia   NT-proBNP 01/15/2021 84  <125 pg/mL Final    D-Dimer, Quant 01/15/2021 <0 27  <0 50 ug/ml FEU Final    Reference and upper limits to exclude DVT and PE are the same  Do not use to exclude if clinical symptoms are present      SARS-CoV-2 01/15/2021 Negative  Negative Final    INFLUENZA A PCR 01/15/2021 Negative  Negative Final    INFLUENZA B PCR 01/15/2021 Negative  Negative Final    RSV PCR 01/15/2021 Negative  Negative Final    Ventricular Rate 01/15/2021 85  BPM Final    Atrial Rate 01/15/2021 85  BPM Final    IA Interval 01/15/2021 172  ms Final    QRSD Interval 01/15/2021 90  ms Final    QT Interval 01/15/2021 372  ms Final    QTC Interval 01/15/2021 442  ms Final    P Axis 01/15/2021 38  degrees Final    QRS Axis 01/15/2021 37  degrees Final    T Wave Axis 01/15/2021 49  degrees Final    ABO Grouping 01/15/2021 B   Final    Rh Factor 01/15/2021 Negative   Final    Antibody Screen 01/15/2021 Negative   Final    Specimen Expiration Date 01/15/2021 [id number]   Final    Unit Product Code 01/16/2021 [id number]   Final-Edited    Unit Number 01/16/2021 [id number]   Final-Edited    Unit ABO 01/16/2021 B   Final-Edited    Unit RH 01/16/2021 NEG   Final-Edited    Crossmatch 01/16/2021 Compatible   Final    Unit Dispense Status 01/16/2021 Presumed Trans   Final-Edited    ABO Grouping 01/15/2021 B   Final    Rh Factor 01/15/2021 Negative   Final    Retic Ct Abs 01/15/2021 40,600  50,[phone] Final    Retic Ct Pct 01/15/2021 1 28  0 37 - 1 87 % Final    Folate 01/15/2021 7 8  3 1 - 17 5 ng/mL Final    Vitamin B-12 01/15/2021 264  100 - 900 pg/mL Final    Iron Saturation 01/15/2021 1  % Final    TIBC 01/15/2021 489* 250 - 450 ug/dL Final    Iron 01/15/2021 6* 65 - 175 ug/dL Final    Patients treated with metal-binding drugs (ie  Deferoxamine) may have depressed iron values   Ferritin 01/15/2021 3* 8 - 388 ng/mL Final    LD 01/15/2021 203  81 - 234 U/L Final    Troponin I 01/15/2021 <0 02  <=0 04 ng/mL Final    Autovalidation override  [employer] Chemistry analyzer 99% cutoff is > 0 04 ng/mL in network labs     o cTnI 99% cutoff is useful only when applied to patients in the clinical setting of myocardial ischemia   o cTnI 99% cutoff should be interpreted in the context of clinical history, ECG findings and possibly cardiac imaging to establish correct diagnosis     o cTnI 99% cutoff may be suggestive but clearly not indicative of a coronary event without the clinical setting of myocardial ischemia   Hemoglobin 01/15/2021 7 1* 12 0 - 17 0 g/dL Final    Hematocrit 01/15/2021 25 3* 36 5 - 49 3 % Final    Sodium 01/16/2021 138  136 - 145 mmol/L Final    Potassium 01/16/2021 3 8  3 5 - 5 3 mmol/L Final    Chloride 01/16/2021 105  100 - 108 mmol/L Final    CO2 01/16/2021 24  21 - 32 mmol/L Final    ANION GAP 01/16/2021 9  4 - 13 mmol/L Final    BUN 01/16/2021 16  5 - 25 mg/dL Final    Creatinine 01/16/2021 1 12  0 60 - 1 30 mg/dL Final    Standardized to IDMS reference method    Glucose 01/16/2021 87  65 - 140 mg/dL Final    If the patient is fasting, the ADA then defines impaired fasting glucose as > 100 mg/dL and diabetes as > or equal to 123 mg/dL  Specimen collection should occur prior to Sulfasalazine administration due to the potential for falsely depressed results  Specimen collection should occur prior to Sulfapyridine administration due to the potential for falsely elevated results      Calcium 01/16/2021 8 4  8 3 - 10 1 mg/dL Final    eGFR 01/16/2021 73  ml/min/1 73sq m Final    Magnesium 01/16/2021 1 9  1 6 - 2 6 mg/dL Final    WBC 01/16/2021 8 66  4 31 - 10 16 Thousand/uL Final    RBC 01/16/2021 3 45* 3 88 - 5 62 Million/uL Final    Hemoglobin 01/16/2021 7 3* 12 0 - 17 0 g/dL Final    Hematocrit 01/16/2021 25 7* 36 5 - 49 3 % Final    MCV 01/16/2021 75* 82 - 98 fL Final    MCH 01/16/2021 21 2* 26 8 - 34 3 pg Final    MCHC 01/16/2021 28 4* 31 4 - 37 4 g/dL Final    RDW 01/16/2021 18 0* 11 6 - 15 1 % Final    Platelets 26/57/7357 690* 149 - 390 Thousands/uL Final    MPV 01/16/2021 9 1  8 9 - 12 7 fL Final    Hemoglobin 01/16/2021 7 5* 12 0 - 17 0 g/dL Final    Hematocrit 01/16/2021 26 8* 36 5 - 49 3 % Final    WBC 01/17/2021 7 01  4 31 - 10 16 Thousand/uL Final    RBC 01/17/2021 3 44* 3 88 - 5 62 Million/uL Final    Hemoglobin 01/17/2021 7 2* 12 0 - 17 0 g/dL Final    Hematocrit 01/17/2021 25 7* 36 5 - 49 3 % Final    MCV 01/17/2021 75* 82 - 98 fL Final   [facility] 01/17/2021 20 9* 26 8 - 34 3 pg Final    MCHC 01/17/2021 28 0* 31 4 - 37 4 g/dL Final    RDW 01/17/2021 17 8* 11 6 - 15 1 % Final    Platelets 58/32/3396 657* 149 - 390 Thousands/uL Final    MPV 01/17/2021 9 2  8 9 - 12 7 fL Final    Sodium 01/17/2021 140  136 - 145 mmol/L Final    Potassium 01/17/2021 3 7  3 5 - 5 3 mmol/L Final    Chloride 01/17/2021 108  100 - 108 mmol/L Final    CO2 01/17/2021 24  21 - 32 mmol/L Final    ANION GAP 01/17/2021 8  4 - 13 mmol/L Final    BUN 01/17/2021 12  5 - 25 mg/dL Final    Creatinine 01/17/2021 1 03  0 60 - 1 30 mg/dL Final    Standardized to IDMS reference method    Glucose 01/17/2021 84  65 - 140 mg/dL Final    If the patient is fasting, the ADA then defines impaired fasting glucose as > 100 mg/dL and diabetes as > or equal to 123 mg/dL  Specimen collection should occur prior to Sulfasalazine administration due to the potential for falsely depressed results  Specimen collection should occur prior to Sulfapyridine administration due to the potential for falsely elevated results      Calcium 01/17/2021 7 8* 8 3 - 10 1 mg/dL Final    eGFR 01/17/2021 81  ml/min/1 73sq m Final    Unit Product Code 01/18/2021 [id number]   Final-Edited    Unit Number 01/18/2021 [id number]   Final-Edited    Unit ABO 01/18/2021 B   Final-Edited    Unit RH 01/18/2021 NEG   Final-Edited    Crossmatch 01/18/2021 Compatible   Final    Unit Dispense Status 01/18/2021 Presumed Trans   Final-Edited    Hemoglobin 01/17/2021 8 5* 12 0 - 17 0 g/dL Final    Hematocrit 01/17/2021 29 9* 36 5 - 49 3 % Final    Sodium 01/18/2021 141  136 - 145 mmol/L Final    Potassium 01/18/2021 3 8  3 5 - 5 3 mmol/L Final    Chloride 01/18/2021 108  100 - 108 mmol/L Final    CO2 01/18/2021 25  21 - 32 mmol/L Final    ANION GAP 01/18/2021 8  4 - 13 mmol/L Final    BUN 01/18/2021 9  5 - 25 mg/dL Final    Creatinine 01/18/2021 1 07  0 60 - 1 30 mg/dL Final    Standardized to IDMS reference method    Glucose 01/18/2021 83  65 - 140 mg/dL Final    If the patient is fasting, the ADA then defines impaired fasting glucose as > 100 mg/dL and diabetes as > or equal to 123 mg/dL  Specimen collection should occur prior to Sulfasalazine administration due to the potential for falsely depressed results  Specimen collection should occur prior to Sulfapyridine administration due to the potential for falsely elevated results   Calcium 01/18/2021 8 2* 8 3 - 10 1 mg/dL Final    eGFR 01/18/2021 77  ml/min/1 73sq m Final    WBC 01/18/2021 8 62  4 31 - 10 16 Thousand/uL Final    RBC 01/18/2021 3 79* 3 88 - 5 62 Million/uL Final    Hemoglobin 01/18/2021 8 2* 12 0 - 17 0 g/dL Final    Hematocrit 01/18/2021 28 9* 36 5 - 49 3 % Final    MCV 01/18/2021 76* 82 - 98 fL Final    MCH 01/18/2021 21 6* 26 8 - 34 3 pg Final    MCHC 01/18/2021 28 4* 31 4 - 37 4 g/dL Final    RDW 01/18/2021 19 0* 11 6 - 15 1 % Final    Platelets 56/00/1174 603* 149 - 390 Thousands/uL Final    MPV 01/18/2021 8 8* 8 9 - 12 7 fL Final    Case Report 01/18/2021    Final                    Value:Surgical Pathology Report                         Case: [id number]                                   Authorizing Provider:  Paul Sanchez MD  Collected:           01/18/2021 1340              Ordering Location:     [address] [place] [address] Received:            01/18/2021 [address] [facility]                                                                      Pathologist:           Erika Mitchell MD                                                         Specimen:    Large Intestine, Left/Descending Colon, Descending colon polyp Bx  cold snare              Final Diagnosis 01/18/2021    Final                    Value: This result contains rich text formatting which cannot be displayed here   Additional Information 01/18/2021    Final                    Value: This result contains rich text formatting which cannot be displayed here   Synoptic Checklist 01/18/2021    Final                    Value:  (COLON/RECTUM POLYP FORM - GI - All Specimens)                                                                                                                 : Adenoma(s)     Cristian Champagne Description 01/18/2021    Final                    Value: This result contains rich text formatting which cannot be displayed here   Sodium 01/19/2021 140  136 - 145 mmol/L Final    Potassium 01/19/2021 3 4* 3 5 - 5 3 mmol/L Final    Chloride 01/19/2021 108  100 - 108 mmol/L Final    CO2 01/19/2021 22  21 - 32 mmol/L Final    ANION GAP 01/19/2021 10  4 - 13 mmol/L Final    BUN 01/19/2021 10  5 - 25 mg/dL Final    Creatinine 01/19/2021 1 11  0 60 - 1 30 mg/dL Final    Standardized to IDMS reference method    Glucose 01/19/2021 86  65 - 140 mg/dL Final    If the patient is fasting, the ADA then defines impaired fasting glucose as > 100 mg/dL and diabetes as > or equal to 123 mg/dL  Specimen collection should occur prior to Sulfasalazine administration due to the potential for falsely depressed results  Specimen collection should occur prior to Sulfapyridine administration due to the potential for falsely elevated results      Calcium 01/19/2021 8 4  8 3 - 10 1 mg/dL Final    eGFR 01/19/2021 74  ml/min/1 73sq m Final    WBC 01/19/2021 13 10* 4 31 - 10 16 Thousand/uL Final    RBC 01/19/2021 3 54* 3 88 - 5 62 Million/uL Final    Hemoglobin 01/19/2021 7 6* 12 0 - 17 0 g/dL Final    Hematocrit 01/19/2021 27 5* 36 5 - 49 3 % Final    MCV 01/19/2021 78* 82 - 98 fL Final    MCH 01/19/2021 21 5* 26 8 - 34 3 pg Final    MCHC 01/19/2021 27 6* 31 4 - 37 4 g/dL Final    RDW 01/19/2021 19 4* 11 6 - 15 1 % Final    Platelets 86/81/8576 569* 149 - 390 Thousands/uL Final    MPV 01/19/2021 9 0  8 9 - 12 7 fL Final    H Pylori IgG 01/19/2021 0 67  0 00 - 0 79 Index Value Final                                 Negative           <0 80 Equivocal    0 80 - 0 89                               Positive           >0 89    H  pylori IgM 01/19/2021 <9 0  0 0 - 8 9 units Final                                    Negative          <9 0                                  Equivocal   9 0 - 11 0                                  Positive         >11 0  This test was developed and its performance characteristics  determined by [facility]  It has not been cleared or approved  by the Food and Drug Administration  The following historical data was reviewed      Past Medical History:   Diagnosis Date    Colon polyp     Elevated PSA     Enlarged prostate     Full dentures     GERD (gastroesophageal reflux disease)     Hemorrhoid     Hyperlipidemia     Hypertension     Sleep apnea     No CPAP    Smoker     Wears glasses        Past Surgical History:   Procedure Laterality Date    APPENDECTOMY      COLONOSCOPY      EGD      FOOT SURGERY Right     bone removed    OTHER SURGICAL HISTORY      bone removal right arm-abnormal growth of surface bone    PROSTATE BIOPSY      SPLENECTOMY      ruptured       Social History     Socioeconomic History    Marital status: Legally      Spouse name: Not on file    Number of children: Not on file    Years of education: Not on file    Highest education level: Not on file   Occupational History    Occupation: supervisor     Employer: [employer]   Social Needs    Financial resource strain: Not on file    Food insecurity     Worry: Not on file     Inability: Not on file    Transportation needs     Medical: Not on file     Non-medical: Not on file   Tobacco Use    Smoking status: Current Some Day Smoker     Packs/day: 0 50     Years: 10 00     Pack years: 5 00     Types: Cigarettes    Smokeless tobacco: Never Used   Substance and Sexual Activity    Alcohol use: Never     Frequency: Never    Drug use: No    Sexual activity: Not on file   Lifestyle    Physical activity     Days per week: Not on file     Minutes per session: Not on file    Stress: Not on file   Relationships    Social connections     Talks on phone: Not on file     Gets together: Not on file     Attends Denominational service: Not on file     Active member of club or organization: Not on file     Attends meetings of clubs or organizations: Not on file     Relationship status: Not on file    Intimate partner violence     Fear of current or ex partner: Not on file     Emotionally abused: Not on file     Physically abused: Not on file     Forced sexual activity: Not on file   Other Topics Concern    Not on file   Social History Narrative    Not on file       Family History   Problem Relation Age of Onset    Dementia Father     Heart disease Mother     Hypertension Mother     Hyperlipidemia Mother     Hypertension Sister     Hypertension Brother     No Known Problems Daughter     No Known Problems Son     Cancer Paternal Grandfather         prostate    No Known Problems Son        Please note: This report has been generated by a voice recognition software system  Therefore there may be syntax, spelling, and/or grammatical errors  Please call if you have any questions

## 2021-01-25 NOTE — TELEPHONE ENCOUNTER
Vascular Lab calling with doppler report  Report showed superficial phlebitis - no evidence of DVT  Reviewed results with Christine Morris - patient ok to be discharged to home  He is already taking baby ASA daily    He will continue this and I explained that someone from the office will follow up with him tomorrow   Patient verbalized understanding     Christine Morris to follow up with patient about anticoagulation tomorrow

## 2021-01-26 ENCOUNTER — TELEPHONE (OUTPATIENT)
Dept: HEMATOLOGY ONCOLOGY | Facility: MEDICAL CENTER | Age: 56
End: 2021-01-26

## 2021-01-26 NOTE — TELEPHONE ENCOUNTER
DEBRA Ann, RN             Please call pt  Stephanie Constable with him that his U/s showed a mild phelbitis - inflammation of the vein due to prior IV insertion   NO deep vein issue, cont with ASA, warm compresses and NSAID for discomfort  Thanks

## 2021-01-29 ENCOUNTER — OFFICE VISIT (OUTPATIENT)
Dept: FAMILY MEDICINE CLINIC | Facility: CLINIC | Age: 56
End: 2021-01-29
Payer: COMMERCIAL

## 2021-01-29 VITALS
OXYGEN SATURATION: 100 % | SYSTOLIC BLOOD PRESSURE: 120 MMHG | RESPIRATION RATE: 16 BRPM | HEIGHT: 72 IN | WEIGHT: 200 LBS | DIASTOLIC BLOOD PRESSURE: 84 MMHG | BODY MASS INDEX: 27.09 KG/M2 | TEMPERATURE: 97.5 F | HEART RATE: 92 BPM

## 2021-01-29 DIAGNOSIS — I10 ESSENTIAL HYPERTENSION: Primary | ICD-10-CM

## 2021-01-29 DIAGNOSIS — Z72.0 TOBACCO USE: ICD-10-CM

## 2021-01-29 DIAGNOSIS — D64.9 SYMPTOMATIC ANEMIA: ICD-10-CM

## 2021-01-29 DIAGNOSIS — D50.0 IRON DEFICIENCY ANEMIA DUE TO CHRONIC BLOOD LOSS: ICD-10-CM

## 2021-01-29 PROCEDURE — 99495 TRANSJ CARE MGMT MOD F2F 14D: CPT | Performed by: FAMILY MEDICINE

## 2021-01-29 PROCEDURE — 1111F DSCHRG MED/CURRENT MED MERGE: CPT | Performed by: FAMILY MEDICINE

## 2021-01-29 PROCEDURE — 3008F BODY MASS INDEX DOCD: CPT | Performed by: PHYSICIAN ASSISTANT

## 2021-01-29 NOTE — PATIENT INSTRUCTIONS
Please stay on the verapamil and do not restart the losartan unless your blood pressure seems high again in the future

## 2021-01-29 NOTE — PROGRESS NOTES
Assessment/Plan:    No problem-specific Assessment & Plan notes found for this encounter     htn stable  VINICIO w/u in progress, await capsule endosocpy  tob use discouraged     Diagnoses and all orders for this visit:    Essential hypertension    Iron deficiency anemia due to chronic blood loss    Symptomatic anemia    Tobacco use        Return if symptoms worsen or fail to improve  Subjective:      Patient ID: Madalyn Leger is a 63 y o  male  Chief Complaint   Patient presents with    Transition of Care Management     Jessie Covington  Last colonoscopy last year was normal excepts polyps  occas blood in stool but not lately  Capsule planned on 2/5/21    Of lipitor due to myalgias but still has them  Off losartan since hosp since bp low  Still on verapamil    Still on flomax  Eating/drinking ok  Lots of water  Some dizziness in shower briefly    TCM Call (since 12/29/2020)     Date and time call was made  1/19/2021  3:05 [facility] [place]  [place] reviewed    Patient was hospitialized at  [facility]        Date of Admission  01/15/21    Date of discharge  01/19/21    Diagnosis  Symptomatic Anemia    Disposition  Home    Were the patients medications reviewed and updated  Yes    Current Symptoms  Fatigue      TCM Call (since 12/29/2020)     Post hospital issues  None    Should patient be enrolled in anticoag monitoring? No    Scheduled for follow up?   Yes    Patients specialists  Other (comment)    Other specialists names  Dr Juma Fontana    Other specialists contcat #  Dr Anitha Chavez    Did you obtain your prescribed medications  Yes    Do you need help managing your prescriptions or medications  No    Is transportation to your appointment needed  No    I have advised the patient to call PCP with any new or worsening symptoms  [address] or [facility] other    Support System  Family    The type of support provided  Physical; Emotional    Do you have social support  Yes, as much as I need    Are you recieving any outpatient services  Yes    What type of services  Blood and iron transfusions    Are you recieving home care services  No    Have you fallen in the last 12 months  Yes    How many times  3 times no injuries    Interperter language line needed  No    Counseling  Patient    Counseling topics  Activities of daily living; Importance of RX compliance; patient and family education; instructions for management; Risk factor reduction    Comments  Lorena Campbell states that he is doing fine this am  NO c/o other than feeling tired  No chest pain or shortness of breath this am  He knows to go to the ER if any chest pain or dyspnea, weakness, etc Kostas Myers          The following portions of the patient's history were reviewed and updated as appropriate: allergies, current medications, past family history, past medical history, past social history, past surgical history and problem list     Review of Systems   Cardiovascular: Negative for chest pain  Gastrointestinal: Negative for blood in stool           Current Outpatient Medications   Medication Sig Dispense Refill    acetaminophen (TYLENOL) 325 mg tablet Take 2 tablets (650 mg total) by mouth every 6 (six) hours as needed for mild pain, headaches or fever 30 tablet 0    aspirin (ECOTRIN LOW STRENGTH) 81 mg EC tablet Take 1 tablet (81 mg total) by mouth daily 30 tablet 0    finasteride (PROSCAR) 5 mg tablet Take 1 tablet (5 mg total) by mouth daily (Patient taking differently: Take 5 mg by mouth every morning Pt said he is still taking this) 90 tablet 3    nicotine (NICODERM CQ) 14 mg/24hr TD 24 hr patch Place 1 patch on the skin daily 28 patch 0    pantoprazole (PROTONIX) 40 mg tablet Take 1 tablet (40 mg total) by mouth 2 (two) times a day 30-60 min before breakfast 90 tablet 0    tamsulosin (FLOMAX) 0 4 mg Take 1 capsule (0 4 mg total) by mouth daily with dinner 30 capsule 3    verapamil (CALAN-SR) 120 mg CR tablet TAKE 1 TABLET (120 MG TOTAL) BY MOUTH DAILY WITH DINNER 90 tablet 1     No current facility-administered medications for this visit  Objective:    /84   Pulse 92   Temp 97 5 °F (36 4 °C)   Resp 16   Ht 6' (1 829 m)   Wt 90 7 kg (200 lb)   SpO2 100%   BMI 27 12 kg/m²        Physical Exam  Vitals signs and nursing note reviewed  Constitutional:       Appearance: Normal appearance  He is well-developed  He is not ill-appearing  HENT:      Head: Normocephalic  Right Ear: Tympanic membrane normal       Left Ear: Tympanic membrane normal    Eyes:      General: No scleral icterus  Conjunctiva/sclera: Conjunctivae normal    Neck:      Musculoskeletal: Neck supple  Cardiovascular:      Rate and Rhythm: Normal rate and regular rhythm  Heart sounds: No murmur  Pulmonary:      Effort: Pulmonary effort is normal  No respiratory distress  Abdominal:      General: There is no distension  Palpations: Abdomen is soft  Tenderness: There is no abdominal tenderness  Musculoskeletal:         General: No deformity  Right lower leg: No edema  Left lower leg: No edema  Skin:     General: Skin is warm and dry  Coloration: Skin is not jaundiced or pale  Neurological:      Mental Status: He is alert  Motor: No weakness  Gait: Gait normal    Psychiatric:         Behavior: Behavior normal          Thought Content:  Thought content normal                 Rufina Russell DO

## 2021-02-01 ENCOUNTER — HOSPITAL ENCOUNTER (OUTPATIENT)
Dept: INFUSION CENTER | Facility: HOSPITAL | Age: 56
Discharge: HOME | End: 2021-02-01
Attending: INTERNAL MEDICINE

## 2021-02-02 DIAGNOSIS — D64.9 SYMPTOMATIC ANEMIA: ICD-10-CM

## 2021-02-02 DIAGNOSIS — D50.0 IRON DEFICIENCY ANEMIA DUE TO CHRONIC BLOOD LOSS: Primary | ICD-10-CM

## 2021-02-02 RX ORDER — SODIUM CHLORIDE 9 MG/ML
20 INJECTION, SOLUTION INTRAVENOUS ONCE
Status: CANCELLED | OUTPATIENT
Start: 2021-02-03

## 2021-02-03 ENCOUNTER — HOSPITAL ENCOUNTER (OUTPATIENT)
Dept: INFUSION CENTER | Facility: HOSPITAL | Age: 56
Discharge: HOME | End: 2021-02-03
Attending: INTERNAL MEDICINE
Payer: COMMERCIAL

## 2021-02-03 VITALS
HEART RATE: 98 BPM | OXYGEN SATURATION: 99 % | TEMPERATURE: 97.7 F | DIASTOLIC BLOOD PRESSURE: 70 MMHG | SYSTOLIC BLOOD PRESSURE: 137 MMHG | RESPIRATION RATE: 18 BRPM

## 2021-02-03 DIAGNOSIS — D50.0 IRON DEFICIENCY ANEMIA DUE TO CHRONIC BLOOD LOSS: Primary | ICD-10-CM

## 2021-02-03 DIAGNOSIS — D64.9 SYMPTOMATIC ANEMIA: ICD-10-CM

## 2021-02-03 LAB
HCT VFR BLD AUTO: 36.7 % (ref 36.5–49.3)
HGB BLD-MCNC: 10.5 G/DL (ref 12–17)

## 2021-02-03 PROCEDURE — 85018 HEMOGLOBIN: CPT | Performed by: INTERNAL MEDICINE

## 2021-02-03 PROCEDURE — 96365 THER/PROPH/DIAG IV INF INIT: CPT

## 2021-02-03 PROCEDURE — 85014 HEMATOCRIT: CPT | Performed by: INTERNAL MEDICINE

## 2021-02-03 RX ORDER — SODIUM CHLORIDE 9 MG/ML
20 INJECTION, SOLUTION INTRAVENOUS ONCE
Status: CANCELLED | OUTPATIENT
Start: 2021-02-08

## 2021-02-03 RX ORDER — SODIUM CHLORIDE 9 MG/ML
20 INJECTION, SOLUTION INTRAVENOUS ONCE
Status: COMPLETED | OUTPATIENT
Start: 2021-02-03 | End: 2021-02-03

## 2021-02-03 RX ADMIN — SODIUM CHLORIDE 20 ML/HR: 9 INJECTION, SOLUTION INTRAVENOUS at 13:32

## 2021-02-03 RX ADMIN — IRON SUCROSE 200 MG: 20 INJECTION, SOLUTION INTRAVENOUS at 13:32

## 2021-02-03 NOTE — PLAN OF CARE
Problem: Potential for Falls  Goal: Patient will remain free of falls  Description: INTERVENTIONS:  - Assess patient frequently for physical needs  -  Identify cognitive and physical deficits and behaviors that affect risk of falls    -  [place] fall precautions as indicated by assessment   - Educate patient/family on patient safety including physical limitations  - Instruct patient to call for assistance with activity based on assessment  - Modify environment to reduce risk of injury  - Consider OT/PT consult to assist with strengthening/mobility  2/3/2021 1509 by Chantal Burgos RN  Outcome: Progressing  2/3/2021 1337 by Chantal Burgos RN  Outcome: Progressing

## 2021-02-04 DIAGNOSIS — D64.9 SYMPTOMATIC ANEMIA: ICD-10-CM

## 2021-02-04 DIAGNOSIS — D50.0 IRON DEFICIENCY ANEMIA DUE TO CHRONIC BLOOD LOSS: Primary | ICD-10-CM

## 2021-02-04 RX ORDER — SODIUM CHLORIDE 9 MG/ML
20 INJECTION, SOLUTION INTRAVENOUS ONCE
Status: CANCELLED | OUTPATIENT
Start: 2021-02-08

## 2021-02-05 ENCOUNTER — HOSPITAL ENCOUNTER (OUTPATIENT)
Dept: GASTROENTEROLOGY | Facility: HOSPITAL | Age: 56
Discharge: HOME | End: 2021-02-05
Payer: COMMERCIAL

## 2021-02-05 DIAGNOSIS — D50.9 IRON DEFICIENCY ANEMIA, UNSPECIFIED IRON DEFICIENCY ANEMIA TYPE: ICD-10-CM

## 2021-02-05 PROCEDURE — 91110 GI TRC IMG INTRAL ESOPH-ILE: CPT

## 2021-02-08 ENCOUNTER — HOSPITAL ENCOUNTER (OUTPATIENT)
Dept: INFUSION CENTER | Facility: HOSPITAL | Age: 56
Discharge: HOME | End: 2021-02-08
Attending: INTERNAL MEDICINE
Payer: COMMERCIAL

## 2021-02-08 VITALS
SYSTOLIC BLOOD PRESSURE: 118 MMHG | OXYGEN SATURATION: 99 % | DIASTOLIC BLOOD PRESSURE: 71 MMHG | HEART RATE: 93 BPM | TEMPERATURE: 98.1 F | RESPIRATION RATE: 18 BRPM

## 2021-02-08 DIAGNOSIS — D50.0 IRON DEFICIENCY ANEMIA DUE TO CHRONIC BLOOD LOSS: Primary | ICD-10-CM

## 2021-02-08 DIAGNOSIS — D64.9 SYMPTOMATIC ANEMIA: ICD-10-CM

## 2021-02-08 LAB
HCT VFR BLD AUTO: 37.8 % (ref 36.5–49.3)
HGB BLD-MCNC: 10.6 G/DL (ref 12–17)

## 2021-02-08 PROCEDURE — 85014 HEMATOCRIT: CPT | Performed by: INTERNAL MEDICINE

## 2021-02-08 PROCEDURE — 96365 THER/PROPH/DIAG IV INF INIT: CPT

## 2021-02-08 PROCEDURE — 85018 HEMOGLOBIN: CPT | Performed by: INTERNAL MEDICINE

## 2021-02-08 RX ORDER — SODIUM CHLORIDE 9 MG/ML
20 INJECTION, SOLUTION INTRAVENOUS ONCE
Status: CANCELLED | OUTPATIENT
Start: 2021-02-15

## 2021-02-08 RX ORDER — SODIUM CHLORIDE 9 MG/ML
20 INJECTION, SOLUTION INTRAVENOUS ONCE
Status: COMPLETED | OUTPATIENT
Start: 2021-02-08 | End: 2021-02-08

## 2021-02-08 RX ADMIN — SODIUM CHLORIDE 20 ML/HR: 9 INJECTION, SOLUTION INTRAVENOUS at 11:51

## 2021-02-08 RX ADMIN — IRON SUCROSE 200 MG: 20 INJECTION, SOLUTION INTRAVENOUS at 11:52

## 2021-02-08 NOTE — PLAN OF CARE
Problem: Potential for Falls  Goal: Patient will remain free of falls  Description: INTERVENTIONS:  - Assess patient frequently for physical needs  -  Identify cognitive and physical deficits and behaviors that affect risk of falls  -  [place] fall precautions as indicated by assessment   - Educate patient/family on patient safety including physical limitations  - Instruct patient to call for assistance with activity based on assessment  - Modify environment to reduce risk of injury  - Consider OT/PT consult to assist with strengthening/mobility  Outcome: Progressing     Problem: Potential for Falls  Goal: Patient will remain free of falls  Description: INTERVENTIONS:  - Assess patient frequently for physical needs  -  Identify cognitive and physical deficits and behaviors that affect risk of falls    -  [place] fall precautions as indicated by assessment   - Educate patient/family on patient safety including physical limitations  - Instruct patient to call for assistance with activity based on assessment  - Modify environment to reduce risk of injury  - Consider OT/PT consult to assist with strengthening/mobility  Outcome: Progressing

## 2021-02-09 ENCOUNTER — PREP FOR PROCEDURE (OUTPATIENT)
Dept: GASTROENTEROLOGY | Facility: CLINIC | Age: 56
End: 2021-02-09

## 2021-02-09 DIAGNOSIS — K92.2 GASTROINTESTINAL HEMORRHAGE, UNSPECIFIED GASTROINTESTINAL HEMORRHAGE TYPE: ICD-10-CM

## 2021-02-09 DIAGNOSIS — I99.8 ANGIECTASIS: Primary | ICD-10-CM

## 2021-02-09 PROCEDURE — 91110 GI TRC IMG INTRAL ESOPH-ILE: CPT | Performed by: INTERNAL MEDICINE

## 2021-02-11 ENCOUNTER — IMMUNIZATIONS (OUTPATIENT)
Dept: FAMILY MEDICINE CLINIC | Facility: HOSPITAL | Age: 56
End: 2021-02-11

## 2021-02-11 DIAGNOSIS — Z23 ENCOUNTER FOR IMMUNIZATION: Primary | ICD-10-CM

## 2021-02-11 PROCEDURE — 0011A SARS-COV-2 / COVID-19 MRNA VACCINE (MODERNA) 100 MCG: CPT

## 2021-02-11 PROCEDURE — 91301 SARS-COV-2 / COVID-19 MRNA VACCINE (MODERNA) 100 MCG: CPT

## 2021-02-12 ENCOUNTER — TELEPHONE (OUTPATIENT)
Dept: GASTROENTEROLOGY | Facility: AMBULARY SURGERY CENTER | Age: 56
End: 2021-02-12

## 2021-02-12 NOTE — TELEPHONE ENCOUNTER
Patients GI provider:  Dr Chavez Abler    Number to return call: (    [phone]    Reason for call: Pt calling to schedule egd w/ balloon    Scheduled procedure/appointment date if applicable: Apt/procedure  na

## 2021-02-15 ENCOUNTER — HOSPITAL ENCOUNTER (OUTPATIENT)
Dept: INFUSION CENTER | Facility: HOSPITAL | Age: 56
Discharge: HOME | End: 2021-02-15
Attending: INTERNAL MEDICINE
Payer: COMMERCIAL

## 2021-02-15 VITALS
RESPIRATION RATE: 18 BRPM | SYSTOLIC BLOOD PRESSURE: 147 MMHG | DIASTOLIC BLOOD PRESSURE: 79 MMHG | OXYGEN SATURATION: 100 % | TEMPERATURE: 97.9 F | HEART RATE: 93 BPM

## 2021-02-15 DIAGNOSIS — D64.9 SYMPTOMATIC ANEMIA: ICD-10-CM

## 2021-02-15 DIAGNOSIS — D50.0 IRON DEFICIENCY ANEMIA DUE TO CHRONIC BLOOD LOSS: Primary | ICD-10-CM

## 2021-02-15 LAB
HCT VFR BLD AUTO: 41.4 % (ref 36.5–49.3)
HGB BLD-MCNC: 12 G/DL (ref 12–17)

## 2021-02-15 PROCEDURE — 96365 THER/PROPH/DIAG IV INF INIT: CPT

## 2021-02-15 PROCEDURE — 85018 HEMOGLOBIN: CPT | Performed by: INTERNAL MEDICINE

## 2021-02-15 PROCEDURE — 85014 HEMATOCRIT: CPT | Performed by: INTERNAL MEDICINE

## 2021-02-15 RX ORDER — SODIUM CHLORIDE 9 MG/ML
20 INJECTION, SOLUTION INTRAVENOUS ONCE
Status: CANCELLED | OUTPATIENT
Start: 2021-02-18

## 2021-02-15 RX ORDER — SODIUM CHLORIDE 9 MG/ML
20 INJECTION, SOLUTION INTRAVENOUS ONCE
Status: COMPLETED | OUTPATIENT
Start: 2021-02-15 | End: 2021-02-15

## 2021-02-15 RX ADMIN — SODIUM CHLORIDE 20 ML/HR: 9 INJECTION, SOLUTION INTRAVENOUS at 12:10

## 2021-02-15 RX ADMIN — IRON SUCROSE 200 MG: 20 INJECTION, SOLUTION INTRAVENOUS at 12:10

## 2021-02-15 NOTE — PLAN OF CARE
Problem: Potential for Falls  Goal: Patient will remain free of falls  Description: INTERVENTIONS:  - Assess patient frequently for physical needs  -  Identify cognitive and physical deficits and behaviors that affect risk of falls  -  [place] fall precautions as indicated by assessment   - Educate patient/family on patient safety including physical limitations  - Instruct patient to call for assistance with activity based on assessment  - Modify environment to reduce risk of injury  - Consider OT/PT consult to assist with strengthening/mobility  Outcome: Progressing     Problem: Potential for Falls  Goal: Patient will remain free of falls  Description: INTERVENTIONS:  - Assess patient frequently for physical needs  -  Identify cognitive and physical deficits and behaviors that affect risk of falls    -  [place] fall precautions as indicated by assessment   - Educate patient/family on patient safety including physical limitations  - Instruct patient to call for assistance with activity based on assessment  - Modify environment to reduce risk of injury  - Consider OT/PT consult to assist with strengthening/mobility  Outcome: Progressing

## 2021-02-15 NOTE — TELEPHONE ENCOUNTER
SBE scheduled on 2/26/21 with Dr Tellez at [facility] gave pt verbal instructions/mailed

## 2021-02-16 DIAGNOSIS — R51.9 HEADACHE: ICD-10-CM

## 2021-02-22 ENCOUNTER — DOCUMENTATION (OUTPATIENT)
Dept: HEMATOLOGY ONCOLOGY | Facility: MEDICAL CENTER | Age: 56
End: 2021-02-22

## 2021-02-22 ENCOUNTER — OFFICE VISIT (OUTPATIENT)
Dept: HEMATOLOGY ONCOLOGY | Facility: MEDICAL CENTER | Age: 56
End: 2021-02-22
Payer: COMMERCIAL

## 2021-02-22 VITALS
DIASTOLIC BLOOD PRESSURE: 88 MMHG | SYSTOLIC BLOOD PRESSURE: 130 MMHG | OXYGEN SATURATION: 99 % | HEART RATE: 99 BPM | BODY MASS INDEX: 28.25 KG/M2 | RESPIRATION RATE: 18 BRPM | WEIGHT: 208.6 LBS | TEMPERATURE: 97 F | HEIGHT: 72 IN

## 2021-02-22 DIAGNOSIS — D50.0 IRON DEFICIENCY ANEMIA DUE TO CHRONIC BLOOD LOSS: Primary | ICD-10-CM

## 2021-02-22 DIAGNOSIS — R55 SYNCOPE, NEAR: ICD-10-CM

## 2021-02-22 PROCEDURE — 99215 OFFICE O/P EST HI 40 MIN: CPT | Performed by: PHYSICIAN ASSISTANT

## 2021-02-22 NOTE — PROGRESS NOTES
Left voicemail for patient that Saray Gonzales referred him to a cardiologists  I have scheduled an appointment for him for Friday, 2/26/21 at 3pm with Dr Tanvi Figueroa at [place], [place] [address]  Their phone number is [phone] should this date and time not work for [employer]

## 2021-02-22 NOTE — PROGRESS NOTES
[facility]  [employer] [facility] [facility] [address]  Hematology Ambulatory Follow-Up  Marichuy May, 1965, [id number]  2/22/2021    Assessment/Plan:    1  Iron deficiency anemia due to chronic blood loss  S/p iron infusions ending mid feb 2020  Hemoglobin continues to elevate, pre in the femoral my computer I do not result in a month's about and and is still acceptable with last hemoglobin of 12 0 grams/deciliter  Patient has been successful in having an elevated hemoglobin with each subsequent dose of treatment  Does not appear that the patient is overtly bleeding  Patient continues to follow-up with GI- endoscopy planned for Friday 2/26/21  Patient will follow-up in approximately four weeks with the blood work as outlined below  As long as patient's hemoglobin remains stable, we will discuss further interval blood testing in the future  Jocelin patient understands to contactent the office if he experiences any signs of progressive anemia     - CBC and differential; Future  - Ferritin; Future  - Iron; Future  - Iron Saturation %; Future  - TIBC; Future    2  Syncope, near  Patient reports seven year syncopal events over the past week  Patient states it has been at the [facility] since diagnosis with iron deficiency anemia/blood-loss anemia  Obviously, hemoglobin has stabilized  The Near syncopal episodes have not improved  Patient is sent to cardiology for further evaluation   - Ambulatory referral to Cardiology; Future    3  Right upper extremity superficial thrombophlebitis status post IV access from hospitalization  Patient was advised to continue on 81 mg of aspirin therapy  This was a provoked event  Patient has not had any other issues with IV site irritation as an outpatient  No need for further workup or for prolonged anticoagulation  The patient is scheduled for follow-up in approximately 4 weeks     Patient voiced agreement and understanding to the above  Patient knows to call the Hematology/Oncology office with any questions and concerns regarding the above  Barrier(s) to care: None  The patient is able to self care   ------------------------------------------------------------------------------------------------------    Chief Complaint   Patient presents with    Follow-up     Iron Deficiency Anemia     History of present illness: This is a 50-year-old male who was admitted to the emergency room after having several weeks of shortness of breath, three episodes of syncope at home in January 2021   Patient's past medical history significant for 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      Patient had been previously worked up for abnormalities by primary care doctor who recommended that the patient follow-up with GI as well as with Cardiology for a stress test   Unfortunately, patient's symptoms progressed and he presented to the emergency room  [address] [place] [address] work demonstrated hemoglobin in the 7 gram/deciliter range   Moreover, the patient was found to have a significant iron deficiency with a ferritin of three and an iron saturation of 1%   Patient underwent three Venofer infusions daily in the hospital   Patient's hemoglobin is in the 7 gram/deciliter range        Patient does not remember any bowel changes, blood in the toilet  [facility] does note that he had a colonoscopy and endoscopy in May that was negative for any abnormalities      GI has seen the patient in the hospital   Patient underwent colonoscopy in EGD that demonstrated questionable AVM in the small intestine   This area was cauterized   This area was not bleeding   Capsule endoscopy will be recommended as an outpatient  Capsule demonstrated angiectasis and follow up EGD is planned on 2/26/2021      Interval history:  Patient is feeling okay today  No major issues with IV iron  No side effects  Patient got his 1st COVID-19 vaccine  Patient has a scheduled endoscopy for Friday the 26th  Review of Systems   Constitutional: Negative for activity change, appetite change, fatigue and fever  HENT: Negative for nosebleeds  Respiratory: Negative for cough, choking and shortness of breath  Cardiovascular: Negative for chest pain, palpitations and leg swelling  Gastrointestinal: Negative for abdominal distention, abdominal pain, anal bleeding, blood in stool, constipation, diarrhea, nausea and vomiting  Endocrine: Negative for cold intolerance  Genitourinary: Negative for hematuria  Musculoskeletal: Negative for myalgias  Skin: Negative for color change, pallor and rash  Allergic/Immunologic: Negative for immunocompromised state  Neurological: Positive for syncope (near ) and light-headedness  Negative for headaches  Hematological: Negative for adenopathy  Does not bruise/bleed easily  All other systems reviewed and are negative        Patient Active Problem List   Diagnosis    Essential hypertension    Hyperlipidemia    Arm paresthesia, right    Headache    Tobacco use    Osteoarthritis of cervical spine    Colon cancer screening    Elevated PSA    Gastroesophageal reflux disease    Hematochezia    Other emphysema ([address] )    Prediabetes    Healthcare maintenance    Crews's esophagus without dysplasia    Tubular adenoma of colon    Syncope    Myalgia    Hip pain, bilateral    Osteoarthritis, hip, bilateral    Symptomatic anemia    BPH (benign prostatic hyperplasia)    Iron deficiency anemia due to chronic blood loss       Past Medical History:   Diagnosis Date    Colon polyp     Elevated PSA     Enlarged prostate     Full dentures     GERD (gastroesophageal reflux disease)     Hemorrhoid     Hyperlipidemia     Hypertension     Sleep apnea     No CPAP    Smoker     Wears glasses        Past Surgical History:   Procedure Laterality Date    APPENDECTOMY      COLONOSCOPY      EGD      FOOT SURGERY Right     bone removed    OTHER SURGICAL HISTORY      bone removal right arm-abnormal growth of surface bone    PROSTATE BIOPSY      SPLENECTOMY      ruptured       Family History   Problem Relation Age of Onset    Dementia Father     Heart disease Mother     Hypertension Mother     Hyperlipidemia Mother     Hypertension Sister     Hypertension Brother     No Known Problems Daughter     No Known Problems Son     Cancer Paternal Grandfather         prostate    No Known Problems Son        Social History     Socioeconomic History    Marital status: Legally      Spouse name: None    Number of children: None    Years of education: None    Highest education level: None   Occupational History    Occupation: supervisor     Employer: [employer]   Social Needs    Financial resource strain: None    Food insecurity     Worry: None     Inability: None    Transportation needs     Medical: None     Non-medical: None   Tobacco Use    Smoking status: Former Smoker     Packs/day: 0 50     Years: 10 00     Pack years: 5 00     Types: Cigarettes     Quit date: 2/22/2021    Smokeless tobacco: Never Used   Substance and Sexual Activity    Alcohol use: Never     Frequency: Never    Drug use: No    Sexual activity: None   Lifestyle    Physical activity     Days per week: None     Minutes per session: None    Stress: None   Relationships    Social connections     Talks on phone: None     Gets together: None     Attends Bahai service: None     Active member of club or organization: None     Attends meetings of clubs or organizations: None     Relationship status: None    Intimate partner violence     Fear of current or ex partner: None     Emotionally abused: None     Physically abused: None     Forced sexual activity: None   Other Topics Concern    None   Social History Narrative    None         Current Outpatient Medications:     acetaminophen (TYLENOL) 325 mg tablet, Take 2 tablets (650 mg total) by mouth every 6 (six) hours as needed for mild pain, headaches or fever, Disp: 30 tablet, Rfl: 0    aspirin (ECOTRIN LOW STRENGTH) 81 mg EC tablet, Take 1 tablet (81 mg total) by mouth daily, Disp: 30 tablet, Rfl: 0    finasteride (PROSCAR) 5 mg tablet, Take 1 tablet (5 mg total) by mouth daily (Patient taking differently: Take 5 mg by mouth every morning Pt said he is still taking this), Disp: 90 tablet, Rfl: 3    nicotine (NICODERM CQ) 14 mg/24hr TD 24 hr patch, Place 1 patch on the skin daily, Disp: 28 patch, Rfl: 0    pantoprazole (PROTONIX) 40 mg tablet, Take 1 tablet (40 mg total) by mouth 2 (two) times a day 30-60 min before breakfast, Disp: 90 tablet, Rfl: 0    tamsulosin (FLOMAX) 0 4 mg, Take 1 capsule (0 4 mg total) by mouth daily with dinner, Disp: 30 capsule, Rfl: 3    verapamil (CALAN-SR) 120 mg CR tablet, Take 1 tablet (120 mg total) by mouth daily, Disp: 90 tablet, Rfl: 1    No Known Allergies    Objective:  /88 (BP Location: Right arm, Patient Position: Sitting, Cuff Size: Adult)   Pulse 99   Temp (!) 97 °F (36 1 °C)   Resp 18   Ht 6' (1 829 m)   Wt 94 6 kg (208 lb 9 6 oz)   SpO2 99%   BMI 28 29 kg/m²    Physical Exam  Constitutional:       General: He is not in acute distress  Appearance: He is well-developed  HENT:      Head: Normocephalic and atraumatic  Mouth/Throat:      Pharynx: No oropharyngeal exudate  Eyes:      General: No scleral icterus  Conjunctiva/sclera: Conjunctivae normal       Pupils: Pupils are equal, round, and reactive to light  Neck:      Musculoskeletal: Normal range of motion  Cardiovascular:      Rate and Rhythm: Normal rate and regular rhythm  Heart sounds: No murmur  Pulmonary:      Effort: Pulmonary effort is normal  No respiratory distress  Breath sounds: Normal breath sounds  Abdominal:      General: Bowel sounds are normal  There is no distension  Palpations: Abdomen is soft        Tenderness: There is no abdominal tenderness  Musculoskeletal:         General: No tenderness  Lymphadenopathy:      Cervical: No cervical adenopathy  Skin:     Coloration: Skin is not pale  Findings: No erythema or rash  Neurological:      Mental Status: He is alert and oriented to person, place, and time  Cranial Nerves: No cranial nerve deficit  Result Review  Labs:  Hospital Outpatient Visit on 02/15/2021   Component Date Value Ref Range Status    Hemoglobin 02/15/2021 12 0  12 0 - 17 0 g/dL Final    Hematocrit 02/15/2021 41 4  36 5 - 49 3 % Final   Hospital Outpatient Visit on 02/08/2021   Component Date Value Ref Range Status    Hemoglobin 02/08/2021 10 6* 12 0 - 17 0 g/dL Final    Hematocrit 02/08/2021 37 8  36 5 - 49 3 % Final   Hospital Outpatient Visit on 02/03/2021   Component Date Value Ref Range Status    Hemoglobin 02/03/2021 10 5* 12 0 - 17 0 g/dL Final    Hematocrit 02/03/2021 36 7  36 5 - 49 3 % Final       Imaging:   VAS upper limb venous duplex scan, unilateral/limited     [facility] REPORT  CLINICAL:  Indications:  Patient presents with upper lower extremity swelling and pain x  several days  Risk Factors  The patient has history of HTN and smoking (current) 0 5 ppd  He has no  history of HLD  FINDINGS:     Right                             Impression       Thrombus    Antecubital                                        Acute       Ceph AC                                            Acute       Cephalic Upper Arm Distal                          Acute       Ceph Mid Forearm                                   Acute       Cephalic Vein - Forearm Proximal                   Acute       Innominate Vein                   Normal (Patent)                       CONCLUSION:  Impression  RIGHT UPPER LIMB:  Evidence of superficial thrombophlebitis noted in the cephalic vein from distal  upper arm to mid forearm  The antecubital vein was included    No evidence of acute or chronic deep vein thrombosis  Doppler evaluation shows a normal response to augmentation maneuvers  LEFT UPPER LIMB LIMITED:  Evaluation shows no evidence of thrombus in the internal jugular vein and  subclavian vein  Technical findings given to [employer] 16:27     SIGNATURE:  Electronically Signed by: Roland Worthy MD, [address] on 2021-01-25 07:41:36 PM      Please note: This report has been generated by a voice recognition software system  Therefore there may be syntax, spelling, and/or grammatical errors  Please call if you have any questions

## 2021-02-25 ENCOUNTER — CONSULT (OUTPATIENT)
Dept: CARDIOLOGY CLINIC | Facility: CLINIC | Age: 56
End: 2021-02-25
Payer: COMMERCIAL

## 2021-02-25 VITALS
WEIGHT: 202 LBS | HEIGHT: 72 IN | BODY MASS INDEX: 27.36 KG/M2 | TEMPERATURE: 98.6 F | DIASTOLIC BLOOD PRESSURE: 94 MMHG | OXYGEN SATURATION: 98 % | SYSTOLIC BLOOD PRESSURE: 128 MMHG

## 2021-02-25 DIAGNOSIS — R55 SYNCOPE, NEAR: Primary | ICD-10-CM

## 2021-02-25 DIAGNOSIS — I10 ESSENTIAL HYPERTENSION: ICD-10-CM

## 2021-02-25 DIAGNOSIS — E78.2 MIXED HYPERLIPIDEMIA: ICD-10-CM

## 2021-02-25 DIAGNOSIS — R06.02 SHORTNESS OF BREATH: ICD-10-CM

## 2021-02-25 PROCEDURE — 99205 OFFICE O/P NEW HI 60 MIN: CPT | Performed by: INTERNAL MEDICINE

## 2021-02-25 PROCEDURE — 3074F SYST BP LT 130 MM HG: CPT | Performed by: INTERNAL MEDICINE

## 2021-02-25 PROCEDURE — 3080F DIAST BP >= 90 MM HG: CPT | Performed by: INTERNAL MEDICINE

## 2021-02-25 RX ORDER — ATORVASTATIN CALCIUM 20 MG/1
20 TABLET, FILM COATED ORAL DAILY
Qty: 90 TABLET | Refills: 3 | Status: SHIPPED | OUTPATIENT
Start: 2021-02-25 | End: 2022-03-08

## 2021-02-25 NOTE — PROGRESS NOTES
[facility]  [address] [place] [address] [address]   [place], [address]    Cardiology Consultation    Di Rojas  [id number]  1965      Consult for: Near syncope  Appreciate consult by: Lobito Gifford DO      Discussion/Summary:     1  Syncope, near  Ambulatory referral to Cardiology    Holter monitor - 48 hour    CANCELED: POCT ECG   2  Essential hypertension  CANCELED: POCT ECG   3  Shortness of breath  NM myocardial perfusion spect (stress and/or rest)   4  Mixed hyperlipidemia  atorvastatin (LIPITOR) 20 mg tablet        - Patient with exertional dyspnea and syncope/near syncope symptoms  Symptoms preceeded anemia diagnosis and continue even after anemia resolved  - Echocardiogram was reviewed - normal EF and valve function   - Nuclear stress test will be obtained for further evaluation  - 48 hour holter monitor ordered  - He may proceed with endoscopy as he had similar procedure without complications recently  HPI:     Di Rojas is a 63 y o  here for evaluation of near syncope  The patient has had multiple episodes of near syncope over the past few weeks  He had one syncopal episode 3 weeks ago while at work  Each time, he feels lightheaded and sees black prior to event  He denies any chest pain or palpitations  He has some shortness of breath with exertion  No inciting event but feels symptoms may be precipitated by exertion  He feels lightheaded if he gets up too quickly at times  He was admitted to [facility] in January 2021 with symptomatic anemia  Will be undergoing balloon enteroscopy tomorrow  Anemia has resolved but still with symptoms  No history of similar       Past Medical History:   Diagnosis Date    Anemia     Colon polyp     Elevated PSA     Enlarged prostate     Full dentures     GERD (gastroesophageal reflux disease)     Hemorrhoid     Hyperlipidemia     Hypertension     Sleep apnea     No CPAP    Smoker     Wears glasses      Social History     Tobacco Use    Smoking status: Former Smoker     Packs/day: 0 50     Years: 10 00     Pack years: 5 00     Types: Cigarettes     Quit date: 2021     Years since quittin 0    Smokeless tobacco: Never Used   Substance Use Topics    Alcohol use: Not Currently     Frequency: Never    Drug use: No      Family History   Problem Relation Age of Onset    Dementia Father     Heart disease Mother     Hypertension Mother     Hyperlipidemia Mother     Hypertension Sister     Hypertension Brother     No Known Problems Daughter     No Known Problems Son     Cancer Paternal Grandfather         prostate    No Known Problems Son      Past Surgical History:   Procedure Laterality Date    APPENDECTOMY      COLONOSCOPY      EGD      FOOT SURGERY Right     bone removed    OTHER SURGICAL HISTORY      bone removal right arm-abnormal growth of surface bone    PROSTATE BIOPSY      SPLENECTOMY      ruptured       Current Outpatient Medications:     acetaminophen (TYLENOL) 325 mg tablet, Take 2 tablets (650 mg total) by mouth every 6 (six) hours as needed for mild pain, headaches or fever, Disp: 30 tablet, Rfl: 0    aspirin (ECOTRIN LOW STRENGTH) 81 mg EC tablet, Take 1 tablet (81 mg total) by mouth daily, Disp: 30 tablet, Rfl: 0    finasteride (PROSCAR) 5 mg tablet, Take 1 tablet (5 mg total) by mouth daily (Patient taking differently: Take 5 mg by mouth every morning Pt said he is still taking this), Disp: 90 tablet, Rfl: 3    nicotine (NICODERM CQ) 14 mg/24hr TD 24 hr patch, Place 1 patch on the skin daily, Disp: 28 patch, Rfl: 0    pantoprazole (PROTONIX) 40 mg tablet, Take 1 tablet (40 mg total) by mouth 2 (two) times a day 30-60 min before breakfast, Disp: 90 tablet, Rfl: 0    tamsulosin (FLOMAX) 0 4 mg, Take 1 capsule (0 4 mg total) by mouth daily with dinner, Disp: 30 capsule, Rfl: 3    verapamil (CALAN-SR) 120 mg CR tablet, Take 1 tablet (120 mg total) by mouth daily, Disp: 90 tablet, Rfl: 1    atorvastatin (LIPITOR) 20 mg tablet, Take 1 tablet (20 mg total) by mouth daily, Disp: 90 tablet, Rfl: 3  No current facility-administered medications for this visit  No Known Allergies    Review of Systems:   Review of Systems   Constitutional: Negative for chills and fever  HENT: Negative for ear pain and sore throat  Eyes: Negative for pain and visual disturbance  Respiratory: Negative for cough and shortness of breath  Cardiovascular: Negative for chest pain and palpitations  Gastrointestinal: Negative for abdominal pain and vomiting  Genitourinary: Negative for dysuria and hematuria  Musculoskeletal: Negative for arthralgias and back pain  Skin: Negative for color change and rash  Neurological: Positive for syncope  Negative for seizures  All other systems reviewed and are negative  Physical Examination:     Vitals:    02/25/21 1519   BP: 128/94   BP Location: Left arm   Patient Position: Sitting   Cuff Size: Standard   Temp: 98 6 °F (37 °C)   TempSrc: Temporal   SpO2: 98%   Weight: 91 6 kg (202 lb)   Height: 6' (1 829 m)       Physical Exam   Constitutional: He appears healthy  No distress  Eyes: Pupils are equal, round, and reactive to light  Conjunctivae are normal    Neck: Normal range of motion  Neck supple  No JVD present  Cardiovascular: Normal rate, regular rhythm and normal heart sounds  Exam reveals no gallop and no friction rub  No murmur heard  Pulmonary/Chest: Effort normal and breath sounds normal  He has no wheezes  He has no rales  Musculoskeletal:         General: No tenderness, deformity or edema  Neurological: He is alert and oriented to person, place, and time  Skin: Skin is warm and dry          Labs:     Lab Results   Component Value Date    WBC 6 39 01/25/2021    HGB 12 0 02/15/2021    HCT 41 4 02/15/2021    MCV 80 (L) 01/25/2021    RDW 23 6 (H) 01/25/2021     (H) 01/25/2021     BMP:  Lab Results   Component Value Date    SODIUM 140 01/19/2021    K 3 4 (L) 01/19/2021     01/19/2021    CO2 22 01/19/2021    BUN 10 01/19/2021    CREATININE 1 11 01/19/2021    GLUC 86 01/19/2021    GLUF 95 01/29/2020    CALCIUM 8 4 01/19/2021    EGFR 74 01/19/2021    MG 1 9 01/16/2021     LFT:  Lab Results   Component Value Date    AST 19 01/15/2021    ALT 38 01/15/2021    ALKPHOS 97 01/15/2021    TP 7 2 01/15/2021    ALB 3 6 01/15/2021      Lab Results   Component Value Date    XMA6GSPKVWXM 2 406 01/28/2020     Lab Results   Component Value Date    HGBA1C 5 4 01/29/2020     Lipid Profile:   Lab Results   Component Value Date    CHOLESTEROL 148 03/11/2020    HDL 42 03/11/2020    LDLCALC 70 03/11/2020    TRIG 182 (H) 03/11/2020     Lab Results   Component Value Date    CHOLESTEROL 148 03/11/2020    CHOLESTEROL 203 (H) 01/29/2020     Lab Results   Component Value Date    TROPONINI <0 02 01/15/2021     Lab Results   Component Value Date    NTBNP 84 01/15/2021      Recent Results (from the past 672 hour(s))   Hemoglobin and hematocrit, blood    Collection Time: 02/03/21  1:32 PM   Result Value Ref Range    Hemoglobin 10 5 (L) 12 0 - 17 0 g/dL    Hematocrit 36 7 36 5 - 49 3 %   Hemoglobin and hematocrit, blood    Collection Time: 02/08/21 11:52 AM   Result Value Ref Range    Hemoglobin 10 6 (L) 12 0 - 17 0 g/dL    Hematocrit 37 8 36 5 - 49 3 %   Hemoglobin and hematocrit, blood    Collection Time: 02/15/21 12:05 PM   Result Value Ref Range    Hemoglobin 12 0 12 0 - 17 0 g/dL    Hematocrit 41 4 36 5 - 49 3 %       Imaging & Testing   I have personally reviewed pertinent reports        Cardiac Testing     Results for orders placed during the hospital encounter of 01/28/20   Echo complete with contrast if indicated    N[address]  [address] [facility]  [phone]    Transthoracic Echocardiogram  2D, M-mode, Doppler, and Color Doppler    Study date:  29-Jan-2020    Patient: Olesya Rodriguez  MR number: [id number]  Account number: [de-identified]  : 1965  Age: 53 years  Gender: Male  Status: Outpatient  Location: Bedside  Height: 72 in  Weight: 193 6 lb  BP: 126/ 77 mmHg    Indications: CVA    Diagnoses: I67 9 - Cerebrovascular disease, unspecified    Sonographer:  PRETTY Reddy  Referring Physician:  Marion Evangelista DO  Group:  [facility]  Interpreting Physician:  Saul Duff DO    SUMMARY    LEFT VENTRICLE:  Systolic function was normal by visual assessment  Ejection fraction was estimated in the range of 55 % to 60 %  There were no regional wall motion abnormalities  Wall thickness was at the upper limits of normal   Doppler parameters were consistent with abnormal left ventricular relaxation (grade 1 diastolic dysfunction)  ATRIAL SEPTUM:  No defect or patent foramen ovale was identified  A bubble study was performed  There was no right-to-left shunt, in the baseline state  MITRAL VALVE:  There was mild regurgitation  AORTIC VALVE:  There was no evidence for stenosis  There was no regurgitation  TRICUSPID VALVE:  There was trace regurgitation  The tricuspid jet envelope definition was inadequate for estimation of RV systolic pressure  HISTORY: PRIOR HISTORY: HTN, HLD, Tobacco Use    PROCEDURE: The procedure was performed at the bedside  This was a routine study  The transthoracic approach was used  The study included complete 2D imaging, M-mode, complete spectral Doppler, and color Doppler  The heart rate was 72 bpm,  at the start of the study  Intravenous contrast (agitated saline, 10 ml) was administered to evaluate shunting  Intravenous contrast ( 10 ml) was administered  Image quality was adequate  LEFT VENTRICLE: Size was normal  Systolic function was normal by visual assessment  Ejection fraction was estimated in the range of 55 % to 60 %  There were no regional wall motion abnormalities   Wall thickness was at the upper limits of  normal  DOPPLER: Doppler parameters were consistent with abnormal left ventricular relaxation (grade 1 diastolic dysfunction)  RIGHT VENTRICLE: The size was normal  Systolic function was normal  DOPPLER: Systolic pressure was within the normal range  LEFT ATRIUM: Size was normal  No thrombus was identified  ATRIAL SEPTUM: No defect or patent foramen ovale was identified  A bubble study was performed  There was no right-to-left shunt, in the baseline state  RIGHT ATRIUM: Size was normal     MITRAL VALVE: Valve structure was normal  There was normal leaflet separation  No echocardiographic evidence for prolapse  DOPPLER: The transmitral velocity was within the normal range  There was no evidence for stenosis  There was mild  regurgitation  AORTIC VALVE: The valve was trileaflet  Leaflets exhibited normal thickness and normal cuspal separation  DOPPLER: Transaortic velocity was within the normal range  There was no evidence for stenosis  There was no regurgitation  TRICUSPID VALVE: The valve structure was normal  There was normal leaflet separation  DOPPLER: The transtricuspid velocity was within the normal range  There was trace regurgitation  The tricuspid jet envelope definition was inadequate for  estimation of RV systolic pressure  PULMONIC VALVE: Leaflets exhibited normal thickness, no calcification, and normal cuspal separation  DOPPLER: The transpulmonic velocity was within the normal range  There was no regurgitation  PERICARDIUM: There was no thickening  There was no pericardial effusion  AORTA: The root exhibited normal size      PULMONARY ARTERY: The size was normal  The morphology appeared normal     SYSTEM MEASUREMENT TABLES    2D mode  AoR Diam 2D: 3 cm  LA Diam (2D): 3 5 cm  LA/Ao (2D): 1 17  FS (2D Teich): 28 7 %  IVSd (2D): 0 99 cm  LVDEV: 139 cmï¾³  LVESV: 62 7 cmï¾³  LVIDd(2D): 5 36 cm  LVISd (2D): 3 82 cm  LVPWd (2D): 1 02 cm  SV (Teich): 76 3 cmï¾³    Apical four chamber  LVEF A4C: 59 %    Unspecified Scan Mode  MV Peak A Harshad: 719 mm/s  MV Peak E Harshad  Mean: 561 mm/s  MVA (PHT): 3 1 cmï¾²  PHT: 71 ms  RA Area: 12 4 cmï¾²  RA Volume: 24 3 cmï¾³  TAPSE: 2 cm    [facility] Accredited Echocardiography Laboratory    Prepared and electronically signed by    Jared Cain DO  Signed 29-Jan-2020 16:39:07       EKG: Personally reviewed  NSR      Jared Cain DO [facility]  [phone]  Please call with any questions

## 2021-02-26 ENCOUNTER — HOSPITAL ENCOUNTER (OUTPATIENT)
Dept: GASTROENTEROLOGY | Facility: HOSPITAL | Age: 56
Setting detail: OUTPATIENT SURGERY
Discharge: HOME | End: 2021-02-26
Attending: INTERNAL MEDICINE | Admitting: INTERNAL MEDICINE
Payer: COMMERCIAL

## 2021-02-26 ENCOUNTER — ANESTHESIA (OUTPATIENT)
Dept: GASTROENTEROLOGY | Facility: HOSPITAL | Age: 56
End: 2021-02-26

## 2021-02-26 ENCOUNTER — ANESTHESIA EVENT (OUTPATIENT)
Dept: GASTROENTEROLOGY | Facility: HOSPITAL | Age: 56
End: 2021-02-26

## 2021-02-26 VITALS
TEMPERATURE: 96 F | HEIGHT: 72 IN | RESPIRATION RATE: 16 BRPM | DIASTOLIC BLOOD PRESSURE: 78 MMHG | BODY MASS INDEX: 27.36 KG/M2 | WEIGHT: 202 LBS | OXYGEN SATURATION: 98 % | HEART RATE: 78 BPM | SYSTOLIC BLOOD PRESSURE: 130 MMHG

## 2021-02-26 DIAGNOSIS — K92.2 GASTROINTESTINAL HEMORRHAGE, UNSPECIFIED GASTROINTESTINAL HEMORRHAGE TYPE: ICD-10-CM

## 2021-02-26 DIAGNOSIS — I99.8 ANGIECTASIS: ICD-10-CM

## 2021-02-26 PROCEDURE — 44799 UNLISTED PX SMALL INTESTINE: CPT | Performed by: INTERNAL MEDICINE

## 2021-02-26 RX ORDER — SODIUM CHLORIDE 9 MG/ML
125 INJECTION, SOLUTION INTRAVENOUS CONTINUOUS
Status: CANCELLED | OUTPATIENT
Start: 2021-02-26

## 2021-02-26 RX ORDER — SODIUM CHLORIDE, SODIUM LACTATE, POTASSIUM CHLORIDE, CALCIUM CHLORIDE 600; 310; 30; 20 MG/100ML; MG/100ML; MG/100ML; MG/100ML
INJECTION, SOLUTION INTRAVENOUS CONTINUOUS PRN
Status: DISCONTINUED | OUTPATIENT
Start: 2021-02-26 | End: 2021-02-26

## 2021-02-26 RX ORDER — PROPOFOL 10 MG/ML
INJECTION, EMULSION INTRAVENOUS CONTINUOUS PRN
Status: DISCONTINUED | OUTPATIENT
Start: 2021-02-26 | End: 2021-02-26

## 2021-02-26 RX ORDER — PROPOFOL 10 MG/ML
INJECTION, EMULSION INTRAVENOUS AS NEEDED
Status: DISCONTINUED | OUTPATIENT
Start: 2021-02-26 | End: 2021-02-26

## 2021-02-26 RX ADMIN — TOPICAL ANESTHETIC 1 SPRAY: 200 SPRAY DENTAL; PERIODONTAL at 09:06

## 2021-02-26 RX ADMIN — SODIUM CHLORIDE, SODIUM LACTATE, POTASSIUM CHLORIDE, AND CALCIUM CHLORIDE: .6; .31; .03; .02 INJECTION, SOLUTION INTRAVENOUS at 09:04

## 2021-02-26 RX ADMIN — PROPOFOL 50 MG: 10 INJECTION, EMULSION INTRAVENOUS at 09:09

## 2021-02-26 RX ADMIN — PROPOFOL 120 MCG/KG/MIN: 10 INJECTION, EMULSION INTRAVENOUS at 09:09

## 2021-02-26 NOTE — ANESTHESIA PREPROCEDURE EVALUATION
Procedure:  EGD WITH SINGLE BALLOON ENTEROSCOPY    Relevant Problems   CARDIO   (+) Essential hypertension   (+) Hyperlipidemia      GI/HEPATIC   (+) Gastroesophageal reflux disease      /RENAL   (+) BPH (benign prostatic hyperplasia)      HEMATOLOGY   (+) Iron deficiency anemia due to chronic blood loss   (+) Symptomatic anemia      MUSCULOSKELETAL   (+) Osteoarthritis of cervical spine   (+) Osteoarthritis, hip, bilateral      NEURO/PSYCH   (+) Arm paresthesia, right   (+) Headache      PULMONARY   (+) Other emphysema (HCC)        Physical Exam    Airway    Mallampati score: II  TM Distance: >3 FB  Neck ROM: full     Dental   upper dentures and lower dentures,     Cardiovascular  Cardiovascular exam normal    Pulmonary  Pulmonary exam normal     Other Findings  Normal sinus rhythm  Normal ECG  When compared with ECG of 28-JAN-2020 16:01,  No significant change was found  Confirmed by Laurel Gorman ([id number]) on 1/15/2021 2:26:57 PM      Anesthesia Plan  ASA Score- 2     Anesthesia Type- IV sedation with anesthesia with ASA Monitors  Additional Monitors:   Airway Plan:           Plan Factors-Exercise tolerance (METS): >4 METS  Chart reviewed  EKG reviewed  Existing labs reviewed  Patient is not a current smoker  Patient not instructed to abstain from smoking on day of procedure  Patient did not smoke on day of surgery  Induction- intravenous  Postoperative Plan-     Informed Consent- Anesthetic plan and risks discussed with patient  I personally reviewed this patient with the CRNA  Discussed and agreed on the Anesthesia Plan with the CRNA  [place]

## 2021-02-26 NOTE — ANESTHESIA POSTPROCEDURE EVALUATION
Post-Op Assessment Note    CV Status:  Stable  Pain Score: 0    Pain management: adequate     Mental Status:  Alert and awake   Hydration Status:  Euvolemic   PONV Controlled:  Controlled   Airway Patency:  Patent      Post Op Vitals Reviewed: Yes      Staff: CRNA   Comments: arousable, following commands and verbally responsive  Denies any discomfort at this time  RRR, Nonlabored, VSS  No complications documented      BP (!) 73/44 (02/26/21 0944)    Temp (!) 96 °F (35 6 °C) (02/26/21 0944)    Pulse 71 (02/26/21 0944)   Resp 18 (02/26/21 0944)    SpO2 92 % (02/26/21 0944)

## 2021-03-10 ENCOUNTER — IMMUNIZATIONS (OUTPATIENT)
Dept: FAMILY MEDICINE CLINIC | Facility: HOSPITAL | Age: 56
End: 2021-03-10

## 2021-03-10 DIAGNOSIS — Z23 ENCOUNTER FOR IMMUNIZATION: Primary | ICD-10-CM

## 2021-03-10 PROCEDURE — 91301 SARS-COV-2 / COVID-19 MRNA VACCINE (MODERNA) 100 MCG: CPT

## 2021-03-10 PROCEDURE — 0012A SARS-COV-2 / COVID-19 MRNA VACCINE (MODERNA) 100 MCG: CPT

## 2021-03-15 ENCOUNTER — RA CDI HCC (OUTPATIENT)
Dept: OTHER | Facility: HOSPITAL | Age: 56
End: 2021-03-15

## 2021-03-15 DIAGNOSIS — N40.1 BENIGN PROSTATIC HYPERPLASIA WITH LOWER URINARY TRACT SYMPTOMS, SYMPTOM DETAILS UNSPECIFIED: ICD-10-CM

## 2021-03-15 DIAGNOSIS — N40.1 BENIGN PROSTATIC HYPERPLASIA WITH URINARY OBSTRUCTION: ICD-10-CM

## 2021-03-15 DIAGNOSIS — N13.8 BENIGN PROSTATIC HYPERPLASIA WITH URINARY OBSTRUCTION: ICD-10-CM

## 2021-03-15 RX ORDER — TAMSULOSIN HYDROCHLORIDE 0.4 MG/1
0.4 CAPSULE ORAL
Qty: 30 CAPSULE | Refills: 0 | Status: SHIPPED | OUTPATIENT
Start: 2021-03-15 | End: 2021-04-02 | Stop reason: SDUPTHER

## 2021-03-15 RX ORDER — FINASTERIDE 5 MG/1
TABLET, FILM COATED ORAL
Qty: 90 TABLET | Refills: 3 | Status: SHIPPED | OUTPATIENT
Start: 2021-03-15 | End: 2021-04-02 | Stop reason: SDUPTHER

## 2021-03-15 NOTE — PROGRESS NOTES
[place] [id number]  coding oppertunities             Chart reviewed, (number of) suggestions sent to provider: 1     Problem listed updated   Provider Accepted, (number of) suggestions accepted: 1              [place] [address]  coding oppertunities             Chart reviewed, (number of) suggestions sent to provider: 1                 J43 8 Other emphysema, unspecified ([place] [address] )     If this is correct, please document and assess at your next visit 3/22/21

## 2021-03-17 LAB
ALBUMIN SERPL-MCNC: 4.4 G/DL (ref 3.8–4.9)
ALBUMIN/GLOB SERPL: 1.5 {RATIO} (ref 1.2–2.2)
ALP SERPL-CCNC: 118 IU/L (ref 39–117)
ALT SERPL-CCNC: 22 IU/L (ref 0–44)
AST SERPL-CCNC: 22 IU/L (ref 0–40)
BASOPHILS # BLD AUTO: 0.2 X10E3/UL (ref 0–0.2)
BASOPHILS NFR BLD AUTO: 3 %
BILIRUB SERPL-MCNC: <0.2 MG/DL (ref 0–1.2)
BUN SERPL-MCNC: 12 MG/DL (ref 6–24)
BUN/CREAT SERPL: 10 (ref 9–20)
CALCIUM SERPL-MCNC: 9.6 MG/DL (ref 8.7–10.2)
CHLORIDE SERPL-SCNC: 102 MMOL/L (ref 96–106)
CHOLEST SERPL-MCNC: 139 MG/DL (ref 100–199)
CO2 SERPL-SCNC: 24 MMOL/L (ref 20–29)
CREAT SERPL-MCNC: 1.19 MG/DL (ref 0.76–1.27)
EOSINOPHIL # BLD AUTO: 0.4 X10E3/UL (ref 0–0.4)
EOSINOPHIL NFR BLD AUTO: 6 %
ERYTHROCYTE [DISTWIDTH] IN BLOOD BY AUTOMATED COUNT: 22.8 % (ref 11.6–15.4)
FERRITIN SERPL-MCNC: 41 NG/ML (ref 30–400)
GLOBULIN SER-MCNC: 2.9 G/DL (ref 1.5–4.5)
GLUCOSE SERPL-MCNC: 90 MG/DL (ref 65–99)
HBA1C MFR BLD: 5.2 % (ref 4.8–5.6)
HCT VFR BLD AUTO: 41.1 % (ref 37.5–51)
HDLC SERPL-MCNC: 37 MG/DL
HGB BLD-MCNC: 12.7 G/DL (ref 13–17.7)
IMM GRANULOCYTES # BLD: 0 X10E3/UL (ref 0–0.1)
IMM GRANULOCYTES NFR BLD: 0 %
LDLC SERPL CALC-MCNC: 70 MG/DL (ref 0–99)
LYMPHOCYTES # BLD AUTO: 3 X10E3/UL (ref 0.7–3.1)
LYMPHOCYTES NFR BLD AUTO: 40 %
MCH RBC QN AUTO: 23.6 PG (ref 26.6–33)
MCHC RBC AUTO-ENTMCNC: 30.9 G/DL (ref 31.5–35.7)
MCV RBC AUTO: 76 FL (ref 79–97)
MICRODELETION SYND BLD/T FISH: NORMAL
MONOCYTES # BLD AUTO: 0.7 X10E3/UL (ref 0.1–0.9)
MONOCYTES NFR BLD AUTO: 10 %
MORPHOLOGY BLD-IMP: ABNORMAL
NEUTROPHILS # BLD AUTO: 3 X10E3/UL (ref 1.4–7)
NEUTROPHILS NFR BLD AUTO: 41 %
PLATELET # BLD AUTO: 595 X10E3/UL (ref 150–450)
POTASSIUM SERPL-SCNC: 4.5 MMOL/L (ref 3.5–5.2)
PROT SERPL-MCNC: 7.3 G/DL (ref 6–8.5)
RBC # BLD AUTO: 5.39 X10E6/UL (ref 4.14–5.8)
SL AMB EGFR AFRICAN AMERICAN: 78 ML/MIN/1.73
SL AMB EGFR NON AFRICAN AMERICAN: 68 ML/MIN/1.73
SODIUM SERPL-SCNC: 140 MMOL/L (ref 134–144)
TRIGL SERPL-MCNC: 188 MG/DL (ref 0–149)
WBC # BLD AUTO: 7.4 X10E3/UL (ref 3.4–10.8)

## 2021-03-18 LAB
IRON SATN MFR SERPL: 7 % (ref 15–55)
IRON SERPL-MCNC: 30 UG/DL (ref 38–169)
TIBC SERPL-MCNC: 424 UG/DL (ref 250–450)
UIBC SERPL-MCNC: 394 UG/DL (ref 111–343)

## 2021-03-20 PROBLEM — R55 SYNCOPE: Status: RESOLVED | Noted: 2020-12-29 | Resolved: 2021-03-20

## 2021-03-22 ENCOUNTER — OFFICE VISIT (OUTPATIENT)
Dept: FAMILY MEDICINE CLINIC | Facility: CLINIC | Age: 56
End: 2021-03-22
Payer: COMMERCIAL

## 2021-03-22 VITALS
WEIGHT: 206 LBS | HEART RATE: 82 BPM | SYSTOLIC BLOOD PRESSURE: 118 MMHG | TEMPERATURE: 97.5 F | BODY MASS INDEX: 27.9 KG/M2 | OXYGEN SATURATION: 100 % | RESPIRATION RATE: 18 BRPM | DIASTOLIC BLOOD PRESSURE: 80 MMHG | HEIGHT: 72 IN

## 2021-03-22 DIAGNOSIS — E78.2 MIXED HYPERLIPIDEMIA: ICD-10-CM

## 2021-03-22 DIAGNOSIS — I10 ESSENTIAL HYPERTENSION: Primary | ICD-10-CM

## 2021-03-22 DIAGNOSIS — D50.0 IRON DEFICIENCY ANEMIA DUE TO CHRONIC BLOOD LOSS: ICD-10-CM

## 2021-03-22 DIAGNOSIS — R97.20 ELEVATED PSA: ICD-10-CM

## 2021-03-22 PROBLEM — Z90.81 S/P SPLENECTOMY: Status: ACTIVE | Noted: 2021-03-22

## 2021-03-22 PROBLEM — R73.03 PREDIABETES: Status: RESOLVED | Noted: 2020-09-15 | Resolved: 2021-03-22

## 2021-03-22 PROBLEM — Z72.0 TOBACCO USE: Status: RESOLVED | Noted: 2020-01-28 | Resolved: 2021-03-22

## 2021-03-22 PROCEDURE — 99213 OFFICE O/P EST LOW 20 MIN: CPT | Performed by: FAMILY MEDICINE

## 2021-03-22 PROCEDURE — 3725F SCREEN DEPRESSION PERFORMED: CPT | Performed by: FAMILY MEDICINE

## 2021-03-22 NOTE — PROGRESS NOTES
Assessment/Plan:    No problem-specific Assessment & Plan notes found for this encounter     htn stable w/o constipation or edema  Elevated PSA, on flomax/proscar, has urology f/u  tob use, on patch now  Overweight, he will try harder, try to get under 200#, motivated to start an exercise program outdoors  Labs reviewed     Diagnoses and all orders for this visit:    Essential hypertension    Elevated PSA    Mixed hyperlipidemia    Iron deficiency anemia due to chronic blood loss              Return in about 6 months (around 9/15/2021) for Annual physical     Subjective:      Patient ID: Julisa Strauss is a 63 y o  male  Chief Complaint   Patient presents with    Follow-up    Hypertension     sas/cma       HPI  Labs reviewed  Back on lipitor 20mg/d  Did have leg aches that did not improve with stopping  Legs fatigue after 1hr  No swelling    Flow is good on meds  uro Dr Jazmyne Parra  psa elevated in past    The following portions of the patient's history were reviewed and updated as appropriate: allergies, current medications, past family history, past medical history, past social history, past surgical history and problem list     Review of Systems   Cardiovascular: Negative for leg swelling  Neurological: Negative for dizziness           Current Outpatient Medications   Medication Sig Dispense Refill    acetaminophen (TYLENOL) 325 mg tablet Take 2 tablets (650 mg total) by mouth every 6 (six) hours as needed for mild pain, headaches or fever 30 tablet 0    aspirin (ECOTRIN LOW STRENGTH) 81 mg EC tablet Take 1 tablet (81 mg total) by mouth daily 30 tablet 0    atorvastatin (LIPITOR) 20 mg tablet Take 1 tablet (20 mg total) by mouth daily 90 tablet 3    finasteride (PROSCAR) 5 mg tablet TAKE 1 TABLET BY MOUTH EVERY DAY 90 tablet 3    nicotine (NICODERM CQ) 14 mg/24hr TD 24 hr patch Place 1 patch on the skin daily 28 patch 0    pantoprazole (PROTONIX) 40 mg tablet Take 1 tablet (40 mg total) by mouth 2 (two) times a day 30-60 min before breakfast 90 tablet 0    tamsulosin (FLOMAX) 0 4 mg Take 1 capsule (0 4 mg total) by mouth daily with dinner 30 capsule 0    verapamil (CALAN-SR) 120 mg CR tablet Take 1 tablet (120 mg total) by mouth daily 90 tablet 1     No current facility-administered medications for this visit  Objective:    /80   Pulse 82   Temp 97 5 °F (36 4 °C)   Resp 18   Ht 6' (1 829 m)   Wt 93 4 kg (206 lb)   SpO2 100%   BMI 27 94 kg/m²        Physical Exam  Vitals signs and nursing note reviewed  Constitutional:       Appearance: He is well-developed  He is not ill-appearing  HENT:      Head: Normocephalic  Eyes:      General: No scleral icterus  Conjunctiva/sclera: Conjunctivae normal    Neck:      Musculoskeletal: Neck supple  Cardiovascular:      Rate and Rhythm: Normal rate and regular rhythm  Pulmonary:      Effort: Pulmonary effort is normal  No respiratory distress  Abdominal:      General: There is no distension  Palpations: Abdomen is soft  Tenderness: There is no abdominal tenderness  Comments: Scar from splenectomy   Musculoskeletal:         General: No deformity  Right lower leg: No edema  Left lower leg: No edema  Skin:     General: Skin is warm and dry  Neurological:      Mental Status: He is alert  Motor: No weakness  Gait: Gait normal    Psychiatric:         Behavior: Behavior normal          Thought Content:  Thought content normal                 Jermain Quinteros DO

## 2021-03-23 ENCOUNTER — OFFICE VISIT (OUTPATIENT)
Dept: HEMATOLOGY ONCOLOGY | Facility: MEDICAL CENTER | Age: 56
End: 2021-03-23
Payer: COMMERCIAL

## 2021-03-23 VITALS
RESPIRATION RATE: 16 BRPM | DIASTOLIC BLOOD PRESSURE: 80 MMHG | HEART RATE: 93 BPM | OXYGEN SATURATION: 98 % | WEIGHT: 205 LBS | HEIGHT: 72 IN | SYSTOLIC BLOOD PRESSURE: 128 MMHG | TEMPERATURE: 97 F | BODY MASS INDEX: 27.77 KG/M2

## 2021-03-23 DIAGNOSIS — D64.9 SYMPTOMATIC ANEMIA: ICD-10-CM

## 2021-03-23 DIAGNOSIS — E61.1 IRON DEFICIENCY: Primary | ICD-10-CM

## 2021-03-23 DIAGNOSIS — D50.0 IRON DEFICIENCY ANEMIA DUE TO CHRONIC BLOOD LOSS: ICD-10-CM

## 2021-03-23 DIAGNOSIS — K92.1 MELENA: ICD-10-CM

## 2021-03-23 DIAGNOSIS — D50.0 CHRONIC BLOOD LOSS ANEMIA: ICD-10-CM

## 2021-03-23 PROCEDURE — 99215 OFFICE O/P EST HI 40 MIN: CPT | Performed by: PHYSICIAN ASSISTANT

## 2021-03-23 PROCEDURE — 1036F TOBACCO NON-USER: CPT | Performed by: PHYSICIAN ASSISTANT

## 2021-03-23 PROCEDURE — 3008F BODY MASS INDEX DOCD: CPT | Performed by: PHYSICIAN ASSISTANT

## 2021-03-23 RX ORDER — SODIUM CHLORIDE 9 MG/ML
20 INJECTION, SOLUTION INTRAVENOUS ONCE
Status: CANCELLED | OUTPATIENT
Start: 2021-04-09

## 2021-03-23 NOTE — PROGRESS NOTES
[facility] [address] [address]  Hematology Ambulatory Follow-Up  Milan Santos, 1965, [id number]  3/23/2021    Assessment/Plan:    1  Iron deficiency; 2  Chronic blood loss anemia; 3  Melena    Despite anemia resolving, patient still remains iron deficient with iron saturation of 7% and ferritin of 41  With recent history of melena, I have advised the patient to undergo additional dosing of Venofer  Patient agreed to undergo a higher dose over a period of the next five weeks  I will contact the patient's GI physician as I believe the patient will need evaluation with possible capsule endoscopy as EGD with single balloon endoscopy was not diagnostic      patient will have hemoglobin checks along with iron supplementation  Regimen:  Venofer 300 mg IV weekly x 5 weeks    - CBC and differential; Future  - Iron Panel (Includes Ferritin, Iron Sat%, Iron, and TIBC); Future    The patient is scheduled for follow-up in approximately 3 months or sooner with symptoms  Patient voiced agreement and understanding to the above  Patient knows to call the Hematology/Oncology office with any questions and concerns regarding the above  Barrier(s) to care: None  The patient is able to self care   ------------------------------------------------------------------------------------------------------    Chief Complaint   Patient presents with    Follow-up     Iron deficiency anemia       History of present illness:  This is a 59-year-old male who was admitted to the emergency room after having several weeks of shortness of breath, three episodes of syncope at home in January 2021   Patient's past medical history significant for 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      Patient had been previously worked up for abnormalities by primary care doctor who recommended that the patient follow-up with GI as well as with Cardiology for a stress test   Unfortunately, patient's symptoms progressed and he presented to the emergency room  [facility] demonstrated hemoglobin in the 7 gram/deciliter range   Moreover, the patient was found to have a significant iron deficiency with a ferritin of three and an iron saturation of 1%   Patient underwent three Venofer infusions daily in the hospital   Patient's hemoglobin is in the 7 gram/deciliter range        Patient does not remember any bowel changes, blood in the toilet  Akhil Ocampo does note that he had a colonoscopy and endoscopy in May that was negative for any abnormalities      GI has seen the patient in the hospital  Akhil Ocampo underwent colonoscopy in EGD that demonstrated questionable AVM in the small intestine   This area was cauterized   This area was not bleeding   Capsule endoscopy will be recommended as an outpatient      Capsule demonstrated angiectasis and follow up EGD- is planned on 2/26/2021  This procedure did not demonstrate any signs of active bleeding  Patient completed the remainder of Venofer treatments ever started in the hospital in February 2021  Patient now returns for routine follow-up  Interval history:    03/23/21:    Patient notes overall feeling well  Blood work was reviewed  Patient's iron saturation increased to 7% and ferritin at 41  Hemoglobin has completely supplemented however, MCV is still low  Patient's platelet count is still elevated however this may be secondary to splenectomy and not necessarily to iron deficiency  Patient has completed COVID vaccinations  patient notes two episodes of melena last week  No recent issues  Review of Systems   Constitutional: Negative for activity change, appetite change, fatigue and fever  HENT: Negative for nosebleeds  Respiratory: Negative for cough, choking and shortness of breath  Cardiovascular: Negative for chest pain, palpitations and leg swelling     Gastrointestinal: Positive for blood in stool  Negative for abdominal distention, abdominal pain, anal bleeding, constipation, diarrhea, nausea and vomiting  Endocrine: Negative for cold intolerance  Genitourinary: Negative for hematuria  Musculoskeletal: Negative for myalgias  Skin: Negative for color change, pallor and rash  Allergic/Immunologic: Negative for immunocompromised state  Neurological: Negative for headaches  Hematological: Negative for adenopathy  Does not bruise/bleed easily  All other systems reviewed and are negative        Patient Active Problem List   Diagnosis    Essential hypertension    Hyperlipidemia    Arm paresthesia, right    Headache    Osteoarthritis of cervical spine    Colon cancer screening    Elevated PSA    Gastroesophageal reflux disease    Hematochezia    Other emphysema ([address] )    Healthcare maintenance    Crews's esophagus without dysplasia    Tubular adenoma of colon    Myalgia    Hip pain, bilateral    Osteoarthritis, hip, bilateral    Symptomatic anemia    BPH (benign prostatic hyperplasia)    Iron deficiency anemia due to chronic blood loss    S/P splenectomy       Past Medical History:   Diagnosis Date    Anemia     Colon polyp     Elevated PSA     Enlarged prostate     Full dentures     GERD (gastroesophageal reflux disease)     Hemorrhoid     Hyperlipidemia     Hypertension     Sleep apnea     No CPAP    Smoker     Wears glasses        Past Surgical History:   Procedure Laterality Date    APPENDECTOMY      COLONOSCOPY      EGD      FOOT SURGERY Right     bone removed    OTHER SURGICAL HISTORY      bone removal right arm-abnormal growth of surface bone    PROSTATE BIOPSY      SPLENECTOMY      ruptured       Family History   Problem Relation Age of Onset    Dementia Father     Heart disease Mother     Hypertension Mother     Hyperlipidemia Mother     Hypertension Sister     Hypertension Brother     No Known Problems Daughter     No Known Problems Son     Cancer Paternal Grandfather         prostate    No Known Problems Son        Social History     Socioeconomic History    Marital status: Legally      Spouse name: None    Number of children: None    Years of education: None    Highest education level: None   Occupational History    Occupation: supervisor     Employer: [employer]   Social Needs    Financial resource strain: None    Food insecurity     Worry: None     Inability: None    Transportation needs     Medical: None     Non-medical: None   Tobacco Use    Smoking status: Former Smoker     Packs/day: 0 50     Years: 10 00     Pack years: 5 00     Types: Cigarettes     Quit date: 2021     Years since quittin 0    Smokeless tobacco: Never Used   Substance and Sexual Activity    Alcohol use: Not Currently     Frequency: Never    Drug use: No    Sexual activity: None   Lifestyle    Physical activity     Days per week: None     Minutes per session: None    Stress: None   Relationships    Social connections     Talks on phone: None     Gets together: None     Attends Catholic service: None     Active member of club or organization: None     Attends meetings of clubs or organizations: None     Relationship status: None    Intimate partner violence     Fear of current or ex partner: None     Emotionally abused: None     Physically abused: None     Forced sexual activity: None   Other Topics Concern    None   Social History Narrative    None         Current Outpatient Medications:     acetaminophen (TYLENOL) 325 mg tablet, Take 2 tablets (650 mg total) by mouth every 6 (six) hours as needed for mild pain, headaches or fever, Disp: 30 tablet, Rfl: 0    aspirin (ECOTRIN LOW STRENGTH) 81 mg EC tablet, Take 1 tablet (81 mg total) by mouth daily, Disp: 30 tablet, Rfl: 0    atorvastatin (LIPITOR) 20 mg tablet, Take 1 tablet (20 mg total) by mouth daily, Disp: 90 tablet, Rfl: 3    finasteride (PROSCAR) 5 mg tablet, TAKE 1 TABLET BY MOUTH EVERY DAY, Disp: 90 tablet, Rfl: 3    nicotine (NICODERM CQ) 14 mg/24hr TD 24 hr patch, Place 1 patch on the skin daily, Disp: 28 patch, Rfl: 0    pantoprazole (PROTONIX) 40 mg tablet, Take 1 tablet (40 mg total) by mouth 2 (two) times a day 30-60 min before breakfast, Disp: 90 tablet, Rfl: 0    tamsulosin (FLOMAX) 0 4 mg, Take 1 capsule (0 4 mg total) by mouth daily with dinner, Disp: 30 capsule, Rfl: 0    verapamil (CALAN-SR) 120 mg CR tablet, Take 1 tablet (120 mg total) by mouth daily, Disp: 90 tablet, Rfl: 1    No Known Allergies    Objective:  /80 (BP Location: Right arm, Patient Position: Sitting, Cuff Size: Adult)   Pulse 93   Temp (!) 97 °F (36 1 °C)   Resp 16   Ht 6' (1 829 m)   Wt 93 kg (205 lb)   SpO2 98%   BMI 27 80 kg/m²    Physical Exam  Constitutional:       General: He is not in acute distress  Appearance: He is well-developed  HENT:      Head: Normocephalic and atraumatic  Right Ear: External ear normal       Left Ear: External ear normal       Nose: Nose normal    Eyes:      General: No scleral icterus  Conjunctiva/sclera: Conjunctivae normal    Cardiovascular:      Rate and Rhythm: Normal rate  Pulmonary:      Effort: No respiratory distress  Abdominal:      General: There is no distension  Palpations: Abdomen is soft  Skin:     Findings: No rash (on exposed skin  )  Neurological:      Mental Status: He is alert and oriented to person, place, and time  Psychiatric:         Thought Content:  Thought content normal          Result Review  Labs:  Orders Only on 03/17/2021   Component Date Value Ref Range Status    Glucose, Random 03/17/2021 90  65 - 99 mg/dL Final    BUN 03/17/2021 12  6 - 24 mg/dL Final    Creatinine 03/17/2021 1 19  0 76 - 1 27 mg/dL Final    eGFR Non  03/17/2021 68  >59 mL/min/1 73 Final    eGFR  03/17/2021 78  >59 mL/min/1 73 Final    SL AMB BUN/CREATININE RATIO 03/17/2021 10  9 - 20 Final    Sodium 03/17/2021 140  134 - 144 mmol/L Final    Potassium 03/17/2021 4 5  3 5 - 5 2 mmol/L Final    Chloride 03/17/2021 102  96 - 106 mmol/L Final    CO2 03/17/2021 24  20 - 29 mmol/L Final    CALCIUM 03/17/2021 9 6  8 7 - 10 2 mg/dL Final    Protein, Total 03/17/2021 7 3  6 0 - 8 5 g/dL Final    Albumin 03/17/2021 4 4  3 8 - 4 9 g/dL Final    Globulin, Total 03/17/2021 2 9  1 5 - 4 5 g/dL Final    Albumin/Globulin Ratio 03/17/2021 1 5  1 2 - 2 2 Final    TOTAL BILIRUBIN 03/17/2021 <0 2  0 0 - 1 2 mg/dL Final    Alk Phos Isoenzymes 03/17/2021 118* 39 - 117 IU/L Final    AST 03/17/2021 22  0 - 40 IU/L Final    ALT 03/17/2021 22  0 - 44 IU/L Final    Cholesterol, Total 03/17/2021 139  100 - 199 mg/dL Final    Triglycerides 03/17/2021 188* 0 - 149 mg/dL Final    HDL 03/17/2021 37* >39 mg/dL Final    LDL Calculated 03/17/2021 70  0 - 99 mg/dL Final    Hemoglobin A1C 03/17/2021 5 2  4 8 - 5 6 % Final    Comment:          Prediabetes: 5 7 - 6 4         Diabetes: >6 4         Glycemic   control for adults with diabetes: <7 0      Interpretation 03/17/2021 Note   Final    Supplemental report is available  Orders Only on 03/17/2021   Component Date Value Ref Range Status    White Blood Cell Count 03/17/2021 7 4  3 4 - 10 8 x10E3/uL Final    Red Blood Cell Count 03/17/2021 5 39  4 14 - 5 80 x10E6/uL Final    Comment: Polychromasia presentMicrocytes present  Hypochromasia present  Anisocytosis   present        Hemoglobin 03/17/2021 12 7* 13 0 - 17 7 g/dL Final    HCT 03/17/2021 41 1  37 5 - 51 0 % Final    MCV 03/17/2021 76* 79 - 97 fL Final    MCH 03/17/2021 23 6* 26 6 - 33 0 pg Final    MCHC 03/17/2021 30 9* 31 5 - 35 7 g/dL Final    RDW 03/17/2021 22 8* 11 6 - 15 4 % Final    Platelet Count 62/58/0809 595* 150 - 450 x10E3/uL Final    Neutrophils 03/17/2021 41  Not Estab  % Final    Lymphocytes 03/17/2021 40  Not Estab  % Final    Monocytes 03/17/2021 10  Not Estab  % Final  Eosinophils 03/17/2021 6  Not Estab  % Final    Basophils PCT 03/17/2021 3  Not Estab  % Final    Neutrophils (Absolute) 03/17/2021 3 0  1 4 - 7 0 x10E3/uL Final    Lymphocytes (Absolute) 03/17/2021 3 0  0 7 - 3 1 x10E3/uL Final    Monocytes (Absolute) 03/17/2021 0 7  0 1 - 0 9 x10E3/uL Final    Eosinophils (Absolute) 03/17/2021 0 4  0 0 - 0 4 x10E3/uL Final    Basophils ABS 03/17/2021 0 2  0 0 - 0 2 x10E3/uL Final    Immature Granulocytes 03/17/2021 0  Not Estab  % Final    Immature Granulocytes (Absolute) 03/17/2021 0 0  0 0 - 0 1 x10E3/uL Final    Hematology Comments 03/17/2021 Note:   Final    Verified by microscopic examination   Total Iron Binding Capacity (TIBC) 03/17/2021 424  250 - 450 ug/dL Final    UIBC 03/17/2021 394* 111 - 343 ug/dL Final    Iron, Serum 03/17/2021 30* 38 - 169 ug/dL Final    Iron Saturation 03/17/2021 7* 15 - 55 % Final    Ferritin 03/17/2021 41  30 - 400 ng/mL Final     Please note: This report has been generated by a voice recognition software system  Therefore there may be syntax, spelling, and/or grammatical errors  Please call if you have any questions

## 2021-03-29 ENCOUNTER — TELEPHONE (OUTPATIENT)
Dept: HEMATOLOGY ONCOLOGY | Facility: MEDICAL CENTER | Age: 56
End: 2021-03-29

## 2021-03-29 ENCOUNTER — TELEPHONE (OUTPATIENT)
Dept: UROLOGY | Facility: AMBULATORY SURGERY CENTER | Age: 56
End: 2021-03-29

## 2021-03-29 DIAGNOSIS — E61.1 IRON DEFICIENCY: ICD-10-CM

## 2021-03-29 DIAGNOSIS — D50.0 IRON DEFICIENCY ANEMIA DUE TO CHRONIC BLOOD LOSS: Primary | ICD-10-CM

## 2021-03-29 NOTE — TELEPHONE ENCOUNTER
Left a voicemail to have patient get his ordered PSA done prior to scheduled appointment at [facility] office on 4/2/2021

## 2021-03-29 NOTE — TELEPHONE ENCOUNTER
Per Wali Rome PA-C's discussion with patient's GI physician - they would like the patient to have a repeat CBC completed this week  I called and spoke with the patient to inform him of this  He voiced understanding and stated he will have this done in the next few days  Orders placed in the system

## 2021-03-30 ENCOUNTER — APPOINTMENT (OUTPATIENT)
Dept: LAB | Facility: HOSPITAL | Age: 56
End: 2021-03-30
Payer: COMMERCIAL

## 2021-03-30 ENCOUNTER — TRANSCRIBE ORDERS (OUTPATIENT)
Dept: ADMINISTRATIVE | Facility: HOSPITAL | Age: 56
End: 2021-03-30

## 2021-03-30 ENCOUNTER — TELEPHONE (OUTPATIENT)
Dept: HEMATOLOGY ONCOLOGY | Facility: MEDICAL CENTER | Age: 56
End: 2021-03-30

## 2021-03-30 DIAGNOSIS — D50.0 IRON DEFICIENCY ANEMIA DUE TO CHRONIC BLOOD LOSS: ICD-10-CM

## 2021-03-30 DIAGNOSIS — R97.20 ELEVATED PSA: ICD-10-CM

## 2021-03-30 DIAGNOSIS — E61.1 IRON DEFICIENCY: ICD-10-CM

## 2021-03-30 LAB
BASOPHILS # BLD AUTO: 0.16 THOUSANDS/ΜL (ref 0–0.1)
BASOPHILS NFR BLD AUTO: 2 % (ref 0–1)
EOSINOPHIL # BLD AUTO: 0.37 THOUSAND/ΜL (ref 0–0.61)
EOSINOPHIL NFR BLD AUTO: 4 % (ref 0–6)
ERYTHROCYTE [DISTWIDTH] IN BLOOD BY AUTOMATED COUNT: 22.1 % (ref 11.6–15.1)
FERRITIN SERPL-MCNC: 23 NG/ML (ref 8–388)
HCT VFR BLD AUTO: 42.5 % (ref 36.5–49.3)
HGB BLD-MCNC: 12.4 G/DL (ref 12–17)
IMM GRANULOCYTES # BLD AUTO: 0.02 THOUSAND/UL (ref 0–0.2)
IMM GRANULOCYTES NFR BLD AUTO: 0 % (ref 0–2)
IRON SATN MFR SERPL: 7 %
IRON SERPL-MCNC: 29 UG/DL (ref 65–175)
LYMPHOCYTES # BLD AUTO: 3.12 THOUSANDS/ΜL (ref 0.6–4.47)
LYMPHOCYTES NFR BLD AUTO: 37 % (ref 14–44)
MCH RBC QN AUTO: 23.3 PG (ref 26.8–34.3)
MCHC RBC AUTO-ENTMCNC: 29.2 G/DL (ref 31.4–37.4)
MCV RBC AUTO: 80 FL (ref 82–98)
MONOCYTES # BLD AUTO: 1.15 THOUSAND/ΜL (ref 0.17–1.22)
MONOCYTES NFR BLD AUTO: 14 % (ref 4–12)
NEUTROPHILS # BLD AUTO: 3.7 THOUSANDS/ΜL (ref 1.85–7.62)
NEUTS SEG NFR BLD AUTO: 43 % (ref 43–75)
NRBC BLD AUTO-RTO: 0 /100 WBCS
PLATELET # BLD AUTO: 536 THOUSANDS/UL (ref 149–390)
PMV BLD AUTO: 9.2 FL (ref 8.9–12.7)
PSA SERPL-MCNC: 3.4 NG/ML (ref 0–4)
RBC # BLD AUTO: 5.32 MILLION/UL (ref 3.88–5.62)
TIBC SERPL-MCNC: 430 UG/DL (ref 250–450)
WBC # BLD AUTO: 8.52 THOUSAND/UL (ref 4.31–10.16)

## 2021-03-30 PROCEDURE — 83550 IRON BINDING TEST: CPT

## 2021-03-30 PROCEDURE — 85025 COMPLETE CBC W/AUTO DIFF WBC: CPT

## 2021-03-30 PROCEDURE — 84153 ASSAY OF PSA TOTAL: CPT

## 2021-03-30 PROCEDURE — 36415 COLL VENOUS BLD VENIPUNCTURE: CPT

## 2021-03-30 PROCEDURE — 83540 ASSAY OF IRON: CPT

## 2021-03-30 PROCEDURE — 82728 ASSAY OF FERRITIN: CPT

## 2021-03-30 NOTE — PROGRESS NOTES
4/2/2021      Chief Complaint   Patient presents with    Elevated PSA    Benign Prostatic Hypertrophy     Assessment and Plan    63 y o  male managed by Dr Howard Newman    1  Elevated PSA s/p negative prostate biopsy 3/2020  - PSA from 3/30/21 was 3 4 (6 8 finasteride adjusted), previously 8 9 last year  - JENNIFER negative for nodules or suspicious findings    - Recommend prostate MRI given continued elevation of PSA  Pt agrees with plan    2  BPH with LUTS  - Continue Finasteride and Flomax as prescribed  - Worsening LUTS, will add in Oxybutynin 5 mg qd  - PVR= 58 mL  Urine dip negative  - Ultimately would recommend cystoscopy and TRUS evaluation for surgical considerations however would like patient to obtain prostate MRI first      3  Family history of prostate cancer     History of Present Illness  Miah Burdick is a 63 y o  male here for follow up evaluation of  Elevated PSA and BPH  Patient had negative prostate biopsy in 3/2020 for PSA of 8 9  Prostate measured approximately 124 g  Most recent PSA from 3/30/21 was 3 4 (6 8 finasteride adjusted)  Patient has family history of prostate cancer  Patient is currently taking finasteride and flomax for BPH  Patient reports worsening frequency and urgency  Reports he has difficulty making it to the bathroom  Reports weak stream and occasional urinary leakage  Denies hematuria, dysuria, flank pain  AUA SYMPTOM SCORE      Most Recent Value   AUA SYMPTOM SCORE   How often have you had a sensation of not emptying your bladder completely after you finished urinating? 3   How often have you had to urinate again less than two hours after you finished urinating? 5   How often have you found you stopped and started again several times when you urinate? 5   How often have you found it difficult to postpone urination? 3   How often have you had a weak urinary stream?  2   How often have you had to push or strain to begin urination?   4   How many times did you most typically get up to urinate from the time you went to bed at night until the time you got up in the morning? 5   Quality of Life: If you were to spend the rest of your life with your urinary condition just the way it is now, how would you feel about that?  3   AUA SYMPTOM SCORE  27          Review of Systems   Constitutional: Negative for chills and fever  Respiratory: Negative for shortness of breath  Cardiovascular: Negative for chest pain  Gastrointestinal: Negative for abdominal pain, constipation, diarrhea and vomiting  Genitourinary: Positive for frequency and urgency  Negative for difficulty urinating, dysuria, flank pain and hematuria           Past Medical History  Past Medical History:   Diagnosis Date    Anemia     Anemia     Colon polyp     Elevated PSA     Enlarged prostate     Full dentures     GERD (gastroesophageal reflux disease)     Hemorrhoid     Hyperlipidemia     Hypertension     Sleep apnea     No CPAP    Smoker     Wears glasses        Past Social History  Past Surgical History:   Procedure Laterality Date    APPENDECTOMY      COLONOSCOPY      EGD      FOOT SURGERY Right     bone removed    OTHER SURGICAL HISTORY      bone removal right arm-abnormal growth of surface bone    PROSTATE BIOPSY      SPLENECTOMY      ruptured     Social History     Tobacco Use   Smoking Status Former Smoker    Packs/day: 0 50    Years: 10 00    Pack years: 5 00    Types: Cigarettes    Quit date: 2021    Years since quittin 1   Smokeless Tobacco Never Used       Past Family History  Family History   Problem Relation Age of Onset    Dementia Father     Heart disease Mother     Hypertension Mother     Hyperlipidemia Mother     Hypertension Sister     Hypertension Brother     No Known Problems Daughter     No Known Problems Son     Cancer Paternal Grandfather         prostate    No Known Problems Son        Past Social history  Social History Socioeconomic History    Marital status: Legally      Spouse name: Not on file    Number of children: Not on file    Years of education: Not on file    Highest education level: Not on file   Occupational History    Occupation: supervisor     Employer: [employer]   Social Needs    Financial resource strain: Not on file    Food insecurity     Worry: Not on file     Inability: Not on file    Transportation needs     Medical: Not on file     Non-medical: Not on file   Tobacco Use    Smoking status: Former Smoker     Packs/day: 0 50     Years: 10 00     Pack years: 5 00     Types: Cigarettes     Quit date: 2021     Years since quittin 1    Smokeless tobacco: Never Used   Substance and Sexual Activity    Alcohol use: Not Currently     Frequency: Never    Drug use: No    Sexual activity: Not on file   Lifestyle    Physical activity     Days per week: Not on file     Minutes per session: Not on file    Stress: Not on file   Relationships    Social connections     Talks on phone: Not on file     Gets together: Not on file     Attends Sikhism service: Not on file     Active member of club or organization: Not on file     Attends meetings of clubs or organizations: Not on file     Relationship status: Not on file    Intimate partner violence     Fear of current or ex partner: Not on file     Emotionally abused: Not on file     Physically abused: Not on file     Forced sexual activity: Not on file   Other Topics Concern    Not on file   Social History Narrative    Not on file       Current Medications  Current Outpatient Medications   Medication Sig Dispense Refill    acetaminophen (TYLENOL) 325 mg tablet Take 2 tablets (650 mg total) by mouth every 6 (six) hours as needed for mild pain, headaches or fever 30 tablet 0    aspirin (ECOTRIN LOW STRENGTH) 81 mg EC tablet Take 1 tablet (81 mg total) by mouth daily 30 tablet 0    atorvastatin (LIPITOR) 20 mg tablet Take 1 tablet (20 mg total) by mouth daily 90 tablet 3    finasteride (PROSCAR) 5 mg tablet TAKE 1 TABLET BY MOUTH EVERY DAY 90 tablet 3    nicotine (NICODERM CQ) 14 mg/24hr TD 24 hr patch Place 1 patch on the skin daily 28 patch 0    pantoprazole (PROTONIX) 40 mg tablet Take 1 tablet (40 mg total) by mouth 2 (two) times a day 30-60 min before breakfast 90 tablet 0    tamsulosin (FLOMAX) 0 4 mg Take 1 capsule (0 4 mg total) by mouth daily with dinner 30 capsule 0    verapamil (CALAN-SR) 120 mg CR tablet Take 1 tablet (120 mg total) by mouth daily 90 tablet 1     No current facility-administered medications for this visit  Allergies  No Known Allergies      The following portions of the patient's history were reviewed and updated as appropriate: allergies, current medications, past medical history, past social history, past surgical history and problem list       Vitals  Vitals:    04/02/21 0835   BP: 142/86   BP Location: Left arm   Patient Position: Sitting   Cuff Size: Standard   Pulse: 80   Weight: 95 3 kg (210 lb)   Height: 6' (1 829 m)           Physical Exam  Physical Exam  Constitutional:       Appearance: Normal appearance  He is normal weight  HENT:      Head: Normocephalic and atraumatic  Eyes:      General: No scleral icterus  Conjunctiva/sclera: Conjunctivae normal    Neck:      Musculoskeletal: Normal range of motion  Pulmonary:      Effort: Pulmonary effort is normal    Genitourinary:     Comments: Prostate enlargement, unable to fully palpate gland  No tenderness or nodules  Smooth, firm and symmetric  Musculoskeletal: Normal range of motion  Skin:     General: Skin is warm and dry  Neurological:      General: No focal deficit present  Mental Status: He is alert and oriented to person, place, and time  Psychiatric:         Mood and Affect: Mood normal          Behavior: Behavior normal          Thought Content:  Thought content normal          Judgment: Judgment normal  Results  Recent Results (from the past 1 hour(s))   POCT urine dip    Collection Time: 04/02/21  8:36 AM   Result Value Ref Range    LEUKOCYTE ESTERASE,UA -     NITRITE,UA -     SL AMB POCT UROBILINOGEN 0 2     POCT URINE PROTEIN -      PH,UA 5 0     BLOOD,UA -     SPECIFIC GRAVITY,UA 1 010     KETONES,UA -     BILIRUBIN,UA -     GLUCOSE, UA -      COLOR,UA yellow     CLARITY,UA clear    POCT Measure PVR    Collection Time: 04/02/21  8:37 AM   Result Value Ref Range    POST-VOID RESIDUAL VOLUME, ML POC 58 mL   ]  Lab Results   Component Value Date    PSA 3 4 03/30/2021    PSA 8 9 (H) 03/11/2020     Lab Results   Component Value Date    CALCIUM 8 4 01/19/2021    K 4 5 03/17/2021    CO2 24 03/17/2021     03/17/2021    BUN 12 03/17/2021    CREATININE 1 19 03/17/2021     Lab Results   Component Value Date    WBC 8 52 03/30/2021    HGB 12 4 03/30/2021    HCT 42 5 03/30/2021    MCV 80 (L) 03/30/2021     (H) 03/30/2021       Orders  Orders Placed This Encounter   Procedures    POCT Measure PVR    POCT urine dip       Marcelino Toledo PA-C

## 2021-03-30 NOTE — TELEPHONE ENCOUNTER
Spoke with patient, reviewed his schedule for iron infusions  He will call with additional questions or concerns

## 2021-03-30 NOTE — TELEPHONE ENCOUNTER
----- Message from Denise Mauricio PA-C sent at 3/30/2021  1:16 PM EDT -----  Make sure pt is following up with iron infusions

## 2021-04-02 ENCOUNTER — OFFICE VISIT (OUTPATIENT)
Dept: UROLOGY | Facility: CLINIC | Age: 56
End: 2021-04-02
Payer: COMMERCIAL

## 2021-04-02 ENCOUNTER — TELEPHONE (OUTPATIENT)
Dept: NON INVASIVE DIAGNOSTICS | Facility: HOSPITAL | Age: 56
End: 2021-04-02

## 2021-04-02 VITALS
HEART RATE: 80 BPM | WEIGHT: 210 LBS | SYSTOLIC BLOOD PRESSURE: 142 MMHG | DIASTOLIC BLOOD PRESSURE: 86 MMHG | HEIGHT: 72 IN | BODY MASS INDEX: 28.44 KG/M2

## 2021-04-02 DIAGNOSIS — N40.1 BENIGN PROSTATIC HYPERPLASIA WITH LOWER URINARY TRACT SYMPTOMS, SYMPTOM DETAILS UNSPECIFIED: Primary | ICD-10-CM

## 2021-04-02 DIAGNOSIS — R97.20 ELEVATED PSA: ICD-10-CM

## 2021-04-02 DIAGNOSIS — R39.15 URINARY URGENCY: ICD-10-CM

## 2021-04-02 DIAGNOSIS — N13.8 BENIGN PROSTATIC HYPERPLASIA WITH URINARY OBSTRUCTION: ICD-10-CM

## 2021-04-02 DIAGNOSIS — N40.1 BENIGN PROSTATIC HYPERPLASIA WITH URINARY OBSTRUCTION: ICD-10-CM

## 2021-04-02 LAB
POST-VOID RESIDUAL VOLUME, ML POC: 58 ML
SL AMB  POCT GLUCOSE, UA: NORMAL
SL AMB LEUKOCYTE ESTERASE,UA: NORMAL
SL AMB POCT BILIRUBIN,UA: NORMAL
SL AMB POCT BLOOD,UA: NORMAL
SL AMB POCT CLARITY,UA: CLEAR
SL AMB POCT COLOR,UA: YELLOW
SL AMB POCT KETONES,UA: NORMAL
SL AMB POCT NITRITE,UA: NORMAL
SL AMB POCT PH,UA: 5
SL AMB POCT SPECIFIC GRAVITY,UA: 1.01
SL AMB POCT URINE PROTEIN: NORMAL
SL AMB POCT UROBILINOGEN: 0.2

## 2021-04-02 PROCEDURE — 99214 OFFICE O/P EST MOD 30 MIN: CPT | Performed by: PHYSICIAN ASSISTANT

## 2021-04-02 PROCEDURE — 1036F TOBACCO NON-USER: CPT | Performed by: PHYSICIAN ASSISTANT

## 2021-04-02 PROCEDURE — 81002 URINALYSIS NONAUTO W/O SCOPE: CPT | Performed by: PHYSICIAN ASSISTANT

## 2021-04-02 PROCEDURE — 51798 US URINE CAPACITY MEASURE: CPT | Performed by: PHYSICIAN ASSISTANT

## 2021-04-02 RX ORDER — FINASTERIDE 5 MG/1
5 TABLET, FILM COATED ORAL DAILY
Qty: 90 TABLET | Refills: 3 | Status: SHIPPED | OUTPATIENT
Start: 2021-04-02 | End: 2021-10-11 | Stop reason: SDUPTHER

## 2021-04-02 RX ORDER — OXYBUTYNIN CHLORIDE 5 MG/1
5 TABLET, EXTENDED RELEASE ORAL DAILY
Qty: 30 TABLET | Refills: 1 | Status: SHIPPED | OUTPATIENT
Start: 2021-04-02 | End: 2021-05-05 | Stop reason: SDUPTHER

## 2021-04-02 RX ORDER — TAMSULOSIN HYDROCHLORIDE 0.4 MG/1
0.4 CAPSULE ORAL
Qty: 30 CAPSULE | Refills: 0 | Status: SHIPPED | OUTPATIENT
Start: 2021-04-02 | End: 2021-04-08

## 2021-04-05 ENCOUNTER — HOSPITAL ENCOUNTER (OUTPATIENT)
Dept: RADIOLOGY | Facility: HOSPITAL | Age: 56
Discharge: HOME | End: 2021-04-05
Attending: INTERNAL MEDICINE
Payer: COMMERCIAL

## 2021-04-05 ENCOUNTER — HOSPITAL ENCOUNTER (OUTPATIENT)
Dept: NON INVASIVE DIAGNOSTICS | Facility: HOSPITAL | Age: 56
Discharge: HOME | End: 2021-04-05
Attending: INTERNAL MEDICINE
Payer: COMMERCIAL

## 2021-04-05 DIAGNOSIS — R55 SYNCOPE, NEAR: ICD-10-CM

## 2021-04-05 DIAGNOSIS — R06.02 SHORTNESS OF BREATH: ICD-10-CM

## 2021-04-05 LAB
CHEST PAIN STATEMENT: NORMAL
MAX DIASTOLIC BP: 72 MMHG
MAX HEART RATE: 126 BPM
MAX PREDICTED HEART RATE: 164 BPM
MAX. SYSTOLIC BP: 161 MMHG
PROTOCOL NAME: NORMAL
REASON FOR TERMINATION: NORMAL
TARGET HR FORMULA: NORMAL
TIME IN EXERCISE PHASE: NORMAL

## 2021-04-05 PROCEDURE — 78452 HT MUSCLE IMAGE SPECT MULT: CPT

## 2021-04-05 PROCEDURE — 93017 CV STRESS TEST TRACING ONLY: CPT

## 2021-04-05 PROCEDURE — G1004 CDSM NDSC: HCPCS

## 2021-04-05 PROCEDURE — 93016 CV STRESS TEST SUPVJ ONLY: CPT | Performed by: INTERNAL MEDICINE

## 2021-04-05 PROCEDURE — A9502 TC99M TETROFOSMIN: HCPCS

## 2021-04-05 PROCEDURE — 93226 XTRNL ECG REC<48 HR SCAN A/R: CPT

## 2021-04-05 PROCEDURE — 93225 XTRNL ECG REC<48 HRS REC: CPT

## 2021-04-05 PROCEDURE — 93018 CV STRESS TEST I&R ONLY: CPT | Performed by: INTERNAL MEDICINE

## 2021-04-05 PROCEDURE — 78452 HT MUSCLE IMAGE SPECT MULT: CPT | Performed by: INTERNAL MEDICINE

## 2021-04-05 RX ADMIN — REGADENOSON 0.4 MG: 0.08 INJECTION, SOLUTION INTRAVENOUS at 09:36

## 2021-04-06 ENCOUNTER — TELEPHONE (OUTPATIENT)
Dept: CARDIOLOGY CLINIC | Facility: CLINIC | Age: 56
End: 2021-04-06

## 2021-04-06 NOTE — TELEPHONE ENCOUNTER
----- Message from Petar Arriaga DO sent at 4/6/2021  2:01 PM EDT -----  Can you please let the patient know stress test was normal

## 2021-04-07 DIAGNOSIS — N40.1 BENIGN PROSTATIC HYPERPLASIA WITH LOWER URINARY TRACT SYMPTOMS, SYMPTOM DETAILS UNSPECIFIED: ICD-10-CM

## 2021-04-08 RX ORDER — TAMSULOSIN HYDROCHLORIDE 0.4 MG/1
CAPSULE ORAL
Qty: 30 CAPSULE | Refills: 0 | Status: SHIPPED | OUTPATIENT
Start: 2021-04-08 | End: 2021-05-28

## 2021-04-09 ENCOUNTER — HOSPITAL ENCOUNTER (OUTPATIENT)
Dept: INFUSION CENTER | Facility: HOSPITAL | Age: 56
Discharge: HOME | End: 2021-04-09
Attending: INTERNAL MEDICINE
Payer: COMMERCIAL

## 2021-04-09 VITALS
SYSTOLIC BLOOD PRESSURE: 157 MMHG | OXYGEN SATURATION: 99 % | RESPIRATION RATE: 18 BRPM | HEART RATE: 83 BPM | TEMPERATURE: 97.3 F | DIASTOLIC BLOOD PRESSURE: 85 MMHG

## 2021-04-09 DIAGNOSIS — D64.9 SYMPTOMATIC ANEMIA: ICD-10-CM

## 2021-04-09 DIAGNOSIS — D50.0 IRON DEFICIENCY ANEMIA DUE TO CHRONIC BLOOD LOSS: Primary | ICD-10-CM

## 2021-04-09 LAB — HGB BLD-MCNC: 12.4 G/DL (ref 12–17)

## 2021-04-09 PROCEDURE — 85018 HEMOGLOBIN: CPT | Performed by: INTERNAL MEDICINE

## 2021-04-09 PROCEDURE — 96365 THER/PROPH/DIAG IV INF INIT: CPT

## 2021-04-09 PROCEDURE — 96366 THER/PROPH/DIAG IV INF ADDON: CPT

## 2021-04-09 RX ORDER — SODIUM CHLORIDE 9 MG/ML
20 INJECTION, SOLUTION INTRAVENOUS ONCE
Status: CANCELLED | OUTPATIENT
Start: 2021-04-16

## 2021-04-09 RX ORDER — SODIUM CHLORIDE 9 MG/ML
20 INJECTION, SOLUTION INTRAVENOUS ONCE
Status: COMPLETED | OUTPATIENT
Start: 2021-04-09 | End: 2021-04-09

## 2021-04-09 RX ADMIN — IRON SUCROSE 300 MG: 20 INJECTION, SOLUTION INTRAVENOUS at 08:21

## 2021-04-09 RX ADMIN — SODIUM CHLORIDE 20 ML/HR: 9 INJECTION, SOLUTION INTRAVENOUS at 08:20

## 2021-04-09 NOTE — PLAN OF CARE
Problem: Potential for Falls  Goal: Patient will remain free of falls  Description: INTERVENTIONS:  - Assess patient frequently for physical needs  -  Identify cognitive and physical deficits and behaviors that affect risk of falls    -  [place] fall precautions as indicated by assessment   - Educate patient/family on patient safety including physical limitations  - Instruct patient to call for assistance with activity based on assessment  - Modify environment to reduce risk of injury  Outcome: Progressing

## 2021-04-16 ENCOUNTER — HOSPITAL ENCOUNTER (OUTPATIENT)
Dept: INFUSION CENTER | Facility: HOSPITAL | Age: 56
Discharge: HOME | End: 2021-04-16
Attending: INTERNAL MEDICINE
Payer: COMMERCIAL

## 2021-04-16 VITALS
DIASTOLIC BLOOD PRESSURE: 94 MMHG | HEART RATE: 77 BPM | SYSTOLIC BLOOD PRESSURE: 158 MMHG | TEMPERATURE: 97.2 F | OXYGEN SATURATION: 98 % | RESPIRATION RATE: 16 BRPM

## 2021-04-16 DIAGNOSIS — D64.9 SYMPTOMATIC ANEMIA: ICD-10-CM

## 2021-04-16 DIAGNOSIS — D50.0 IRON DEFICIENCY ANEMIA DUE TO CHRONIC BLOOD LOSS: Primary | ICD-10-CM

## 2021-04-16 PROCEDURE — 96365 THER/PROPH/DIAG IV INF INIT: CPT

## 2021-04-16 PROCEDURE — 93227 XTRNL ECG REC<48 HR R&I: CPT | Performed by: INTERNAL MEDICINE

## 2021-04-16 RX ORDER — SODIUM CHLORIDE 9 MG/ML
20 INJECTION, SOLUTION INTRAVENOUS ONCE
Status: COMPLETED | OUTPATIENT
Start: 2021-04-16 | End: 2021-04-16

## 2021-04-16 RX ORDER — SODIUM CHLORIDE 9 MG/ML
20 INJECTION, SOLUTION INTRAVENOUS ONCE
Status: CANCELLED | OUTPATIENT
Start: 2021-04-23

## 2021-04-16 RX ADMIN — SODIUM CHLORIDE 20 ML/HR: 9 INJECTION, SOLUTION INTRAVENOUS at 08:18

## 2021-04-16 RX ADMIN — IRON SUCROSE 300 MG: 20 INJECTION, SOLUTION INTRAVENOUS at 08:19

## 2021-04-16 NOTE — PLAN OF CARE
Problem: Potential for Falls  Goal: Patient will remain free of falls  Description: INTERVENTIONS:  - Assess patient frequently for physical needs  -  Identify cognitive and physical deficits and behaviors that affect risk of falls  -  [place] fall precautions as indicated by assessment   - Educate patient/family on patient safety including physical limitations  - Instruct patient to call for assistance with activity based on assessment  - Modify environment to reduce risk of injury  - Consider OT/PT consult to assist with strengthening/mobility  Outcome: Progressing     Problem: DISCHARGE PLANNING  Goal: Discharge to home or other facility with appropriate resources  Description: INTERVENTIONS:  - Identify barriers to discharge w/patient and caregiver  - Arrange for needed discharge resources and transportation as appropriate  - Identify discharge learning needs (meds, wound care, etc )  - Arrange for interpretive services to assist at discharge as needed  - Refer to Case Management Department for coordinating discharge planning if the patient needs post-hospital services based on physician/advanced practitioner order or complex needs related to functional status, cognitive ability, or social support system  Outcome: Progressing     Problem: Knowledge Deficit  Goal: Patient/family/caregiver demonstrates understanding of disease process, treatment plan, medications, and discharge instructions  Description: Complete learning assessment and assess knowledge base    Interventions:  - Provide teaching at level of understanding  - Provide teaching via preferred learning methods  Outcome: Progressing

## 2021-04-22 ENCOUNTER — HOSPITAL ENCOUNTER (OUTPATIENT)
Dept: RADIOLOGY | Age: 56
Discharge: HOME | End: 2021-04-22
Payer: COMMERCIAL

## 2021-04-22 DIAGNOSIS — R97.20 ELEVATED PSA: ICD-10-CM

## 2021-04-22 PROCEDURE — 72197 MRI PELVIS W/O & W/DYE: CPT

## 2021-04-22 PROCEDURE — G1004 CDSM NDSC: HCPCS

## 2021-04-22 PROCEDURE — A9585 GADOBUTROL INJECTION: HCPCS | Performed by: PHYSICIAN ASSISTANT

## 2021-04-22 PROCEDURE — 76377 3D RENDER W/INTRP POSTPROCES: CPT

## 2021-04-22 RX ADMIN — GADOBUTROL 9 ML: 604.72 INJECTION INTRAVENOUS at 12:45

## 2021-04-23 ENCOUNTER — HOSPITAL ENCOUNTER (OUTPATIENT)
Dept: INFUSION CENTER | Facility: HOSPITAL | Age: 56
Discharge: HOME | End: 2021-04-23
Attending: INTERNAL MEDICINE
Payer: COMMERCIAL

## 2021-04-23 VITALS
HEART RATE: 69 BPM | RESPIRATION RATE: 18 BRPM | DIASTOLIC BLOOD PRESSURE: 83 MMHG | TEMPERATURE: 96.5 F | OXYGEN SATURATION: 98 % | SYSTOLIC BLOOD PRESSURE: 148 MMHG

## 2021-04-23 DIAGNOSIS — D50.0 IRON DEFICIENCY ANEMIA DUE TO CHRONIC BLOOD LOSS: Primary | ICD-10-CM

## 2021-04-23 DIAGNOSIS — D64.9 SYMPTOMATIC ANEMIA: ICD-10-CM

## 2021-04-23 LAB — HGB BLD-MCNC: 13.9 G/DL (ref 12–17)

## 2021-04-23 PROCEDURE — 96365 THER/PROPH/DIAG IV INF INIT: CPT

## 2021-04-23 PROCEDURE — 85018 HEMOGLOBIN: CPT | Performed by: INTERNAL MEDICINE

## 2021-04-23 RX ORDER — SODIUM CHLORIDE 9 MG/ML
20 INJECTION, SOLUTION INTRAVENOUS ONCE
Status: COMPLETED | OUTPATIENT
Start: 2021-04-23 | End: 2021-04-23

## 2021-04-23 RX ORDER — SODIUM CHLORIDE 9 MG/ML
20 INJECTION, SOLUTION INTRAVENOUS ONCE
Status: CANCELLED | OUTPATIENT
Start: 2021-04-30

## 2021-04-23 RX ADMIN — SODIUM CHLORIDE 20 ML/HR: 0.9 INJECTION, SOLUTION INTRAVENOUS at 08:13

## 2021-04-23 RX ADMIN — IRON SUCROSE 300 MG: 20 INJECTION, SOLUTION INTRAVENOUS at 08:13

## 2021-04-23 NOTE — PLAN OF CARE
Problem: Potential for Falls  Goal: Patient will remain free of falls  Description: INTERVENTIONS:  - Assess patient frequently for physical needs  -  Identify cognitive and physical deficits and behaviors that affect risk of falls  -  [place] fall precautions as indicated by assessment   - Educate patient/family on patient safety including physical limitations  - Instruct patient to call for assistance with activity based on assessment  - Modify environment to reduce risk of injury  - Consider OT/PT consult to assist with strengthening/mobility  Outcome: Progressing     Problem: Potential for Falls  Goal: Patient will remain free of falls  Description: INTERVENTIONS:  - Assess patient frequently for physical needs  -  Identify cognitive and physical deficits and behaviors that affect risk of falls    -  [place] fall precautions as indicated by assessment   - Educate patient/family on patient safety including physical limitations  - Instruct patient to call for assistance with activity based on assessment  - Modify environment to reduce risk of injury  - Consider OT/PT consult to assist with strengthening/mobility  Outcome: Progressing     Problem: DISCHARGE PLANNING  Goal: Discharge to home or other facility with appropriate resources  Description: INTERVENTIONS:  - Identify barriers to discharge w/patient and caregiver  - Arrange for needed discharge resources and transportation as appropriate  - Identify discharge learning needs (meds, wound care, etc )  - Arrange for interpretive services to assist at discharge as needed  - Refer to Case Management Department for coordinating discharge planning if the patient needs post-hospital services based on physician/advanced practitioner order or complex needs related to functional status, cognitive ability, or social support system  Outcome: Progressing     Problem: Knowledge Deficit  Goal: Patient/family/caregiver demonstrates understanding of disease process, treatment plan, medications, and discharge instructions  Description: Complete learning assessment and assess knowledge base    Interventions:  - Provide teaching at level of understanding  - Provide teaching via preferred learning methods  Outcome: Progressing

## 2021-04-25 ENCOUNTER — HOSPITAL ENCOUNTER (EMERGENCY)
Facility: HOSPITAL | Age: 56
Discharge: HOME | End: 2021-04-25
Attending: EMERGENCY MEDICINE | Admitting: EMERGENCY MEDICINE
Payer: COMMERCIAL

## 2021-04-25 VITALS
RESPIRATION RATE: 20 BRPM | TEMPERATURE: 97.5 F | DIASTOLIC BLOOD PRESSURE: 108 MMHG | HEART RATE: 93 BPM | OXYGEN SATURATION: 98 % | SYSTOLIC BLOOD PRESSURE: 155 MMHG | HEIGHT: 72 IN | BODY MASS INDEX: 27.96 KG/M2 | WEIGHT: 206.4 LBS

## 2021-04-25 DIAGNOSIS — L50.9 URTICARIA: Primary | ICD-10-CM

## 2021-04-25 PROCEDURE — 99282 EMERGENCY DEPT VISIT SF MDM: CPT

## 2021-04-25 PROCEDURE — 99284 EMERGENCY DEPT VISIT MOD MDM: CPT | Performed by: EMERGENCY MEDICINE

## 2021-04-25 RX ORDER — PREDNISONE 20 MG/1
40 TABLET ORAL ONCE
Status: COMPLETED | OUTPATIENT
Start: 2021-04-25 | End: 2021-04-25

## 2021-04-25 RX ORDER — PREDNISONE 10 MG/1
20 TABLET ORAL DAILY
Qty: 10 TABLET | Refills: 0 | Status: SHIPPED | OUTPATIENT
Start: 2021-04-25 | End: 2021-05-01

## 2021-04-25 RX ADMIN — PREDNISONE 40 MG: 20 TABLET ORAL at 18:56

## 2021-04-25 NOTE — ED PROVIDER NOTES
History  Chief Complaint   Patient presents with    Rash     rash on arms, axillary, chest, abdomen, legs started thursday and worse today  itchy no pain     58-year-old male presents the ED with rash over various areas of his body  Patient states that he was given contrast for an MRI noted look at his prostate late this last week  States a few days after that he did develop these he is not sure if this is due to that or not no systemic symptoms no shortness of breath no stridor or wheezing  Patient states he has not had this before states it is very itchy  No other complaints      History provided by:  Patient   used: No        Prior to Admission Medications   Prescriptions Last Dose Informant Patient Reported?  Taking?   acetaminophen (TYLENOL) 325 mg tablet Past Week at Unknown time Self No Yes   Sig: Take 2 tablets (650 mg total) by mouth every 6 (six) hours as needed for mild pain, headaches or fever   aspirin (ECOTRIN LOW STRENGTH) 81 mg EC tablet 4/25/2021 at Unknown time Self No Yes   Sig: Take 1 tablet (81 mg total) by mouth daily   atorvastatin (LIPITOR) 20 mg tablet 4/25/2021 at Unknown time Self No Yes   Sig: Take 1 tablet (20 mg total) by mouth daily   finasteride (PROSCAR) 5 mg tablet 4/25/2021 at Unknown time  No Yes   Sig: Take 1 tablet (5 mg total) by mouth daily   oxybutynin (DITROPAN-XL) 5 mg 24 hr tablet 4/25/2021 at Unknown time  No Yes   Sig: Take 1 tablet (5 mg total) by mouth daily   pantoprazole (PROTONIX) 40 mg tablet 4/25/2021 at Unknown time Self No Yes   Sig: Take 1 tablet (40 mg total) by mouth 2 (two) times a day 30-60 min before breakfast   tamsulosin (FLOMAX) 0 4 mg 4/25/2021 at Unknown time  No Yes   Sig: TAKE 1 CAPSULE BY MOUTH EVERY DAY WITH DINNER   verapamil (CALAN-SR) 120 mg CR tablet 4/25/2021 at Unknown time Self No Yes   Sig: Take 1 tablet (120 mg total) by mouth daily      Facility-Administered Medications: None       Past Medical History: Diagnosis Date    Anemia     Anemia     Cancer ([facility] [address] )     prostate    Colon polyp     Elevated PSA     Enlarged prostate     Full dentures     GERD (gastroesophageal reflux disease)     Hemorrhoid     Hyperlipidemia     Hypertension     Sleep apnea     No CPAP    Smoker     TIA (transient ischemic attack)     Wears glasses        Past Surgical History:   Procedure Laterality Date    APPENDECTOMY      COLONOSCOPY      EGD      FOOT SURGERY Right     bone removed    OTHER SURGICAL HISTORY      bone removal right arm-abnormal growth of surface bone    PROSTATE BIOPSY      SPLENECTOMY      ruptured       Family History   Problem Relation Age of Onset    Dementia Father     Heart disease Mother     Hypertension Mother     Hyperlipidemia Mother     Hypertension Sister     Hypertension Brother     No Known Problems Daughter     No Known Problems Son     Cancer Paternal Grandfather         prostate    No Known Problems Son      I have reviewed and agree with the history as documented  E-Cigarette/Vaping    E-Cigarette Use Never User      E-Cigarette/Vaping Substances     Social History     Tobacco Use    Smoking status: Former Smoker     Packs/day: 0 50     Years: 10 00     Pack years: 5 00     Types: Cigarettes     Quit date: 2021     Years since quittin 1    Smokeless tobacco: Never Used   Substance Use Topics    Alcohol use: Not Currently     Frequency: Never    Drug use: No       Review of Systems   Constitutional: Negative for activity change, chills, diaphoresis and fever  HENT: Negative for congestion, ear pain, nosebleeds, sore throat, trouble swallowing and voice change  Eyes: Negative for pain, discharge and redness  Respiratory: Negative for apnea, cough, choking, shortness of breath, wheezing and stridor  Cardiovascular: Negative for chest pain and palpitations     Gastrointestinal: Negative for abdominal distention, abdominal pain, constipation, diarrhea, nausea and vomiting  Endocrine: Negative for polydipsia  Genitourinary: Negative for difficulty urinating, dysuria, flank pain, frequency, hematuria and urgency  Musculoskeletal: Negative for back pain, gait problem, joint swelling, myalgias, neck pain and neck stiffness  Skin: Positive for rash  Negative for pallor  Neurological: Negative for dizziness, tremors, syncope, speech difficulty, weakness, numbness and headaches  Hematological: Negative for adenopathy  Psychiatric/Behavioral: Negative for confusion, hallucinations, self-injury and suicidal ideas  The patient is not nervous/anxious  Physical Exam  Physical Exam  Vitals signs and nursing note reviewed  Constitutional:       General: He is not in acute distress  Appearance: He is well-developed  He is not diaphoretic  HENT:      Head: Normocephalic and atraumatic  Right Ear: External ear normal       Left Ear: External ear normal       Nose: Nose normal    Eyes:      Conjunctiva/sclera: Conjunctivae normal       Pupils: Pupils are equal, round, and reactive to light  Neck:      Musculoskeletal: Normal range of motion and neck supple  Cardiovascular:      Rate and Rhythm: Normal rate and regular rhythm  Heart sounds: Normal heart sounds  Pulmonary:      Effort: Pulmonary effort is normal       Breath sounds: Normal breath sounds  Abdominal:      General: Bowel sounds are normal       Palpations: Abdomen is soft  Musculoskeletal: Normal range of motion  Skin:     General: Skin is warm and dry  Findings: Rash present  Neurological:      Mental Status: He is alert and oriented to person, place, and time  Deep Tendon Reflexes: Reflexes are normal and symmetric  Psychiatric:         Behavior: Behavior is cooperative           Vital Signs  ED Triage Vitals [04/25/21 1829]   Temperature Pulse Respirations Blood Pressure SpO2   97 5 °F (36 4 °C) 93 20 (!) 155/108 98 %      Temp Source Heart Rate Source Patient Position - Orthostatic VS BP Location FiO2 (%)   Tympanic Monitor Sitting Right arm --      Pain Score       --           Vitals:    04/25/21 1829   BP: (!) 155/108   Pulse: 93   Patient Position - Orthostatic VS: Sitting         Visual Acuity      ED Medications  Medications   predniSONE tablet 40 mg (40 mg Oral Given 4/25/21 1856)       Diagnostic Studies  Results Reviewed     None                 No orders to display              Procedures  Procedures         ED Course                             SBIRT 20yo+      Most Recent Value   SBIRT (22 yo +)   In order to provide better care to our patients, we are screening all of our patients for alcohol and drug use  Would it be okay to ask you these screening questions? No Filed at: 04/25/2021 1853                    MDM    Disposition  Final diagnoses:   Urticaria     Time reflects when diagnosis was documented in both MDM as applicable and the Disposition within this note     Time User Action Codes Description Comment    4/25/2021  6:53 PM Brigitte Mendoza Add [L50 9] Urticaria       ED Disposition     ED Disposition Condition Date/Time Comment    Discharge Stable Sun Apr 25, 2021  6:53 PM Jessie Acevedo discharge to home/self care              Follow-up Information     Follow up With Specialties Details Why [facility],  Family Medicine Schedule an appointment as soon as possible for a visit  As needed [id number] [phone]            Discharge Medication List as of 4/25/2021  6:54 PM      START taking these medications    Details   predniSONE 10 mg tablet Take 2 tablets (20 mg total) by mouth daily for 5 days, Starting Sun 4/25/2021, Until Fri 4/30/2021, Normal         CONTINUE these medications which have NOT CHANGED    Details   acetaminophen (TYLENOL) 325 mg tablet Take 2 tablets (650 mg total) by mouth every 6 (six) hours as needed for mild pain, headaches or fever, Starting Wed 1/29/2020, No Print      aspirin (ECOTRIN LOW STRENGTH) 81 mg EC tablet Take 1 tablet (81 mg total) by mouth daily, Starting Thu 1/30/2020, Normal      atorvastatin (LIPITOR) 20 mg tablet Take 1 tablet (20 mg total) by mouth daily, Starting Thu 2/25/2021, Normal      finasteride (PROSCAR) 5 mg tablet Take 1 tablet (5 mg total) by mouth daily, Starting Fri 4/2/2021, Normal      oxybutynin (DITROPAN-XL) 5 mg 24 hr tablet Take 1 tablet (5 mg total) by mouth daily, Starting Fri 4/2/2021, Until Tue 6/1/2021, Normal      pantoprazole (PROTONIX) 40 mg tablet Take 1 tablet (40 mg total) by mouth 2 (two) times a day 30-60 min before breakfast, Starting Tue 1/19/2021, Normal      tamsulosin (FLOMAX) 0 4 mg TAKE 1 CAPSULE BY MOUTH EVERY DAY WITH DINNER, Normal      verapamil (CALAN-SR) 120 mg CR tablet Take 1 tablet (120 mg total) by mouth daily, Starting Tue 2/16/2021, Normal           No discharge procedures on file      PDMP Review     None          ED Provider  Electronically Signed by           Ajith Cates DO  04/25/21 1950

## 2021-04-26 ENCOUNTER — VBI (OUTPATIENT)
Dept: FAMILY MEDICINE CLINIC | Facility: CLINIC | Age: 56
End: 2021-04-26

## 2021-04-26 NOTE — LETTER
[facility]  [address]  [address]  [place] [address]    Date: 04/30/21  [address] [address]    Dear Minna Lara:                                                                                                                              Thank you for choosing [facility] emergency department for care  As your primary care provider we want to make sure that your ongoing medical care is being addressed  If you require follow up care as a result of your emergency department visit, there are a few things we would like you to know  As part of our continuing commitment to caring for our patient, we have added more same day appointments and have extended our office hours to meet your medical needs  After hours, on-call physicians are available via our main office line  We encourage you to contact our office prior to seeking treatment to discuss your symptoms with our medical staff  Together, we can determine the correct course of action  A majority of non-emergent conditions such as: common cold, flu-like symptoms, fevers, strains/sprains, dislocations, minor burns, cuts and animal bites can be treated at [address] [facility] facilities  Diagnostic testing is available at some sites  Of course, if you are experiencing a life threatening medical emergency call 911 or proceed directly to the nearest emergency room      Your nearest [facility] facility is conveniently located at:    [address] [place] [address] [address], [facility]  [phone]  SKIP THE WAIT  Conveniently offered at most [facility] locations  [place] your spot online at www [facility] org/[email or web address] or on the [facility] [address]    Sincerely,    [facility]  Dept: [phone]

## 2021-04-26 NOTE — TELEPHONE ENCOUNTER
Left Message - PATIENT WENT TO THE ED FOR A RASH AFTER HAVE AN MRI FROM DYE - PLEASE SEE IF PATIENT NEEDS A F/U APPT      By Eric Ness MA

## 2021-04-29 ENCOUNTER — TELEPHONE (OUTPATIENT)
Dept: HEMATOLOGY ONCOLOGY | Facility: CLINIC | Age: 56
End: 2021-04-29

## 2021-04-29 NOTE — TELEPHONE ENCOUNTER
Call from patient  Patient had a "large dark red liquid" stool today  Patient declines ED  Patient will call GI MD ASAP and go to ED with any symptoms of lightheadedness or with another episode of bloody stool    Patient states" I feel fine"  Latest hg was 13 9  Patient has an iron infusion scheduled for tomorrow    FYI to RN with PA

## 2021-04-30 ENCOUNTER — HOSPITAL ENCOUNTER (OUTPATIENT)
Dept: INFUSION CENTER | Facility: HOSPITAL | Age: 56
Discharge: HOME | End: 2021-04-30
Attending: INTERNAL MEDICINE
Payer: COMMERCIAL

## 2021-04-30 ENCOUNTER — TELEPHONE (OUTPATIENT)
Dept: UROLOGY | Facility: CLINIC | Age: 56
End: 2021-04-30

## 2021-04-30 VITALS
TEMPERATURE: 98.5 F | HEART RATE: 78 BPM | RESPIRATION RATE: 18 BRPM | OXYGEN SATURATION: 98 % | DIASTOLIC BLOOD PRESSURE: 90 MMHG | SYSTOLIC BLOOD PRESSURE: 152 MMHG

## 2021-04-30 DIAGNOSIS — D50.0 IRON DEFICIENCY ANEMIA DUE TO CHRONIC BLOOD LOSS: Primary | ICD-10-CM

## 2021-04-30 DIAGNOSIS — D64.9 SYMPTOMATIC ANEMIA: ICD-10-CM

## 2021-04-30 PROCEDURE — 96365 THER/PROPH/DIAG IV INF INIT: CPT

## 2021-04-30 PROCEDURE — 96367 TX/PROPH/DG ADDL SEQ IV INF: CPT

## 2021-04-30 RX ORDER — DIPHENHYDRAMINE HCL 25 MG
25 CAPSULE ORAL ONCE
Status: CANCELLED
Start: 2021-04-30

## 2021-04-30 RX ORDER — DIPHENHYDRAMINE HCL 25 MG
25 TABLET ORAL ONCE
Status: COMPLETED | OUTPATIENT
Start: 2021-04-30 | End: 2021-04-30

## 2021-04-30 RX ORDER — DEXAMETHASONE 4 MG/1
4 TABLET ORAL
Qty: 10 TABLET | Refills: 0 | Status: SHIPPED | OUTPATIENT
Start: 2021-04-30 | End: 2021-11-26

## 2021-04-30 RX ORDER — SODIUM CHLORIDE 9 MG/ML
20 INJECTION, SOLUTION INTRAVENOUS ONCE
Status: CANCELLED | OUTPATIENT
Start: 2021-05-07

## 2021-04-30 RX ORDER — DIPHENHYDRAMINE HCL 25 MG
25 CAPSULE ORAL ONCE
Status: CANCELLED
Start: 2021-05-07

## 2021-04-30 RX ORDER — SODIUM CHLORIDE 9 MG/ML
20 INJECTION, SOLUTION INTRAVENOUS ONCE
Status: COMPLETED | OUTPATIENT
Start: 2021-04-30 | End: 2021-04-30

## 2021-04-30 RX ORDER — DIPHENHYDRAMINE HCL 25 MG
25 CAPSULE ORAL ONCE
Status: DISCONTINUED | OUTPATIENT
Start: 2021-04-30 | End: 2021-04-30 | Stop reason: CLARIF

## 2021-04-30 RX ADMIN — DEXAMETHASONE SODIUM PHOSPHATE 10 MG: 10 INJECTION, SOLUTION INTRAMUSCULAR; INTRAVENOUS at 08:46

## 2021-04-30 RX ADMIN — DIPHENHYDRAMINE HYDROCHLORIDE 25 MG: 25 TABLET ORAL at 08:31

## 2021-04-30 RX ADMIN — IRON SUCROSE 300 MG: 20 INJECTION, SOLUTION INTRAVENOUS at 09:18

## 2021-04-30 RX ADMIN — SODIUM CHLORIDE 20 ML/HR: 0.9 INJECTION, SOLUTION INTRAVENOUS at 08:29

## 2021-04-30 NOTE — PLAN OF CARE
Problem: Potential for Falls  Goal: Patient will remain free of falls  Description: INTERVENTIONS:  - Assess patient frequently for physical needs  -  Identify cognitive and physical deficits and behaviors that affect risk of falls    -  [place] fall precautions as indicated by assessment   - Educate patient/family on patient safety including physical limitations  - Instruct patient to call for assistance with activity based on assessment  - Modify environment to reduce risk of injury  Outcome: Progressing

## 2021-04-30 NOTE — PROGRESS NOTES
Pt reported going to the ED after last venofer for itching and rash to both arms  He says he isnt sure if it was caused by the dye they used for a scan or the venofer  I notified Dr Frank Mcallister via Arpit Solis RN and received new orders for pre-meds and pt to pickup prescription from his pharmacy to continue steroids at home as directed  Pt verbalized understanding

## 2021-04-30 NOTE — TELEPHONE ENCOUNTER
Saran Machado    ED Visit Information     Ed visit date: 4/25/2021  Diagnosis Description: RASH  In Network? Yes Deondre Macdonald  Discharge status: Home  Discharged with meds ? Yes  Number of ED visits to date: 1  ED Severity:N/A     Outreach Information    Outreach successful: YES 3  Date letter mailed:MY [address] [place] [address]  Date Finalized:4/30/21    Care Coordination    Follow up appointment with pcp: no MY CHART LETTER SENT  Transportation issues ?  No    Value [employer]

## 2021-04-30 NOTE — TELEPHONE ENCOUNTER
Called and left message for patient to call office back  When patient calls back please review message below from [facility]  It looks like patient is already scheduled for cysto/trus with Dr Maral Bender on 6/25  Please confirm with patient       ----- Message from Ramon Calvin PA-C sent at 4/28/2021 12:40 PM EDT -----  Please advise patient that prostate MRI reveals PI-RADS category 2 indicating that prostate cancer is unlikely to be present  The MRI does reveal a very large prostate gland  I would recommend patient proceed with cystoscopy and TRUS evaluation as previously discussed  Please inform patient of MRI results and schedule him for appointment with MD for cysto & TRUS

## 2021-05-01 ENCOUNTER — HOSPITAL ENCOUNTER (EMERGENCY)
Facility: HOSPITAL | Age: 56
Discharge: HOME | End: 2021-05-01
Attending: EMERGENCY MEDICINE | Admitting: EMERGENCY MEDICINE
Payer: COMMERCIAL

## 2021-05-01 ENCOUNTER — APPOINTMENT (EMERGENCY)
Dept: RADIOLOGY | Facility: HOSPITAL | Age: 56
End: 2021-05-01
Payer: COMMERCIAL

## 2021-05-01 VITALS
HEART RATE: 78 BPM | OXYGEN SATURATION: 96 % | RESPIRATION RATE: 18 BRPM | HEIGHT: 72 IN | TEMPERATURE: 98.1 F | DIASTOLIC BLOOD PRESSURE: 89 MMHG | SYSTOLIC BLOOD PRESSURE: 154 MMHG | WEIGHT: 206 LBS | BODY MASS INDEX: 27.9 KG/M2

## 2021-05-01 DIAGNOSIS — D50.0 CHRONIC BLOOD LOSS ANEMIA: ICD-10-CM

## 2021-05-01 DIAGNOSIS — E61.1 IRON DEFICIENCY: ICD-10-CM

## 2021-05-01 DIAGNOSIS — K62.5 RECTAL BLEEDING: Primary | ICD-10-CM

## 2021-05-01 LAB
ALBUMIN SERPL BCP-MCNC: 3.8 G/DL (ref 3.5–5)
ALP SERPL-CCNC: 110 U/L (ref 46–116)
ALT SERPL W P-5'-P-CCNC: 34 U/L (ref 12–78)
ANION GAP SERPL CALCULATED.3IONS-SCNC: 10 MMOL/L (ref 4–13)
AST SERPL W P-5'-P-CCNC: 11 U/L (ref 5–45)
BASOPHILS # BLD AUTO: 0.04 THOUSANDS/ΜL (ref 0–0.1)
BASOPHILS NFR BLD AUTO: 0 % (ref 0–1)
BILIRUB SERPL-MCNC: 0.16 MG/DL (ref 0.2–1)
BILIRUB UR QL STRIP: NEGATIVE
BUN SERPL-MCNC: 16 MG/DL (ref 5–25)
CALCIUM SERPL-MCNC: 8.9 MG/DL (ref 8.3–10.1)
CHLORIDE SERPL-SCNC: 106 MMOL/L (ref 100–108)
CLARITY UR: CLEAR
CO2 SERPL-SCNC: 24 MMOL/L (ref 21–32)
COLOR UR: ABNORMAL
CREAT SERPL-MCNC: 1.14 MG/DL (ref 0.6–1.3)
EOSINOPHIL # BLD AUTO: 0.01 THOUSAND/ΜL (ref 0–0.61)
EOSINOPHIL NFR BLD AUTO: 0 % (ref 0–6)
ERYTHROCYTE [DISTWIDTH] IN BLOOD BY AUTOMATED COUNT: 22.3 % (ref 11.6–15.1)
GFR SERPL CREATININE-BSD FRML MDRD: 71 ML/MIN/1.73SQ M
GLUCOSE SERPL-MCNC: 128 MG/DL (ref 65–140)
GLUCOSE UR STRIP-MCNC: ABNORMAL MG/DL
HCT VFR BLD AUTO: 46.2 % (ref 36.5–49.3)
HGB BLD-MCNC: 14.3 G/DL (ref 12–17)
HGB UR QL STRIP.AUTO: NEGATIVE
HOLD SPECIMEN: NORMAL
IMM GRANULOCYTES # BLD AUTO: 0.26 THOUSAND/UL (ref 0–0.2)
IMM GRANULOCYTES NFR BLD AUTO: 1 % (ref 0–2)
KETONES UR STRIP-MCNC: NEGATIVE MG/DL
LEUKOCYTE ESTERASE UR QL STRIP: NEGATIVE
LIPASE SERPL-CCNC: 130 U/L (ref 73–393)
LYMPHOCYTES # BLD AUTO: 2.06 THOUSANDS/ΜL (ref 0.6–4.47)
LYMPHOCYTES NFR BLD AUTO: 11 % (ref 14–44)
MCH RBC QN AUTO: 25.5 PG (ref 26.8–34.3)
MCHC RBC AUTO-ENTMCNC: 31 G/DL (ref 31.4–37.4)
MCV RBC AUTO: 83 FL (ref 82–98)
MONOCYTES # BLD AUTO: 2.09 THOUSAND/ΜL (ref 0.17–1.22)
MONOCYTES NFR BLD AUTO: 11 % (ref 4–12)
NEUTROPHILS # BLD AUTO: 15.16 THOUSANDS/ΜL (ref 1.85–7.62)
NEUTS SEG NFR BLD AUTO: 77 % (ref 43–75)
NITRITE UR QL STRIP: NEGATIVE
NRBC BLD AUTO-RTO: 0 /100 WBCS
PH UR STRIP.AUTO: 6.5 [PH]
PLATELET # BLD AUTO: 501 THOUSANDS/UL (ref 149–390)
PMV BLD AUTO: 9.8 FL (ref 8.9–12.7)
POTASSIUM SERPL-SCNC: 3.8 MMOL/L (ref 3.5–5.3)
PROT SERPL-MCNC: 7.3 G/DL (ref 6.4–8.2)
PROT UR STRIP-MCNC: NEGATIVE MG/DL
RBC # BLD AUTO: 5.6 MILLION/UL (ref 3.88–5.62)
SODIUM SERPL-SCNC: 140 MMOL/L (ref 136–145)
SP GR UR STRIP.AUTO: 1.01 (ref 1–1.03)
UROBILINOGEN UR QL STRIP.AUTO: 0.2 E.U./DL
WBC # BLD AUTO: 19.62 THOUSAND/UL (ref 4.31–10.16)

## 2021-05-01 PROCEDURE — 80053 COMPREHEN METABOLIC PANEL: CPT | Performed by: EMERGENCY MEDICINE

## 2021-05-01 PROCEDURE — 99285 EMERGENCY DEPT VISIT HI MDM: CPT

## 2021-05-01 PROCEDURE — 83540 ASSAY OF IRON: CPT

## 2021-05-01 PROCEDURE — 99284 EMERGENCY DEPT VISIT MOD MDM: CPT | Performed by: EMERGENCY MEDICINE

## 2021-05-01 PROCEDURE — 82272 OCCULT BLD FECES 1-3 TESTS: CPT

## 2021-05-01 PROCEDURE — 85025 COMPLETE CBC W/AUTO DIFF WBC: CPT | Performed by: EMERGENCY MEDICINE

## 2021-05-01 PROCEDURE — 83690 ASSAY OF LIPASE: CPT | Performed by: EMERGENCY MEDICINE

## 2021-05-01 PROCEDURE — G1004 CDSM NDSC: HCPCS

## 2021-05-01 PROCEDURE — 83550 IRON BINDING TEST: CPT

## 2021-05-01 PROCEDURE — 82728 ASSAY OF FERRITIN: CPT

## 2021-05-01 PROCEDURE — 36415 COLL VENOUS BLD VENIPUNCTURE: CPT | Performed by: EMERGENCY MEDICINE

## 2021-05-01 PROCEDURE — 51798 US URINE CAPACITY MEASURE: CPT

## 2021-05-01 PROCEDURE — 96360 HYDRATION IV INFUSION INIT: CPT

## 2021-05-01 PROCEDURE — 74177 CT ABD & PELVIS W/CONTRAST: CPT

## 2021-05-01 PROCEDURE — 81003 URINALYSIS AUTO W/O SCOPE: CPT | Performed by: EMERGENCY MEDICINE

## 2021-05-01 RX ADMIN — SODIUM CHLORIDE 1000 ML: 0.9 INJECTION, SOLUTION INTRAVENOUS at 08:28

## 2021-05-01 RX ADMIN — IOHEXOL 99 ML: 350 INJECTION, SOLUTION INTRAVENOUS at 08:39

## 2021-05-01 NOTE — ED PROVIDER NOTES
History  Chief Complaint   Patient presents with    Rectal Bleeding     patient states he has been having rectal bleeding since Thursday, recieved iron infusion yesterday  Patient has had an epsiode of this previously and told he has hemorroids and polyps  71-year-old male with past history of hyperlipidemia, GERD, LALIT not on CPAP, hypertension, colon polyp, hemorrhoids, anemia, prostate cancer, TIA, presents to the ED with complaint of dark red blood per rectum over the past 3 days  Patient has history of hemorrhoids  Patient states that he is followed by GI as well as Hematology  Patient received are infusion yesterday for his anemia  Patient recently had colonoscopy about 2 months ago  Patient states that he has had some abdominal distension and some mild discomfort since having his colonoscopy  Patient came to the ED for further evaluation as his rectal bleeding persists  Patient denies any fevers, chills, chest pain, shortness of breath, headaches, weakness, dizziness, focal neuro deficits  History provided by:  Patient      Prior to Admission Medications   Prescriptions Last Dose Informant Patient Reported? Taking?   acetaminophen (TYLENOL) 325 mg tablet 4/30/2021 at Unknown time Self No Yes   Sig: Take 2 tablets (650 mg total) by mouth every 6 (six) hours as needed for mild pain, headaches or fever   aspirin (ECOTRIN LOW STRENGTH) 81 mg EC tablet 5/1/2021 at 0700 Self No Yes   Sig: Take 1 tablet (81 mg total) by mouth daily   atorvastatin (LIPITOR) 20 mg tablet 5/1/2021 at 0700 Self No Yes   Sig: Take 1 tablet (20 mg total) by mouth daily   dexamethasone (DECADRON) 4 mg tablet 5/1/2021 at 0700  No Yes   Sig: Take 1 tablet (4 mg total) by mouth daily with breakfast For two days after iron infusion     finasteride (PROSCAR) 5 mg tablet 4/30/2021 at Unknown time  No Yes   Sig: Take 1 tablet (5 mg total) by mouth daily   Patient taking differently: Take 5 mg by mouth daily at bedtime    oxybutynin (DITROPAN-XL) 5 mg 24 hr tablet 4/30/2021 at Unknown time  No Yes   Sig: Take 1 tablet (5 mg total) by mouth daily   Patient taking differently: Take 5 mg by mouth daily at bedtime    pantoprazole (PROTONIX) 40 mg tablet 5/1/2021 at 0700 Self No Yes   Sig: Take 1 tablet (40 mg total) by mouth 2 (two) times a day 30-60 min before breakfast   tamsulosin (FLOMAX) 0 4 mg 4/30/2021 at Unknown time  No Yes   Sig: TAKE 1 CAPSULE BY MOUTH EVERY DAY WITH DINNER   verapamil (CALAN-SR) 120 mg CR tablet 4/30/2021 at Unknown time Self No Yes   Sig: Take 1 tablet (120 mg total) by mouth daily   Patient taking differently: Take 120 mg by mouth daily at bedtime       Facility-Administered Medications: None       Past Medical History:   Diagnosis Date    Anemia     Anemia     Cancer (HCC)     prostate    Colon polyp     Elevated PSA     Enlarged prostate     Full dentures     GERD (gastroesophageal reflux disease)     Hemorrhoid     Hyperlipidemia     Hypertension     Sleep apnea     No CPAP    Smoker     TIA (transient ischemic attack)     Wears glasses        Past Surgical History:   Procedure Laterality Date    APPENDECTOMY      COLONOSCOPY      EGD      FOOT SURGERY Right     bone removed    OTHER SURGICAL HISTORY      bone removal right arm-abnormal growth of surface bone    PROSTATE BIOPSY      SPLENECTOMY      ruptured       Family History   Problem Relation Age of Onset    Dementia Father     Heart disease Mother     Hypertension Mother     Hyperlipidemia Mother     Hypertension Sister     Hypertension Brother     No Known Problems Daughter     No Known Problems Son     Cancer Paternal Grandfather         prostate    No Known Problems Son      I have reviewed and agree with the history as documented      E-Cigarette/Vaping    E-Cigarette Use Never User      E-Cigarette/Vaping Substances    Nicotine No     THC No     CBD No     Flavoring No     Other No     Unknown No      Social History Tobacco Use    Smoking status: Former Smoker     Packs/day: 0 50     Years: 10 00     Pack years: 5 00     Types: Cigarettes     Quit date: 2021     Years since quittin 1    Smokeless tobacco: Never Used   Substance Use Topics    Alcohol use: Not Currently     Frequency: Never    Drug use: No       Review of Systems   Constitutional: Negative for chills and fever  HENT: Negative for ear pain and sore throat  Eyes: Negative for pain and visual disturbance  Respiratory: Negative for cough and shortness of breath  Cardiovascular: Negative for chest pain and palpitations  Gastrointestinal: Positive for abdominal distention and blood in stool  Negative for abdominal pain and vomiting  Genitourinary: Negative for dysuria and hematuria  Musculoskeletal: Negative for arthralgias and back pain  Skin: Negative for color change and rash  Neurological: Negative for seizures and syncope  All other systems reviewed and are negative  Physical Exam  Physical Exam  Vitals signs and nursing note reviewed  Constitutional:       Appearance: He is well-developed  HENT:      Head: Normocephalic and atraumatic  Eyes:      Conjunctiva/sclera: Conjunctivae normal    Neck:      Musculoskeletal: Neck supple  Cardiovascular:      Rate and Rhythm: Normal rate and regular rhythm  Heart sounds: No murmur  Pulmonary:      Effort: Pulmonary effort is normal  No respiratory distress  Breath sounds: Normal breath sounds  Abdominal:      Palpations: Abdomen is soft  Tenderness: There is no abdominal tenderness  Genitourinary:     Rectum: Guaiac result positive  Comments: No external or internal hemorrhoids felt during digital rectal exam   Dark blood noted on glove during JENNIFER  Skin:     General: Skin is warm and dry  Neurological:      Mental Status: He is alert           Vital Signs  ED Triage Vitals [21 0722]   Temperature Pulse Respirations Blood Pressure SpO2 98 1 °F (36 7 °C) 89 18 (!) 178/105 97 %      Temp Source Heart Rate Source Patient Position - Orthostatic VS BP Location FiO2 (%)   Tympanic Monitor Sitting Right arm --      Pain Score       No Pain           Vitals:    05/01/21 0722 05/01/21 0919   BP: (!) 178/105 154/89   Pulse: 89 78   Patient Position - Orthostatic VS: Sitting Lying         Visual Acuity      ED Medications  Medications   sodium chloride 0 9 % bolus 1,000 mL (1,000 mL Intravenous New Bag 5/1/21 0828)   iohexol (OMNIPAQUE) 350 MG/ML injection (SINGLE-DOSE) 99 mL (99 mL Intravenous Given 5/1/21 0839)       Diagnostic Studies  Results Reviewed     Procedure Component Value Units Date/Time    UA w Reflex to Microscopic w Reflex to Culture [[id number]]  (Abnormal) Collected: 05/01/21 0941    Lab Status: Final result Specimen: Urine, Clean Catch Updated: 05/01/21 0947     Color, UA Light Yellow     Clarity, UA Clear     Specific Gravity, UA 1 010     pH, UA 6 5     Leukocytes, UA Negative     Nitrite, UA Negative     Protein, UA Negative mg/dl      Glucose,  (1/10%) mg/dl      Ketones, UA Negative mg/dl      Urobilinogen, UA 0 2 E U /dl      Bilirubin, UA Negative     Blood, UA Negative    Lipase [[id number]]  (Normal) Collected: 05/01/21 0730    Lab Status: Final result Specimen: Blood from Arm, Right Updated: 05/01/21 0822     Lipase 130 u/L     Comprehensive metabolic panel [[id number]]  (Abnormal) Collected: 05/01/21 0730    Lab Status: Final result Specimen: Blood from Arm, Right Updated: 05/01/21 6078     Sodium 140 mmol/L      Potassium 3 8 mmol/L      Chloride 106 mmol/L      CO2 24 mmol/L      ANION GAP 10 mmol/L      BUN 16 mg/dL      Creatinine 1 14 mg/dL      Glucose 128 mg/dL      Calcium 8 9 mg/dL      AST 11 U/L      ALT 34 U/L      Alkaline Phosphatase 110 U/L      Total Protein 7 3 g/dL      Albumin 3 8 g/dL      Total Bilirubin 0 16 mg/dL      eGFR 71 ml/min/1 73sq m     Narrative:      Raina guidelines for Chronic Kidney Disease (CKD):     Stage 1 with normal or high GFR (GFR > 90 mL/min/1 73 square meters)    Stage 2 Mild CKD (GFR = 60-89 mL/min/1 73 square meters)    Stage 3A Moderate CKD (GFR = 45-59 mL/min/1 73 square meters)    Stage 3B Moderate CKD (GFR = 30-44 mL/min/1 73 square meters)    Stage 4 Severe CKD (GFR = 15-29 mL/min/1 73 square meters)    Stage 5 End Stage CKD (GFR <15 mL/min/1 73 square meters)  Note: GFR calculation is accurate only with a steady state creatinine    CBC and differential [[id number]]  (Abnormal) Collected: 05/01/21 0730    Lab Status: Final result Specimen: Blood from Arm, Right Updated: 05/01/21 0805     WBC 19 62 Thousand/uL      RBC 5 60 Million/uL      Hemoglobin 14 3 g/dL      Hematocrit 46 2 %      MCV 83 fL      MCH 25 5 pg      MCHC 31 0 g/dL      RDW 22 3 %      MPV 9 8 fL      Platelets 989 Thousands/uL      nRBC 0 /100 WBCs      Neutrophils Relative 77 %      Immat GRANS % 1 %      Lymphocytes Relative 11 %      Monocytes Relative 11 %      Eosinophils Relative 0 %      Basophils Relative 0 %      Neutrophils Absolute 15 16 Thousands/µL      Immature Grans Absolute 0 26 Thousand/uL      Lymphocytes Absolute 2 06 Thousands/µL      Monocytes Absolute 2 09 Thousand/µL      Eosinophils Absolute 0 01 Thousand/µL      Basophils Absolute 0 04 Thousands/µL     [employer] draw [[id number]] Collected: 05/01/21 0730    Lab Status: In process Specimen: Blood from Arm, Right Updated: 05/01/21 3275    Narrative: The following orders were created for panel order [employer] draw  Procedure                               Abnormality         Status                     ---------                               -----------         ------                     Alex Doctor / Black tube on MADONNA[[id number]]                       In process                   Please view results for these tests on the individual orders                   CT abdomen pelvis with contrast   Final Result by Amalia Roca DO (05/01 1256)   1  No acute CT abnormality to account for the patient's abdominal symptoms  2   Additional incidental findings as described above  Workstation performed: [id number]                    Procedures  Procedures         ED Course  ED Course as of May 01 0954   Sat May 01, 2021   2321 Patient initially had 350 of urine output on his own  Postvoid bladder scan showed 400 in the bladder  Patient subsequently voided and other 550  Second postvoid bladder scan showed less than 150 of urine in the bladder  8892 Patient re-examined at bedside  Patient continues to do well  Discussed lab and radiology findings with patient  Patient states that he is currently in a course of Decadron that he usually gets during his iron infusion  This may explain patient's leukocytosis  At this time patient would like to go home  MDM  Number of Diagnoses or Management Options  Rectal bleeding:   Diagnosis management comments: Obtain blood work         Amount and/or Complexity of Data Reviewed  Clinical lab tests: reviewed and ordered  Tests in the radiology section of CPT®: ordered and reviewed  Tests in the medicine section of CPT®: ordered and reviewed  Review and summarize past medical records: yes  Independent visualization of images, tracings, or specimens: yes    Risk of Complications, Morbidity, and/or Mortality  General comments: Patient's blood work showed leukocytosis  Patient's hemoglobin was within normal range  Subsequently CT abdomen/pelvis was obtained  CT scan did not show any acute intra-abdominal pathology  The was some bladder wall distention and thickening  Subsequent UA did not show any signs of infection  Patient then remembered that he is currently on a course of Decadron that he usually gets during his iron infusion  Decadron can not explain patient's leukocytosis    At this time patient is well-appearing and has no other signs of infection  At this time patient will be discharged home with follow-up to GI and PCP  Close return instructions given to return to the ER for any worsening symptoms  Patient agrees with discharge plan  Patient well appearing at time of discharge  Please Note: Fluency Direct voice recognition software may have been used in the creation of this document  Wrong words or sound a like substitutions may have occurred due to the inherent limitations of the voice software  Patient Progress  Patient progress: stable      Disposition  Final diagnoses:   Rectal bleeding     Time reflects when diagnosis was documented in both MDM as applicable and the Disposition within this note     Time User Action Codes Description Comment    5/1/2021  9:53 AM Rande Cos Add [K92 2] GI bleed     5/1/2021  9:53 AM Jackie Quiros Remove [K92 2] GI bleed     5/1/2021  9:53 AM Rande Cos Add [K62 5] Rectal bleeding       ED Disposition     ED Disposition Condition Date/Time Comment    Discharge Stable Sat May 1, 2021  9:53 AM Laci Jarrell discharge to home/self care  Follow-up Information     Follow up With Specialties Details Why [address] [facility] Schedule an appointment as soon as possible for a visit   [facility] Ronaldo Holbrook [facility] [address] Concha Menendez MD Gastroenterology Schedule an appointment as soon as possible for a visit   [place] [address]  [phone]            Patient's Medications   Discharge Prescriptions    No medications on file     No discharge procedures on file      PDMP Review     None          ED Provider  Electronically Signed by           Francisca Burkett DO  05/01/21 0869

## 2021-05-03 ENCOUNTER — TELEPHONE (OUTPATIENT)
Dept: HEMATOLOGY ONCOLOGY | Facility: MEDICAL CENTER | Age: 56
End: 2021-05-03

## 2021-05-03 ENCOUNTER — OFFICE VISIT (OUTPATIENT)
Dept: HEMATOLOGY ONCOLOGY | Facility: MEDICAL CENTER | Age: 56
End: 2021-05-03
Payer: COMMERCIAL

## 2021-05-03 ENCOUNTER — TELEPHONE (OUTPATIENT)
Dept: HEMATOLOGY ONCOLOGY | Facility: CLINIC | Age: 56
End: 2021-05-03

## 2021-05-03 VITALS
BODY MASS INDEX: 28.66 KG/M2 | RESPIRATION RATE: 18 BRPM | TEMPERATURE: 96.7 F | WEIGHT: 211.6 LBS | HEIGHT: 72 IN | OXYGEN SATURATION: 98 % | DIASTOLIC BLOOD PRESSURE: 98 MMHG | HEART RATE: 91 BPM | SYSTOLIC BLOOD PRESSURE: 158 MMHG

## 2021-05-03 DIAGNOSIS — E61.1 IRON DEFICIENCY: Primary | ICD-10-CM

## 2021-05-03 DIAGNOSIS — D50.0 CHRONIC BLOOD LOSS ANEMIA: ICD-10-CM

## 2021-05-03 DIAGNOSIS — L27.0 DERMATITIS DUE TO DRUG REACTION: ICD-10-CM

## 2021-05-03 LAB
FERRITIN SERPL-MCNC: 319 NG/ML (ref 8–388)
IRON SATN MFR SERPL: 88 %
IRON SERPL-MCNC: 341 UG/DL (ref 65–175)
TIBC SERPL-MCNC: 387 UG/DL (ref 250–450)

## 2021-05-03 PROCEDURE — 99215 OFFICE O/P EST HI 40 MIN: CPT | Performed by: PHYSICIAN ASSISTANT

## 2021-05-03 PROCEDURE — 3008F BODY MASS INDEX DOCD: CPT | Performed by: PHYSICIAN ASSISTANT

## 2021-05-03 NOTE — TELEPHONE ENCOUNTER
Appointment Confirmation     Appointment with  Chiki Sinha   Appointment date & time 5-3-2021 @ 11:00am   Location [facility]    Patient verbilized Understanding

## 2021-05-03 NOTE — TELEPHONE ENCOUNTER
LVM for the patient with the information noted below  Office phone number provided for return call if needed  ----- Message from Radha Brown PA-C sent at 5/3/2021 11:42 AM EDT -----    Call the pt and tell him his iron level is ok - high as anticipated due to recent infusions- will follow up with labs as discussed at appt - weekly cbc and iron panel before follow up

## 2021-05-03 NOTE — PROGRESS NOTES
[facility] [address] [address]  Hematology Ambulatory Follow-Up  Robbin Valderrama, 1965, [id number]  5/3/2021    Assessment/Plan:    1  Iron deficiency  Patient was initially found to be iron deficient secondary to GI source of bleeding  Patient recently had banding of hemorrhoids as well as a capsule endoscopy which did not demonstrate etiology of bleeding  Patient's hemoglobin has improved since undergoing IV iron supplementation  Unfortunately, patient suffered from another episode of kyara blood in the stool that lasted approximately three days  Patient is to follow-up with GI  Patient's hemoglobin will be monitored  Patient has one more dose of IV iron  Iron panel was requested and has not resulted  Patient will follow-up in one month with weekly CBCs and iron panel at the time of follow-up  - Iron Panel (Includes Ferritin, Iron Sat%, Iron, and TIBC); Future    2  Chronic blood loss anemia  Continues with GI blood loss- more significant over the last few days  Will arrange follow up with GI   ? 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 as a teenager  Thrombocytosis will likely not correct with IV iron treatments due to lack of spleen    Continue to follow-up patient's platelet count     The patient is scheduled for follow-up in approximately 1 month  Patient voiced agreement and understanding to the above  Patient knows to call the Hematology/Oncology office with any questions and concerns regarding the above  Barrier(s) to care: None  The patient is able to self care   ------------------------------------------------------------------------------------------------------    Chief Complaint   Patient presents with    Follow-up     Iron deficiency       History of present illness: This is a 50-year-old male who was admitted to the emergency room after having several weeks of shortness of breath, three episodes of syncope at home in January 2021   Patient's past medical history significant for 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      Patient had been previously worked up for abnormalities by primary care doctor who recommended that the patient follow-up with GI as well as with Cardiology for a stress test   Unfortunately, patient's symptoms progressed and he presented to the emergency room  [facility] work demonstrated hemoglobin in the 7 gram/deciliter range   Moreover, the patient was found to have a significant iron deficiency with a ferritin of three and an iron saturation of 1%   Patient underwent three Venofer infusions daily in the hospital   Patient's hemoglobin is in the 7 gram/deciliter range        GI work up in 1/2021-2/2021:  GI has seen the patient in the hospital  Hieu Camron underwent colonoscopy in EGD that demonstrated questionable AVM in the small intestine   This area was cauterized   This area was not bleeding   Capsule demonstrated angiectasis and follow up EGD- is planned on 2/26/2021    This procedure did not demonstrate any signs of active bleeding      Patient completed the remainder of Venofer treatments ever started in the hospital in February 2021    _____________________________________________________________    Interval history:  03/23/21: Patient notes overall feeling well  Blood work was reviewed  Patient's iron saturation increased to 7% and ferritin at 41  Hemoglobin has completely supplemented however, MCV is still low  Patient's platelet count is still elevated however this may be secondary to splenectomy and not necessarily to iron deficiency  Patient has completed COVID vaccinations       patient notes two episodes of melena last week  No recent issues  05/03/21:  Urgent follow up; [facility] on 5/1/2021, ED visit, hemoglobin stable  No bleeding today  1 more IV iron treatment to go  Iron studies requestion on labs from 5/1/21:pending  Review of Systems   Constitutional: Negative for activity change, appetite change, chills, fatigue, fever and unexpected weight change  HENT: Negative for hearing loss, mouth sores, nosebleeds, sore throat, trouble swallowing and voice change  Eyes: Negative for visual disturbance  Respiratory: Negative for cough and shortness of breath  Cardiovascular: Negative for chest pain, palpitations and leg swelling  Gastrointestinal: Positive for anal bleeding and blood in stool  Negative for abdominal pain, constipation, diarrhea, nausea and vomiting  Endocrine: Negative for cold intolerance  Genitourinary: Negative for decreased urine volume, dysuria and hematuria  Musculoskeletal: Negative for arthralgias and myalgias  Skin: Negative for rash (resolved)  Neurological: Negative for dizziness, weakness, numbness and headaches  Hematological: Negative for adenopathy  Does not bruise/bleed easily  Psychiatric/Behavioral: Negative for dysphoric mood       Patient Active Problem List   Diagnosis    Essential hypertension    Hyperlipidemia    Arm paresthesia, right    Headache    Osteoarthritis of cervical spine    Colon cancer screening    Elevated PSA    Gastroesophageal reflux disease    Hematochezia    Other emphysema ([address] )    Healthcare maintenance    Crews's esophagus without dysplasia    Tubular adenoma of colon    Myalgia    Hip pain, bilateral    Osteoarthritis, hip, bilateral    Symptomatic anemia    BPH (benign prostatic hyperplasia)    Iron deficiency anemia due to chronic blood loss    S/P splenectomy     Past Medical History:   Diagnosis Date    Anemia     Anemia     Cancer ([address] )     prostate    Colon polyp     Elevated PSA     Enlarged prostate     Full dentures     GERD (gastroesophageal reflux disease)     Hemorrhoid     Hyperlipidemia     Hypertension     Sleep apnea     No CPAP    Smoker     TIA (transient ischemic attack)     Wears glasses        Past Surgical History:   Procedure Laterality Date    APPENDECTOMY      COLONOSCOPY      EGD      FOOT SURGERY Right     bone removed    OTHER SURGICAL HISTORY      bone removal right arm-abnormal growth of surface bone    PROSTATE BIOPSY      SPLENECTOMY      ruptured     Family History   Problem Relation Age of Onset    Dementia Father     Heart disease Mother     Hypertension Mother     Hyperlipidemia Mother     Hypertension Sister     Hypertension Brother     No Known Problems Daughter     No Known Problems Son     Cancer Paternal Grandfather         prostate    No Known Problems Son        Social History     Socioeconomic History    Marital status: Legally      Spouse name: None    Number of children: None    Years of education: None    Highest education level: None   Occupational History    Occupation: supervisor     Employer: [employer]   Social Needs    Financial resource strain: None    Food insecurity     Worry: None     Inability: None    Transportation needs     Medical: None     Non-medical: None   Tobacco Use    Smoking status: Former Smoker     Packs/day: 0 50     Years: 10 00     Pack years: 5 00     Types: Cigarettes     Quit date: 2021     Years since quittin 1    Smokeless tobacco: Never Used   Substance and Sexual Activity    Alcohol use: Not Currently     Frequency: Never    Drug use: No    Sexual activity: None   Lifestyle    Physical activity     Days per week: None     Minutes per session: None    Stress: None   Relationships    Social connections     Talks on phone: None     Gets together: None     Attends Buddhist service: None     Active member of club or organization: None     Attends meetings of clubs or organizations: None     Relationship status: None    Intimate partner violence     Fear of current or ex partner: None     Emotionally abused: None     Physically abused: None     Forced sexual activity: None   Other Topics Concern    None   Social History Narrative    None         Current Outpatient Medications:     acetaminophen (TYLENOL) 325 mg tablet, Take 2 tablets (650 mg total) by mouth every 6 (six) hours as needed for mild pain, headaches or fever, Disp: 30 tablet, Rfl: 0    aspirin (ECOTRIN LOW STRENGTH) 81 mg EC tablet, Take 1 tablet (81 mg total) by mouth daily, Disp: 30 tablet, Rfl: 0    atorvastatin (LIPITOR) 20 mg tablet, Take 1 tablet (20 mg total) by mouth daily, Disp: 90 tablet, Rfl: 3    dexamethasone (DECADRON) 4 mg tablet, Take 1 tablet (4 mg total) by mouth daily with breakfast For two days after iron infusion  , Disp: 10 tablet, Rfl: 0    finasteride (PROSCAR) 5 mg tablet, Take 1 tablet (5 mg total) by mouth daily (Patient taking differently: Take 5 mg by mouth daily at bedtime ), Disp: 90 tablet, Rfl: 3    oxybutynin (DITROPAN-XL) 5 mg 24 hr tablet, Take 1 tablet (5 mg total) by mouth daily (Patient taking differently: Take 5 mg by mouth daily at bedtime ), Disp: 30 tablet, Rfl: 1    pantoprazole (PROTONIX) 40 mg tablet, Take 1 tablet (40 mg total) by mouth 2 (two) times a day 30-60 min before breakfast, Disp: 90 tablet, Rfl: 0    tamsulosin (FLOMAX) 0 4 mg, TAKE 1 CAPSULE BY MOUTH EVERY DAY WITH DINNER, Disp: 30 capsule, Rfl: 0    verapamil (CALAN-SR) 120 mg CR tablet, Take 1 tablet (120 mg total) by mouth daily (Patient taking differently: Take 120 mg by mouth daily at bedtime ), Disp: 90 tablet, Rfl: 1    No Known Allergies    Objective:  /98 (BP Location: Right arm, Patient Position: Sitting, Cuff Size: Adult)   Pulse 91   Temp (!) 96 7 °F (35 9 °C)   Resp 18   Ht 6' (1 829 m)   Wt 96 kg (211 lb 9 6 oz)   SpO2 98%   BMI 28 70 kg/m²    Physical Exam  Constitutional:       General: He is not in acute distress  Appearance: He is well-developed  HENT:      Head: Normocephalic and atraumatic  Right Ear: External ear normal       Left Ear: External ear normal       Nose: Nose normal    Eyes:      General: No scleral icterus  Conjunctiva/sclera: Conjunctivae normal    Cardiovascular:      Rate and Rhythm: Normal rate  Pulmonary:      Effort: No respiratory distress  Abdominal:      General: There is no distension  Palpations: Abdomen is soft  Skin:     Findings: No rash (on exposed skin  )  Neurological:      Mental Status: He is alert and oriented to person, place, and time  Psychiatric:         Thought Content:  Thought content normal          Result Review  Labs:  Admission on 05/01/2021, Discharged on 05/01/2021   Component Date Value Ref Range Status    WBC 05/01/2021 19 62* 4 31 - 10 16 Thousand/uL Final    RBC 05/01/2021 5 60  3 88 - 5 62 Million/uL Final    Hemoglobin 05/01/2021 14 3  12 0 - 17 0 g/dL Final    Hematocrit 05/01/2021 46 2  36 5 - 49 3 % Final    MCV 05/01/2021 83  82 - 98 fL Final    MCH 05/01/2021 25 5* 26 8 - 34 3 pg Final    MCHC 05/01/2021 31 0* 31 4 - 37 4 g/dL Final    RDW 05/01/2021 22 3* 11 6 - 15 1 % Final    MPV 05/01/2021 9 8  8 9 - 12 7 fL Final    Platelets 62/35/9959 501* 149 - 390 Thousands/uL Final    nRBC 05/01/2021 0  /100 WBCs Final    Neutrophils Relative 05/01/2021 77* 43 - 75 % Final    Immat GRANS % 05/01/2021 1  0 - 2 % Final    Lymphocytes Relative 05/01/2021 11* 14 - 44 % Final    Monocytes Relative 05/01/2021 11  4 - 12 % Final    Eosinophils Relative 05/01/2021 0  0 - 6 % Final    Basophils Relative 05/01/2021 0  0 - 1 % Final    Neutrophils Absolute 05/01/2021 15 16* 1 85 - 7 62 Thousands/µL Final    Immature Grans Absolute 05/01/2021 0 26* 0 00 - 0 20 Thousand/uL Final    Lymphocytes Absolute 05/01/2021 2 06  0 60 - 4 47 Thousands/µL Final    Monocytes Absolute 05/01/2021 2 09* 0 17 - 1 22 Thousand/µL Final    Eosinophils Absolute 05/01/2021 0 01  0 00 - 0 61 Thousand/µL Final    Basophils Absolute 05/01/2021 0 04  0 00 - 0 10 Thousands/µL Final    Sodium 05/01/2021 140  136 - 145 mmol/L Final    Potassium 05/01/2021 3 8  3 5 - 5 3 mmol/L Final    Chloride 05/01/2021 106  100 - 108 mmol/L Final    CO2 05/01/2021 24  21 - 32 mmol/L Final    ANION GAP 05/01/2021 10  4 - 13 mmol/L Final    BUN 05/01/2021 16  5 - 25 mg/dL Final    Creatinine 05/01/2021 1 14  0 60 - 1 30 mg/dL Final    Standardized to IDMS reference method    Glucose 05/01/2021 128  65 - 140 mg/dL Final    If the patient is fasting, the ADA then defines impaired fasting glucose as > 100 mg/dL and diabetes as > or equal to 123 mg/dL  Specimen collection should occur prior to Sulfasalazine administration due to the potential for falsely depressed results  Specimen collection should occur prior to Sulfapyridine administration due to the potential for falsely elevated results   Calcium 05/01/2021 8 9  8 3 - 10 1 mg/dL Final    AST 05/01/2021 11  5 - 45 U/L Final    Specimen collection should occur prior to Sulfasalazine administration due to the potential for falsely depressed results   ALT 05/01/2021 34  12 - 78 U/L Final    Specimen collection should occur prior to Sulfasalazine administration due to the potential for falsely depressed results       Alkaline Phosphatase 05/01/2021 110  46 - 116 U/L Final    Total Protein 05/01/2021 7 3  6 4 - 8 2 g/dL Final    Albumin 05/01/2021 3 8  3 5 - 5 0 g/dL Final    Total Bilirubin 05/01/2021 0 16* 0 20 - 1 00 mg/dL Final    Use of this assay is not recommended for patients undergoing treatment with eltrombopag due to the potential for falsely elevated results   eGFR 05/01/2021 71  ml/min/1 73sq m Final    Lipase 05/01/2021 130  73 - 393 u/L Final    Extra Tube 05/01/2021 Hold for add-ons  Final    Auto resulted   Color, UA 05/01/2021 Light Yellow   Final    Clarity, UA 05/01/2021 Clear   Final    Specific Gravity, UA 05/01/2021 1 010  1 000 - 1 030 Final    pH, UA 05/01/2021 6 5  5 0, 5 5, 6 0, 6 5, 7 0, 7 5, 8 0, 8 5, 9 0 Final    Leukocytes, UA 05/01/2021 Negative  Negative Final    Nitrite, UA 05/01/2021 Negative  Negative Final    Protein, UA 05/01/2021 Negative  Negative mg/dl Final    Glucose, UA 05/01/2021 100 (1/10%)* Negative mg/dl Final    Ketones, UA 05/01/2021 Negative  Negative mg/dl Final    Urobilinogen, UA 05/01/2021 0 2  0 2, 1 0 E U /dl E U /dl Final    Bilirubin, UA 05/01/2021 Negative  Negative Final    Blood, UA 05/01/2021 Negative  Negative Final    Iron Saturation 05/01/2021 88  % Final    TIBC 05/01/2021 387  250 - 450 ug/dL Final    Iron 05/01/2021 341* 65 - 175 ug/dL Final    Patients treated with metal-binding drugs (ie  Deferoxamine) may have depressed iron values   Ferritin 05/01/2021 319  8 - 388 ng/mL Final       Imaging:   CT abdomen pelvis with contrast  Narrative: CT ABDOMEN AND PELVIS WITH IV CONTRAST    INDICATION:   Abdominal distension;  abd discomfort, rectal bleeding  Rectal bleeding  Patient states he has been having rectal bleeding since Thursday  Received iron infusion yesterday  Patient has had an episode of this previously and told he has hemorrhoids and polyps  58-year-old male with past history of hyperlipidemia, GERD, LALIT not on CPAP, hypertension, colon polyp, hemorrhoids, anemia, prostate cancer, TIA, presents to the ED with complaint of dark blood per rectum over the past 3 days    Patient has history of   hemorrhoids  Patient states that he is followed by GI as well as Hematology  Patient received are infusion yesterday for his anemia  Patient recently had colonoscopy about 2 months ago  Patient states that he has had some abdominal distension and   some mild discomfort since having his colonoscopy  Patient came to the ED for further evaluation as his rectal bleeding persists  Patient denies any fevers, chills, chest pain, shortness of breath, headaches, weakness, dizziness, focal neuro deficits  5 mm polyp in descending colon on prior colonoscopy January 18, 2021  One small AVM in the small bowel on push enterography January 18, 2021  COMPARISON:  None  TECHNIQUE:  CT examination of the abdomen and pelvis was performed  Axial, sagittal, and coronal 2D reformatted images were created from the source data and submitted for interpretation  Radiation dose length product (DLP) for this visit:  697 88 mGy-cm   This examination, like all CT scans performed in the [facility], was performed utilizing techniques to minimize radiation dose exposure, including the use of iterative   reconstruction and automated exposure control  IV Contrast:  99 mL of iohexol (OMNIPAQUE)  Enteric Contrast:  Enteric contrast was not administered  FINDINGS:  ABDOMEN  LOWER CHEST:  Atelectatic changes are noted at the lung bases  LIVER/BILIARY TREE:  Mildly enlarged with fatty infiltrative changes  GALLBLADDER: Incompletely distended, consistent with the nonfasting state of the patient  Inadequately evaluated  No calcified gallstones  No pericholecystic inflammatory change  SPLEEN:  Postoperative changes with prior splenectomy  Multiple soft tissue masses in the splenic bed, compatible with hypertrophy of sacral splenic tissue/splenules  PANCREAS:  Unremarkable  ADRENAL GLANDS:  Unremarkable  KIDNEYS/URETERS:  No hydronephrosis or urinary tract calculus    One or more sharply circumscribed subcentimeter renal hypodensities are present, too small to accurately characterize, and statistically most likely benign findings  According to recent   literature (Radiology 2019) no further workup of these findings is recommended  STOMACH AND BOWEL:    Stomach incompletely distended with ingested food products and air  Hiatal hernia  Fecalization of small bowel contents, compatible with delayed small bowel transit  No evidence of small bowel obstruction  The colon is segmentally distended with feces  Normal fecal burden in the colon  Scattered diverticula of the descending colon and sigmoid colon  Nothing to suggest acute diverticulitis  APPENDIX:  Surgically absent  ABDOMINOPELVIC CAVITY:  No ascites  No pneumoperitoneum  No lymphadenopathy  VESSELS:  Unremarkable for patient's age  PELVIS  REPRODUCTIVE ORGANS:  Prostate gland enlarged and heterogeneous in CT density, similar to recent MRI of April 22, 2021  URINARY BLADDER:  Distended  Mild circumferential wall thickening, likely the sequela of mild bladder outlet obstruction from prostatic enlargement  No discrete bladder wall lesions are seen  ABDOMINAL WALL/INGUINAL REGIONS:  Small umbilical hernia containing fat  OSSEOUS STRUCTURES:  No acute fracture or destructive osseous lesion  Spinal degenerative changes are noted  Grade 1 retrolisthesis L3 on L4, L4 on L5 and L5 on S1  Impression: 1  No acute CT abnormality to account for the patient's abdominal symptoms  2   Additional incidental findings as described above  Workstation performed: [id number]        Please note: This report has been generated by a voice recognition software system  Therefore there may be syntax, spelling, and/or grammatical errors  Please call if you have any questions

## 2021-05-04 ENCOUNTER — OFFICE VISIT (OUTPATIENT)
Dept: GASTROENTEROLOGY | Facility: CLINIC | Age: 56
End: 2021-05-04
Payer: COMMERCIAL

## 2021-05-04 VITALS
WEIGHT: 210 LBS | HEART RATE: 89 BPM | HEIGHT: 72 IN | TEMPERATURE: 97.6 F | SYSTOLIC BLOOD PRESSURE: 162 MMHG | BODY MASS INDEX: 28.44 KG/M2 | DIASTOLIC BLOOD PRESSURE: 95 MMHG

## 2021-05-04 DIAGNOSIS — K22.70 BARRETT'S ESOPHAGUS WITHOUT DYSPLASIA: Primary | ICD-10-CM

## 2021-05-04 DIAGNOSIS — K62.5 RECTAL BLEEDING: ICD-10-CM

## 2021-05-04 DIAGNOSIS — K92.1 HEMATOCHEZIA: ICD-10-CM

## 2021-05-04 DIAGNOSIS — D12.6 TUBULAR ADENOMA OF COLON: ICD-10-CM

## 2021-05-04 PROCEDURE — 99214 OFFICE O/P EST MOD 30 MIN: CPT | Performed by: INTERNAL MEDICINE

## 2021-05-04 RX ORDER — HYDROCORTISONE ACETATE 25 MG/1
25 SUPPOSITORY RECTAL
Qty: 12 SUPPOSITORY | Refills: 0 | Status: SHIPPED | OUTPATIENT
Start: 2021-05-04 | End: 2021-12-09

## 2021-05-04 NOTE — TELEPHONE ENCOUNTER
Pt called stating he accidentally erased message prior to listening,please contact him regarding prior message

## 2021-05-04 NOTE — LETTER
May 4, 2021     Hyacinth Haas DO  [facility] [address] [address]    Patient: Clifton Miller   YOB: 1965   Date of Visit: 5/4/2021       Dear Dr Irineo Lay: Thank you for referring Clifton Miller to me for evaluation  Below are my notes for this consultation  If you have questions, please do not hesitate to call me  I look forward to following your patient along with you  Sincerely,        Anita Mai MD        CC: No Recipients  Anita Mai MD  5/4/2021  1:53 PM  Sign when Signing Visit  [facility]  Progress Note - Clifton Miller 63 y o  male MRN: [id number]    Unit/Bed#:  Encounter: [id number]    Assessment/Plan:      1  Iron deficiency anemia  With rectal bleeding- no significant etiology of bleeding noted on capsule endoscopy, EGD previously with nonbleeding AVM which was obliterated  Single balloon enteroscopy up to the mid jejunum without any source of active bleeding  Colonoscopy earlier this year although was a limited prep did not show any signs of active bleeding  There was a small polyp which was removed, patient was recommended a repeat colonoscopy in 1 year interval    In the interim, patient had several episodes of rectal bleeding, most recently was in the ER with rectal bleeding  Fortunately, the hemoglobin was normal  In the ER  CT scan also did not show any evidence of diverticulitis but did show diverticulosis in the descending colon and sigmoid colon  I suspect rectal bleeding is likely hemorrhoidal, will start on Anusol suppository  Would also recommend starting on Metamucil or Benefiber 1-2 tbsp on daily basis  Patient thinks that he has had hemorrhoidal banding however this has not been done by our group within the past 1 year  I would recommend colonoscopy soon as possible at this time to assess for other sources of bleeding   Additionally patient had multiple polyps 1 year ago, several of these were tubular adenomas  Would recommend 2 day prep as previous colonoscopy was a poor prep  2   GERD with history of Crews's esophagus without dysplasia- most recent EGD was in February of 2021  Denies dysphagia, odynophagia, loss of appetite or early satiety  Would recommend to continue pantoprazole 40 mg b i d     Would recommend repeat EGD in 2022 for surveillance  Subjective: This is a 48-year-old male with history of symptomatic anemia, hypertension, hyperlipidemia, who presents for follow-up  Patient previously underwent obliteration of the AVM on EGD  Colonoscopy was poor prep previously but did not show any signs of active bleeding  Patient most recently underwent EGD with  Single balloon enteroscopy at [facility]   There was no evidence of active bleeding on single balloon enteroscopy, exam was performed up to the mid jejunum, there was a small tattoo placed at the very distal portion of the  Extent of exam    Unfortunately, he had another episode of rectal bleeding 4 days ago and was in the emergency room on May 1st   CT scan in the year was unremarkable  Hemoglobin was 14  3  patient reports that he has been having bright red blood per rectum while wiping and has known history of internal and external hemorrhoids  Patient thinks that he had banding done in the past however I do not see any records of this  Additionally, there is fecalization noted in the small bowel without any obstruction  He denies maroon-colored stools or blood mixed with stool, denies any melena  He does report abdominal distention but thinks that he is having normal bowel movements, denies constipation  Reports upper GI symptoms are well controlled with pantoprazole 40 mg b i d  Denies dysphagia, odynophagia, loss of appetite or early satiety  Objective:     Vitals: There were no vitals taken for this visit  ,There is no height or weight on file to calculate BMI      [unfilled]    Physical Exam:    GEN: wn/wd, NAD  HEENT: MMM, no cervical or supraclavicular LAD, anciteric  CV: RRR, no m/r/g  CHEST: CTA b/l, no w/r/r  ABD: +BS, soft, NT/ND, no hepatosplenomegaly  EXT: no c/c/e  SKIN: no rashes  NEURO: aaox3      Invasive Devices     None                         Lab, Imaging and other studies:     No visits with results within 1 Day(s) from this visit  Latest known visit with results is:   Admission on 05/01/2021, Discharged on 05/01/2021   Component Date Value    WBC 05/01/2021 19 62*    RBC 05/01/2021 5 60     Hemoglobin 05/01/2021 14 3     Hematocrit 05/01/2021 46 2     MCV 05/01/2021 83     MCH 05/01/2021 25 5*    MCHC 05/01/2021 31 0*    RDW 05/01/2021 22 3*    MPV 05/01/2021 9 8     Platelets 23/61/4647 501*    nRBC 05/01/2021 0     Neutrophils Relative 05/01/2021 77*    Immat GRANS % 05/01/2021 1     Lymphocytes Relative 05/01/2021 11*    Monocytes Relative 05/01/2021 11     Eosinophils Relative 05/01/2021 0     Basophils Relative 05/01/2021 0     Neutrophils Absolute 05/01/2021 15 16*    Immature Grans Absolute 05/01/2021 0 26*    Lymphocytes Absolute 05/01/2021 2 06     Monocytes Absolute 05/01/2021 2 09*    Eosinophils Absolute 05/01/2021 0 01     Basophils Absolute 05/01/2021 0 04     Sodium 05/01/2021 140     Potassium 05/01/2021 3 8     Chloride 05/01/2021 106     CO2 05/01/2021 24     ANION GAP 05/01/2021 10     BUN 05/01/2021 16     Creatinine 05/01/2021 1 14     Glucose 05/01/2021 128     Calcium 05/01/2021 8 9     AST 05/01/2021 11     ALT 05/01/2021 34     Alkaline Phosphatase 05/01/2021 110     Total Protein 05/01/2021 7 3     Albumin 05/01/2021 3 8     Total Bilirubin 05/01/2021 0 16*    eGFR 05/01/2021 71     Lipase 05/01/2021 130     Extra Tube 05/01/2021 Hold for add-ons       Color, UA 05/01/2021 Light Yellow     Clarity, UA 05/01/2021 Clear     Specific Gravity, UA 05/01/2021 1 010     pH, UA 05/01/2021 6 5     Leukocytes, UA 05/01/2021 Negative     Nitrite, UA 05/01/2021 Negative     Protein, UA 05/01/2021 Negative     Glucose, UA 05/01/2021 100 (1/10%)*    Ketones, UA 05/01/2021 Negative     Urobilinogen, UA 05/01/2021 0 2     Bilirubin, UA 05/01/2021 Negative     Blood, UA 05/01/2021 Negative     Iron Saturation 05/01/2021 88     TIBC 05/01/2021 387     Iron 05/01/2021 341*    Ferritin 05/01/2021 319          I have personally reviewed pertinent films in PACS    No current facility-administered medications for this visit

## 2021-05-04 NOTE — TELEPHONE ENCOUNTER
Attempted to call patient at this time  Per communication consent left detailed message advising   Per provider   Message from Giancarlo Chacon PA-C sent at 4/28/2021 12:40 PM EDT -----  Please advise patient that prostate MRI reveals PI-RADS category 2 indicating that prostate cancer is unlikely to be present  The MRI does reveal a very large prostate gland  I would recommend patient proceed with cystoscopy and TRUS evaluation as previously discussed  Please inform patient of MRI results and schedule him for appointment with MD for cysto & TRUS      Advised patient should follow up as scheduled for cysto/trus on 6/25/2021 at 3pm at the [facility] office with Dr Jalaine Hodgkins  Advised to call our office if any questions or concerns   Office number left

## 2021-05-04 NOTE — PROGRESS NOTES
[facility]  Progress Note - Dottie Orozco 63 y o  male MRN: [id number]    Unit/Bed#:  Encounter: [id number]    Assessment/Plan:      1  Iron deficiency anemia  With rectal bleeding- no significant etiology of bleeding noted on capsule endoscopy, EGD previously with nonbleeding AVM which was obliterated  Single balloon enteroscopy up to the mid jejunum without any source of active bleeding  Colonoscopy earlier this year although was a limited prep did not show any signs of active bleeding  There was a small polyp which was removed, patient was recommended a repeat colonoscopy in 1 year interval    In the interim, patient had several episodes of rectal bleeding, most recently was in the ER with rectal bleeding  Fortunately, the hemoglobin was normal  In the ER  CT scan also did not show any evidence of diverticulitis but did show diverticulosis in the descending colon and sigmoid colon  I suspect rectal bleeding is likely hemorrhoidal, will start on Anusol suppository  Would also recommend starting on Metamucil or Benefiber 1-2 tbsp on daily basis  Patient thinks that he has had hemorrhoidal banding however this has not been done by our group within the past 1 year  I would recommend colonoscopy soon as possible at this time to assess for other sources of bleeding  Additionally patient had multiple polyps 1 year ago, several of these were tubular adenomas  Would recommend 2 day prep as previous colonoscopy was a poor prep  2   GERD with history of Crews's esophagus without dysplasia- most recent EGD was in February of 2021  Denies dysphagia, odynophagia, loss of appetite or early satiety  Would recommend to continue pantoprazole 40 mg b i d     Would recommend repeat EGD in 2022 for surveillance  Subjective: This is a 48-year-old male with history of symptomatic anemia, hypertension, hyperlipidemia, who presents for follow-up    Patient previously underwent obliteration of the AVM on EGD  Colonoscopy was poor prep previously but did not show any signs of active bleeding  Patient most recently underwent EGD with  Single balloon enteroscopy at [facility]   There was no evidence of active bleeding on single balloon enteroscopy, exam was performed up to the mid jejunum, there was a small tattoo placed at the very distal portion of the  Extent of exam    Unfortunately, he had another episode of rectal bleeding 4 days ago and was in the emergency room on May 1st   CT scan in the year was unremarkable  Hemoglobin was 14  3  patient reports that he has been having bright red blood per rectum while wiping and has known history of internal and external hemorrhoids  Patient thinks that he had banding done in the past however I do not see any records of this  Additionally, there is fecalization noted in the small bowel without any obstruction  He denies maroon-colored stools or blood mixed with stool, denies any melena  He does report abdominal distention but thinks that he is having normal bowel movements, denies constipation  Reports upper GI symptoms are well controlled with pantoprazole 40 mg b i d  Denies dysphagia, odynophagia, loss of appetite or early satiety  Objective:     Vitals: There were no vitals taken for this visit  ,There is no height or weight on file to calculate BMI  [unfilled]    Physical Exam:    GEN: wn/wd, NAD  HEENT: MMM, no cervical or supraclavicular LAD, anciteric  CV: RRR, no m/r/g  CHEST: CTA b/l, no w/r/r  ABD: +BS, soft, NT/ND, no hepatosplenomegaly  EXT: no c/c/e  SKIN: no rashes  NEURO: aaox3      Invasive Devices     None                         Lab, Imaging and other studies:     No visits with results within 1 Day(s) from this visit     Latest known visit with results is:   Admission on 05/01/2021, Discharged on 05/01/2021   Component Date Value    WBC 05/01/2021 19 62*    RBC 05/01/2021 5 60     Hemoglobin 05/01/2021 14 3     Hematocrit 05/01/2021 46 2     MCV 05/01/2021 83     MCH 05/01/2021 25 5*    MCHC 05/01/2021 31 0*    RDW 05/01/2021 22 3*    MPV 05/01/2021 9 8     Platelets 55/27/1049 501*    nRBC 05/01/2021 0     Neutrophils Relative 05/01/2021 77*    Immat GRANS % 05/01/2021 1     Lymphocytes Relative 05/01/2021 11*    Monocytes Relative 05/01/2021 11     Eosinophils Relative 05/01/2021 0     Basophils Relative 05/01/2021 0     Neutrophils Absolute 05/01/2021 15 16*    Immature Grans Absolute 05/01/2021 0 26*    Lymphocytes Absolute 05/01/2021 2 06     Monocytes Absolute 05/01/2021 2 09*    Eosinophils Absolute 05/01/2021 0 01     Basophils Absolute 05/01/2021 0 04     Sodium 05/01/2021 140     Potassium 05/01/2021 3 8     Chloride 05/01/2021 106     CO2 05/01/2021 24     ANION GAP 05/01/2021 10     BUN 05/01/2021 16     Creatinine 05/01/2021 1 14     Glucose 05/01/2021 128     Calcium 05/01/2021 8 9     AST 05/01/2021 11     ALT 05/01/2021 34     Alkaline Phosphatase 05/01/2021 110     Total Protein 05/01/2021 7 3     Albumin 05/01/2021 3 8     Total Bilirubin 05/01/2021 0 16*    eGFR 05/01/2021 71     Lipase 05/01/2021 130     Extra Tube 05/01/2021 Hold for add-ons   Color, UA 05/01/2021 Light Yellow     Clarity, UA 05/01/2021 Clear     Specific Gravity, UA 05/01/2021 1 010     pH, UA 05/01/2021 6 5     Leukocytes, UA 05/01/2021 Negative     Nitrite, UA 05/01/2021 Negative     Protein, UA 05/01/2021 Negative     Glucose, UA 05/01/2021 100 (1/10%)*    Ketones, UA 05/01/2021 Negative     Urobilinogen, UA 05/01/2021 0 2     Bilirubin, UA 05/01/2021 Negative     Blood, UA 05/01/2021 Negative     Iron Saturation 05/01/2021 88     TIBC 05/01/2021 387     Iron 05/01/2021 341*    Ferritin 05/01/2021 319          I have personally reviewed pertinent films in PACS    No current facility-administered medications for this visit

## 2021-05-04 NOTE — H&P (VIEW-ONLY)
[facility]  Progress Note - Sergio Orlando 63 y o  male MRN: [id number]    Unit/Bed#:  Encounter: [id number]    Assessment/Plan:      1  Iron deficiency anemia  With rectal bleeding- no significant etiology of bleeding noted on capsule endoscopy, EGD previously with nonbleeding AVM which was obliterated  Single balloon enteroscopy up to the mid jejunum without any source of active bleeding  Colonoscopy earlier this year although was a limited prep did not show any signs of active bleeding  There was a small polyp which was removed, patient was recommended a repeat colonoscopy in 1 year interval    In the interim, patient had several episodes of rectal bleeding, most recently was in the ER with rectal bleeding  Fortunately, the hemoglobin was normal  In the ER  CT scan also did not show any evidence of diverticulitis but did show diverticulosis in the descending colon and sigmoid colon  I suspect rectal bleeding is likely hemorrhoidal, will start on Anusol suppository  Would also recommend starting on Metamucil or Benefiber 1-2 tbsp on daily basis  Patient thinks that he has had hemorrhoidal banding however this has not been done by our group within the past 1 year  I would recommend colonoscopy soon as possible at this time to assess for other sources of bleeding  Additionally patient had multiple polyps 1 year ago, several of these were tubular adenomas  Would recommend 2 day prep as previous colonoscopy was a poor prep  2   GERD with history of Crews's esophagus without dysplasia- most recent EGD was in February of 2021  Denies dysphagia, odynophagia, loss of appetite or early satiety  Would recommend to continue pantoprazole 40 mg b i d     Would recommend repeat EGD in 2022 for surveillance  Subjective: This is a 58-year-old male with history of symptomatic anemia, hypertension, hyperlipidemia, who presents for follow-up    Patient previously underwent obliteration of the AVM on EGD  Colonoscopy was poor prep previously but did not show any signs of active bleeding  Patient most recently underwent EGD with  Single balloon enteroscopy at [facility]   There was no evidence of active bleeding on single balloon enteroscopy, exam was performed up to the mid jejunum, there was a small tattoo placed at the very distal portion of the  Extent of exam    Unfortunately, he had another episode of rectal bleeding 4 days ago and was in the emergency room on May 1st   CT scan in the year was unremarkable  Hemoglobin was 14  3  patient reports that he has been having bright red blood per rectum while wiping and has known history of internal and external hemorrhoids  Patient thinks that he had banding done in the past however I do not see any records of this  Additionally, there is fecalization noted in the small bowel without any obstruction  He denies maroon-colored stools or blood mixed with stool, denies any melena  He does report abdominal distention but thinks that he is having normal bowel movements, denies constipation  Reports upper GI symptoms are well controlled with pantoprazole 40 mg b i d  Denies dysphagia, odynophagia, loss of appetite or early satiety  Objective:     Vitals: There were no vitals taken for this visit  ,There is no height or weight on file to calculate BMI  [unfilled]    Physical Exam:    GEN: wn/wd, NAD  HEENT: MMM, no cervical or supraclavicular LAD, anciteric  CV: RRR, no m/r/g  CHEST: CTA b/l, no w/r/r  ABD: +BS, soft, NT/ND, no hepatosplenomegaly  EXT: no c/c/e  SKIN: no rashes  NEURO: aaox3      Invasive Devices     None                         Lab, Imaging and other studies:     No visits with results within 1 Day(s) from this visit     Latest known visit with results is:   Admission on 05/01/2021, Discharged on 05/01/2021   Component Date Value    WBC 05/01/2021 19 62*    RBC 05/01/2021 5 60     Hemoglobin 05/01/2021 14 3     Hematocrit 05/01/2021 46 2     MCV 05/01/2021 83     MCH 05/01/2021 25 5*    MCHC 05/01/2021 31 0*    RDW 05/01/2021 22 3*    MPV 05/01/2021 9 8     Platelets 44/08/5429 501*    nRBC 05/01/2021 0     Neutrophils Relative 05/01/2021 77*    Immat GRANS % 05/01/2021 1     Lymphocytes Relative 05/01/2021 11*    Monocytes Relative 05/01/2021 11     Eosinophils Relative 05/01/2021 0     Basophils Relative 05/01/2021 0     Neutrophils Absolute 05/01/2021 15 16*    Immature Grans Absolute 05/01/2021 0 26*    Lymphocytes Absolute 05/01/2021 2 06     Monocytes Absolute 05/01/2021 2 09*    Eosinophils Absolute 05/01/2021 0 01     Basophils Absolute 05/01/2021 0 04     Sodium 05/01/2021 140     Potassium 05/01/2021 3 8     Chloride 05/01/2021 106     CO2 05/01/2021 24     ANION GAP 05/01/2021 10     BUN 05/01/2021 16     Creatinine 05/01/2021 1 14     Glucose 05/01/2021 128     Calcium 05/01/2021 8 9     AST 05/01/2021 11     ALT 05/01/2021 34     Alkaline Phosphatase 05/01/2021 110     Total Protein 05/01/2021 7 3     Albumin 05/01/2021 3 8     Total Bilirubin 05/01/2021 0 16*    eGFR 05/01/2021 71     Lipase 05/01/2021 130     Extra Tube 05/01/2021 Hold for add-ons   Color, UA 05/01/2021 Light Yellow     Clarity, UA 05/01/2021 Clear     Specific Gravity, UA 05/01/2021 1 010     pH, UA 05/01/2021 6 5     Leukocytes, UA 05/01/2021 Negative     Nitrite, UA 05/01/2021 Negative     Protein, UA 05/01/2021 Negative     Glucose, UA 05/01/2021 100 (1/10%)*    Ketones, UA 05/01/2021 Negative     Urobilinogen, UA 05/01/2021 0 2     Bilirubin, UA 05/01/2021 Negative     Blood, UA 05/01/2021 Negative     Iron Saturation 05/01/2021 88     TIBC 05/01/2021 387     Iron 05/01/2021 341*    Ferritin 05/01/2021 319          I have personally reviewed pertinent films in PACS    No current facility-administered medications for this visit

## 2021-05-05 ENCOUNTER — OFFICE VISIT (OUTPATIENT)
Dept: FAMILY MEDICINE CLINIC | Facility: CLINIC | Age: 56
End: 2021-05-05
Payer: COMMERCIAL

## 2021-05-05 VITALS
DIASTOLIC BLOOD PRESSURE: 100 MMHG | HEIGHT: 72 IN | TEMPERATURE: 97 F | HEART RATE: 78 BPM | BODY MASS INDEX: 28.44 KG/M2 | RESPIRATION RATE: 16 BRPM | SYSTOLIC BLOOD PRESSURE: 158 MMHG | WEIGHT: 210 LBS

## 2021-05-05 DIAGNOSIS — N40.1 BENIGN PROSTATIC HYPERPLASIA WITH LOWER URINARY TRACT SYMPTOMS, SYMPTOM DETAILS UNSPECIFIED: ICD-10-CM

## 2021-05-05 DIAGNOSIS — I10 ESSENTIAL HYPERTENSION: Primary | ICD-10-CM

## 2021-05-05 DIAGNOSIS — D12.6 TUBULAR ADENOMA OF COLON: ICD-10-CM

## 2021-05-05 DIAGNOSIS — D50.0 IRON DEFICIENCY ANEMIA DUE TO CHRONIC BLOOD LOSS: ICD-10-CM

## 2021-05-05 DIAGNOSIS — E78.2 MIXED HYPERLIPIDEMIA: ICD-10-CM

## 2021-05-05 DIAGNOSIS — K92.1 HEMATOCHEZIA: ICD-10-CM

## 2021-05-05 DIAGNOSIS — R39.15 URINARY URGENCY: ICD-10-CM

## 2021-05-05 PROCEDURE — 3077F SYST BP >= 140 MM HG: CPT | Performed by: FAMILY MEDICINE

## 2021-05-05 PROCEDURE — 99214 OFFICE O/P EST MOD 30 MIN: CPT | Performed by: FAMILY MEDICINE

## 2021-05-05 PROCEDURE — 3080F DIAST BP >= 90 MM HG: CPT | Performed by: FAMILY MEDICINE

## 2021-05-05 RX ORDER — OXYBUTYNIN CHLORIDE 5 MG/1
5 TABLET, EXTENDED RELEASE ORAL DAILY
Qty: 30 TABLET | Refills: 2 | Status: SHIPPED | OUTPATIENT
Start: 2021-05-05 | End: 2021-07-22 | Stop reason: SDUPTHER

## 2021-05-05 RX ORDER — LOSARTAN POTASSIUM 50 MG/1
50 TABLET ORAL DAILY
Qty: 90 TABLET | Refills: 1 | Status: SHIPPED | OUTPATIENT
Start: 2021-05-05 | End: 2021-09-21 | Stop reason: SDUPTHER

## 2021-05-05 NOTE — PATIENT INSTRUCTIONS
We are going to restart your losartan 50mg per day for high blood pressures in addition to continuing the verapamil 120mg once a day    We would like your resting blood pressure to be less than 140/90 within 4 weeks

## 2021-05-05 NOTE — TELEPHONE ENCOUNTER
Called and spoke to patient at this time  Advised per provider     ----- Message from Donice Dandy, PA-C sent at 4/28/2021 12:40 PM EDT -----  Please advise patient that prostate MRI reveals PI-RADS category 2 indicating that prostate cancer is unlikely to be present  The MRI does reveal a very large prostate gland  I would recommend patient proceed with cystoscopy and TRUS evaluation as previously discussed  Please inform patient of MRI results and schedule him for appointment with MD for cysto & TRUS        Confirmed cysto/trus appt on 6/25/2021 at 3pm     Patient verbalized understanding and is thankful for call

## 2021-05-05 NOTE — PROGRESS NOTES
Assessment/Plan:    No problem-specific Assessment & Plan notes found for this encounter  Rectal bleeding, hemorrhoidal suspected, colonoscopy pending, polyp hx also  htn not controlled, losartan stopped in past due to low bp, restart  VINICIO, f/u hematology  HLD stable, on statin, last LDL 70 March 2021    Leukocytosis probably due to decadron but f/u in future     Diagnoses and all orders for this visit:    Essential hypertension  -     losartan (COZAAR) 50 mg tablet; Take 1 tablet (50 mg total) by mouth daily    Hematochezia    Tubular adenoma of colon    Benign prostatic hyperplasia with lower urinary tract symptoms, symptom details unspecified    Mixed hyperlipidemia    Iron deficiency anemia due to chronic blood loss        Return if symptoms worsen or fail to improve  Subjective:      Patient ID: Butch Carrasquillo is a 63 y o  male  Chief Complaint   Patient presents with    Follow-up     er  [facility] lpn       HPI  Still rectal bleeding  A lot   Bright red  No fever  Nasal congestion  Leg pains  Not on iron pills    Hives given decadron for rash after Mri on 4/22/21    The following portions of the patient's history were reviewed and updated as appropriate: allergies, current medications, past family history, past medical history, past social history, past surgical history and problem list     Review of Systems   Constitutional: Negative for chills and fever  Respiratory: Negative for shortness of breath            Current Outpatient Medications   Medication Sig Dispense Refill    acetaminophen (TYLENOL) 325 mg tablet Take 2 tablets (650 mg total) by mouth every 6 (six) hours as needed for mild pain, headaches or fever 30 tablet 0    aspirin (ECOTRIN LOW STRENGTH) 81 mg EC tablet Take 1 tablet (81 mg total) by mouth daily 30 tablet 0    atorvastatin (LIPITOR) 20 mg tablet Take 1 tablet (20 mg total) by mouth daily 90 tablet 3    dexamethasone (DECADRON) 4 mg tablet Take 1 tablet (4 mg total) by mouth daily with breakfast For two days after iron infusion  10 tablet 0    finasteride (PROSCAR) 5 mg tablet Take 1 tablet (5 mg total) by mouth daily (Patient taking differently: Take 5 mg by mouth daily at bedtime ) 90 tablet 3    hydrocortisone (ANUSOL-HC) 25 mg suppository Insert 1 suppository (25 mg total) into the rectum daily at bedtime 12 suppository 0    pantoprazole (PROTONIX) 40 mg tablet Take 1 tablet (40 mg total) by mouth 2 (two) times a day 30-60 min before breakfast 90 tablet 0    tamsulosin (FLOMAX) 0 4 mg TAKE 1 CAPSULE BY MOUTH EVERY DAY WITH DINNER 30 capsule 0    verapamil (CALAN-SR) 120 mg CR tablet Take 1 tablet (120 mg total) by mouth daily (Patient taking differently: Take 120 mg by mouth daily at bedtime ) 90 tablet 1    losartan (COZAAR) 50 mg tablet Take 1 tablet (50 mg total) by mouth daily 90 tablet 1    oxybutynin (DITROPAN-XL) 5 mg 24 hr tablet Take 1 tablet (5 mg total) by mouth daily 30 tablet 2     No current facility-administered medications for this visit  Objective:    /100   Pulse 78   Temp (!) 97 °F (36 1 °C)   Resp 16   Ht 6' (1 829 m)   Wt 95 3 kg (210 lb)   BMI 28 48 kg/m²        Physical Exam  Vitals signs and nursing note reviewed  Constitutional:       General: He is not in acute distress  Appearance: He is well-developed  He is not ill-appearing  HENT:      Head: Normocephalic  Eyes:      General: No scleral icterus  Conjunctiva/sclera: Conjunctivae normal    Neck:      Musculoskeletal: Neck supple  Cardiovascular:      Rate and Rhythm: Normal rate and regular rhythm  Heart sounds: No murmur  Pulmonary:      Effort: Pulmonary effort is normal  No respiratory distress  Abdominal:      Palpations: Abdomen is soft  Musculoskeletal:         General: No deformity  Skin:     General: Skin is warm and dry  Coloration: Skin is not jaundiced or pale  Neurological:      Mental Status: He is alert        Motor: No weakness  Gait: Gait normal    Psychiatric:         Behavior: Behavior normal          Thought Content:  Thought content normal                 Michele Christianson DO

## 2021-05-05 NOTE — PRE-PROCEDURE INSTRUCTIONS
Pre-Surgery Instructions:   Medication Instructions    acetaminophen (TYLENOL) 325 mg tablet Patient was instructed by Physician and understands   aspirin (ECOTRIN LOW STRENGTH) 81 mg EC tablet Patient was instructed by Physician and understands   atorvastatin (LIPITOR) 20 mg tablet Patient was instructed by Physician and understands   dexamethasone (DECADRON) 4 mg tablet Patient was instructed by Physician and understands   hydrocortisone (ANUSOL-HC) 25 mg suppository Patient was instructed by Physician and understands   losartan (COZAAR) 50 mg tablet Patient was instructed by Physician and understands   oxybutynin (DITROPAN-XL) 5 mg 24 hr tablet Patient was instructed by Physician and understands   pantoprazole (PROTONIX) 40 mg tablet Patient was instructed by Physician and understands   tamsulosin (FLOMAX) 0 4 mg Patient was instructed by Physician and understands   verapamil (CALAN-SR) 120 mg CR tablet Instructed patient per Anesthesia Guidelines

## 2021-05-06 DIAGNOSIS — Z11.59 SCREENING FOR VIRAL DISEASE: ICD-10-CM

## 2021-05-06 PROCEDURE — U0003 INFECTIOUS AGENT DETECTION BY NUCLEIC ACID (DNA OR RNA); SEVERE ACUTE RESPIRATORY SYNDROME CORONAVIRUS 2 (SARS-COV-2) (CORONAVIRUS DISEASE [COVID-19]), AMPLIFIED PROBE TECHNIQUE, MAKING USE OF HIGH THROUGHPUT TECHNOLOGIES AS DESCRIBED BY CMS-2020-01-R: HCPCS | Performed by: INTERNAL MEDICINE

## 2021-05-06 PROCEDURE — U0005 INFEC AGEN DETEC AMPLI PROBE: HCPCS | Performed by: INTERNAL MEDICINE

## 2021-05-07 ENCOUNTER — HOSPITAL ENCOUNTER (OUTPATIENT)
Dept: INFUSION CENTER | Facility: HOSPITAL | Age: 56
Discharge: HOME | End: 2021-05-07
Attending: INTERNAL MEDICINE
Payer: COMMERCIAL

## 2021-05-07 VITALS
HEART RATE: 92 BPM | SYSTOLIC BLOOD PRESSURE: 124 MMHG | OXYGEN SATURATION: 97 % | RESPIRATION RATE: 20 BRPM | DIASTOLIC BLOOD PRESSURE: 70 MMHG | TEMPERATURE: 98.1 F

## 2021-05-07 DIAGNOSIS — D50.0 IRON DEFICIENCY ANEMIA DUE TO CHRONIC BLOOD LOSS: Primary | ICD-10-CM

## 2021-05-07 DIAGNOSIS — D64.9 SYMPTOMATIC ANEMIA: ICD-10-CM

## 2021-05-07 LAB
BASOPHILS # BLD AUTO: 0.09 THOUSANDS/ΜL (ref 0–0.1)
BASOPHILS NFR BLD AUTO: 1 % (ref 0–1)
EOSINOPHIL # BLD AUTO: 0.37 THOUSAND/ΜL (ref 0–0.61)
EOSINOPHIL NFR BLD AUTO: 3 % (ref 0–6)
ERYTHROCYTE [DISTWIDTH] IN BLOOD BY AUTOMATED COUNT: 22.7 % (ref 11.6–15.1)
HCT VFR BLD AUTO: 49.3 % (ref 36.5–49.3)
HGB BLD-MCNC: 15.5 G/DL (ref 12–17)
IMM GRANULOCYTES # BLD AUTO: 0.11 THOUSAND/UL (ref 0–0.2)
IMM GRANULOCYTES NFR BLD AUTO: 1 % (ref 0–2)
LYMPHOCYTES # BLD AUTO: 2.86 THOUSANDS/ΜL (ref 0.6–4.47)
LYMPHOCYTES NFR BLD AUTO: 25 % (ref 14–44)
MCH RBC QN AUTO: 26.4 PG (ref 26.8–34.3)
MCHC RBC AUTO-ENTMCNC: 31.4 G/DL (ref 31.4–37.4)
MCV RBC AUTO: 84 FL (ref 82–98)
MONOCYTES # BLD AUTO: 1.33 THOUSAND/ΜL (ref 0.17–1.22)
MONOCYTES NFR BLD AUTO: 12 % (ref 4–12)
NEUTROPHILS # BLD AUTO: 6.81 THOUSANDS/ΜL (ref 1.85–7.62)
NEUTS SEG NFR BLD AUTO: 58 % (ref 43–75)
NRBC BLD AUTO-RTO: 0 /100 WBCS
PLATELET # BLD AUTO: 417 THOUSANDS/UL (ref 149–390)
PMV BLD AUTO: 9 FL (ref 8.9–12.7)
RBC # BLD AUTO: 5.87 MILLION/UL (ref 3.88–5.62)
SARS-COV-2 RNA RESP QL NAA+PROBE: NEGATIVE
WBC # BLD AUTO: 11.57 THOUSAND/UL (ref 4.31–10.16)

## 2021-05-07 PROCEDURE — 85025 COMPLETE CBC W/AUTO DIFF WBC: CPT | Performed by: INTERNAL MEDICINE

## 2021-05-07 PROCEDURE — 96365 THER/PROPH/DIAG IV INF INIT: CPT

## 2021-05-07 PROCEDURE — 96367 TX/PROPH/DG ADDL SEQ IV INF: CPT

## 2021-05-07 RX ORDER — DIPHENHYDRAMINE HCL 25 MG
25 CAPSULE ORAL ONCE
Status: DISCONTINUED | OUTPATIENT
Start: 2021-05-07 | End: 2021-05-07 | Stop reason: CLARIF

## 2021-05-07 RX ORDER — SODIUM CHLORIDE 9 MG/ML
20 INJECTION, SOLUTION INTRAVENOUS ONCE
Status: COMPLETED | OUTPATIENT
Start: 2021-05-07 | End: 2021-05-07

## 2021-05-07 RX ORDER — SODIUM CHLORIDE 9 MG/ML
20 INJECTION, SOLUTION INTRAVENOUS ONCE
Status: CANCELLED | OUTPATIENT
Start: 2021-05-07

## 2021-05-07 RX ORDER — DIPHENHYDRAMINE HCL 25 MG
25 CAPSULE ORAL ONCE
Status: CANCELLED
Start: 2021-05-07

## 2021-05-07 RX ORDER — DIPHENHYDRAMINE HCL 25 MG
25 TABLET ORAL ONCE
Status: COMPLETED | OUTPATIENT
Start: 2021-05-07 | End: 2021-05-07

## 2021-05-07 RX ADMIN — IRON SUCROSE 300 MG: 20 INJECTION, SOLUTION INTRAVENOUS at 09:08

## 2021-05-07 RX ADMIN — DEXAMETHASONE SODIUM PHOSPHATE 10 MG: 10 INJECTION, SOLUTION INTRAMUSCULAR; INTRAVENOUS at 08:31

## 2021-05-07 RX ADMIN — SODIUM CHLORIDE 20 ML/HR: 0.9 INJECTION, SOLUTION INTRAVENOUS at 08:31

## 2021-05-07 RX ADMIN — DIPHENHYDRAMINE HYDROCHLORIDE 25 MG: 25 TABLET ORAL at 08:29

## 2021-05-07 NOTE — PLAN OF CARE
Problem: Potential for Falls  Goal: Patient will remain free of falls  Description: INTERVENTIONS:  - Assess patient frequently for physical needs  -  Identify cognitive and physical deficits and behaviors that affect risk of falls    -  [place] fall precautions as indicated by assessment   - Educate patient/family on patient safety including physical limitations  - Instruct patient to call for assistance with activity based on assessment  - Modify environment to reduce risk of injury  Outcome: Progressing

## 2021-05-12 ENCOUNTER — ANESTHESIA EVENT (OUTPATIENT)
Dept: GASTROENTEROLOGY | Facility: AMBULARY SURGERY CENTER | Age: 56
End: 2021-05-12

## 2021-05-12 ENCOUNTER — ANESTHESIA (OUTPATIENT)
Dept: GASTROENTEROLOGY | Facility: AMBULARY SURGERY CENTER | Age: 56
End: 2021-05-12

## 2021-05-12 ENCOUNTER — TRANSCRIBE ORDERS (OUTPATIENT)
Dept: ADMINISTRATIVE | Facility: HOSPITAL | Age: 56
End: 2021-05-12

## 2021-05-12 ENCOUNTER — HOSPITAL ENCOUNTER (OUTPATIENT)
Dept: CT IMAGING | Facility: HOSPITAL | Age: 56
Discharge: HOME | End: 2021-05-12
Attending: INTERNAL MEDICINE
Payer: COMMERCIAL

## 2021-05-12 ENCOUNTER — HOSPITAL ENCOUNTER (OUTPATIENT)
Dept: GASTROENTEROLOGY | Facility: AMBULARY SURGERY CENTER | Age: 56
Setting detail: OUTPATIENT SURGERY
Discharge: HOME | End: 2021-05-12
Attending: INTERNAL MEDICINE | Admitting: INTERNAL MEDICINE
Payer: COMMERCIAL

## 2021-05-12 ENCOUNTER — TELEPHONE (OUTPATIENT)
Dept: GASTROENTEROLOGY | Facility: AMBULARY SURGERY CENTER | Age: 56
End: 2021-05-12

## 2021-05-12 VITALS
RESPIRATION RATE: 16 BRPM | BODY MASS INDEX: 28.44 KG/M2 | HEART RATE: 75 BPM | DIASTOLIC BLOOD PRESSURE: 77 MMHG | TEMPERATURE: 97.3 F | HEIGHT: 72 IN | SYSTOLIC BLOOD PRESSURE: 131 MMHG | OXYGEN SATURATION: 99 % | WEIGHT: 210 LBS

## 2021-05-12 DIAGNOSIS — K62.5 RECTAL BLEEDING: ICD-10-CM

## 2021-05-12 DIAGNOSIS — D50.9 IRON DEFICIENCY ANEMIA, UNSPECIFIED IRON DEFICIENCY ANEMIA TYPE: ICD-10-CM

## 2021-05-12 DIAGNOSIS — Z12.11 COLON CANCER SCREENING: Primary | ICD-10-CM

## 2021-05-12 DIAGNOSIS — Z12.11 SPECIAL SCREENING FOR MALIGNANT NEOPLASMS, COLON: Primary | ICD-10-CM

## 2021-05-12 DIAGNOSIS — Z12.11 SPECIAL SCREENING FOR MALIGNANT NEOPLASMS, COLON: ICD-10-CM

## 2021-05-12 DIAGNOSIS — K92.2 GASTROINTESTINAL HEMORRHAGE, UNSPECIFIED GASTROINTESTINAL HEMORRHAGE TYPE: ICD-10-CM

## 2021-05-12 PROCEDURE — 74261 CT COLONOGRAPHY DX: CPT

## 2021-05-12 PROCEDURE — 45330 DIAGNOSTIC SIGMOIDOSCOPY: CPT | Performed by: INTERNAL MEDICINE

## 2021-05-12 RX ORDER — PROPOFOL 10 MG/ML
INJECTION, EMULSION INTRAVENOUS AS NEEDED
Status: DISCONTINUED | OUTPATIENT
Start: 2021-05-12 | End: 2021-05-12

## 2021-05-12 RX ORDER — SODIUM CHLORIDE, SODIUM LACTATE, POTASSIUM CHLORIDE, CALCIUM CHLORIDE 600; 310; 30; 20 MG/100ML; MG/100ML; MG/100ML; MG/100ML
INJECTION, SOLUTION INTRAVENOUS CONTINUOUS PRN
Status: DISCONTINUED | OUTPATIENT
Start: 2021-05-12 | End: 2021-05-12

## 2021-05-12 RX ORDER — LIDOCAINE HYDROCHLORIDE 10 MG/ML
INJECTION, SOLUTION EPIDURAL; INFILTRATION; INTRACAUDAL; PERINEURAL AS NEEDED
Status: DISCONTINUED | OUTPATIENT
Start: 2021-05-12 | End: 2021-05-12

## 2021-05-12 RX ORDER — SODIUM CHLORIDE, SODIUM LACTATE, POTASSIUM CHLORIDE, CALCIUM CHLORIDE 600; 310; 30; 20 MG/100ML; MG/100ML; MG/100ML; MG/100ML
125 INJECTION, SOLUTION INTRAVENOUS CONTINUOUS
Status: DISCONTINUED | OUTPATIENT
Start: 2021-05-12 | End: 2021-05-16 | Stop reason: HOSPADM

## 2021-05-12 RX ORDER — PROPOFOL 10 MG/ML
INJECTION, EMULSION INTRAVENOUS CONTINUOUS PRN
Status: DISCONTINUED | OUTPATIENT
Start: 2021-05-12 | End: 2021-05-12

## 2021-05-12 RX ADMIN — PROPOFOL 110 MCG/KG/MIN: 10 INJECTION, EMULSION INTRAVENOUS at 11:02

## 2021-05-12 RX ADMIN — SODIUM CHLORIDE, SODIUM LACTATE, POTASSIUM CHLORIDE, AND CALCIUM CHLORIDE 125 ML/HR: .6; .31; .03; .02 INJECTION, SOLUTION INTRAVENOUS at 10:35

## 2021-05-12 RX ADMIN — PROPOFOL 30 MG: 10 INJECTION, EMULSION INTRAVENOUS at 11:06

## 2021-05-12 RX ADMIN — PROPOFOL 30 MG: 10 INJECTION, EMULSION INTRAVENOUS at 11:09

## 2021-05-12 RX ADMIN — LIDOCAINE HYDROCHLORIDE 50 MG: 10 INJECTION, SOLUTION EPIDURAL; INFILTRATION; INTRACAUDAL; PERINEURAL at 11:02

## 2021-05-12 RX ADMIN — PROPOFOL 80 MG: 10 INJECTION, EMULSION INTRAVENOUS at 11:02

## 2021-05-12 RX ADMIN — SODIUM CHLORIDE, SODIUM LACTATE, POTASSIUM CHLORIDE, AND CALCIUM CHLORIDE: .6; .31; .03; .02 INJECTION, SOLUTION INTRAVENOUS at 10:55

## 2021-05-12 RX ADMIN — PROPOFOL 30 MG: 10 INJECTION, EMULSION INTRAVENOUS at 11:15

## 2021-05-12 RX ADMIN — IOHEXOL 50 ML: 240 INJECTION, SOLUTION INTRATHECAL; INTRAVASCULAR; INTRAVENOUS; ORAL at 13:39

## 2021-05-12 RX ADMIN — PROPOFOL 30 MG: 10 INJECTION, EMULSION INTRAVENOUS at 11:05

## 2021-05-12 NOTE — TELEPHONE ENCOUNTER
Spoke with Fauzia Reeves from [place], received approval for ct colonoscopy  Auth # [id number], valid dates 5/12/21-11/8/21

## 2021-05-12 NOTE — TELEPHONE ENCOUNTER
Patients GI provider:  Dr Leola Rosa    Number to return call: (  [phone] [facility] @ pre encounter    Reason for call: Pt is scheduled for ct colon today at [place] and it needs an Yudith Domingo   You can call [facility] at [phone], cpt code 78197, diag code z12 11, must be started today    Scheduled procedure/appointment date if applicable: Apt/procedure 5-12-21

## 2021-05-12 NOTE — ANESTHESIA POSTPROCEDURE EVALUATION
Post-Op Assessment Note    CV Status:  Stable  Pain Score: 0    Pain management: adequate     Mental Status:  Awake   Hydration Status:  Stable   PONV Controlled:  None   Airway Patency:  Patent      Post Op Vitals Reviewed: Yes      Staff: CRNA         No complications documented      BP      Temp     Pulse     Resp      SpO2   100

## 2021-05-12 NOTE — INTERVAL H&P NOTE
H&P reviewed  After examining the patient I find no changes in the patients condition since the H&P had been written      Vitals:    05/12/21 1028   BP: 133/89   Pulse: 91   Resp: 20   Temp: (!) 97 3 °F (36 3 °C)   SpO2: 99%

## 2021-05-12 NOTE — ANESTHESIA PREPROCEDURE EVALUATION
Procedure:  COLONOSCOPY    Relevant Problems   CARDIO   (+) Essential hypertension   (+) Hyperlipidemia      GI/HEPATIC   (+) Gastroesophageal reflux disease   (+) Rectal bleeding      /RENAL   (+) BPH (benign prostatic hyperplasia)      HEMATOLOGY   (+) Iron deficiency anemia due to chronic blood loss   (+) Symptomatic anemia      MUSCULOSKELETAL   (+) Osteoarthritis of cervical spine   (+) Osteoarthritis, hip, bilateral      NEURO/PSYCH   (+) Arm paresthesia, right   (+) Headache      PULMONARY   (+) Other emphysema (HCC)        Physical Exam    Airway    Mallampati score: II  TM Distance: >3 FB  Neck ROM: full     Dental   upper dentures and lower dentures,     Cardiovascular  Rhythm: regular, Rate: normal, Cardiovascular exam normal    Pulmonary  Pulmonary exam normal Breath sounds clear to auscultation,     Other Findings        Anesthesia Plan  ASA Score- 3     Anesthesia Type- IV sedation with anesthesia with ASA Monitors  Additional Monitors:   Airway Plan:           Plan Factors-Exercise tolerance (METS): >4 METS  Chart reviewed  Existing labs reviewed  Patient summary reviewed  Patient is not a current smoker  Induction- intravenous  Postoperative Plan- Plan for postoperative opioid use  Informed Consent- Anesthetic plan and risks discussed with patient  I personally reviewed this patient with the CRNA  Discussed and agreed on the Anesthesia Plan with the CRNA Gerhardt Sep

## 2021-05-14 ENCOUNTER — TRANSCRIBE ORDERS (OUTPATIENT)
Dept: ADMINISTRATIVE | Facility: HOSPITAL | Age: 56
End: 2021-05-14

## 2021-05-14 ENCOUNTER — APPOINTMENT (OUTPATIENT)
Dept: LAB | Facility: HOSPITAL | Age: 56
End: 2021-05-14
Payer: COMMERCIAL

## 2021-05-14 ENCOUNTER — TELEPHONE (OUTPATIENT)
Dept: GASTROENTEROLOGY | Facility: AMBULARY SURGERY CENTER | Age: 56
End: 2021-05-14

## 2021-05-14 LAB
BASOPHILS # BLD AUTO: 0.1 THOUSANDS/ΜL (ref 0–0.1)
BASOPHILS NFR BLD AUTO: 1 % (ref 0–1)
EOSINOPHIL # BLD AUTO: 0.28 THOUSAND/ΜL (ref 0–0.61)
EOSINOPHIL NFR BLD AUTO: 3 % (ref 0–6)
ERYTHROCYTE [DISTWIDTH] IN BLOOD BY AUTOMATED COUNT: 22.7 % (ref 11.6–15.1)
HCT VFR BLD AUTO: 52.8 % (ref 36.5–49.3)
HGB BLD-MCNC: 16.2 G/DL (ref 12–17)
IMM GRANULOCYTES # BLD AUTO: 0.08 THOUSAND/UL (ref 0–0.2)
IMM GRANULOCYTES NFR BLD AUTO: 1 % (ref 0–2)
LYMPHOCYTES # BLD AUTO: 2.81 THOUSANDS/ΜL (ref 0.6–4.47)
LYMPHOCYTES NFR BLD AUTO: 31 % (ref 14–44)
MCH RBC QN AUTO: 26.4 PG (ref 26.8–34.3)
MCHC RBC AUTO-ENTMCNC: 30.7 G/DL (ref 31.4–37.4)
MCV RBC AUTO: 86 FL (ref 82–98)
MONOCYTES # BLD AUTO: 1.12 THOUSAND/ΜL (ref 0.17–1.22)
MONOCYTES NFR BLD AUTO: 13 % (ref 4–12)
NEUTROPHILS # BLD AUTO: 4.6 THOUSANDS/ΜL (ref 1.85–7.62)
NEUTS SEG NFR BLD AUTO: 51 % (ref 43–75)
NRBC BLD AUTO-RTO: 0 /100 WBCS
PLATELET # BLD AUTO: 439 THOUSANDS/UL (ref 149–390)
PMV BLD AUTO: 9.4 FL (ref 8.9–12.7)
RBC # BLD AUTO: 6.14 MILLION/UL (ref 3.88–5.62)
WBC # BLD AUTO: 8.99 THOUSAND/UL (ref 4.31–10.16)

## 2021-05-14 PROCEDURE — 85025 COMPLETE CBC W/AUTO DIFF WBC: CPT | Performed by: PHYSICIAN ASSISTANT

## 2021-05-14 PROCEDURE — 36415 COLL VENOUS BLD VENIPUNCTURE: CPT | Performed by: PHYSICIAN ASSISTANT

## 2021-05-21 ENCOUNTER — APPOINTMENT (OUTPATIENT)
Dept: LAB | Facility: HOSPITAL | Age: 56
End: 2021-05-21
Payer: COMMERCIAL

## 2021-05-21 DIAGNOSIS — E61.1 IRON DEFICIENCY: ICD-10-CM

## 2021-05-21 DIAGNOSIS — D50.0 CHRONIC BLOOD LOSS ANEMIA: ICD-10-CM

## 2021-05-22 DIAGNOSIS — N40.1 BENIGN PROSTATIC HYPERPLASIA WITH LOWER URINARY TRACT SYMPTOMS, SYMPTOM DETAILS UNSPECIFIED: ICD-10-CM

## 2021-05-28 ENCOUNTER — APPOINTMENT (OUTPATIENT)
Dept: LAB | Facility: HOSPITAL | Age: 56
End: 2021-05-28
Payer: COMMERCIAL

## 2021-05-28 DIAGNOSIS — D50.0 IRON DEFICIENCY ANEMIA DUE TO CHRONIC BLOOD LOSS: ICD-10-CM

## 2021-05-28 LAB
BASOPHILS # BLD AUTO: 0.15 THOUSANDS/ΜL (ref 0–0.1)
BASOPHILS NFR BLD AUTO: 2 % (ref 0–1)
EOSINOPHIL # BLD AUTO: 0.36 THOUSAND/ΜL (ref 0–0.61)
EOSINOPHIL NFR BLD AUTO: 5 % (ref 0–6)
ERYTHROCYTE [DISTWIDTH] IN BLOOD BY AUTOMATED COUNT: 21.8 % (ref 11.6–15.1)
FERRITIN SERPL-MCNC: 452 NG/ML (ref 8–388)
HCT VFR BLD AUTO: 48.4 % (ref 36.5–49.3)
HGB BLD-MCNC: 15.1 G/DL (ref 12–17)
IMM GRANULOCYTES # BLD AUTO: 0.03 THOUSAND/UL (ref 0–0.2)
IMM GRANULOCYTES NFR BLD AUTO: 0 % (ref 0–2)
IRON SATN MFR SERPL: 30 %
IRON SERPL-MCNC: 101 UG/DL (ref 65–175)
LYMPHOCYTES # BLD AUTO: 2.57 THOUSANDS/ΜL (ref 0.6–4.47)
LYMPHOCYTES NFR BLD AUTO: 35 % (ref 14–44)
MCH RBC QN AUTO: 26.8 PG (ref 26.8–34.3)
MCHC RBC AUTO-ENTMCNC: 31.2 G/DL (ref 31.4–37.4)
MCV RBC AUTO: 86 FL (ref 82–98)
MONOCYTES # BLD AUTO: 0.94 THOUSAND/ΜL (ref 0.17–1.22)
MONOCYTES NFR BLD AUTO: 13 % (ref 4–12)
NEUTROPHILS # BLD AUTO: 3.37 THOUSANDS/ΜL (ref 1.85–7.62)
NEUTS SEG NFR BLD AUTO: 45 % (ref 43–75)
NRBC BLD AUTO-RTO: 0 /100 WBCS
PLATELET # BLD AUTO: 427 THOUSANDS/UL (ref 149–390)
PMV BLD AUTO: 9.5 FL (ref 8.9–12.7)
RBC # BLD AUTO: 5.64 MILLION/UL (ref 3.88–5.62)
TIBC SERPL-MCNC: 336 UG/DL (ref 250–450)
WBC # BLD AUTO: 7.42 THOUSAND/UL (ref 4.31–10.16)

## 2021-05-28 PROCEDURE — 82728 ASSAY OF FERRITIN: CPT

## 2021-05-28 PROCEDURE — 36415 COLL VENOUS BLD VENIPUNCTURE: CPT

## 2021-05-28 PROCEDURE — 83540 ASSAY OF IRON: CPT

## 2021-05-28 PROCEDURE — 85025 COMPLETE CBC W/AUTO DIFF WBC: CPT

## 2021-05-28 PROCEDURE — 83550 IRON BINDING TEST: CPT

## 2021-05-28 RX ORDER — TAMSULOSIN HYDROCHLORIDE 0.4 MG/1
CAPSULE ORAL
Qty: 30 CAPSULE | Refills: 0 | Status: SHIPPED | OUTPATIENT
Start: 2021-05-28 | End: 2021-06-24

## 2021-06-03 ENCOUNTER — OFFICE VISIT (OUTPATIENT)
Dept: HEMATOLOGY ONCOLOGY | Facility: MEDICAL CENTER | Age: 56
End: 2021-06-03
Payer: COMMERCIAL

## 2021-06-03 VITALS
HEART RATE: 77 BPM | HEIGHT: 72 IN | OXYGEN SATURATION: 97 % | TEMPERATURE: 97.8 F | DIASTOLIC BLOOD PRESSURE: 86 MMHG | RESPIRATION RATE: 18 BRPM | WEIGHT: 212 LBS | BODY MASS INDEX: 28.71 KG/M2 | SYSTOLIC BLOOD PRESSURE: 124 MMHG

## 2021-06-03 DIAGNOSIS — E61.1 IRON DEFICIENCY: Primary | ICD-10-CM

## 2021-06-03 DIAGNOSIS — D50.0 CHRONIC BLOOD LOSS ANEMIA: ICD-10-CM

## 2021-06-03 PROCEDURE — 1036F TOBACCO NON-USER: CPT | Performed by: PHYSICIAN ASSISTANT

## 2021-06-03 PROCEDURE — 3008F BODY MASS INDEX DOCD: CPT | Performed by: PHYSICIAN ASSISTANT

## 2021-06-03 PROCEDURE — 99215 OFFICE O/P EST HI 40 MIN: CPT | Performed by: PHYSICIAN ASSISTANT

## 2021-06-03 NOTE — PROGRESS NOTES
[facility] [address] [address]  Hematology Ambulatory Follow-Up  Butch Carrasquillo, 1965, [id number]  6/3/2021    Assessment/Plan:    1  Iron deficiency; 2  Chronic blood loss anemia  Stable  Patient has completed IV iron supplementation  Patient has not had any more bright red blood per rectum since his last appointment  Overall, patient feeling well  Labs were acceptable  Patient will follow-up with blood work every week and then will follow-up in the office in four months  Patient understands contact the office PS any questions or concerns or any other issues  - CBC and differential; Standing  - Iron Panel (Includes Ferritin, Iron Sat%, Iron, and TIBC); Future  - CBC and differential    The patient is scheduled for follow-up in approximately 4 Months with Dr Mary Francisco  Patient voiced agreement and understanding to the above  Patient knows to call the Hematology/Oncology office with any questions and concerns regarding the above     ------------------------------------------------------------------------------------------------------    Chief Complaint   Patient presents with    Follow-up     Iron Deficiency     History of present illness:  This is a 59-year-old male who was admitted to the emergency room after having several weeks of shortness of breath, three episodes of syncope at home in January 2021   Patient's past medical history significant for 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      Patient had been previously worked up for abnormalities by primary care doctor who recommended that the patient follow-up with GI as well as with Cardiology for a stress test   Unfortunately, patient's symptoms progressed and he presented to the emergency room  [facility] work demonstrated hemoglobin in the 7 gram/deciliter range   Moreover, the patient was found to have a significant iron deficiency with a ferritin of three and an iron saturation of 1%   Patient underwent three Venofer infusions daily in the hospital   Patient's hemoglobin is in the 7 gram/deciliter range        GI work up in 1/2021-2/2021:  GI has seen the patient in the hospital  Caroline Wilder underwent colonoscopy in EGD that demonstrated questionable AVM in the small intestine   This area was cauterized   This area was not bleeding   Capsule demonstrated angiectasis and follow up EGD- is planned on 2/26/2021   This procedure did not demonstrate any signs of active bleeding      Patient completed the remainder of Venofer treatments started in the hospital in February 2021  Interval history:  03/23/21:    Patient notes overall feeling well  [employer] work was reviewed   Patient's iron saturation increased to 7% and ferritin at 41   Hemoglobin has completely supplemented however, MCV is still low   Patient's platelet count is still elevated however this may be secondary to splenectomy and not necessarily to iron deficiency     Patient has completed COVID vaccinations       patient notes two episodes of melena last week   No recent issues      05/03/21:  Urgent follow up; [facility] on 5/1/2021, ED visit, hemoglobin stable  No bleeding today  1 more IV iron treatment to go  Iron studies requestion on labs from 5/1/21:iron sat supratheraputic      5/12/21:  Colonoscopy was negative for signs of active bleeding  Hemorrhoidal bleeding to blame for bright red blood per rectum  Scope was advanced past the descending colon due to patient's  CT colonoscopy diagnostic without contrast did not demonstrate any lesions  06/03/21:  Patient is feeling better  No more GI bleeding  Hemoglobin = 15 1, platelet count 945 oral seven, MCV = 86, ferritin = 452, iron saturation = 30%, TIBC = 336    Review of Systems   All other systems reviewed and are negative        Patient Active Problem List   Diagnosis    Essential hypertension    Hyperlipidemia    Arm paresthesia, right    Headache    Osteoarthritis of cervical spine    Colon cancer screening    Elevated PSA    Gastroesophageal reflux disease    Hematochezia    Other emphysema ([address] )   [address] maintenance    Crews's esophagus without dysplasia    Tubular adenoma of colon    Myalgia    Hip pain, bilateral    Osteoarthritis, hip, bilateral    Symptomatic anemia    BPH (benign prostatic hyperplasia)    Iron deficiency anemia due to chronic blood loss    S/P splenectomy    Rectal bleeding       Past Medical History:   Diagnosis Date    Anemia     Anemia     Cancer ([address] )     prostate    Colon polyp     Elevated PSA     Enlarged prostate     Full dentures     GERD (gastroesophageal reflux disease)     Hemorrhoid     Hyperlipidemia     Hypertension     Rectal bleed     Sleep apnea     No CPAP    Smoker     TIA (transient ischemic attack)     Transfusion history     Wears glasses      Past Surgical History:   Procedure Laterality Date    APPENDECTOMY      COLONOSCOPY      EGD      FOOT SURGERY Right     bone removed    OTHER SURGICAL HISTORY      bone removal right arm-abnormal growth of surface bone    PROSTATE BIOPSY      SPLENECTOMY      ruptured     Family History   Problem Relation Age of Onset    Dementia Father     Heart disease Mother     Hypertension Mother     Hyperlipidemia Mother     Hypertension Sister     Hypertension Brother     No Known Problems Daughter     No Known Problems Son     Cancer Paternal Grandfather         prostate    No Known Problems Son      Social History     Socioeconomic History    Marital status: Legally      Spouse name: None    Number of children: None    Years of education: None    Highest education level: None   Occupational History    Occupation: supervisor     Employer: [employer]   Social Needs    Financial resource strain: None    Food insecurity     Worry: None     Inability: None    Transportation needs     Medical: None     Non-medical: None   Tobacco Use    Smoking status: Former Smoker     Packs/day: 0 50     Years: 10 00     Pack years: 5 00     Types: Cigarettes     Quit date: 2021     Years since quittin 2    Smokeless tobacco: Never Used   Substance and Sexual Activity    Alcohol use: Not Currently     Frequency: Never    Drug use: No    Sexual activity: None   Lifestyle    Physical activity     Days per week: None     Minutes per session: None    Stress: None   Relationships    Social connections     Talks on phone: None     Gets together: None     Attends Scientology service: None     Active member of club or organization: None     Attends meetings of clubs or organizations: None     Relationship status: None    Intimate partner violence     Fear of current or ex partner: None     Emotionally abused: None     Physically abused: None     Forced sexual activity: None   Other Topics Concern    None   Social History Narrative    None         Current Outpatient Medications:     acetaminophen (TYLENOL) 325 mg tablet, Take 2 tablets (650 mg total) by mouth every 6 (six) hours as needed for mild pain, headaches or fever, Disp: 30 tablet, Rfl: 0    aspirin (ECOTRIN LOW STRENGTH) 81 mg EC tablet, Take 1 tablet (81 mg total) by mouth daily, Disp: 30 tablet, Rfl: 0    atorvastatin (LIPITOR) 20 mg tablet, Take 1 tablet (20 mg total) by mouth daily, Disp: 90 tablet, Rfl: 3    dexamethasone (DECADRON) 4 mg tablet, Take 1 tablet (4 mg total) by mouth daily with breakfast For two days after iron infusion  , Disp: 10 tablet, Rfl: 0    finasteride (PROSCAR) 5 mg tablet, Take 1 tablet (5 mg total) by mouth daily (Patient taking differently: Take 5 mg by mouth daily at bedtime ), Disp: 90 tablet, Rfl: 3    hydrocortisone (ANUSOL-HC) 25 mg suppository, Insert 1 suppository (25 mg total) into the rectum daily at bedtime, Disp: 12 suppository, Rfl: 0    losartan (COZAAR) 50 mg tablet, Take 1 tablet (50 mg total) by mouth daily, Disp: 90 tablet, Rfl: 1    oxybutynin (DITROPAN-XL) 5 mg 24 hr tablet, Take 1 tablet (5 mg total) by mouth daily, Disp: 30 tablet, Rfl: 2    pantoprazole (PROTONIX) 40 mg tablet, Take 1 tablet (40 mg total) by mouth 2 (two) times a day 30-60 min before breakfast, Disp: 90 tablet, Rfl: 0    tamsulosin (FLOMAX) 0 4 mg, TAKE 1 CAPSULE BY MOUTH EVERY DAY WITH DINNER, Disp: 30 capsule, Rfl: 0    verapamil (CALAN-SR) 120 mg CR tablet, Take 1 tablet (120 mg total) by mouth daily (Patient taking differently: Take 120 mg by mouth daily at bedtime ), Disp: 90 tablet, Rfl: 1    No Known Allergies    Objective:  /86 (BP Location: Right arm, Patient Position: Sitting, Cuff Size: Large)   Pulse 77   Temp 97 8 °F (36 6 °C) (Temporal)   Resp 18   Ht 6' (1 829 m)   Wt 96 2 kg (212 lb)   SpO2 97%   BMI 28 75 kg/m²    Physical Exam  Constitutional:       General: He is not in acute distress  Appearance: He is well-developed  HENT:      Head: Normocephalic and atraumatic  Right Ear: External ear normal       Left Ear: External ear normal       Nose: Nose normal    Eyes:      General: No scleral icterus  Conjunctiva/sclera: Conjunctivae normal    Cardiovascular:      Rate and Rhythm: Normal rate  Pulmonary:      Effort: No respiratory distress  Abdominal:      General: There is no distension  Palpations: Abdomen is soft  Skin:     Findings: No rash (on exposed skin  )  Neurological:      Mental Status: He is alert and oriented to person, place, and time  Psychiatric:         Thought Content:  Thought content normal          Result Review  Labs:  Appointment on 05/28/2021   Component Date Value Ref Range Status    WBC 05/28/2021 7 42  4 31 - 10 16 Thousand/uL Final    RBC 05/28/2021 5 64* 3 88 - 5 62 Million/uL Final    Hemoglobin 05/28/2021 15 1  12 0 - 17 0 g/dL Final    Hematocrit 05/28/2021 48 4  36 5 - 49 3 % Final    MCV 05/28/2021 86  82 - 98 fL Final   [facility] 05/28/2021 26 8  26 8 - 34 3 pg Final    MCHC 05/28/2021 31 2* 31 4 - 37 4 g/dL Final    RDW 05/28/2021 21 8* 11 6 - 15 1 % Final    MPV 05/28/2021 9 5  8 9 - 12 7 fL Final    Platelets 80/58/4904 427* 149 - 390 Thousands/uL Final    nRBC 05/28/2021 0  /100 WBCs Final    Neutrophils Relative 05/28/2021 45  43 - 75 % Final    Immat GRANS % 05/28/2021 0  0 - 2 % Final    Lymphocytes Relative 05/28/2021 35  14 - 44 % Final    Monocytes Relative 05/28/2021 13* 4 - 12 % Final    Eosinophils Relative 05/28/2021 5  0 - 6 % Final    Basophils Relative 05/28/2021 2* 0 - 1 % Final    Neutrophils Absolute 05/28/2021 3 37  1 85 - 7 62 Thousands/µL Final    Immature Grans Absolute 05/28/2021 0 03  0 00 - 0 20 Thousand/uL Final    Lymphocytes Absolute 05/28/2021 2 57  0 60 - 4 47 Thousands/µL Final    Monocytes Absolute 05/28/2021 0 94  0 17 - 1 22 Thousand/µL Final    Eosinophils Absolute 05/28/2021 0 36  0 00 - 0 61 Thousand/µL Final    Basophils Absolute 05/28/2021 0 15* 0 00 - 0 10 Thousands/µL Final   Appointment on 05/21/2021   Component Date Value Ref Range Status    Iron Saturation 05/28/2021 30  % Final    TIBC 05/28/2021 336  250 - 450 ug/dL Final    Iron 05/28/2021 101  65 - 175 ug/dL Final    Patients treated with metal-binding drugs (ie  Deferoxamine) may have depressed iron values      Ferritin 05/28/2021 452* 8 - 388 ng/mL Final   Ancillary Orders on 05/14/2021   Component Date Value Ref Range Status    WBC 05/21/2021 7 14  4 31 - 10 16 Thousand/uL Final    RBC 05/21/2021 5 37  3 88 - 5 62 Million/uL Final    Hemoglobin 05/21/2021 14 4  12 0 - 17 0 g/dL Final    Hematocrit 05/21/2021 45 9  36 5 - 49 3 % Final    MCV 05/21/2021 86  82 - 98 fL Final    MCH 05/21/2021 26 8  26 8 - 34 3 pg Final    MCHC 05/21/2021 31 4  31 4 - 37 4 g/dL Final    RDW 05/21/2021 22 1* 11 6 - 15 1 % Final    MPV 05/21/2021 9 5  8 9 - 12 7 fL Final    Platelets 82/71/4769 412* 149 - 390 Thousands/uL Final    nRBC 05/21/2021 0  /100 WBCs Final    Neutrophils Relative 05/21/2021 52  43 - 75 % Final    Immat GRANS % 05/21/2021 1  0 - 2 % Final    Lymphocytes Relative 05/21/2021 30  14 - 44 % Final    Monocytes Relative 05/21/2021 12  4 - 12 % Final    Eosinophils Relative 05/21/2021 4  0 - 6 % Final    Basophils Relative 05/21/2021 1  0 - 1 % Final    Neutrophils Absolute 05/21/2021 3 74  1 85 - 7 62 Thousands/µL Final    Immature Grans Absolute 05/21/2021 0 04  0 00 - 0 20 Thousand/uL Final    Lymphocytes Absolute 05/21/2021 2 14  0 60 - 4 47 Thousands/µL Final    Monocytes Absolute 05/21/2021 0 83  0 17 - 1 22 Thousand/µL Final    Eosinophils Absolute 05/21/2021 0 29  0 00 - 0 61 Thousand/µL Final    Basophils Absolute 05/21/2021 0 10  0 00 - 0 10 Thousands/µL Final     CT colonoscopy diagnostic wo contrast  Narrative: VIRTUAL CT COLONOSCOPY - CT ABDOMEN AND PELVIS WITHOUT IV CONTRAST    INDICATION:   Z12 11: Encounter for screening for malignant neoplasm of colon  COMPARISON:  May 1, 2021    TECHNIQUE:  CT examination of the abdomen and pelvis was performed without intravenous and without enteric contrast using low radiation dose according to a protocol designed specifically to evaluate the colonic mucosa  Three dimensional reconstructed   images were created on an independent workstation and included for evaluation  Reformatted images were created in axial, sagittal, and coronal planes  Radiation dose length product (DLP) for this visit:  4280 mGy-cm   This examination, like all CT scans performed in the [facility], was performed utilizing techniques to minimize radiation dose exposure, including the use of iterative   reconstruction and automated exposure control  COLON PREP: Patient was administered our standard virtual colonoscopy preparation    Colon prep was satisfactory with a few scattered tagged residual foci of fluid and/or fecal material   Patient was unable to tolerate volume of colon colonic insufflation   and therefore insufflation and is slightly suboptimal in the descending and sigmoid colon  However, there is adequate insufflation of the large bowel from the cecum to the splenic flexure  FINDINGS:    COLONIC MUCOSA:    Sigmoid colon is tortuous and elongated  There is also tortuosity of the hepatic flexure  There is no colonic polyp of 6 mm or greater in size  There is no persistent stricture or mass lesion  ABDOMEN AND PELVIS:  Patchy geographic hepatic steatosis is noted  Surgical changes of prior splenectomy with splenosis in the splenectomy space, unchanged from prior exam   Small cortical cyst again noted in the anterior lobe pulmonary kidney  Limited evaluation demonstrates no other significant abnormality of liver, spleen, kidneys, pancreas, or adrenal glands  No calcified gallstones or gallbladder wall thickening noted  No ascites or lymphadenopathy  Prostate is enlarged with impression into the urinary bladder base  Impression: No colonic polyp or mass of 6 mm or greater  Patchy geographic hepatic steatosis  Prostatomegaly  Workstation performed: [id number]      Please note: This report has been generated by a voice recognition software system  Therefore there may be syntax, spelling, and/or grammatical errors  Please call if you have any questions

## 2021-06-16 ENCOUNTER — APPOINTMENT (OUTPATIENT)
Dept: LAB | Facility: HOSPITAL | Age: 56
End: 2021-06-16
Payer: COMMERCIAL

## 2021-06-16 DIAGNOSIS — D50.0 IRON DEFICIENCY ANEMIA SECONDARY TO BLOOD LOSS (CHRONIC): ICD-10-CM

## 2021-06-16 DIAGNOSIS — E61.1 IRON DEFICIENCY: Primary | ICD-10-CM

## 2021-06-16 LAB
BASOPHILS # BLD AUTO: 0.13 THOUSANDS/ΜL (ref 0–0.1)
BASOPHILS # BLD AUTO: 0.15 THOUSANDS/ΜL (ref 0–0.1)
BASOPHILS NFR BLD AUTO: 2 % (ref 0–1)
BASOPHILS NFR BLD AUTO: 2 % (ref 0–1)
EOSINOPHIL # BLD AUTO: 0.45 THOUSAND/ΜL (ref 0–0.61)
EOSINOPHIL # BLD AUTO: 0.48 THOUSAND/ΜL (ref 0–0.61)
EOSINOPHIL NFR BLD AUTO: 5 % (ref 0–6)
EOSINOPHIL NFR BLD AUTO: 5 % (ref 0–6)
ERYTHROCYTE [DISTWIDTH] IN BLOOD BY AUTOMATED COUNT: 22 % (ref 11.6–15.1)
ERYTHROCYTE [DISTWIDTH] IN BLOOD BY AUTOMATED COUNT: 22.1 % (ref 11.6–15.1)
HCT VFR BLD AUTO: 48.5 % (ref 36.5–49.3)
HCT VFR BLD AUTO: 49.1 % (ref 36.5–49.3)
HGB BLD-MCNC: 15.6 G/DL (ref 12–17)
HGB BLD-MCNC: 15.7 G/DL (ref 12–17)
IMM GRANULOCYTES # BLD AUTO: 0.04 THOUSAND/UL (ref 0–0.2)
IMM GRANULOCYTES # BLD AUTO: 0.04 THOUSAND/UL (ref 0–0.2)
IMM GRANULOCYTES NFR BLD AUTO: 1 % (ref 0–2)
IMM GRANULOCYTES NFR BLD AUTO: 1 % (ref 0–2)
LYMPHOCYTES # BLD AUTO: 3.25 THOUSANDS/ΜL (ref 0.6–4.47)
LYMPHOCYTES # BLD AUTO: 3.27 THOUSANDS/ΜL (ref 0.6–4.47)
LYMPHOCYTES NFR BLD AUTO: 37 % (ref 14–44)
LYMPHOCYTES NFR BLD AUTO: 37 % (ref 14–44)
MCH RBC QN AUTO: 27.4 PG (ref 26.8–34.3)
MCH RBC QN AUTO: 27.5 PG (ref 26.8–34.3)
MCHC RBC AUTO-ENTMCNC: 32 G/DL (ref 31.4–37.4)
MCHC RBC AUTO-ENTMCNC: 32.2 G/DL (ref 31.4–37.4)
MCV RBC AUTO: 86 FL (ref 82–98)
MCV RBC AUTO: 86 FL (ref 82–98)
MONOCYTES # BLD AUTO: 1.07 THOUSAND/ΜL (ref 0.17–1.22)
MONOCYTES # BLD AUTO: 1.11 THOUSAND/ΜL (ref 0.17–1.22)
MONOCYTES NFR BLD AUTO: 12 % (ref 4–12)
MONOCYTES NFR BLD AUTO: 13 % (ref 4–12)
NEUTROPHILS # BLD AUTO: 3.82 THOUSANDS/ΜL (ref 1.85–7.62)
NEUTROPHILS # BLD AUTO: 3.84 THOUSANDS/ΜL (ref 1.85–7.62)
NEUTS SEG NFR BLD AUTO: 42 % (ref 43–75)
NEUTS SEG NFR BLD AUTO: 43 % (ref 43–75)
NRBC BLD AUTO-RTO: 0 /100 WBCS
NRBC BLD AUTO-RTO: 0 /100 WBCS
PLATELET # BLD AUTO: 363 THOUSANDS/UL (ref 149–390)
PLATELET # BLD AUTO: 383 THOUSANDS/UL (ref 149–390)
PMV BLD AUTO: 9.7 FL (ref 8.9–12.7)
PMV BLD AUTO: 9.8 FL (ref 8.9–12.7)
RBC # BLD AUTO: 5.67 MILLION/UL (ref 3.88–5.62)
RBC # BLD AUTO: 5.72 MILLION/UL (ref 3.88–5.62)
WBC # BLD AUTO: 8.78 THOUSAND/UL (ref 4.31–10.16)
WBC # BLD AUTO: 8.87 THOUSAND/UL (ref 4.31–10.16)

## 2021-06-16 PROCEDURE — 36415 COLL VENOUS BLD VENIPUNCTURE: CPT | Performed by: PHYSICIAN ASSISTANT

## 2021-06-16 PROCEDURE — 85025 COMPLETE CBC W/AUTO DIFF WBC: CPT | Performed by: PHYSICIAN ASSISTANT

## 2021-06-16 PROCEDURE — 85025 COMPLETE CBC W/AUTO DIFF WBC: CPT

## 2021-06-23 ENCOUNTER — OFFICE VISIT (OUTPATIENT)
Dept: FAMILY MEDICINE CLINIC | Facility: CLINIC | Age: 56
End: 2021-06-23
Payer: COMMERCIAL

## 2021-06-23 VITALS
WEIGHT: 208 LBS | RESPIRATION RATE: 20 BRPM | BODY MASS INDEX: 28.17 KG/M2 | HEART RATE: 111 BPM | DIASTOLIC BLOOD PRESSURE: 80 MMHG | HEIGHT: 72 IN | OXYGEN SATURATION: 96 % | TEMPERATURE: 101 F | SYSTOLIC BLOOD PRESSURE: 110 MMHG

## 2021-06-23 DIAGNOSIS — I10 ESSENTIAL HYPERTENSION: ICD-10-CM

## 2021-06-23 DIAGNOSIS — E78.2 MIXED HYPERLIPIDEMIA: ICD-10-CM

## 2021-06-23 DIAGNOSIS — J01.00 ACUTE NON-RECURRENT MAXILLARY SINUSITIS: Primary | ICD-10-CM

## 2021-06-23 PROCEDURE — 1036F TOBACCO NON-USER: CPT | Performed by: NURSE PRACTITIONER

## 2021-06-23 PROCEDURE — 3079F DIAST BP 80-89 MM HG: CPT | Performed by: NURSE PRACTITIONER

## 2021-06-23 PROCEDURE — 3008F BODY MASS INDEX DOCD: CPT | Performed by: NURSE PRACTITIONER

## 2021-06-23 PROCEDURE — 99214 OFFICE O/P EST MOD 30 MIN: CPT | Performed by: NURSE PRACTITIONER

## 2021-06-23 PROCEDURE — 3074F SYST BP LT 130 MM HG: CPT | Performed by: NURSE PRACTITIONER

## 2021-06-23 RX ORDER — AMOXICILLIN 875 MG/1
875 TABLET, COATED ORAL 2 TIMES DAILY
Qty: 20 TABLET | Refills: 0 | Status: SHIPPED | OUTPATIENT
Start: 2021-06-23 | End: 2021-07-03

## 2021-06-23 NOTE — PROGRESS NOTES
Assessment/Plan:    Take antibiotics until finished  Reviewed supportive care  F/u for any persistent/worsening symptoms    1  Acute non-recurrent maxillary sinusitis  -     amoxicillin (AMOXIL) 875 mg tablet; Take 1 tablet (875 mg total) by mouth 2 (two) times a day for 10 days    2  Essential hypertension  Assessment & Plan:  stable      3  Mixed hyperlipidemia  Assessment & Plan:  Continue statin              Patient Instructions   Take antibiotics until finished  Tylenol as needed for fevers, body aches, and chills  Saline sinus rinses or nasal spray  Coricidin HBP over the counter for congestion  Return if symptoms worsen or fail to improve  Subjective:      Patient ID: Ifrah Funes is a 63 y o  male  Chief Complaint   Patient presents with    Cold Like Symptoms     [id number]        Here today with complaints of headache, sinus pressure, congestion, and sore throat  Checked him temp earlier today and it was 100 1  He has some upper chest pains when breathing in  Notes intermittent, dry cough and intermittent wheezing  He has been vaccinated against COVID 19        The following portions of the patient's history were reviewed and updated as appropriate: allergies, current medications, past family history, past medical history, past social history, past surgical history and problem list     Review of Systems   Constitutional: Positive for chills, fatigue and fever  HENT: Positive for congestion, sinus pressure and sore throat  Negative for ear pain, postnasal drip and rhinorrhea  Respiratory: Positive for cough and wheezing  Negative for shortness of breath  Cardiovascular: Negative for chest pain  Gastrointestinal: Negative for abdominal pain, diarrhea, nausea and vomiting  Musculoskeletal: Negative for arthralgias  Skin: Negative for rash  Neurological: Positive for headaches           Current Outpatient Medications   Medication Sig Dispense Refill    acetaminophen (TYLENOL) 325 mg tablet Take 2 tablets (650 mg total) by mouth every 6 (six) hours as needed for mild pain, headaches or fever 30 tablet 0    aspirin (ECOTRIN LOW STRENGTH) 81 mg EC tablet Take 1 tablet (81 mg total) by mouth daily 30 tablet 0    atorvastatin (LIPITOR) 20 mg tablet Take 1 tablet (20 mg total) by mouth daily 90 tablet 3    dexamethasone (DECADRON) 4 mg tablet Take 1 tablet (4 mg total) by mouth daily with breakfast For two days after iron infusion  10 tablet 0    finasteride (PROSCAR) 5 mg tablet Take 1 tablet (5 mg total) by mouth daily (Patient taking differently: Take 5 mg by mouth daily at bedtime ) 90 tablet 3    hydrocortisone (ANUSOL-HC) 25 mg suppository Insert 1 suppository (25 mg total) into the rectum daily at bedtime 12 suppository 0    losartan (COZAAR) 50 mg tablet Take 1 tablet (50 mg total) by mouth daily 90 tablet 1    oxybutynin (DITROPAN-XL) 5 mg 24 hr tablet Take 1 tablet (5 mg total) by mouth daily 30 tablet 2    pantoprazole (PROTONIX) 40 mg tablet Take 1 tablet (40 mg total) by mouth 2 (two) times a day 30-60 min before breakfast 90 tablet 0    tamsulosin (FLOMAX) 0 4 mg TAKE 1 CAPSULE BY MOUTH EVERY DAY WITH DINNER 30 capsule 0    verapamil (CALAN-SR) 120 mg CR tablet Take 1 tablet (120 mg total) by mouth daily (Patient taking differently: Take 120 mg by mouth daily at bedtime ) 90 tablet 1    amoxicillin (AMOXIL) 875 mg tablet Take 1 tablet (875 mg total) by mouth 2 (two) times a day for 10 days 20 tablet 0     No current facility-administered medications for this visit  Objective:    /80   Pulse (!) 111   Temp (!) 101 °F (38 3 °C)   Resp 20   Ht 6' (1 829 m)   Wt 94 3 kg (208 lb)   SpO2 96%   BMI 28 21 kg/m²        Physical Exam  Vitals and nursing note reviewed  Constitutional:       Appearance: Normal appearance  He is well-developed  HENT:      Head: Normocephalic and atraumatic        Right Ear: Tympanic membrane, ear canal and external ear normal       Left Ear: Tympanic membrane, ear canal and external ear normal       Nose: Congestion present  No mucosal edema or rhinorrhea  Right Sinus: Maxillary sinus tenderness present  Left Sinus: Maxillary sinus tenderness present  Mouth/Throat:      Pharynx: Uvula midline  Posterior oropharyngeal erythema present  No oropharyngeal exudate  Eyes:      Conjunctiva/sclera: Conjunctivae normal    Neck:      Thyroid: No thyromegaly  Cardiovascular:      Rate and Rhythm: Normal rate and regular rhythm  Heart sounds: Normal heart sounds  No murmur heard  Pulmonary:      Effort: Pulmonary effort is normal       Breath sounds: Normal breath sounds  Abdominal:      General: Bowel sounds are normal  There is no distension  Palpations: There is no hepatomegaly or splenomegaly  Tenderness: There is no abdominal tenderness  Musculoskeletal:      Cervical back: Neck supple  No edema  Lymphadenopathy:      Cervical:      Right cervical: No superficial cervical adenopathy  Left cervical: No superficial cervical adenopathy  Skin:     General: Skin is warm and dry  Capillary Refill: Capillary refill takes less than 2 seconds  Findings: No rash  Neurological:      Mental Status: He is alert     Psychiatric:         Mood and Affect: Mood normal          Behavior: Behavior normal                 LAUREN Villanueva

## 2021-06-23 NOTE — PATIENT INSTRUCTIONS
Take antibiotics until finished  Tylenol as needed for fevers, body aches, and chills  Saline sinus rinses or nasal spray  Coricidin HBP over the counter for congestion

## 2021-07-22 DIAGNOSIS — N40.1 BENIGN PROSTATIC HYPERPLASIA WITH LOWER URINARY TRACT SYMPTOMS, SYMPTOM DETAILS UNSPECIFIED: ICD-10-CM

## 2021-07-22 DIAGNOSIS — R39.15 URINARY URGENCY: ICD-10-CM

## 2021-07-23 RX ORDER — OXYBUTYNIN CHLORIDE 5 MG/1
5 TABLET, EXTENDED RELEASE ORAL DAILY
Qty: 30 TABLET | Refills: 0 | Status: SHIPPED | OUTPATIENT
Start: 2021-07-23 | End: 2021-08-16

## 2021-07-26 ENCOUNTER — APPOINTMENT (OUTPATIENT)
Dept: LAB | Facility: HOSPITAL | Age: 56
End: 2021-07-26
Payer: COMMERCIAL

## 2021-08-08 NOTE — PATIENT INSTRUCTIONS
Suprapubic Prostatectomy   WHAT YOU NEED TO KNOW:   What do I need to know about suprapubic prostatectomy? Suprapubic prostatectomy is surgery to remove part or all of your prostate gland  Your prostate gland is found below your bladder and surrounds the top of your urethra  Your urethra is a tube that carries urine from your bladder to the outside of your body  You may need suprapubic prostatectomy if you have an enlarged prostate  How do I prepare for surgery? Your healthcare provider will talk to you about how to prepare for surgery  He or she may tell you not to eat or drink anything after midnight on the day of your surgery  He or she will tell you what medicines to take or not take on the day of your surgery  What will happen during surgery? Your surgeon will make an incision in your abdomen below your belly button  He or she will remove part or all of your prostate  A suprapubic catheter may be placed into your bladder through the cut in your abdomen to drain your urine  A drain may be placed near your bladder to remove extra blood and fluid  Your surgeon will use stitches or staples to close your incision  What are the risks of surgery? You may bleed more than expected or get an infection  Your bladder, urethra, penis, or nearby tissues may be damaged  Your urethra may become narrow and block the flow of urine  The muscle at the base of your bladder may shorten and cause you to leak urine  Urine may leak inside your body and collect in nearby areas, such as your scrotum  Your kidneys may stop working properly  You may get a blood clot in your leg  This may become life-threatening  CARE AGREEMENT:   You have the right to help plan your care  Learn about your health condition and how it may be treated  Discuss treatment options with your healthcare providers to decide what care you want to receive  You always have the right to refuse treatment  The above information is an  only   It is not intended as medical advice for individual conditions or treatments  Talk to your doctor, nurse or pharmacist before following any medical regimen to see if it is safe and effective for you  © Copyright [employer] 2021 Information is for End User's use only and may not be sold, redistributed or otherwise used for commercial purposes   All illustrations and images included in CareNotes® are the copyrighted property of [employer]  or [address] [place] [address]

## 2021-08-08 NOTE — PROGRESS NOTES
Problem List Items Addressed This Visit        Genitourinary    Benign localized prostatic hyperplasia with lower urinary tract symptoms (LUTS)    Relevant Orders    Case request operating room: PROSTATECTOMY SUPRAPUBIC, robot assisted laparoscopic approach (Completed)       Other    Elevated PSA - Primary    Person consulting for explanation of examination or test finding            Discussion:    Lonny Kline and I had a productive consultation today  His prostate measures 92 7 grams consistent with some treatment effect from his finasteride  His bladder outlet is highly obstructed with a significant intravesical portion of his prostate circumferentially  The recommended operative therapy would be a robot assisted simple prostatectomy versus open simple prostatectomy  He has previously undergone a splenectomy by way of a paramedian incision on the left hand side  The plan at the time of his operation would be to perform a diagnostic laparoscopy with a 5 millimeter camera through the right upper quadrant, if it appears that a safe lysis of adhesions could be performed then I would plan to do this robotically, if there are multiple adhesions present I would discontinue the laparoscopy and plan for a open suprapubic simple prostatectomy for treatment of a large adenoma  He does understand the risk of bladder neck contracture, urethral stricture, and the chance of continuing to have symptoms, especially of a storage nature  I spoke with him about potentially exploring botulinum toxin injection after his bladder outlet has been treated  The hope is that with opening his bladder outlet his lower urinary symptoms will improve as he is currently failing tri modality therapy with an anticholinergic, alpha-blocker, and 5 alpha reductase inhibitor        The other risks of surgery include infection, bleeding, pain, damage to surrounding structures, need for additional procedures, risk of failure of the procedure, risk of anesthesia, risk of positioning complications including neurapraxia, chronic pain, and paralysis as well as risk of rhabdomyolysis and compartment syndrome  Risk of deep venous thrombosis and venous thromboembolism as well as pneumonia and other potential but unforseeable or unpredictable complications was also discussed with the patient  he does have a history of acute blood-loss anemia/ chronic blood-loss anemia if per rectum, this has been worked up extensively, recent hemoglobin is 16  He will undergo optimization for surgery and see us back for his simple prostatectomy either in a robotic assisted fashion or open fashion upcoming          Assessment and plan:     Please see problem oriented charting for the assessment plan of today's urological complaints      Cassidy Owusu MD      Chief Complaint     No chief complaint on file  History of Present Illness     Mark Clayton is a 63 y o  Gentleman with a history of elevated PSA, he is status post negative biopsy showing active inflammatory changes within the tissue of the prostate sometime ago  Recently his lower urinary tract symptoms have been worsening, he has failed anticholinergic medications, alpha blockers, and follow-up alpha reductase inhibitors  He is interested in surgical correction of his bladder outlet obstruction in hopes that this helps with his urinary symptoms, see discussion above  The following portions of the patient's history were reviewed and updated as appropriate: allergies, current medications, past family history, past medical history, past social history, past surgical history and problem list       Detailed Urologic History     - please refer to HPI    Review of Systems     Review of Systems   Constitutional: Negative  HENT: Negative  Eyes: Negative  Respiratory: Negative  Cardiovascular: Negative  Gastrointestinal: Negative  Endocrine: Negative      Genitourinary: Positive for difficulty urinating, frequency and urgency  Musculoskeletal: Negative  Skin: Negative  Allergic/Immunologic: Negative  Neurological: Negative  Hematological: Negative  Psychiatric/Behavioral: Negative  Allergies     No Known Allergies    Physical Exam     Physical Exam  Vitals reviewed  Constitutional:       General: He is not in acute distress  Appearance: Normal appearance  He is not ill-appearing, toxic-appearing or diaphoretic  HENT:      Head: Normocephalic and atraumatic  Eyes:      General: No scleral icterus  Right eye: No discharge  Left eye: No discharge  Cardiovascular:      Pulses: Normal pulses  Pulmonary:      Effort: Pulmonary effort is normal    Abdominal:      General: There is no distension  Palpations: There is no mass  Tenderness: There is no abdominal tenderness  Hernia: No hernia is present  Genitourinary:     Penis: Normal     Musculoskeletal:         General: No swelling  Neurological:      General: No focal deficit present  Mental Status: He is alert and oriented to person, place, and time  Cranial Nerves: No cranial nerve deficit  Psychiatric:         Mood and Affect: Mood normal          Behavior: Behavior normal          Thought Content:  Thought content normal          Judgment: Judgment normal              Vital Signs  Vitals:    08/09/21 1252   BP: 134/68   BP Location: Left arm   Patient Position: Sitting   Cuff Size: Adult   Pulse: 77   Weight: 95 3 kg (210 lb)   Height: 6' (1 829 m)         Current Medications       Current Outpatient Medications:     acetaminophen (TYLENOL) 325 mg tablet, Take 2 tablets (650 mg total) by mouth every 6 (six) hours as needed for mild pain, headaches or fever, Disp: 30 tablet, Rfl: 0    aspirin (ECOTRIN LOW STRENGTH) 81 mg EC tablet, Take 1 tablet (81 mg total) by mouth daily, Disp: 30 tablet, Rfl: 0    atorvastatin (LIPITOR) 20 mg tablet, Take 1 tablet (20 mg total) by mouth daily, Disp: 90 tablet, Rfl: 3    dexamethasone (DECADRON) 4 mg tablet, Take 1 tablet (4 mg total) by mouth daily with breakfast For two days after iron infusion  , Disp: 10 tablet, Rfl: 0    finasteride (PROSCAR) 5 mg tablet, Take 1 tablet (5 mg total) by mouth daily (Patient taking differently: Take 5 mg by mouth daily at bedtime ), Disp: 90 tablet, Rfl: 3    hydrocortisone (ANUSOL-HC) 25 mg suppository, Insert 1 suppository (25 mg total) into the rectum daily at bedtime, Disp: 12 suppository, Rfl: 0    losartan (COZAAR) 50 mg tablet, Take 1 tablet (50 mg total) by mouth daily, Disp: 90 tablet, Rfl: 1    oxybutynin (DITROPAN-XL) 5 mg 24 hr tablet, Take 1 tablet (5 mg total) by mouth daily, Disp: 30 tablet, Rfl: 0    pantoprazole (PROTONIX) 40 mg tablet, Take 1 tablet (40 mg total) by mouth 2 (two) times a day 30-60 min before breakfast, Disp: 90 tablet, Rfl: 0    tamsulosin (FLOMAX) 0 4 mg, TAKE 1 CAPSULE BY MOUTH EVERY DAY WITH DINNER, Disp: 30 capsule, Rfl: 10    verapamil (CALAN-SR) 120 mg CR tablet, Take 1 tablet (120 mg total) by mouth daily (Patient taking differently: Take 120 mg by mouth daily at bedtime ), Disp: 90 tablet, Rfl: 1      Active Problems     Patient Active Problem List   Diagnosis    Essential hypertension    Hyperlipidemia    Arm paresthesia, right    Headache    Osteoarthritis of cervical spine    Colon cancer screening    Elevated PSA    Gastroesophageal reflux disease    Hematochezia    Other emphysema ([facility] [address] )   [address] maintenance    Crews's esophagus without dysplasia    Tubular adenoma of colon    Myalgia    Hip pain, bilateral    Osteoarthritis, hip, bilateral    Symptomatic anemia    Iron deficiency anemia due to chronic blood loss    S/P splenectomy    Rectal bleeding    Benign localized prostatic hyperplasia with lower urinary tract symptoms (LUTS)    Person consulting for explanation of examination or test finding         Past Medical History     Past Medical History:   Diagnosis Date    Anemia     Cancer ([address] )     prostate    Colon polyp     Elevated PSA     Full dentures     GERD (gastroesophageal reflux disease)     Hemorrhoid     Hyperlipidemia     Hypertension     Sleep apnea     No CPAP    Smoker     TIA (transient ischemic attack)     Transfusion history     Wears glasses          Surgical History     Past Surgical History:   Procedure Laterality Date    APPENDECTOMY      COLONOSCOPY      EGD      FOOT SURGERY Right     bone removed    OTHER SURGICAL HISTORY      bone removal right arm-abnormal growth of surface bone    PROSTATE BIOPSY      SPLENECTOMY      ruptured         Family History     Family History   Problem Relation Age of Onset    Dementia Father     Heart disease Mother     Hypertension Mother     Hyperlipidemia Mother     Hypertension Sister     Hypertension Brother     No Known Problems Daughter     No Known Problems Son     Cancer Paternal Grandfather         prostate    No Known Problems Son          Social History     Social History     Social History     Tobacco Use   Smoking Status Former Smoker    Packs/day: 0 50    Years: 10 00    Pack years: 5 00    Types: Cigarettes    Quit date: 2021    Years since quittin 4   Smokeless Tobacco Never Used         Pertinent Lab Values     Lab Results   Component Value Date    CREATININE 1 14 2021       Lab Results   Component Value Date    PSA 3 4 2021    PSA 8 9 (H) 2020             Pertinent Imaging       Real-time review of transrectal ultrasound performed today

## 2021-08-09 ENCOUNTER — PROCEDURE VISIT (OUTPATIENT)
Dept: UROLOGY | Facility: CLINIC | Age: 56
End: 2021-08-09
Payer: COMMERCIAL

## 2021-08-09 VITALS
HEIGHT: 72 IN | SYSTOLIC BLOOD PRESSURE: 134 MMHG | DIASTOLIC BLOOD PRESSURE: 68 MMHG | BODY MASS INDEX: 28.44 KG/M2 | HEART RATE: 77 BPM | WEIGHT: 210 LBS

## 2021-08-09 DIAGNOSIS — R97.20 ELEVATED PSA: Primary | ICD-10-CM

## 2021-08-09 DIAGNOSIS — Z71.2 PERSON CONSULTING FOR EXPLANATION OF EXAMINATION OR TEST FINDING: ICD-10-CM

## 2021-08-09 DIAGNOSIS — N40.1 BENIGN LOCALIZED PROSTATIC HYPERPLASIA WITH LOWER URINARY TRACT SYMPTOMS (LUTS): ICD-10-CM

## 2021-08-09 PROCEDURE — 3078F DIAST BP <80 MM HG: CPT | Performed by: UROLOGY

## 2021-08-09 PROCEDURE — 3075F SYST BP GE 130 - 139MM HG: CPT | Performed by: UROLOGY

## 2021-08-09 PROCEDURE — 52000 CYSTOURETHROSCOPY: CPT | Performed by: UROLOGY

## 2021-08-09 PROCEDURE — 1036F TOBACCO NON-USER: CPT | Performed by: UROLOGY

## 2021-08-09 PROCEDURE — 99214 OFFICE O/P EST MOD 30 MIN: CPT | Performed by: UROLOGY

## 2021-08-09 PROCEDURE — 3008F BODY MASS INDEX DOCD: CPT | Performed by: UROLOGY

## 2021-08-09 PROCEDURE — 76872 US TRANSRECTAL: CPT | Performed by: UROLOGY

## 2021-08-09 RX ORDER — GABAPENTIN 100 MG/1
300 CAPSULE ORAL ONCE
Status: CANCELLED | OUTPATIENT
Start: 2021-08-09 | End: 2021-08-09

## 2021-08-09 RX ORDER — ACETAMINOPHEN 325 MG/1
975 TABLET ORAL ONCE
Status: CANCELLED | OUTPATIENT
Start: 2021-08-09 | End: 2021-08-09

## 2021-08-09 NOTE — LETTER
August 9, 2021     Sameer Duggan DO  [address] [place] [id number]    Patient: Kylah Stuart   YOB: 1965   Date of Visit: 8/9/2021       Dear Dr Duval Six: Thank you for referring Kylah Stuart to me for evaluation  Below are my notes for this consultation  If you have questions, please do not hesitate to call me  I look forward to following your patient along with you  Sincerely,        Lewis Drake MD        CC: No Recipients  Lewis Drake MD  8/9/2021  1:30 PM  Sign when Signing Visit  Office Cystoscopy Procedure Note    Indication:     medically refractory lower urinary tract symptoms     Informed consent   The risks, benefits, complications, treatment options, and expected outcomes were discussed with the patient  The patient concurred with the proposed plan and provided informed consent  Anesthesia  Lidocaine jelly 2%    Antibiotic prophylaxis   None    Procedure  The patient was placed in the supineposition, was prepped and draped in the usual manner using sterile technique, and 2% lidocaine jelly instilled into the urethra  A 17 F flexible cystoscope was then inserted into the urethra and the urethra and bladder carefully examined    The following findings were noted:    Findings:  Urethra:  Normal caliber, no stricture  Prostate:   trilobar hypertrophy, circumferential projection of the prostate into the lumen of the bladder is noted  Bladder:   trabeculation is present, no malignant findings  Ureteral orifices:   orthotopic, clear urine exiting  Other findings:   none, retroflexed view confirms    Specimens: None                 Complications:    None; patient tolerated the procedure well           Disposition: To home            Condition: Stable    Plan:  high-grade bladder outlet obstruction, patient would do well to consider a simple prostatectomy in terms of treatment of his bladder outlet       Cystoscopy     Date/Time 8/9/2021 1:30 PM     Performed by  Fran Mckinnon MD     Authorized by Fran Mckinnon MD      Universal Protocol:  Consent: Verbal consent obtained  Written consent obtained  Risks and benefits: risks, benefits and alternatives were discussed  Consent given by: patient  Patient understanding: patient states understanding of the procedure being performed  Patient consent: the patient's understanding of the procedure matches consent given  Procedure consent: procedure consent matches procedure scheduled  Relevant documents: relevant documents present and verified  Test results: test results available and properly labeled  Site marked: the operative site was not marked  Radiology Images displayed and confirmed  If images not available, report reviewed: imaging studies available  Required items: required blood products, implants, devices, and special equipment available  Patient identity confirmed: verbally with patient and provided demographic data        Procedure Details:  Procedure type: cystoscopy    Patient tolerance: Patient tolerated the procedure well with no immediate complications    Additional Procedure Details: Office Cystoscopy Procedure Note    Indication:     medically refractory lower urinary tract symptoms     Informed consent   The risks, benefits, complications, treatment options, and expected outcomes were discussed with the patient  The patient concurred with the proposed plan and provided informed consent  Anesthesia  Lidocaine jelly 2%    Antibiotic prophylaxis   None    Procedure  The patient was placed in the supineposition, was prepped and draped in the usual manner using sterile technique, and 2% lidocaine jelly instilled into the urethra  A 17 F flexible cystoscope was then inserted into the urethra and the urethra and bladder carefully examined    The following findings were noted:    Findings:  Urethra:  Normal caliber, no stricture  Prostate:   trilobar hypertrophy, circumferential projection of the prostate into the lumen of the bladder is noted  Bladder:   trabeculation is present, no malignant findings  Ureteral orifices:   orthotopic, clear urine exiting  Other findings:   none, retroflexed view confirms    Specimens: None                 Complications:    None; patient tolerated the procedure well           Disposition: To home            Condition: Stable    Plan:  high-grade bladder outlet obstruction, patient would do well to consider a simple prostatectomy in terms of treatment of his bladder outlet              Soledad Randolph MD  8/9/2021  1:27 PM  Sign when Signing Visit  Office TRUS    Indication    Medically refractory lower urinary tract symptoms    Informed consent   The risks, benefits and alternatives to TRUS-guided prostate biopsy were conveyed to the patient prior to performing the procedure  Informed consent obtained      Transrectal ultrasonography  The patient was placed in the left lateral decubitus position  After an attentive digital rectal examination, a 7 5 mHz sidefire ultrasound probe was gently inserted into the rectum and biplanar imaging of the prostate was done with the findings noted below  Images were taken of any abnormal findings and also to document prostate size  Bladder  The bladder base appeared normal     Prostate      Ultrasound size measurements:  -Volume:   92 7 cm3    Ultrasound findings:  -Cysts: None  -Masses: None  -Median lobe: present                  Complications  There were no procedural complications  Disposition  The patient was dismissed to home     Post-procedure instructions: Today he underwent an uncomplicated transrectal ultrasound  Showing enlarged prostate causing high-grade bladder outlet obstruction  The prostate measures 92 7 grams  Biopsy prostate     Date/Time 8/9/2021 1:26 PM     Performed by  Soledad Randolph MD     Authorized by Soledad Randolph MD      Universal Protocol   Consent: Verbal consent obtained  Written consent obtained  Risks and benefits: risks, benefits and alternatives were discussed  Consent given by: patient  Patient understanding: patient states understanding of the procedure being performed  Patient consent: the patient's understanding of the procedure matches consent given  Procedure consent: procedure consent matches procedure scheduled  Relevant documents: relevant documents present and verified  Test results: test results available and properly labeled  Site marked: the operative site was not marked  Radiology Images displayed and confirmed  If images not available, report reviewed: imaging studies available  Required items: required blood products, implants, devices, and special equipment available  Patient identity confirmed: verbally with patient and provided demographic data        Local anesthesia used: no     Anesthesia   Local anesthesia used: no     Sedation   Patient sedated: no        Specimen: no    Culture: no   Procedure Details   Procedure Notes: Office TRUS    Indication    Medically refractory lower urinary tract symptoms    Informed consent   The risks, benefits and alternatives to TRUS-guided prostate biopsy were conveyed to the patient prior to performing the procedure  Informed consent obtained      Transrectal ultrasonography  The patient was placed in the left lateral decubitus position  After an attentive digital rectal examination, a 7 5 mHz sidefire ultrasound probe was gently inserted into the rectum and biplanar imaging of the prostate was done with the findings noted below  Images were taken of any abnormal findings and also to document prostate size  Bladder  The bladder base appeared normal     Prostate      Ultrasound size measurements:  -Volume:   92 7 cm3    Ultrasound findings:  -Cysts: None  -Masses: None  -Median lobe: present                  Complications  There were no procedural complications      Disposition  The patient was dismissed to home Post-procedure instructions: Today he underwent an uncomplicated transrectal ultrasound  Showing enlarged prostate causing high-grade bladder outlet obstruction  The prostate measures 92 7 grams  Patient Transportation: confirmed  Patient tolerance: patient tolerated the procedure well with no immediate complications                       Riya Poole MD  8/9/2021  1:25 PM  Sign when Signing Visit       Problem List Items Addressed This Visit        Genitourinary    Benign localized prostatic hyperplasia with lower urinary tract symptoms (LUTS)    Relevant Orders    Case request operating room: PROSTATECTOMY SUPRAPUBIC, robot assisted laparoscopic approach (Completed)       Other    Elevated PSA - Primary    Person consulting for explanation of examination or test finding            Discussion:    Matthew Acuna and I had a productive consultation today  His prostate measures 92 7 grams consistent with some treatment effect from his finasteride  His bladder outlet is highly obstructed with a significant intravesical portion of his prostate circumferentially  The recommended operative therapy would be a robot assisted simple prostatectomy versus open simple prostatectomy  He has previously undergone a splenectomy by way of a paramedian incision on the left hand side  The plan at the time of his operation would be to perform a diagnostic laparoscopy with a 5 millimeter camera through the right upper quadrant, if it appears that a safe lysis of adhesions could be performed then I would plan to do this robotically, if there are multiple adhesions present I would discontinue the laparoscopy and plan for a open suprapubic simple prostatectomy for treatment of a large adenoma  He does understand the risk of bladder neck contracture, urethral stricture, and the chance of continuing to have symptoms, especially of a storage nature    I spoke with him about potentially exploring botulinum toxin injection after his bladder outlet has been treated  The hope is that with opening his bladder outlet his lower urinary symptoms will improve as he is currently failing tri modality therapy with an anticholinergic, alpha-blocker, and 5 alpha reductase inhibitor  The other risks of surgery include infection, bleeding, pain, damage to surrounding structures, need for additional procedures, risk of failure of the procedure, risk of anesthesia, risk of positioning complications including neurapraxia, chronic pain, and paralysis as well as risk of rhabdomyolysis and compartment syndrome  Risk of deep venous thrombosis and venous thromboembolism as well as pneumonia and other potential but unforseeable or unpredictable complications was also discussed with the patient  he does have a history of acute blood-loss anemia/ chronic blood-loss anemia if per rectum, this has been worked up extensively, recent hemoglobin is 16  He will undergo optimization for surgery and see us back for his simple prostatectomy either in a robotic assisted fashion or open fashion upcoming          Assessment and plan:     Please see problem oriented charting for the assessment plan of today's urological complaints      Nicole Serra MD      Chief Complaint     No chief complaint on file  History of Present Illness     Carolyn May is a 63 y o  Gentleman with a history of elevated PSA, he is status post negative biopsy showing active inflammatory changes within the tissue of the prostate sometime ago  Recently his lower urinary tract symptoms have been worsening, he has failed anticholinergic medications, alpha blockers, and follow-up alpha reductase inhibitors  He is interested in surgical correction of his bladder outlet obstruction in hopes that this helps with his urinary symptoms, see discussion above        The following portions of the patient's history were reviewed and updated as appropriate: allergies, current medications, past family history, past medical history, past social history, past surgical history and problem list       Detailed Urologic History     - please refer to HPI    Review of Systems     Review of Systems   Constitutional: Negative  HENT: Negative  Eyes: Negative  Respiratory: Negative  Cardiovascular: Negative  Gastrointestinal: Negative  Endocrine: Negative  Genitourinary: Positive for difficulty urinating, frequency and urgency  Musculoskeletal: Negative  Skin: Negative  Allergic/Immunologic: Negative  Neurological: Negative  Hematological: Negative  Psychiatric/Behavioral: Negative  Allergies     No Known Allergies    Physical Exam     Physical Exam  Vitals reviewed  Constitutional:       General: He is not in acute distress  Appearance: Normal appearance  He is not ill-appearing, toxic-appearing or diaphoretic  HENT:      Head: Normocephalic and atraumatic  Eyes:      General: No scleral icterus  Right eye: No discharge  Left eye: No discharge  Cardiovascular:      Pulses: Normal pulses  Pulmonary:      Effort: Pulmonary effort is normal    Abdominal:      General: There is no distension  Palpations: There is no mass  Tenderness: There is no abdominal tenderness  Hernia: No hernia is present  Genitourinary:     Penis: Normal     Musculoskeletal:         General: No swelling  Neurological:      General: No focal deficit present  Mental Status: He is alert and oriented to person, place, and time  Cranial Nerves: No cranial nerve deficit  Psychiatric:         Mood and Affect: Mood normal          Behavior: Behavior normal          Thought Content:  Thought content normal          Judgment: Judgment normal              Vital Signs  Vitals:    08/09/21 1252   BP: 134/68   BP Location: Left arm   Patient Position: Sitting   Cuff Size: Adult   Pulse: 77   Weight: 95 3 kg (210 lb)   Height: 6' (1 829 m) Current Medications       Current Outpatient Medications:     acetaminophen (TYLENOL) 325 mg tablet, Take 2 tablets (650 mg total) by mouth every 6 (six) hours as needed for mild pain, headaches or fever, Disp: 30 tablet, Rfl: 0    aspirin (ECOTRIN LOW STRENGTH) 81 mg EC tablet, Take 1 tablet (81 mg total) by mouth daily, Disp: 30 tablet, Rfl: 0    atorvastatin (LIPITOR) 20 mg tablet, Take 1 tablet (20 mg total) by mouth daily, Disp: 90 tablet, Rfl: 3    dexamethasone (DECADRON) 4 mg tablet, Take 1 tablet (4 mg total) by mouth daily with breakfast For two days after iron infusion  , Disp: 10 tablet, Rfl: 0    finasteride (PROSCAR) 5 mg tablet, Take 1 tablet (5 mg total) by mouth daily (Patient taking differently: Take 5 mg by mouth daily at bedtime ), Disp: 90 tablet, Rfl: 3    hydrocortisone (ANUSOL-HC) 25 mg suppository, Insert 1 suppository (25 mg total) into the rectum daily at bedtime, Disp: 12 suppository, Rfl: 0    losartan (COZAAR) 50 mg tablet, Take 1 tablet (50 mg total) by mouth daily, Disp: 90 tablet, Rfl: 1    oxybutynin (DITROPAN-XL) 5 mg 24 hr tablet, Take 1 tablet (5 mg total) by mouth daily, Disp: 30 tablet, Rfl: 0    pantoprazole (PROTONIX) 40 mg tablet, Take 1 tablet (40 mg total) by mouth 2 (two) times a day 30-60 min before breakfast, Disp: 90 tablet, Rfl: 0    tamsulosin (FLOMAX) 0 4 mg, TAKE 1 CAPSULE BY MOUTH EVERY DAY WITH DINNER, Disp: 30 capsule, Rfl: 10    verapamil (CALAN-SR) 120 mg CR tablet, Take 1 tablet (120 mg total) by mouth daily (Patient taking differently: Take 120 mg by mouth daily at bedtime ), Disp: 90 tablet, Rfl: 1      Active Problems     Patient Active Problem List   Diagnosis    Essential hypertension    Hyperlipidemia    Arm paresthesia, right    Headache    Osteoarthritis of cervical spine    Colon cancer screening    Elevated PSA    Gastroesophageal reflux disease    Hematochezia    Other emphysema ([address] )    Healthcare maintenance    Crews's esophagus without dysplasia    Tubular adenoma of colon    Myalgia    Hip pain, bilateral    Osteoarthritis, hip, bilateral    Symptomatic anemia    Iron deficiency anemia due to chronic blood loss    S/P splenectomy    Rectal bleeding    Benign localized prostatic hyperplasia with lower urinary tract symptoms (LUTS)   [facility] Person consulting for explanation of examination or test finding         Past Medical History     Past Medical History:   Diagnosis Date    Anemia     Cancer ([address] )     prostate    Colon polyp     Elevated PSA     Full dentures     GERD (gastroesophageal reflux disease)     Hemorrhoid     Hyperlipidemia     Hypertension     Sleep apnea     No CPAP    Smoker     TIA (transient ischemic attack)     Transfusion history     Wears glasses          Surgical History     Past Surgical History:   Procedure Laterality Date    APPENDECTOMY      COLONOSCOPY      EGD      FOOT SURGERY Right     bone removed    OTHER SURGICAL HISTORY      bone removal right arm-abnormal growth of surface bone    PROSTATE BIOPSY      SPLENECTOMY      ruptured         Family History     Family History   Problem Relation Age of Onset    Dementia Father     Heart disease Mother     Hypertension Mother     Hyperlipidemia Mother     Hypertension Sister     Hypertension Brother     No Known Problems Daughter     No Known Problems Son     Cancer Paternal Grandfather         prostate    No Known Problems Son          Social History     Social History     Social History     Tobacco Use   Smoking Status Former Smoker    Packs/day: 0 50    Years: 10 00    Pack years: 5 00    Types: Cigarettes    Quit date: 2021    Years since quittin 4   Smokeless Tobacco Never Used         Pertinent Lab Values     Lab Results   Component Value Date    CREATININE 1 14 2021       Lab Results   Component Value Date    PSA 3 4 2021    PSA 8 9 (H) 2020             Pertinent Imaging Real-time review of transrectal ultrasound performed today

## 2021-08-09 NOTE — PROGRESS NOTES
Office Cystoscopy Procedure Note    Indication:     medically refractory lower urinary tract symptoms     Informed consent   The risks, benefits, complications, treatment options, and expected outcomes were discussed with the patient  The patient concurred with the proposed plan and provided informed consent  Anesthesia  Lidocaine jelly 2%    Antibiotic prophylaxis   None    Procedure  The patient was placed in the supineposition, was prepped and draped in the usual manner using sterile technique, and 2% lidocaine jelly instilled into the urethra  A 17 F flexible cystoscope was then inserted into the urethra and the urethra and bladder carefully examined  The following findings were noted:    Findings:  Urethra:  Normal caliber, no stricture  Prostate:   trilobar hypertrophy, circumferential projection of the prostate into the lumen of the bladder is noted  Bladder:   trabeculation is present, no malignant findings  Ureteral orifices:   orthotopic, clear urine exiting  Other findings:   none, retroflexed view confirms    Specimens: None                 Complications:    None; patient tolerated the procedure well           Disposition: To home            Condition: Stable    Plan:  high-grade bladder outlet obstruction, patient would do well to consider a simple prostatectomy in terms of treatment of his bladder outlet       Cystoscopy     Date/Time 8/9/2021 1:30 PM     Performed by  Shaina Hinkle MD     Authorized by Shaina Hinlke MD      Universal Protocol:  Consent: Verbal consent obtained  Written consent obtained    Risks and benefits: risks, benefits and alternatives were discussed  Consent given by: patient  Patient understanding: patient states understanding of the procedure being performed  Patient consent: the patient's understanding of the procedure matches consent given  Procedure consent: procedure consent matches procedure scheduled  Relevant documents: relevant documents present and verified  Test results: test results available and properly labeled  Site marked: the operative site was not marked  Radiology Images displayed and confirmed  If images not available, report reviewed: imaging studies available  Required items: required blood products, implants, devices, and special equipment available  Patient identity confirmed: verbally with patient and provided demographic data        Procedure Details:  Procedure type: cystoscopy    Patient tolerance: Patient tolerated the procedure well with no immediate complications    Additional Procedure Details: Office Cystoscopy Procedure Note    Indication:     medically refractory lower urinary tract symptoms     Informed consent   The risks, benefits, complications, treatment options, and expected outcomes were discussed with the patient  The patient concurred with the proposed plan and provided informed consent  Anesthesia  Lidocaine jelly 2%    Antibiotic prophylaxis   None    Procedure  The patient was placed in the supineposition, was prepped and draped in the usual manner using sterile technique, and 2% lidocaine jelly instilled into the urethra  A 17 F flexible cystoscope was then inserted into the urethra and the urethra and bladder carefully examined    The following findings were noted:    Findings:  Urethra:  Normal caliber, no stricture  Prostate:   trilobar hypertrophy, circumferential projection of the prostate into the lumen of the bladder is noted  Bladder:   trabeculation is present, no malignant findings  Ureteral orifices:   orthotopic, clear urine exiting  Other findings:   none, retroflexed view confirms    Specimens: None                 Complications:    None; patient tolerated the procedure well           Disposition: To home            Condition: Stable    Plan:  high-grade bladder outlet obstruction, patient would do well to consider a simple prostatectomy in terms of treatment of his bladder outlet

## 2021-08-09 NOTE — PROGRESS NOTES
Office TRUS    Indication    Medically refractory lower urinary tract symptoms    Informed consent   The risks, benefits and alternatives to TRUS-guided prostate biopsy were conveyed to the patient prior to performing the procedure  Informed consent obtained      Transrectal ultrasonography  The patient was placed in the left lateral decubitus position  After an attentive digital rectal examination, a 7 5 mHz sidefire ultrasound probe was gently inserted into the rectum and biplanar imaging of the prostate was done with the findings noted below  Images were taken of any abnormal findings and also to document prostate size  Bladder  The bladder base appeared normal     Prostate      Ultrasound size measurements:  -Volume:   92 7 cm3    Ultrasound findings:  -Cysts: None  -Masses: None  -Median lobe: present                  Complications  There were no procedural complications  Disposition  The patient was dismissed to home     Post-procedure instructions: Today he underwent an uncomplicated transrectal ultrasound  Showing enlarged prostate causing high-grade bladder outlet obstruction  The prostate measures 92 7 grams  Biopsy prostate     Date/Time 8/9/2021 1:26 PM     Performed by  Kelle Joyner MD     Authorized by Kelle Joyner MD      Universal Protocol   Consent: Verbal consent obtained  Written consent obtained  Risks and benefits: risks, benefits and alternatives were discussed  Consent given by: patient  Patient understanding: patient states understanding of the procedure being performed  Patient consent: the patient's understanding of the procedure matches consent given  Procedure consent: procedure consent matches procedure scheduled  Relevant documents: relevant documents present and verified  Test results: test results available and properly labeled  Site marked: the operative site was not marked  Radiology Images displayed and confirmed   If images not available, report reviewed: imaging studies available  Required items: required blood products, implants, devices, and special equipment available  Patient identity confirmed: verbally with patient and provided demographic data        Local anesthesia used: no     Anesthesia   Local anesthesia used: no     Sedation   Patient sedated: no        Specimen: no    Culture: no   Procedure Details   Procedure Notes: Office TRUS    Indication    Medically refractory lower urinary tract symptoms    Informed consent   The risks, benefits and alternatives to TRUS-guided prostate biopsy were conveyed to the patient prior to performing the procedure  Informed consent obtained      Transrectal ultrasonography  The patient was placed in the left lateral decubitus position  After an attentive digital rectal examination, a 7 5 mHz sidefire ultrasound probe was gently inserted into the rectum and biplanar imaging of the prostate was done with the findings noted below  Images were taken of any abnormal findings and also to document prostate size  Bladder  The bladder base appeared normal     Prostate      Ultrasound size measurements:  -Volume:   92 7 cm3    Ultrasound findings:  -Cysts: None  -Masses: None  -Median lobe: present                  Complications  There were no procedural complications  Disposition  The patient was dismissed to home     Post-procedure instructions: Today he underwent an uncomplicated transrectal ultrasound  Showing enlarged prostate causing high-grade bladder outlet obstruction  The prostate measures 92 7 grams    Patient Transportation: confirmed  Patient tolerance: patient tolerated the procedure well with no immediate complications

## 2021-08-13 DIAGNOSIS — R51.9 HEADACHE: ICD-10-CM

## 2021-08-13 NOTE — TELEPHONE ENCOUNTER
Requested medication(s) are due for refill today: Yes  Patient has already received a courtesy refill: No  Other reason request has been forwarded to provider:   Patient Taking Differently

## 2021-08-14 DIAGNOSIS — K22.70 BARRETT'S ESOPHAGUS WITHOUT DYSPLASIA: ICD-10-CM

## 2021-08-15 DIAGNOSIS — R39.15 URINARY URGENCY: ICD-10-CM

## 2021-08-15 DIAGNOSIS — N40.1 BENIGN PROSTATIC HYPERPLASIA WITH LOWER URINARY TRACT SYMPTOMS, SYMPTOM DETAILS UNSPECIFIED: ICD-10-CM

## 2021-08-16 RX ORDER — OXYBUTYNIN CHLORIDE 5 MG/1
TABLET, EXTENDED RELEASE ORAL
Qty: 30 TABLET | Refills: 0 | Status: SHIPPED | OUTPATIENT
Start: 2021-08-16 | End: 2021-09-07

## 2021-08-16 RX ORDER — PANTOPRAZOLE SODIUM 40 MG/1
40 TABLET, DELAYED RELEASE ORAL 2 TIMES DAILY
Qty: 90 TABLET | Refills: 0 | Status: SHIPPED | OUTPATIENT
Start: 2021-08-16 | End: 2021-11-24

## 2021-08-17 ENCOUNTER — TELEPHONE (OUTPATIENT)
Dept: UROLOGY | Facility: CLINIC | Age: 56
End: 2021-08-17

## 2021-08-18 NOTE — TELEPHONE ENCOUNTER
LM advising patient that I am currently pending date for his procedure and once I have confirmation I will call to proceed w scheduling  Asked to pls cb if he has questions

## 2021-08-19 ENCOUNTER — PREP FOR PROCEDURE (OUTPATIENT)
Dept: UROLOGY | Facility: CLINIC | Age: 56
End: 2021-08-19

## 2021-08-19 DIAGNOSIS — N40.1 BENIGN PROSTATIC HYPERPLASIA WITH LOWER URINARY TRACT SYMPTOMS, SYMPTOM DETAILS UNSPECIFIED: Primary | ICD-10-CM

## 2021-08-27 ENCOUNTER — APPOINTMENT (OUTPATIENT)
Dept: LAB | Facility: HOSPITAL | Age: 56
End: 2021-08-27
Payer: COMMERCIAL

## 2021-08-27 DIAGNOSIS — E61.1 IRON DEFICIENCY: ICD-10-CM

## 2021-08-27 LAB
FERRITIN SERPL-MCNC: 155 NG/ML (ref 8–388)
IRON SATN MFR SERPL: 33 %
IRON SERPL-MCNC: 107 UG/DL (ref 65–175)
TIBC SERPL-MCNC: 324 UG/DL (ref 250–450)

## 2021-08-27 PROCEDURE — 83540 ASSAY OF IRON: CPT

## 2021-08-27 PROCEDURE — 82728 ASSAY OF FERRITIN: CPT

## 2021-08-27 PROCEDURE — 83550 IRON BINDING TEST: CPT

## 2021-08-28 ENCOUNTER — APPOINTMENT (OUTPATIENT)
Dept: FAMILY MEDICINE CLINIC | Facility: HOSPITAL | Age: 56
End: 2021-08-28

## 2021-09-02 ENCOUNTER — OFFICE VISIT (OUTPATIENT)
Dept: HEMATOLOGY ONCOLOGY | Facility: MEDICAL CENTER | Age: 56
End: 2021-09-02
Payer: COMMERCIAL

## 2021-09-02 VITALS
OXYGEN SATURATION: 99 % | BODY MASS INDEX: 28.31 KG/M2 | HEIGHT: 72 IN | RESPIRATION RATE: 16 BRPM | DIASTOLIC BLOOD PRESSURE: 100 MMHG | WEIGHT: 209 LBS | HEART RATE: 78 BPM | TEMPERATURE: 97.1 F | SYSTOLIC BLOOD PRESSURE: 148 MMHG

## 2021-09-02 DIAGNOSIS — R55 SYNCOPE, NEAR: ICD-10-CM

## 2021-09-02 DIAGNOSIS — E61.1 IRON DEFICIENCY: ICD-10-CM

## 2021-09-02 DIAGNOSIS — D50.0 CHRONIC BLOOD LOSS ANEMIA: Primary | ICD-10-CM

## 2021-09-02 PROCEDURE — 99215 OFFICE O/P EST HI 40 MIN: CPT | Performed by: PHYSICIAN ASSISTANT

## 2021-09-02 PROCEDURE — 3008F BODY MASS INDEX DOCD: CPT | Performed by: UROLOGY

## 2021-09-02 NOTE — PROGRESS NOTES
[facility] [address] [address]  Hematology Ambulatory Follow-Up  Johnathan Clancy, 1965, [id number]  9/2/2021    Assessment/Plan:    1  Chronic blood loss anemia; 2  Iron deficiency  Stable  Symptomatically patient is feeling well  He notes having occasional lightheaded episodes that this could be related to other factors as hemoglobin has remained robust over the past three months  Monthly blood work is no longer indicated  Patient will follow-up with me in approximately two months prior to surgery prostatectomy  We re-discussed signs and symptoms of progressive anemia and blood-loss anemia  Patient understands to monitor stools for signs of occult blood loss which would include melena and hematochezia  Patient was also told to contact the office if lightheadedness becomes were chronic     - Iron Panel (Includes Ferritin, Iron Sat%, Iron, and TIBC); Future  - CBC and differential; Future    3  Near syncope  Patient's quantity of syncopal or near syncopal events has reduced significantly  Patient states that he has had a total of five since he was last seen  Etiology is not related to anemia since hemoglobin is robust   I have asked him to follow-up with his primary care doctor  Cardiac workup was completed in April without significant findings  The patient is scheduled for follow-up in approximately 2 5 months  Patient voiced agreement and understanding to the above  Patient knows to call the Hematology/Oncology office with any questions and concerns regarding the above  Barrier(s) to care: None  The patient is able to self care   ------------------------------------------------------------------------------------------------------    Chief Complaint   Patient presents with    Follow-up     no concerns at the moment  History of present illness:  This is a 69-year-old male who was admitted to the emergency room after having several weeks of shortness of breath, three episodes of syncope at home in January 2021   Patient's past medical history significant for 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      Patient had been previously worked up for abnormalities by primary care doctor who recommended that the patient follow-up with GI as well as with Cardiology for a stress test   Unfortunately, patient's symptoms progressed and he presented to the emergency room  [employer] work demonstrated hemoglobin in the 7 gram/deciliter range   Moreover, the patient was found to have a significant iron deficiency with a ferritin of three and an iron saturation of 1%   Patient underwent three Venofer infusions daily in the hospital   Patient's hemoglobin is in the 7 gram/deciliter range        GI work up in 1/2021-2/2021:  GI has seen the patient in the hospital  Alivia Butts underwent colonoscopy in EGD that demonstrated questionable AVM in the small intestine   This area was cauterized   This area was not bleeding   Capsule demonstrated angiectasis and follow up EGD- is planned on 2/26/2021   This procedure did not demonstrate any signs of active bleeding      Patient completed the remainder of Venofer treatments started in the hospital in February 2021      Interval history:    03/23/21:    Patient notes overall feeling well  [employer] work was reviewed   Patient's iron saturation increased to 7% and ferritin at 41   Hemoglobin has completely supplemented however, MCV is still low   Patient's platelet count is still elevated however this may be secondary to splenectomy and not necessarily to iron deficiency    Patient has completed COVID vaccinations      3/30/21:   Ferritin= 23, iron saturation = seven, TIBC 430,  Hemoglobin = 12 4, MCV 80,  Platelet count = 755,  PSA = 3 4    4/9-5/7/21:  5 Venofer 300 mg IV weekly      05/03/21:  Urgent follow up; [facility] on 5/1/2021, ED visit, hemoglobin stable   No bleeding today  1 more IV iron treatment to go  Iron studies requestion on labs from 5/1/21:iron sat supratheraputic       5/12/21:  Colonoscopy was negative for signs of active bleeding  Hemorrhoidal bleeding to blame for bright red blood per rectum  Scope was advanced past the descending colon due to patient's  CT colonoscopy diagnostic without contrast did not demonstrate any lesions      06/03/21:  Patient is feeling better  No more GI bleeding  Hemoglobin = 15 1, platelet count 419 oral seven, MCV = 86, ferritin = 452, iron saturation = 30%, TIBC = 336    8/27/21:    Labs taken every four weeks demonstrate stability  No GI bleeding  Patient feels well  Hemoglobin = 15 1, platelet count = 704, MCV = 93,  Iron saturation = 33, TIBC 324,  Ferritin = 155    Review of Systems   Constitutional: Positive for fatigue (working 13 hours stents)  Gastrointestinal: Negative for blood in stool  Neurological: Positive for light-headedness  All other systems reviewed and are negative        Patient Active Problem List   Diagnosis    Essential hypertension    Hyperlipidemia    Arm paresthesia, right    Headache    Osteoarthritis of cervical spine    Colon cancer screening    Elevated PSA    Gastroesophageal reflux disease    Hematochezia    [facility]    Healthcare maintenance    Crews's esophagus without dysplasia    Tubular adenoma of colon    Myalgia    Hip pain, bilateral    Osteoarthritis, hip, bilateral    Symptomatic anemia    Iron deficiency anemia due to chronic blood loss    S/P splenectomy    Rectal bleeding    Benign localized prostatic hyperplasia with lower urinary tract symptoms (LUTS)    Person consulting for explanation of examination or test finding       Past Medical History:   Diagnosis Date    Anemia     Cancer ([facility]     prostate    Colon polyp     Elevated PSA     Full dentures     GERD (gastroesophageal reflux disease)     Hemorrhoid     Hyperlipidemia     Sleep apnea     No CPAP    Smoker     TIA (transient ischemic attack)     Transfusion history     Wears glasses        Past Surgical History:   Procedure Laterality Date    APPENDECTOMY      COLONOSCOPY      EGD      FOOT SURGERY Right     bone removed    OTHER SURGICAL HISTORY      bone removal right arm-abnormal growth of surface bone    PROSTATE BIOPSY      SPLENECTOMY      ruptured       Family History   Problem Relation Age of Onset    Dementia Father     Heart disease Mother     Hypertension Mother     Hyperlipidemia Mother     Hypertension Sister     Hypertension Brother     No Known Problems Daughter     No Known Problems Son     Cancer Paternal Grandfather         prostate    No Known Problems Son        Social History     Socioeconomic History    Marital status: Legally      Spouse name: None    Number of children: None    Years of education: None    Highest education level: None   Occupational History    Occupation: supervisor     Employer: [employer]   Tobacco Use    Smoking status: Former Smoker     Packs/day: 0 50     Years: 10 00     Pack years: 5 00     Types: Cigarettes     Quit date: 2021     Years since quittin 5    Smokeless tobacco: Never Used   Vaping Use    Vaping Use: Never used   Substance and Sexual Activity    Alcohol use: Not Currently    Drug use: No    Sexual activity: None   Other Topics Concern    None   Social History Narrative    None     Social Determinants of Health     Financial Resource Strain:     Difficulty of Paying Living Expenses:    Food Insecurity:     Worried About Running Out of Food in the Last Year:     Ran Out of Food in the Last Year:    Transportation Needs:     Lack of Transportation (Medical):      Lack of Transportation (Non-Medical):    Physical Activity:     Days of Exercise per Week:     Minutes of Exercise per Session:    Stress:     Feeling of Stress :    Social Connections:     Frequency of Communication with Friends and Family:     Frequency of Social Gatherings with Friends and Family:     Attends Christian Services:     Active Member of Clubs or Organizations:     Attends Club or Organization Meetings:     Marital Status:    Intimate Partner Violence:     Fear of Current or Ex-Partner:     Emotionally Abused:     Physically Abused:     Sexually Abused:          Current Outpatient Medications:     acetaminophen (TYLENOL) 325 mg tablet, Take 2 tablets (650 mg total) by mouth every 6 (six) hours as needed for mild pain, headaches or fever, Disp: 30 tablet, Rfl: 0    aspirin (ECOTRIN LOW STRENGTH) 81 mg EC tablet, Take 1 tablet (81 mg total) by mouth daily, Disp: 30 tablet, Rfl: 0    atorvastatin (LIPITOR) 20 mg tablet, Take 1 tablet (20 mg total) by mouth daily, Disp: 90 tablet, Rfl: 3    dexamethasone (DECADRON) 4 mg tablet, Take 1 tablet (4 mg total) by mouth daily with breakfast For two days after iron infusion  , Disp: 10 tablet, Rfl: 0    finasteride (PROSCAR) 5 mg tablet, Take 1 tablet (5 mg total) by mouth daily (Patient taking differently: Take 5 mg by mouth daily at bedtime ), Disp: 90 tablet, Rfl: 3    hydrocortisone (ANUSOL-HC) 25 mg suppository, Insert 1 suppository (25 mg total) into the rectum daily at bedtime, Disp: 12 suppository, Rfl: 0    losartan (COZAAR) 50 mg tablet, Take 1 tablet (50 mg total) by mouth daily, Disp: 90 tablet, Rfl: 1    oxybutynin (DITROPAN-XL) 5 mg 24 hr tablet, TAKE 1 TABLET BY MOUTH EVERY DAY, Disp: 30 tablet, Rfl: 0    pantoprazole (PROTONIX) 40 mg tablet, Take 1 tablet (40 mg total) by mouth 2 (two) times a day 30-60 min before breakfast, Disp: 90 tablet, Rfl: 0    tamsulosin (FLOMAX) 0 4 mg, TAKE 1 CAPSULE BY MOUTH EVERY DAY WITH DINNER, Disp: 30 capsule, Rfl: 10    verapamil (CALAN-SR) 120 mg CR tablet, TAKE 1 TABLET BY MOUTH EVERY DAY, Disp: 90 tablet, Rfl: 1    No Known Allergies    Objective:  /100 (BP Location: Left arm, Patient Position: Sitting, Cuff Size: Adult)   Pulse 78   Temp (!) 97 1 °F (36 2 °C)   Resp 16   Ht 6' (1 829 m)   Wt 94 8 kg (209 lb)   SpO2 99%   BMI 28 35 kg/m²    Physical Exam  Constitutional:       General: He is not in acute distress  Appearance: He is well-developed  HENT:      Head: Normocephalic and atraumatic  Right Ear: External ear normal       Left Ear: External ear normal       Nose: Nose normal    Eyes:      General: No scleral icterus  Conjunctiva/sclera: Conjunctivae normal    Cardiovascular:      Rate and Rhythm: Normal rate  Pulmonary:      Effort: No respiratory distress  Abdominal:      General: There is no distension  Palpations: Abdomen is soft  Skin:     Findings: No rash (on exposed skin  )  Neurological:      Mental Status: He is alert and oriented to person, place, and time  Psychiatric:         Thought Content: Thought content normal          Result Review  Labs:  Ferritin   Date Value Ref Range Status   08/27/2021 155 8 - 388 ng/mL Final   05/28/2021 452 (H) 8 - 388 ng/mL Final   05/01/2021 319 8 - 388 ng/mL Final     Hemoglobin   Date Value Ref Range Status   08/27/2021 15 1 12 0 - 17 0 g/dL Final   07/26/2021 16 6 12 0 - 17 0 g/dL Final   06/16/2021 15 7 12 0 - 17 0 g/dL Final   06/16/2021 15 6 12 0 - 17 0 g/dL Final   05/28/2021 15 1 12 0 - 17 0 g/dL Final   05/21/2021 14 4 12 0 - 17 0 g/dL Final     No visits with results within 4 Week(s) from this visit  Latest known visit with results is:    Ancillary Orders on 07/23/2021   Component Date Value Ref Range Status    WBC 07/26/2021 8 01  4 31 - 10 16 Thousand/uL Final    RBC 07/26/2021 5 66* 3 88 - 5 62 Million/uL Final    Hemoglobin 07/26/2021 16 6  12 0 - 17 0 g/dL Final    Hematocrit 07/26/2021 51 9* 36 5 - 49 3 % Final    MCV 07/26/2021 92  82 - 98 fL Final    MCH 07/26/2021 29 3  26 8 - 34 3 pg Final    MCHC 07/26/2021 32 0  31 4 - 37 4 g/dL Final    RDW 07/26/2021 18 5* 11 6 - 15 1 % Final    MPV 07/26/2021 9 8  8 9 - 12 7 fL Final    Platelets 90/36/8919 414* 149 - 390 Thousands/uL Final    nRBC 07/26/2021 0  /100 WBCs Final    Neutrophils Relative 07/26/2021 39* 43 - 75 % Final    Immat GRANS % 07/26/2021 1  0 - 2 % Final    Lymphocytes Relative 07/26/2021 36  14 - 44 % Final    Monocytes Relative 07/26/2021 12  4 - 12 % Final    Eosinophils Relative 07/26/2021 10* 0 - 6 % Final    Basophils Relative 07/26/2021 2* 0 - 1 % Final    Neutrophils Absolute 07/26/2021 3 19  1 85 - 7 62 Thousands/µL Final    Immature Grans Absolute 07/26/2021 0 05  0 00 - 0 20 Thousand/uL Final    Lymphocytes Absolute 07/26/2021 2 89  0 60 - 4 47 Thousands/µL Final    Monocytes Absolute 07/26/2021 0 95  0 17 - 1 22 Thousand/µL Final    Eosinophils Absolute 07/26/2021 0 80* 0 00 - 0 61 Thousand/µL Final    Basophils Absolute 07/26/2021 0 13* 0 00 - 0 10 Thousands/µL Final     Please note: This report has been generated by a voice recognition software system  Therefore there may be syntax, spelling, and/or grammatical errors  Please call if you have any questions

## 2021-09-05 DIAGNOSIS — R39.15 URINARY URGENCY: ICD-10-CM

## 2021-09-05 DIAGNOSIS — N40.1 BENIGN PROSTATIC HYPERPLASIA WITH LOWER URINARY TRACT SYMPTOMS, SYMPTOM DETAILS UNSPECIFIED: ICD-10-CM

## 2021-09-07 RX ORDER — OXYBUTYNIN CHLORIDE 5 MG/1
TABLET, EXTENDED RELEASE ORAL
Qty: 30 TABLET | Refills: 0 | Status: SHIPPED | OUTPATIENT
Start: 2021-09-07 | End: 2021-10-01

## 2021-09-07 NOTE — TELEPHONE ENCOUNTER
Patient RS to 11/17 at [facility] AND [facility] w Dr Lise Quinteros  He will go for PATs on 9/1 and see PCP on 9/9

## 2021-09-21 ENCOUNTER — OFFICE VISIT (OUTPATIENT)
Dept: FAMILY MEDICINE CLINIC | Facility: CLINIC | Age: 56
End: 2021-09-21
Payer: COMMERCIAL

## 2021-09-21 VITALS
SYSTOLIC BLOOD PRESSURE: 116 MMHG | WEIGHT: 210 LBS | DIASTOLIC BLOOD PRESSURE: 80 MMHG | OXYGEN SATURATION: 98 % | HEIGHT: 72 IN | HEART RATE: 75 BPM | TEMPERATURE: 97.5 F | BODY MASS INDEX: 28.44 KG/M2 | RESPIRATION RATE: 16 BRPM

## 2021-09-21 DIAGNOSIS — E78.2 MIXED HYPERLIPIDEMIA: ICD-10-CM

## 2021-09-21 DIAGNOSIS — I10 ESSENTIAL HYPERTENSION: Primary | ICD-10-CM

## 2021-09-21 DIAGNOSIS — Z23 IMMUNIZATION DUE: ICD-10-CM

## 2021-09-21 DIAGNOSIS — E66.3 OVERWEIGHT (BMI 25.0-29.9): ICD-10-CM

## 2021-09-21 DIAGNOSIS — D50.0 IRON DEFICIENCY ANEMIA DUE TO CHRONIC BLOOD LOSS: ICD-10-CM

## 2021-09-21 DIAGNOSIS — N40.1 BENIGN LOCALIZED PROSTATIC HYPERPLASIA WITH LOWER URINARY TRACT SYMPTOMS (LUTS): ICD-10-CM

## 2021-09-21 PROBLEM — K62.5 RECTAL BLEEDING: Status: RESOLVED | Noted: 2021-05-04 | Resolved: 2021-09-21

## 2021-09-21 PROBLEM — Z71.2 PERSON CONSULTING FOR EXPLANATION OF EXAMINATION OR TEST FINDING: Status: RESOLVED | Noted: 2021-08-09 | Resolved: 2021-09-21

## 2021-09-21 PROCEDURE — 3074F SYST BP LT 130 MM HG: CPT | Performed by: FAMILY MEDICINE

## 2021-09-21 PROCEDURE — 90682 RIV4 VACC RECOMBINANT DNA IM: CPT

## 2021-09-21 PROCEDURE — 1036F TOBACCO NON-USER: CPT | Performed by: FAMILY MEDICINE

## 2021-09-21 PROCEDURE — 90471 IMMUNIZATION ADMIN: CPT

## 2021-09-21 PROCEDURE — 99214 OFFICE O/P EST MOD 30 MIN: CPT | Performed by: FAMILY MEDICINE

## 2021-09-21 PROCEDURE — 3008F BODY MASS INDEX DOCD: CPT | Performed by: FAMILY MEDICINE

## 2021-09-21 PROCEDURE — 3079F DIAST BP 80-89 MM HG: CPT | Performed by: FAMILY MEDICINE

## 2021-09-21 RX ORDER — LOSARTAN POTASSIUM 50 MG/1
50 TABLET ORAL DAILY
Qty: 90 TABLET | Refills: 1 | Status: SHIPPED | OUTPATIENT
Start: 2021-09-21 | End: 2022-03-30

## 2021-09-21 NOTE — PROGRESS NOTES
Assessment/Plan:    No problem-specific Assessment & Plan notes found for this encounter     htn stable  preop pending for prostatectomy for obstructive bph  HLD stable, tolerates statin, risk reduction advised  Labs as scheduled  Flu shot today  bmi aware     Diagnoses and all orders for this visit:    Essential hypertension  -     losartan (COZAAR) 50 mg tablet; Take 1 tablet (50 mg total) by mouth daily    Mixed hyperlipidemia    Immunization due  -     influenza vaccine, quadrivalent, recombinant, PF, 0 5 mL, for patients 18 yr+ (FLUBLOK)    BMI 28 0-28 9,adult    Benign localized prostatic hyperplasia with lower urinary tract symptoms (LUTS)    Iron deficiency anemia due to chronic blood loss    Overweight (BMI 25 0-29  9)        No follow-ups on file  Subjective:      Patient ID: Gavin Mccoy is a 61 y o  male  Chief Complaint   Patient presents with    Follow-up     3 month f/[id number]-wmcma       HPI  VINICIO  Sees oncology  Iron infusions at times    Having prostatectomy in November  urologist  Dr Jairo Camilo    Taking bp meds  No edema or constipation on meds    The following portions of the patient's history were reviewed and updated as appropriate: allergies, current medications, past family history, past medical history, past social history, past surgical history and problem list     Review of Systems   Constitutional: Negative for fever  Respiratory: Negative for shortness of breath            Current Outpatient Medications   Medication Sig Dispense Refill    acetaminophen (TYLENOL) 325 mg tablet Take 2 tablets (650 mg total) by mouth every 6 (six) hours as needed for mild pain, headaches or fever 30 tablet 0    aspirin (ECOTRIN LOW STRENGTH) 81 mg EC tablet Take 1 tablet (81 mg total) by mouth daily 30 tablet 0    atorvastatin (LIPITOR) 20 mg tablet Take 1 tablet (20 mg total) by mouth daily 90 tablet 3    dexamethasone (DECADRON) 4 mg tablet Take 1 tablet (4 mg total) by mouth daily with breakfast For two days after iron infusion  10 tablet 0    finasteride (PROSCAR) 5 mg tablet Take 1 tablet (5 mg total) by mouth daily (Patient taking differently: Take 5 mg by mouth daily at bedtime ) 90 tablet 3    hydrocortisone (ANUSOL-HC) 25 mg suppository Insert 1 suppository (25 mg total) into the rectum daily at bedtime 12 suppository 0    losartan (COZAAR) 50 mg tablet Take 1 tablet (50 mg total) by mouth daily 90 tablet 1    oxybutynin (DITROPAN-XL) 5 mg 24 hr tablet TAKE 1 TABLET BY MOUTH EVERY DAY 30 tablet 0    pantoprazole (PROTONIX) 40 mg tablet Take 1 tablet (40 mg total) by mouth 2 (two) times a day 30-60 min before breakfast 90 tablet 0    tamsulosin (FLOMAX) 0 4 mg TAKE 1 CAPSULE BY MOUTH EVERY DAY WITH DINNER 30 capsule 10    verapamil (CALAN-SR) 120 mg CR tablet TAKE 1 TABLET BY MOUTH EVERY DAY 90 tablet 1     No current facility-administered medications for this visit  Objective:    /80   Pulse 75   Temp 97 5 °F (36 4 °C)   Resp 16   Ht 6' (1 829 m)   Wt 95 3 kg (210 lb)   SpO2 98%   BMI 28 48 kg/m²        Physical Exam  Vitals and nursing note reviewed  Constitutional:       General: He is not in acute distress  Appearance: He is well-developed  He is not ill-appearing  HENT:      Head: Normocephalic  Right Ear: Tympanic membrane normal       Left Ear: Tympanic membrane normal    Eyes:      General: No scleral icterus  Conjunctiva/sclera: Conjunctivae normal    Cardiovascular:      Rate and Rhythm: Normal rate and regular rhythm  Heart sounds: No murmur heard  Pulmonary:      Effort: Pulmonary effort is normal  No respiratory distress  Breath sounds: No wheezing or rales  Abdominal:      General: There is no distension  Palpations: Abdomen is soft  Tenderness: There is no abdominal tenderness  Musculoskeletal:         General: No deformity  Cervical back: Neck supple  Right lower leg: No edema  Left lower leg: No edema  Skin:     General: Skin is warm and dry  Coloration: Skin is not jaundiced or pale  Neurological:      Mental Status: He is alert  Motor: No weakness  Gait: Gait normal    Psychiatric:         Mood and Affect: Mood normal          Behavior: Behavior normal          Thought Content: Thought content normal        BMI Counseling: Body mass index is 28 48 kg/m²  The BMI is above normal  Nutrition recommendations include decreasing portion sizes and moderation in carbohydrate intake  Exercise recommendations include exercising 3-5 times per week  No pharmacotherapy was ordered  Rationale for BMI follow-up plan is due to patient being overweight or obese                Christin Jason DO

## 2021-10-01 DIAGNOSIS — R39.15 URINARY URGENCY: ICD-10-CM

## 2021-10-01 DIAGNOSIS — N40.1 BENIGN PROSTATIC HYPERPLASIA WITH LOWER URINARY TRACT SYMPTOMS, SYMPTOM DETAILS UNSPECIFIED: ICD-10-CM

## 2021-10-01 RX ORDER — OXYBUTYNIN CHLORIDE 5 MG/1
TABLET, EXTENDED RELEASE ORAL
Qty: 30 TABLET | Refills: 0 | Status: SHIPPED | OUTPATIENT
Start: 2021-10-01 | End: 2021-10-27

## 2021-10-11 DIAGNOSIS — N40.1 BENIGN PROSTATIC HYPERPLASIA WITH LOWER URINARY TRACT SYMPTOMS, SYMPTOM DETAILS UNSPECIFIED: ICD-10-CM

## 2021-10-11 RX ORDER — FINASTERIDE 5 MG/1
5 TABLET, FILM COATED ORAL DAILY
Qty: 90 TABLET | Refills: 0 | Status: SHIPPED | OUTPATIENT
Start: 2021-10-11 | End: 2021-11-21 | Stop reason: HOSPADM

## 2021-10-20 ENCOUNTER — APPOINTMENT (EMERGENCY)
Dept: RADIOLOGY | Facility: HOSPITAL | Age: 56
End: 2021-10-20
Payer: COMMERCIAL

## 2021-10-20 ENCOUNTER — HOSPITAL ENCOUNTER (EMERGENCY)
Facility: HOSPITAL | Age: 56
Discharge: HOME | End: 2021-10-20
Attending: EMERGENCY MEDICINE | Admitting: EMERGENCY MEDICINE
Payer: COMMERCIAL

## 2021-10-20 VITALS
OXYGEN SATURATION: 99 % | HEART RATE: 64 BPM | WEIGHT: 211 LBS | SYSTOLIC BLOOD PRESSURE: 128 MMHG | DIASTOLIC BLOOD PRESSURE: 85 MMHG | RESPIRATION RATE: 18 BRPM | BODY MASS INDEX: 28.62 KG/M2 | TEMPERATURE: 98 F

## 2021-10-20 DIAGNOSIS — M54.12 CERVICAL RADICULOPATHY: Primary | ICD-10-CM

## 2021-10-20 DIAGNOSIS — R91.1 PULMONARY NODULE: ICD-10-CM

## 2021-10-20 DIAGNOSIS — M25.512 LEFT SHOULDER PAIN: ICD-10-CM

## 2021-10-20 LAB
ALBUMIN SERPL BCP-MCNC: 3.9 G/DL (ref 3.5–5)
ALP SERPL-CCNC: 107 U/L (ref 46–116)
ALT SERPL W P-5'-P-CCNC: 54 U/L (ref 12–78)
ANION GAP SERPL CALCULATED.3IONS-SCNC: 12 MMOL/L (ref 4–13)
AST SERPL W P-5'-P-CCNC: 25 U/L (ref 5–45)
ATRIAL RATE: 79 BPM
BASOPHILS # BLD AUTO: 0.17 THOUSANDS/ΜL (ref 0–0.1)
BASOPHILS NFR BLD AUTO: 1 % (ref 0–1)
BILIRUB SERPL-MCNC: 0.27 MG/DL (ref 0.2–1)
BILIRUB UR QL STRIP: NEGATIVE
BUN SERPL-MCNC: 9 MG/DL (ref 5–25)
CALCIUM SERPL-MCNC: 8.8 MG/DL (ref 8.3–10.1)
CHLORIDE SERPL-SCNC: 104 MMOL/L (ref 100–108)
CLARITY UR: CLEAR
CO2 SERPL-SCNC: 25 MMOL/L (ref 21–32)
COLOR UR: YELLOW
CREAT SERPL-MCNC: 1.03 MG/DL (ref 0.6–1.3)
EOSINOPHIL # BLD AUTO: 0.77 THOUSAND/ΜL (ref 0–0.61)
EOSINOPHIL NFR BLD AUTO: 7 % (ref 0–6)
ERYTHROCYTE [DISTWIDTH] IN BLOOD BY AUTOMATED COUNT: 14.1 % (ref 11.6–15.1)
GFR SERPL CREATININE-BSD FRML MDRD: 80 ML/MIN/1.73SQ M
GLUCOSE SERPL-MCNC: 96 MG/DL (ref 65–140)
GLUCOSE UR STRIP-MCNC: NEGATIVE MG/DL
HCT VFR BLD AUTO: 48.4 % (ref 36.5–49.3)
HGB BLD-MCNC: 16 G/DL (ref 12–17)
HGB UR QL STRIP.AUTO: NEGATIVE
IMM GRANULOCYTES # BLD AUTO: 0.07 THOUSAND/UL (ref 0–0.2)
IMM GRANULOCYTES NFR BLD AUTO: 1 % (ref 0–2)
KETONES UR STRIP-MCNC: NEGATIVE MG/DL
LEUKOCYTE ESTERASE UR QL STRIP: NEGATIVE
LYMPHOCYTES # BLD AUTO: 1.98 THOUSANDS/ΜL (ref 0.6–4.47)
LYMPHOCYTES NFR BLD AUTO: 17 % (ref 14–44)
MAGNESIUM SERPL-MCNC: 1.9 MG/DL (ref 1.6–2.6)
MCH RBC QN AUTO: 30.6 PG (ref 26.8–34.3)
MCHC RBC AUTO-ENTMCNC: 33.1 G/DL (ref 31.4–37.4)
MCV RBC AUTO: 93 FL (ref 82–98)
MONOCYTES # BLD AUTO: 1.38 THOUSAND/ΜL (ref 0.17–1.22)
MONOCYTES NFR BLD AUTO: 12 % (ref 4–12)
NEUTROPHILS # BLD AUTO: 7.43 THOUSANDS/ΜL (ref 1.85–7.62)
NEUTS SEG NFR BLD AUTO: 62 % (ref 43–75)
NITRITE UR QL STRIP: NEGATIVE
NRBC BLD AUTO-RTO: 0 /100 WBCS
P AXIS: 17 DEGREES
PH UR STRIP.AUTO: 6 [PH]
PLATELET # BLD AUTO: 395 THOUSANDS/UL (ref 149–390)
PMV BLD AUTO: 9.7 FL (ref 8.9–12.7)
POTASSIUM SERPL-SCNC: 3.9 MMOL/L (ref 3.5–5.3)
PR INTERVAL: 172 MS
PROT SERPL-MCNC: 7.2 G/DL (ref 6.4–8.2)
PROT UR STRIP-MCNC: NEGATIVE MG/DL
QRS AXIS: -4 DEGREES
QRSD INTERVAL: 94 MS
QT INTERVAL: 384 MS
QTC INTERVAL: 440 MS
RBC # BLD AUTO: 5.23 MILLION/UL (ref 3.88–5.62)
SODIUM SERPL-SCNC: 141 MMOL/L (ref 136–145)
SP GR UR STRIP.AUTO: <=1.005 (ref 1–1.03)
T WAVE AXIS: 16 DEGREES
TROPONIN I SERPL-MCNC: <0.02 NG/ML
TROPONIN I SERPL-MCNC: <0.02 NG/ML
UROBILINOGEN UR QL STRIP.AUTO: 0.2 E.U./DL
VENTRICULAR RATE: 79 BPM
WBC # BLD AUTO: 11.8 THOUSAND/UL (ref 4.31–10.16)

## 2021-10-20 PROCEDURE — 96365 THER/PROPH/DIAG IV INF INIT: CPT

## 2021-10-20 PROCEDURE — 84484 ASSAY OF TROPONIN QUANT: CPT | Performed by: EMERGENCY MEDICINE

## 2021-10-20 PROCEDURE — 80053 COMPREHEN METABOLIC PANEL: CPT | Performed by: EMERGENCY MEDICINE

## 2021-10-20 PROCEDURE — 81003 URINALYSIS AUTO W/O SCOPE: CPT | Performed by: EMERGENCY MEDICINE

## 2021-10-20 PROCEDURE — 73030 X-RAY EXAM OF SHOULDER: CPT

## 2021-10-20 PROCEDURE — G1004 CDSM NDSC: HCPCS

## 2021-10-20 PROCEDURE — 96361 HYDRATE IV INFUSION ADD-ON: CPT

## 2021-10-20 PROCEDURE — 99284 EMERGENCY DEPT VISIT MOD MDM: CPT

## 2021-10-20 PROCEDURE — 72125 CT NECK SPINE W/O DYE: CPT

## 2021-10-20 PROCEDURE — 96375 TX/PRO/DX INJ NEW DRUG ADDON: CPT

## 2021-10-20 PROCEDURE — 36415 COLL VENOUS BLD VENIPUNCTURE: CPT | Performed by: EMERGENCY MEDICINE

## 2021-10-20 PROCEDURE — 83735 ASSAY OF MAGNESIUM: CPT | Performed by: EMERGENCY MEDICINE

## 2021-10-20 PROCEDURE — 85025 COMPLETE CBC W/AUTO DIFF WBC: CPT | Performed by: EMERGENCY MEDICINE

## 2021-10-20 PROCEDURE — 93010 ELECTROCARDIOGRAM REPORT: CPT | Performed by: INTERNAL MEDICINE

## 2021-10-20 PROCEDURE — 71045 X-RAY EXAM CHEST 1 VIEW: CPT

## 2021-10-20 PROCEDURE — 99285 EMERGENCY DEPT VISIT HI MDM: CPT | Performed by: EMERGENCY MEDICINE

## 2021-10-20 PROCEDURE — 93005 ELECTROCARDIOGRAM TRACING: CPT

## 2021-10-20 RX ORDER — PREDNISONE 50 MG/1
50 TABLET ORAL DAILY
Qty: 5 TABLET | Refills: 0 | Status: SHIPPED | OUTPATIENT
Start: 2021-10-20 | End: 2021-10-25

## 2021-10-20 RX ORDER — BACLOFEN 10 MG/1
10 TABLET ORAL ONCE
Status: COMPLETED | OUTPATIENT
Start: 2021-10-20 | End: 2021-10-20

## 2021-10-20 RX ORDER — MORPHINE SULFATE 4 MG/ML
4 INJECTION, SOLUTION INTRAMUSCULAR; INTRAVENOUS ONCE
Status: COMPLETED | OUTPATIENT
Start: 2021-10-20 | End: 2021-10-20

## 2021-10-20 RX ORDER — OXYCODONE HYDROCHLORIDE AND ACETAMINOPHEN 5; 325 MG/1; MG/1
1 TABLET ORAL EVERY 6 HOURS PRN
Qty: 15 TABLET | Refills: 0 | Status: SHIPPED | OUTPATIENT
Start: 2021-10-20 | End: 2021-10-30

## 2021-10-20 RX ORDER — ASPIRIN 81 MG/1
324 TABLET, CHEWABLE ORAL ONCE
Status: COMPLETED | OUTPATIENT
Start: 2021-10-20 | End: 2021-10-20

## 2021-10-20 RX ORDER — METHYLPREDNISOLONE SODIUM SUCCINATE 125 MG/2ML
60 INJECTION, POWDER, LYOPHILIZED, FOR SOLUTION INTRAMUSCULAR; INTRAVENOUS ONCE
Status: COMPLETED | OUTPATIENT
Start: 2021-10-20 | End: 2021-10-20

## 2021-10-20 RX ORDER — MAGNESIUM SULFATE HEPTAHYDRATE 40 MG/ML
2 INJECTION, SOLUTION INTRAVENOUS ONCE
Status: COMPLETED | OUTPATIENT
Start: 2021-10-20 | End: 2021-10-20

## 2021-10-20 RX ORDER — NAPROXEN 500 MG/1
500 TABLET ORAL 2 TIMES DAILY WITH MEALS
Qty: 20 TABLET | Refills: 0 | Status: SHIPPED | OUTPATIENT
Start: 2021-10-20 | End: 2021-11-09

## 2021-10-20 RX ORDER — KETOROLAC TROMETHAMINE 30 MG/ML
15 INJECTION, SOLUTION INTRAMUSCULAR; INTRAVENOUS ONCE
Status: COMPLETED | OUTPATIENT
Start: 2021-10-20 | End: 2021-10-20

## 2021-10-20 RX ADMIN — SODIUM CHLORIDE 1000 ML: 0.9 INJECTION, SOLUTION INTRAVENOUS at 13:09

## 2021-10-20 RX ADMIN — MORPHINE SULFATE 4 MG: 4 INJECTION INTRAVENOUS at 13:31

## 2021-10-20 RX ADMIN — ASPIRIN 81 MG CHEWABLE TABLET 324 MG: 81 TABLET CHEWABLE at 13:30

## 2021-10-20 RX ADMIN — METHYLPREDNISOLONE SODIUM SUCCINATE 60 MG: 125 INJECTION, POWDER, FOR SOLUTION INTRAMUSCULAR; INTRAVENOUS at 13:34

## 2021-10-20 RX ADMIN — BACLOFEN 10 MG: 10 TABLET ORAL at 16:14

## 2021-10-20 RX ADMIN — MAGNESIUM SULFATE HEPTAHYDRATE 2 G: 40 INJECTION, SOLUTION INTRAVENOUS at 14:21

## 2021-10-20 RX ADMIN — KETOROLAC TROMETHAMINE 15 MG: 30 INJECTION, SOLUTION INTRAMUSCULAR; INTRAVENOUS at 15:35

## 2021-10-25 ENCOUNTER — OFFICE VISIT (OUTPATIENT)
Dept: FAMILY MEDICINE CLINIC | Facility: CLINIC | Age: 56
End: 2021-10-25
Payer: COMMERCIAL

## 2021-10-25 VITALS
HEIGHT: 72 IN | DIASTOLIC BLOOD PRESSURE: 86 MMHG | TEMPERATURE: 98.9 F | SYSTOLIC BLOOD PRESSURE: 128 MMHG | RESPIRATION RATE: 18 BRPM | BODY MASS INDEX: 29.17 KG/M2 | WEIGHT: 215.4 LBS | HEART RATE: 97 BPM

## 2021-10-25 DIAGNOSIS — I10 ESSENTIAL HYPERTENSION: ICD-10-CM

## 2021-10-25 DIAGNOSIS — G54.2 CERVICAL NEUROPATHY: Primary | ICD-10-CM

## 2021-10-25 DIAGNOSIS — R91.8 LUNG NODULE, MULTIPLE: ICD-10-CM

## 2021-10-25 DIAGNOSIS — E78.2 MIXED HYPERLIPIDEMIA: ICD-10-CM

## 2021-10-25 PROCEDURE — 99214 OFFICE O/P EST MOD 30 MIN: CPT | Performed by: FAMILY MEDICINE

## 2021-10-25 PROCEDURE — 3725F SCREEN DEPRESSION PERFORMED: CPT | Performed by: FAMILY MEDICINE

## 2021-10-25 PROCEDURE — 1036F TOBACCO NON-USER: CPT | Performed by: FAMILY MEDICINE

## 2021-10-26 ENCOUNTER — TELEPHONE (OUTPATIENT)
Dept: OTHER | Facility: OTHER | Age: 56
End: 2021-10-26

## 2021-10-27 DIAGNOSIS — R39.15 URINARY URGENCY: ICD-10-CM

## 2021-10-27 DIAGNOSIS — N40.1 BENIGN PROSTATIC HYPERPLASIA WITH LOWER URINARY TRACT SYMPTOMS, SYMPTOM DETAILS UNSPECIFIED: ICD-10-CM

## 2021-10-27 PROBLEM — M54.12 RADICULOPATHY, CERVICAL: Status: ACTIVE | Noted: 2021-10-27

## 2021-10-27 RX ORDER — OXYBUTYNIN CHLORIDE 5 MG/1
TABLET, EXTENDED RELEASE ORAL
Qty: 30 TABLET | Refills: 0 | Status: SHIPPED | OUTPATIENT
Start: 2021-10-27 | End: 2021-11-15

## 2021-10-28 ENCOUNTER — CONSULT (OUTPATIENT)
Dept: NEUROSURGERY | Facility: CLINIC | Age: 56
End: 2021-10-28
Payer: COMMERCIAL

## 2021-10-28 VITALS
RESPIRATION RATE: 16 BRPM | TEMPERATURE: 97 F | HEART RATE: 77 BPM | WEIGHT: 215 LBS | DIASTOLIC BLOOD PRESSURE: 78 MMHG | BODY MASS INDEX: 29.12 KG/M2 | SYSTOLIC BLOOD PRESSURE: 138 MMHG | HEIGHT: 72 IN

## 2021-10-28 DIAGNOSIS — M54.12 RADICULOPATHY, CERVICAL: Primary | ICD-10-CM

## 2021-10-28 DIAGNOSIS — G54.2 CERVICAL NEUROPATHY: ICD-10-CM

## 2021-10-28 PROCEDURE — 99244 OFF/OP CNSLTJ NEW/EST MOD 40: CPT | Performed by: NURSE PRACTITIONER

## 2021-10-28 PROCEDURE — 3008F BODY MASS INDEX DOCD: CPT | Performed by: FAMILY MEDICINE

## 2021-10-28 NOTE — TELEPHONE ENCOUNTER
Patient called to ask where can he get his preop testing done  I did let him know any of the [facility] facilities  All testing are in the system  He verbalized understanding

## 2021-11-01 ENCOUNTER — APPOINTMENT (OUTPATIENT)
Dept: LAB | Facility: HOSPITAL | Age: 56
End: 2021-11-01
Payer: COMMERCIAL

## 2021-11-01 ENCOUNTER — APPOINTMENT (OUTPATIENT)
Dept: LAB | Facility: HOSPITAL | Age: 56
End: 2021-11-01
Attending: UROLOGY
Payer: COMMERCIAL

## 2021-11-01 ENCOUNTER — LAB (OUTPATIENT)
Dept: LAB | Facility: HOSPITAL | Age: 56
End: 2021-11-01
Attending: UROLOGY
Payer: COMMERCIAL

## 2021-11-01 ENCOUNTER — LAB REQUISITION (OUTPATIENT)
Dept: LAB | Facility: HOSPITAL | Age: 56
End: 2021-11-01
Payer: COMMERCIAL

## 2021-11-01 ENCOUNTER — TELEPHONE (OUTPATIENT)
Dept: PHYSICAL THERAPY | Facility: OTHER | Age: 56
End: 2021-11-01

## 2021-11-01 ENCOUNTER — HOSPITAL ENCOUNTER (OUTPATIENT)
Dept: RADIOLOGY | Facility: HOSPITAL | Age: 56
Discharge: HOME | End: 2021-11-01
Attending: FAMILY MEDICINE
Payer: COMMERCIAL

## 2021-11-01 DIAGNOSIS — N40.1 BENIGN PROSTATIC HYPERPLASIA WITH LOWER URINARY TRACT SYMPTOMS, SYMPTOM DETAILS UNSPECIFIED: ICD-10-CM

## 2021-11-01 DIAGNOSIS — N40.1 BENIGN PROSTATIC HYPERPLASIA WITH URINARY OBSTRUCTION AND OTHER LOWER URINARY TRACT SYMPTOMS: ICD-10-CM

## 2021-11-01 DIAGNOSIS — R91.8 LUNG NODULE, MULTIPLE: ICD-10-CM

## 2021-11-01 DIAGNOSIS — E61.1 IRON DEFICIENCY: ICD-10-CM

## 2021-11-01 DIAGNOSIS — N13.8 BENIGN PROSTATIC HYPERPLASIA WITH URINARY OBSTRUCTION AND OTHER LOWER URINARY TRACT SYMPTOMS: ICD-10-CM

## 2021-11-01 DIAGNOSIS — D50.0 CHRONIC BLOOD LOSS ANEMIA: ICD-10-CM

## 2021-11-01 DIAGNOSIS — N40.1 BENIGN PROSTATIC HYPERPLASIA WITH LOWER URINARY TRACT SYMPTOMS: ICD-10-CM

## 2021-11-01 DIAGNOSIS — N13.8 OTHER OBSTRUCTIVE AND REFLUX UROPATHY: ICD-10-CM

## 2021-11-01 LAB
ABO GROUP BLD: NORMAL
ANION GAP SERPL CALCULATED.3IONS-SCNC: 7 MMOL/L (ref 4–13)
APTT PPP: 28 SECONDS (ref 23–37)
BASOPHILS # BLD AUTO: 0.13 THOUSANDS/ΜL (ref 0–0.1)
BASOPHILS NFR BLD AUTO: 1 % (ref 0–1)
BLD GP AB SCN SERPL QL: NEGATIVE
BUN SERPL-MCNC: 12 MG/DL (ref 5–25)
CALCIUM SERPL-MCNC: 8.9 MG/DL (ref 8.3–10.1)
CHLORIDE SERPL-SCNC: 106 MMOL/L (ref 100–108)
CO2 SERPL-SCNC: 31 MMOL/L (ref 21–32)
CREAT SERPL-MCNC: 1.17 MG/DL (ref 0.6–1.3)
EOSINOPHIL # BLD AUTO: 0.62 THOUSAND/ΜL (ref 0–0.61)
EOSINOPHIL NFR BLD AUTO: 6 % (ref 0–6)
ERYTHROCYTE [DISTWIDTH] IN BLOOD BY AUTOMATED COUNT: 15 % (ref 11.6–15.1)
FERRITIN SERPL-MCNC: 149 NG/ML (ref 8–388)
GFR SERPL CREATININE-BSD FRML MDRD: 69 ML/MIN/1.73SQ M
GLUCOSE P FAST SERPL-MCNC: 91 MG/DL (ref 65–99)
HCT VFR BLD AUTO: 53.1 % (ref 36.5–49.3)
HGB BLD-MCNC: 17.1 G/DL (ref 12–17)
IMM GRANULOCYTES # BLD AUTO: 0.09 THOUSAND/UL (ref 0–0.2)
IMM GRANULOCYTES NFR BLD AUTO: 1 % (ref 0–2)
INR PPP: 0.88 (ref 0.84–1.19)
IRON SATN MFR SERPL: 38 % (ref 20–50)
IRON SERPL-MCNC: 131 UG/DL (ref 65–175)
LYMPHOCYTES # BLD AUTO: 3.09 THOUSANDS/ΜL (ref 0.6–4.47)
LYMPHOCYTES NFR BLD AUTO: 31 % (ref 14–44)
MCH RBC QN AUTO: 30.9 PG (ref 26.8–34.3)
MCHC RBC AUTO-ENTMCNC: 32.2 G/DL (ref 31.4–37.4)
MCV RBC AUTO: 96 FL (ref 82–98)
MONOCYTES # BLD AUTO: 1.44 THOUSAND/ΜL (ref 0.17–1.22)
MONOCYTES NFR BLD AUTO: 14 % (ref 4–12)
NEUTROPHILS # BLD AUTO: 4.75 THOUSANDS/ΜL (ref 1.85–7.62)
NEUTS SEG NFR BLD AUTO: 47 % (ref 43–75)
NRBC BLD AUTO-RTO: 0 /100 WBCS
PLATELET # BLD AUTO: 402 THOUSANDS/UL (ref 149–390)
PMV BLD AUTO: 9.7 FL (ref 8.9–12.7)
POTASSIUM SERPL-SCNC: 4.3 MMOL/L (ref 3.5–5.3)
PROTHROMBIN TIME: 11.8 SECONDS (ref 11.6–14.5)
RBC # BLD AUTO: 5.54 MILLION/UL (ref 3.88–5.62)
RH BLD: NEGATIVE
SODIUM SERPL-SCNC: 144 MMOL/L (ref 136–145)
SPECIMEN EXPIRATION DATE: NORMAL
TIBC SERPL-MCNC: 344 UG/DL (ref 250–450)
WBC # BLD AUTO: 10.12 THOUSAND/UL (ref 4.31–10.16)

## 2021-11-01 PROCEDURE — 85610 PROTHROMBIN TIME: CPT

## 2021-11-01 PROCEDURE — 85025 COMPLETE CBC W/AUTO DIFF WBC: CPT

## 2021-11-01 PROCEDURE — 87086 URINE CULTURE/COLONY COUNT: CPT

## 2021-11-01 PROCEDURE — 80048 BASIC METABOLIC PNL TOTAL CA: CPT

## 2021-11-01 PROCEDURE — 86901 BLOOD TYPING SEROLOGIC RH(D): CPT | Performed by: UROLOGY

## 2021-11-01 PROCEDURE — 85730 THROMBOPLASTIN TIME PARTIAL: CPT

## 2021-11-01 PROCEDURE — 36415 COLL VENOUS BLD VENIPUNCTURE: CPT

## 2021-11-01 PROCEDURE — 86850 RBC ANTIBODY SCREEN: CPT | Performed by: UROLOGY

## 2021-11-01 PROCEDURE — 86900 BLOOD TYPING SEROLOGIC ABO: CPT | Performed by: UROLOGY

## 2021-11-01 PROCEDURE — 82728 ASSAY OF FERRITIN: CPT

## 2021-11-01 PROCEDURE — 71250 CT THORAX DX C-: CPT

## 2021-11-01 PROCEDURE — 93005 ELECTROCARDIOGRAM TRACING: CPT

## 2021-11-01 PROCEDURE — 83540 ASSAY OF IRON: CPT

## 2021-11-01 PROCEDURE — 83550 IRON BINDING TEST: CPT

## 2021-11-02 ENCOUNTER — EVALUATION (OUTPATIENT)
Dept: PHYSICAL THERAPY | Facility: CLINIC | Age: 56
End: 2021-11-02
Payer: COMMERCIAL

## 2021-11-02 ENCOUNTER — IMMUNIZATIONS (OUTPATIENT)
Dept: FAMILY MEDICINE CLINIC | Facility: HOSPITAL | Age: 56
End: 2021-11-02

## 2021-11-02 DIAGNOSIS — M54.12 LEFT CERVICAL RADICULOPATHY: Primary | ICD-10-CM

## 2021-11-02 DIAGNOSIS — Z23 ENCOUNTER FOR IMMUNIZATION: Primary | ICD-10-CM

## 2021-11-02 LAB
ATRIAL RATE: 70 BPM
BACTERIA UR CULT: NORMAL
P AXIS: 36 DEGREES
PR INTERVAL: 168 MS
QRS AXIS: 23 DEGREES
QRSD INTERVAL: 94 MS
QT INTERVAL: 406 MS
QTC INTERVAL: 438 MS
T WAVE AXIS: 41 DEGREES
VENTRICULAR RATE: 70 BPM

## 2021-11-02 PROCEDURE — 97112 NEUROMUSCULAR REEDUCATION: CPT

## 2021-11-02 PROCEDURE — 97161 PT EVAL LOW COMPLEX 20 MIN: CPT

## 2021-11-02 PROCEDURE — 93010 ELECTROCARDIOGRAM REPORT: CPT | Performed by: INTERNAL MEDICINE

## 2021-11-02 PROCEDURE — 91301 COVID-19 MODERNA VACC 0.5 ML: CPT

## 2021-11-02 PROCEDURE — 0011A COVID-19 MODERNA VACC 0.5 ML: CPT

## 2021-11-03 ENCOUNTER — RA CDI HCC (OUTPATIENT)
Dept: OTHER | Facility: HOSPITAL | Age: 56
End: 2021-11-03

## 2021-11-04 PROBLEM — K76.0 FATTY LIVER: Status: ACTIVE | Noted: 2021-11-04

## 2021-11-05 ENCOUNTER — OFFICE VISIT (OUTPATIENT)
Dept: PHYSICAL THERAPY | Facility: CLINIC | Age: 56
End: 2021-11-05
Payer: COMMERCIAL

## 2021-11-05 DIAGNOSIS — M54.12 LEFT CERVICAL RADICULOPATHY: Primary | ICD-10-CM

## 2021-11-05 PROCEDURE — 97110 THERAPEUTIC EXERCISES: CPT

## 2021-11-05 PROCEDURE — 97140 MANUAL THERAPY 1/> REGIONS: CPT

## 2021-11-05 PROCEDURE — 97112 NEUROMUSCULAR REEDUCATION: CPT

## 2021-11-05 NOTE — TELEPHONE ENCOUNTER
Patient called stating he would like to know if he needed to do the covid test for surgery  communicated via teams with [address] [place] [address]  She stated if patient is vaccinated he does not need to do covid test  Patient stated he is completely vaccinated including the booster which he did on Tuesday 11/02

## 2021-11-06 NOTE — TELEPHONE ENCOUNTER
11/17 at [facility] AND [facility] w Dr Shauna Castillo - pending  Ref# [id number]

## 2021-11-08 ENCOUNTER — OFFICE VISIT (OUTPATIENT)
Dept: HEMATOLOGY ONCOLOGY | Facility: MEDICAL CENTER | Age: 56
End: 2021-11-08
Payer: COMMERCIAL

## 2021-11-08 ENCOUNTER — OFFICE VISIT (OUTPATIENT)
Dept: PHYSICAL THERAPY | Facility: CLINIC | Age: 56
End: 2021-11-08
Payer: COMMERCIAL

## 2021-11-08 VITALS
RESPIRATION RATE: 18 BRPM | BODY MASS INDEX: 29.12 KG/M2 | HEIGHT: 72 IN | TEMPERATURE: 96.4 F | DIASTOLIC BLOOD PRESSURE: 80 MMHG | WEIGHT: 215 LBS | OXYGEN SATURATION: 99 % | HEART RATE: 72 BPM | SYSTOLIC BLOOD PRESSURE: 122 MMHG

## 2021-11-08 DIAGNOSIS — M54.12 LEFT CERVICAL RADICULOPATHY: Primary | ICD-10-CM

## 2021-11-08 DIAGNOSIS — D50.0 CHRONIC BLOOD LOSS ANEMIA: Primary | ICD-10-CM

## 2021-11-08 DIAGNOSIS — N40.1 BENIGN LOCALIZED PROSTATIC HYPERPLASIA WITH LOWER URINARY TRACT SYMPTOMS (LUTS): ICD-10-CM

## 2021-11-08 DIAGNOSIS — Z86.39 HISTORY OF IRON DEFICIENCY: ICD-10-CM

## 2021-11-08 DIAGNOSIS — R55 SYNCOPE, NEAR: ICD-10-CM

## 2021-11-08 DIAGNOSIS — Z90.81 S/P SPLENECTOMY: ICD-10-CM

## 2021-11-08 PROCEDURE — 97140 MANUAL THERAPY 1/> REGIONS: CPT

## 2021-11-08 PROCEDURE — 99214 OFFICE O/P EST MOD 30 MIN: CPT | Performed by: PHYSICIAN ASSISTANT

## 2021-11-08 PROCEDURE — 97112 NEUROMUSCULAR REEDUCATION: CPT

## 2021-11-08 PROCEDURE — 97110 THERAPEUTIC EXERCISES: CPT

## 2021-11-09 ENCOUNTER — OFFICE VISIT (OUTPATIENT)
Dept: FAMILY MEDICINE CLINIC | Facility: CLINIC | Age: 56
End: 2021-11-09
Payer: COMMERCIAL

## 2021-11-09 VITALS
RESPIRATION RATE: 16 BRPM | SYSTOLIC BLOOD PRESSURE: 120 MMHG | OXYGEN SATURATION: 97 % | BODY MASS INDEX: 28.99 KG/M2 | TEMPERATURE: 97.7 F | DIASTOLIC BLOOD PRESSURE: 80 MMHG | HEIGHT: 72 IN | HEART RATE: 68 BPM | WEIGHT: 214 LBS

## 2021-11-09 DIAGNOSIS — N40.1 BENIGN LOCALIZED PROSTATIC HYPERPLASIA WITH LOWER URINARY TRACT SYMPTOMS (LUTS): ICD-10-CM

## 2021-11-09 DIAGNOSIS — D50.0 IRON DEFICIENCY ANEMIA DUE TO CHRONIC BLOOD LOSS: ICD-10-CM

## 2021-11-09 DIAGNOSIS — I10 ESSENTIAL HYPERTENSION: ICD-10-CM

## 2021-11-09 DIAGNOSIS — Z01.818 PREOPERATIVE CLEARANCE: Primary | ICD-10-CM

## 2021-11-09 DIAGNOSIS — K21.9 GASTROESOPHAGEAL REFLUX DISEASE, UNSPECIFIED WHETHER ESOPHAGITIS PRESENT: ICD-10-CM

## 2021-11-09 PROCEDURE — 1036F TOBACCO NON-USER: CPT | Performed by: FAMILY MEDICINE

## 2021-11-09 PROCEDURE — 99243 OFF/OP CNSLTJ NEW/EST LOW 30: CPT | Performed by: FAMILY MEDICINE

## 2021-11-10 ENCOUNTER — OFFICE VISIT (OUTPATIENT)
Dept: PHYSICAL THERAPY | Facility: CLINIC | Age: 56
End: 2021-11-10
Payer: COMMERCIAL

## 2021-11-10 DIAGNOSIS — M54.12 LEFT CERVICAL RADICULOPATHY: Primary | ICD-10-CM

## 2021-11-10 PROCEDURE — 97140 MANUAL THERAPY 1/> REGIONS: CPT

## 2021-11-10 PROCEDURE — 97110 THERAPEUTIC EXERCISES: CPT

## 2021-11-16 ENCOUNTER — OFFICE VISIT (OUTPATIENT)
Dept: PHYSICAL THERAPY | Facility: CLINIC | Age: 56
End: 2021-11-16
Payer: COMMERCIAL

## 2021-11-16 DIAGNOSIS — M54.12 LEFT CERVICAL RADICULOPATHY: Primary | ICD-10-CM

## 2021-11-16 PROCEDURE — 97140 MANUAL THERAPY 1/> REGIONS: CPT

## 2021-11-16 PROCEDURE — 97110 THERAPEUTIC EXERCISES: CPT

## 2021-11-16 PROCEDURE — 97112 NEUROMUSCULAR REEDUCATION: CPT

## 2021-11-17 ENCOUNTER — TELEPHONE (OUTPATIENT)
Dept: UROLOGY | Facility: CLINIC | Age: 56
End: 2021-11-17

## 2021-11-17 ENCOUNTER — HOSPITAL ENCOUNTER (INPATIENT)
Facility: HOSPITAL | Age: 56
LOS: 4 days | Discharge: HOME | DRG: 708 | End: 2021-11-21
Attending: UROLOGY | Admitting: UROLOGY
Payer: COMMERCIAL

## 2021-11-17 ENCOUNTER — ANESTHESIA (INPATIENT)
Dept: PERIOP | Facility: HOSPITAL | Age: 56
DRG: 708 | End: 2021-11-17
Payer: COMMERCIAL

## 2021-11-17 ENCOUNTER — ANESTHESIA EVENT (INPATIENT)
Dept: PERIOP | Facility: HOSPITAL | Age: 56
DRG: 708 | End: 2021-11-17
Payer: COMMERCIAL

## 2021-11-17 DIAGNOSIS — N40.1 BENIGN LOCALIZED PROSTATIC HYPERPLASIA WITH LOWER URINARY TRACT SYMPTOMS (LUTS): Primary | ICD-10-CM

## 2021-11-17 LAB
ANION GAP SERPL CALCULATED.3IONS-SCNC: 4 MMOL/L (ref 4–13)
BUN SERPL-MCNC: 12 MG/DL (ref 5–25)
CALCIUM SERPL-MCNC: 8.8 MG/DL (ref 8.3–10.1)
CHLORIDE SERPL-SCNC: 110 MMOL/L (ref 100–108)
CO2 SERPL-SCNC: 25 MMOL/L (ref 21–32)
CREAT SERPL-MCNC: 0.99 MG/DL (ref 0.6–1.3)
ERYTHROCYTE [DISTWIDTH] IN BLOOD BY AUTOMATED COUNT: 14.4 % (ref 11.6–15.1)
GFR SERPL CREATININE-BSD FRML MDRD: 84 ML/MIN/1.73SQ M
GLUCOSE SERPL-MCNC: 113 MG/DL (ref 65–140)
HCT VFR BLD AUTO: 48.5 % (ref 36.5–49.3)
HGB BLD-MCNC: 16.1 G/DL (ref 12–17)
MCH RBC QN AUTO: 30.7 PG (ref 26.8–34.3)
MCHC RBC AUTO-ENTMCNC: 33.2 G/DL (ref 31.4–37.4)
MCV RBC AUTO: 93 FL (ref 82–98)
PLATELET # BLD AUTO: 431 THOUSANDS/UL (ref 149–390)
PMV BLD AUTO: 9.5 FL (ref 8.9–12.7)
POTASSIUM SERPL-SCNC: 4.6 MMOL/L (ref 3.5–5.3)
RBC # BLD AUTO: 5.24 MILLION/UL (ref 3.88–5.62)
SODIUM SERPL-SCNC: 139 MMOL/L (ref 136–145)
WBC # BLD AUTO: 11.63 THOUSAND/UL (ref 4.31–10.16)

## 2021-11-17 PROCEDURE — S2900 ROBOTIC SURGICAL SYSTEM: HCPCS | Performed by: UROLOGY

## 2021-11-17 PROCEDURE — 0DNU4ZZ RELEASE OMENTUM, PERCUTANEOUS ENDOSCOPIC APPROACH: ICD-10-PCS | Performed by: UROLOGY

## 2021-11-17 PROCEDURE — 55866 LAPS SURG PRST8ECT RPBIC RAD: CPT | Performed by: UROLOGY

## 2021-11-17 PROCEDURE — 85027 COMPLETE CBC AUTOMATED: CPT | Performed by: UROLOGY

## 2021-11-17 PROCEDURE — 80048 BASIC METABOLIC PNL TOTAL CA: CPT | Performed by: UROLOGY

## 2021-11-17 PROCEDURE — 0TQC4ZZ REPAIR BLADDER NECK, PERCUTANEOUS ENDOSCOPIC APPROACH: ICD-10-PCS | Performed by: UROLOGY

## 2021-11-17 PROCEDURE — NC001 PR NO CHARGE: Performed by: UROLOGY

## 2021-11-17 PROCEDURE — 0VT04ZZ RESECTION OF PROSTATE, PERCUTANEOUS ENDOSCOPIC APPROACH: ICD-10-PCS | Performed by: UROLOGY

## 2021-11-17 PROCEDURE — 88307 TISSUE EXAM BY PATHOLOGIST: CPT | Performed by: PATHOLOGY

## 2021-11-17 PROCEDURE — 8E0W4CZ ROBOTIC ASSISTED PROCEDURE OF TRUNK REGION, PERCUTANEOUS ENDOSCOPIC APPROACH: ICD-10-PCS | Performed by: UROLOGY

## 2021-11-17 PROCEDURE — 86920 COMPATIBILITY TEST SPIN: CPT

## 2021-11-17 RX ORDER — FENTANYL CITRATE/PF 50 MCG/ML
25 SYRINGE (ML) INJECTION
Status: DISCONTINUED | OUTPATIENT
Start: 2021-11-17 | End: 2021-11-17 | Stop reason: HOSPADM

## 2021-11-17 RX ORDER — ACETAMINOPHEN 325 MG/1
650 TABLET ORAL EVERY 6 HOURS SCHEDULED
Status: DISCONTINUED | OUTPATIENT
Start: 2021-11-17 | End: 2021-11-21 | Stop reason: HOSPADM

## 2021-11-17 RX ORDER — MIDAZOLAM HYDROCHLORIDE 2 MG/2ML
INJECTION, SOLUTION INTRAMUSCULAR; INTRAVENOUS AS NEEDED
Status: DISCONTINUED | OUTPATIENT
Start: 2021-11-17 | End: 2021-11-17

## 2021-11-17 RX ORDER — SODIUM CHLORIDE 9 MG/ML
INJECTION, SOLUTION INTRAVENOUS CONTINUOUS PRN
Status: DISCONTINUED | OUTPATIENT
Start: 2021-11-17 | End: 2021-11-17

## 2021-11-17 RX ORDER — GABAPENTIN 300 MG/1
300 CAPSULE ORAL ONCE
Status: COMPLETED | OUTPATIENT
Start: 2021-11-17 | End: 2021-11-17

## 2021-11-17 RX ORDER — OXYCODONE HYDROCHLORIDE 5 MG/1
5 TABLET ORAL EVERY 4 HOURS PRN
Qty: 8 TABLET | Refills: 0 | Status: SHIPPED | OUTPATIENT
Start: 2021-11-17 | End: 2021-11-22

## 2021-11-17 RX ORDER — GLYCOPYRROLATE 0.2 MG/ML
INJECTION INTRAMUSCULAR; INTRAVENOUS AS NEEDED
Status: DISCONTINUED | OUTPATIENT
Start: 2021-11-17 | End: 2021-11-17

## 2021-11-17 RX ORDER — DOCUSATE SODIUM 100 MG/1
100 CAPSULE, LIQUID FILLED ORAL 2 TIMES DAILY
Status: DISCONTINUED | OUTPATIENT
Start: 2021-11-17 | End: 2021-11-21 | Stop reason: HOSPADM

## 2021-11-17 RX ORDER — SODIUM CHLORIDE, SODIUM LACTATE, POTASSIUM CHLORIDE, CALCIUM CHLORIDE 600; 310; 30; 20 MG/100ML; MG/100ML; MG/100ML; MG/100ML
125 INJECTION, SOLUTION INTRAVENOUS CONTINUOUS
Status: DISCONTINUED | OUTPATIENT
Start: 2021-11-17 | End: 2021-11-18 | Stop reason: ALTCHOICE

## 2021-11-17 RX ORDER — ACETAMINOPHEN 325 MG/1
975 TABLET ORAL ONCE
Status: COMPLETED | OUTPATIENT
Start: 2021-11-17 | End: 2021-11-17

## 2021-11-17 RX ORDER — PANTOPRAZOLE SODIUM 40 MG/1
40 TABLET, DELAYED RELEASE ORAL
Status: DISCONTINUED | OUTPATIENT
Start: 2021-11-17 | End: 2021-11-21 | Stop reason: HOSPADM

## 2021-11-17 RX ORDER — OXYCODONE HYDROCHLORIDE 5 MG/1
5 TABLET ORAL EVERY 4 HOURS PRN
Status: DISCONTINUED | OUTPATIENT
Start: 2021-11-17 | End: 2021-11-19

## 2021-11-17 RX ORDER — LOSARTAN POTASSIUM 50 MG/1
50 TABLET ORAL DAILY
Status: DISCONTINUED | OUTPATIENT
Start: 2021-11-17 | End: 2021-11-21 | Stop reason: HOSPADM

## 2021-11-17 RX ORDER — METOCLOPRAMIDE HYDROCHLORIDE 5 MG/ML
10 INJECTION INTRAMUSCULAR; INTRAVENOUS ONCE AS NEEDED
Status: DISCONTINUED | OUTPATIENT
Start: 2021-11-17 | End: 2021-11-17 | Stop reason: HOSPADM

## 2021-11-17 RX ORDER — ONDANSETRON 2 MG/ML
4 INJECTION INTRAMUSCULAR; INTRAVENOUS ONCE AS NEEDED
Status: DISCONTINUED | OUTPATIENT
Start: 2021-11-17 | End: 2021-11-17 | Stop reason: HOSPADM

## 2021-11-17 RX ORDER — LIDOCAINE HYDROCHLORIDE 10 MG/ML
INJECTION, SOLUTION EPIDURAL; INFILTRATION; INTRACAUDAL; PERINEURAL AS NEEDED
Status: DISCONTINUED | OUTPATIENT
Start: 2021-11-17 | End: 2021-11-17

## 2021-11-17 RX ORDER — MAGNESIUM HYDROXIDE/ALUMINUM HYDROXICE/SIMETHICONE 120; 1200; 1200 MG/30ML; MG/30ML; MG/30ML
30 SUSPENSION ORAL EVERY 6 HOURS PRN
Status: DISCONTINUED | OUTPATIENT
Start: 2021-11-17 | End: 2021-11-21 | Stop reason: HOSPADM

## 2021-11-17 RX ORDER — DIPHENHYDRAMINE HCL 25 MG
12.5 TABLET ORAL EVERY 6 HOURS PRN
Status: DISCONTINUED | OUTPATIENT
Start: 2021-11-17 | End: 2021-11-21 | Stop reason: HOSPADM

## 2021-11-17 RX ORDER — ROCURONIUM BROMIDE 10 MG/ML
INJECTION, SOLUTION INTRAVENOUS AS NEEDED
Status: DISCONTINUED | OUTPATIENT
Start: 2021-11-17 | End: 2021-11-17

## 2021-11-17 RX ORDER — OXYBUTYNIN CHLORIDE 5 MG/1
5 TABLET, EXTENDED RELEASE ORAL DAILY
Status: DISCONTINUED | OUTPATIENT
Start: 2021-11-17 | End: 2021-11-19

## 2021-11-17 RX ORDER — DOCUSATE SODIUM 100 MG/1
100 CAPSULE, LIQUID FILLED ORAL 3 TIMES DAILY
Qty: 21 CAPSULE | Refills: 0 | Status: SHIPPED | OUTPATIENT
Start: 2021-11-17 | End: 2021-12-10 | Stop reason: SDUPTHER

## 2021-11-17 RX ORDER — ATORVASTATIN CALCIUM 20 MG/1
20 TABLET, FILM COATED ORAL DAILY
Status: DISCONTINUED | OUTPATIENT
Start: 2021-11-17 | End: 2021-11-21 | Stop reason: HOSPADM

## 2021-11-17 RX ORDER — BUPIVACAINE HYDROCHLORIDE 5 MG/ML
INJECTION, SOLUTION PERINEURAL AS NEEDED
Status: DISCONTINUED | OUTPATIENT
Start: 2021-11-17 | End: 2021-11-17 | Stop reason: HOSPADM

## 2021-11-17 RX ORDER — DEXAMETHASONE SODIUM PHOSPHATE 10 MG/ML
INJECTION, SOLUTION INTRAMUSCULAR; INTRAVENOUS AS NEEDED
Status: DISCONTINUED | OUTPATIENT
Start: 2021-11-17 | End: 2021-11-17

## 2021-11-17 RX ORDER — EPHEDRINE SULFATE 50 MG/ML
INJECTION INTRAVENOUS AS NEEDED
Status: DISCONTINUED | OUTPATIENT
Start: 2021-11-17 | End: 2021-11-17

## 2021-11-17 RX ORDER — PROPOFOL 10 MG/ML
INJECTION, EMULSION INTRAVENOUS AS NEEDED
Status: DISCONTINUED | OUTPATIENT
Start: 2021-11-17 | End: 2021-11-17

## 2021-11-17 RX ORDER — HYDROMORPHONE HCL/PF 1 MG/ML
0.5 SYRINGE (ML) INJECTION EVERY 2 HOUR PRN
Status: DISCONTINUED | OUTPATIENT
Start: 2021-11-17 | End: 2021-11-19

## 2021-11-17 RX ORDER — BACITRACIN, NEOMYCIN, POLYMYXIN B 400; 3.5; 5 [USP'U]/G; MG/G; [USP'U]/G
1 OINTMENT TOPICAL 2 TIMES DAILY
Status: DISCONTINUED | OUTPATIENT
Start: 2021-11-17 | End: 2021-11-21 | Stop reason: HOSPADM

## 2021-11-17 RX ORDER — HYDROMORPHONE HCL/PF 1 MG/ML
0.25 SYRINGE (ML) INJECTION
Status: DISCONTINUED | OUTPATIENT
Start: 2021-11-17 | End: 2021-11-17 | Stop reason: HOSPADM

## 2021-11-17 RX ORDER — LIDOCAINE HYDROCHLORIDE 10 MG/ML
0.5 INJECTION, SOLUTION EPIDURAL; INFILTRATION; INTRACAUDAL; PERINEURAL ONCE AS NEEDED
Status: DISCONTINUED | OUTPATIENT
Start: 2021-11-17 | End: 2021-11-17 | Stop reason: HOSPADM

## 2021-11-17 RX ORDER — CEFAZOLIN SODIUM 2 G/50ML
2000 SOLUTION INTRAVENOUS EVERY 8 HOURS
Status: COMPLETED | OUTPATIENT
Start: 2021-11-17 | End: 2021-11-18

## 2021-11-17 RX ORDER — NEOSTIGMINE METHYLSULFATE 1 MG/ML
INJECTION INTRAVENOUS AS NEEDED
Status: DISCONTINUED | OUTPATIENT
Start: 2021-11-17 | End: 2021-11-17

## 2021-11-17 RX ORDER — ONDANSETRON 2 MG/ML
INJECTION INTRAMUSCULAR; INTRAVENOUS AS NEEDED
Status: DISCONTINUED | OUTPATIENT
Start: 2021-11-17 | End: 2021-11-17

## 2021-11-17 RX ORDER — POLYETHYLENE GLYCOL 3350 17 G/17G
17 POWDER, FOR SOLUTION ORAL DAILY
Qty: 119 G | Refills: 0 | Status: SHIPPED | OUTPATIENT
Start: 2021-11-17 | End: 2021-12-10 | Stop reason: SDUPTHER

## 2021-11-17 RX ORDER — FENTANYL CITRATE 50 UG/ML
INJECTION, SOLUTION INTRAMUSCULAR; INTRAVENOUS AS NEEDED
Status: DISCONTINUED | OUTPATIENT
Start: 2021-11-17 | End: 2021-11-17

## 2021-11-17 RX ORDER — CEFAZOLIN SODIUM 1 G/3ML
INJECTION, POWDER, FOR SOLUTION INTRAMUSCULAR; INTRAVENOUS AS NEEDED
Status: DISCONTINUED | OUTPATIENT
Start: 2021-11-17 | End: 2021-11-17

## 2021-11-17 RX ORDER — SULFAMETHOXAZOLE AND TRIMETHOPRIM 800; 160 MG/1; MG/1
1 TABLET ORAL EVERY 12 HOURS SCHEDULED
Qty: 6 TABLET | Refills: 0 | Status: SHIPPED | OUTPATIENT
Start: 2021-11-17 | End: 2021-11-20

## 2021-11-17 RX ORDER — HEPARIN SODIUM 5000 [USP'U]/ML
5000 INJECTION, SOLUTION INTRAVENOUS; SUBCUTANEOUS EVERY 8 HOURS SCHEDULED
Status: DISCONTINUED | OUTPATIENT
Start: 2021-11-18 | End: 2021-11-19

## 2021-11-17 RX ORDER — CEFAZOLIN SODIUM 2 G/50ML
2000 SOLUTION INTRAVENOUS ONCE
Status: DISCONTINUED | OUTPATIENT
Start: 2021-11-17 | End: 2021-11-17 | Stop reason: HOSPADM

## 2021-11-17 RX ORDER — OXYCODONE HYDROCHLORIDE 10 MG/1
10 TABLET ORAL EVERY 4 HOURS PRN
Status: DISCONTINUED | OUTPATIENT
Start: 2021-11-17 | End: 2021-11-19

## 2021-11-17 RX ORDER — SENNOSIDES 8.6 MG
1 TABLET ORAL DAILY
Status: DISCONTINUED | OUTPATIENT
Start: 2021-11-17 | End: 2021-11-21 | Stop reason: HOSPADM

## 2021-11-17 RX ORDER — PHENAZOPYRIDINE HYDROCHLORIDE 100 MG/1
200 TABLET, FILM COATED ORAL
Status: DISCONTINUED | OUTPATIENT
Start: 2021-11-17 | End: 2021-11-19

## 2021-11-17 RX ORDER — ONDANSETRON 2 MG/ML
4 INJECTION INTRAMUSCULAR; INTRAVENOUS EVERY 6 HOURS PRN
Status: DISCONTINUED | OUTPATIENT
Start: 2021-11-17 | End: 2021-11-21 | Stop reason: HOSPADM

## 2021-11-17 RX ORDER — HYDROMORPHONE HCL 110MG/55ML
PATIENT CONTROLLED ANALGESIA SYRINGE INTRAVENOUS AS NEEDED
Status: DISCONTINUED | OUTPATIENT
Start: 2021-11-17 | End: 2021-11-17

## 2021-11-17 RX ADMIN — DOCUSATE SODIUM 100 MG: 100 CAPSULE ORAL at 17:18

## 2021-11-17 RX ADMIN — SODIUM CHLORIDE: 0.9 INJECTION, SOLUTION INTRAVENOUS at 08:36

## 2021-11-17 RX ADMIN — ATORVASTATIN CALCIUM 20 MG: 20 TABLET, FILM COATED ORAL at 14:24

## 2021-11-17 RX ADMIN — FENTANYL CITRATE 50 MCG: 50 INJECTION INTRAMUSCULAR; INTRAVENOUS at 09:55

## 2021-11-17 RX ADMIN — GLYCOPYRROLATE 0.1 MG: 0.2 INJECTION, SOLUTION INTRAMUSCULAR; INTRAVENOUS at 08:10

## 2021-11-17 RX ADMIN — ROCURONIUM BROMIDE 10 MG: 50 INJECTION, SOLUTION INTRAVENOUS at 09:00

## 2021-11-17 RX ADMIN — HYDROMORPHONE HYDROCHLORIDE 0.5 MG: 2 INJECTION, SOLUTION INTRAMUSCULAR; INTRAVENOUS; SUBCUTANEOUS at 09:05

## 2021-11-17 RX ADMIN — Medication 25 MCG: at 12:41

## 2021-11-17 RX ADMIN — FENTANYL CITRATE 50 MCG: 50 INJECTION INTRAMUSCULAR; INTRAVENOUS at 09:00

## 2021-11-17 RX ADMIN — BACITRACIN, NEOMYCIN, POLYMYXIN B 1 SMALL APPLICATION: 400; 3.5; 5 OINTMENT TOPICAL at 17:19

## 2021-11-17 RX ADMIN — OXYCODONE HYDROCHLORIDE 10 MG: 10 TABLET ORAL at 14:23

## 2021-11-17 RX ADMIN — MIDAZOLAM 2 MG: 1 INJECTION INTRAMUSCULAR; INTRAVENOUS at 08:14

## 2021-11-17 RX ADMIN — CEFAZOLIN 2000 MG: 1 INJECTION, POWDER, FOR SOLUTION INTRAMUSCULAR; INTRAVENOUS at 08:35

## 2021-11-17 RX ADMIN — HYDROMORPHONE HYDROCHLORIDE 0.5 MG: 1 INJECTION, SOLUTION INTRAMUSCULAR; INTRAVENOUS; SUBCUTANEOUS at 17:19

## 2021-11-17 RX ADMIN — STANDARDIZED SENNA CONCENTRATE 8.6 MG: 8.6 TABLET ORAL at 14:24

## 2021-11-17 RX ADMIN — SODIUM CHLORIDE, SODIUM LACTATE, POTASSIUM CHLORIDE, AND CALCIUM CHLORIDE 125 ML/HR: .6; .31; .03; .02 INJECTION, SOLUTION INTRAVENOUS at 12:58

## 2021-11-17 RX ADMIN — FENTANYL CITRATE 50 MCG: 50 INJECTION INTRAMUSCULAR; INTRAVENOUS at 11:10

## 2021-11-17 RX ADMIN — ROCURONIUM BROMIDE 10 MG: 50 INJECTION, SOLUTION INTRAVENOUS at 10:03

## 2021-11-17 RX ADMIN — PROPOFOL 200 MG: 10 INJECTION, EMULSION INTRAVENOUS at 08:30

## 2021-11-17 RX ADMIN — NEOSTIGMINE METHYLSULFATE 5 MG: 1 INJECTION INTRAVENOUS at 11:24

## 2021-11-17 RX ADMIN — ROCURONIUM BROMIDE 10 MG: 50 INJECTION, SOLUTION INTRAVENOUS at 10:42

## 2021-11-17 RX ADMIN — SODIUM CHLORIDE, SODIUM LACTATE, POTASSIUM CHLORIDE, AND CALCIUM CHLORIDE: .6; .31; .03; .02 INJECTION, SOLUTION INTRAVENOUS at 08:10

## 2021-11-17 RX ADMIN — EPHEDRINE SULFATE 5 MG: 50 INJECTION, SOLUTION INTRAVENOUS at 09:02

## 2021-11-17 RX ADMIN — ROCURONIUM BROMIDE 10 MG: 50 INJECTION, SOLUTION INTRAVENOUS at 09:13

## 2021-11-17 RX ADMIN — OXYBUTYNIN CHLORIDE 5 MG: 5 TABLET, EXTENDED RELEASE ORAL at 14:58

## 2021-11-17 RX ADMIN — ROCURONIUM BROMIDE 10 MG: 50 INJECTION, SOLUTION INTRAVENOUS at 10:57

## 2021-11-17 RX ADMIN — LOSARTAN POTASSIUM 50 MG: 50 TABLET, FILM COATED ORAL at 14:24

## 2021-11-17 RX ADMIN — DEXAMETHASONE SODIUM PHOSPHATE 10 MG: 10 INJECTION, SOLUTION INTRAMUSCULAR; INTRAVENOUS at 08:11

## 2021-11-17 RX ADMIN — PANTOPRAZOLE SODIUM 40 MG: 40 TABLET, DELAYED RELEASE ORAL at 17:18

## 2021-11-17 RX ADMIN — GLYCOPYRROLATE 1 MG: 0.2 INJECTION, SOLUTION INTRAMUSCULAR; INTRAVENOUS at 11:23

## 2021-11-17 RX ADMIN — PHENAZOPYRIDINE 200 MG: 100 TABLET ORAL at 17:20

## 2021-11-17 RX ADMIN — FENTANYL CITRATE 50 MCG: 50 INJECTION INTRAMUSCULAR; INTRAVENOUS at 08:31

## 2021-11-17 RX ADMIN — ROCURONIUM BROMIDE 50 MG: 50 INJECTION, SOLUTION INTRAVENOUS at 08:30

## 2021-11-17 RX ADMIN — ROCURONIUM BROMIDE 10 MG: 50 INJECTION, SOLUTION INTRAVENOUS at 10:09

## 2021-11-17 RX ADMIN — ONDANSETRON 4 MG: 2 INJECTION INTRAMUSCULAR; INTRAVENOUS at 11:10

## 2021-11-17 RX ADMIN — HYDROMORPHONE HYDROCHLORIDE 0.5 MG: 2 INJECTION, SOLUTION INTRAMUSCULAR; INTRAVENOUS; SUBCUTANEOUS at 11:17

## 2021-11-17 RX ADMIN — LIDOCAINE HYDROCHLORIDE 50 MG: 10 INJECTION, SOLUTION EPIDURAL; INFILTRATION; INTRACAUDAL; PERINEURAL at 08:30

## 2021-11-17 RX ADMIN — CEFAZOLIN SODIUM 2000 MG: 2 SOLUTION INTRAVENOUS at 17:19

## 2021-11-17 RX ADMIN — VERAPAMIL HYDROCHLORIDE 120 MG: 120 TABLET, FILM COATED, EXTENDED RELEASE ORAL at 14:59

## 2021-11-17 RX ADMIN — OXYCODONE HYDROCHLORIDE 10 MG: 10 TABLET ORAL at 20:23

## 2021-11-17 RX ADMIN — ATROPA BELLADONNA AND OPIUM 1 SUPPOSITORY: 16.2; 3 SUPPOSITORY RECTAL at 12:40

## 2021-11-17 RX ADMIN — Medication 25 MCG: at 12:33

## 2021-11-17 RX ADMIN — LIDOCAINE HYDROCHLORIDE 30 MG: 10 INJECTION, SOLUTION EPIDURAL; INFILTRATION; INTRACAUDAL; PERINEURAL at 10:03

## 2021-11-17 RX ADMIN — ACETAMINOPHEN 650 MG: 325 TABLET, FILM COATED ORAL at 14:24

## 2021-11-17 RX ADMIN — HYDROMORPHONE HYDROCHLORIDE 0.5 MG: 1 INJECTION, SOLUTION INTRAMUSCULAR; INTRAVENOUS; SUBCUTANEOUS at 23:55

## 2021-11-17 RX ADMIN — ROCURONIUM BROMIDE 10 MG: 50 INJECTION, SOLUTION INTRAVENOUS at 09:34

## 2021-11-17 RX ADMIN — ACETAMINOPHEN 975 MG: 325 TABLET, FILM COATED ORAL at 07:23

## 2021-11-17 RX ADMIN — GABAPENTIN 300 MG: 300 CAPSULE ORAL at 07:23

## 2021-11-17 RX ADMIN — ACETAMINOPHEN 650 MG: 325 TABLET, FILM COATED ORAL at 23:54

## 2021-11-18 LAB
ANION GAP SERPL CALCULATED.3IONS-SCNC: 7 MMOL/L (ref 4–13)
BUN SERPL-MCNC: 14 MG/DL (ref 5–25)
CALCIUM SERPL-MCNC: 8.9 MG/DL (ref 8.3–10.1)
CHLORIDE SERPL-SCNC: 104 MMOL/L (ref 100–108)
CO2 SERPL-SCNC: 26 MMOL/L (ref 21–32)
CREAT SERPL-MCNC: 1.19 MG/DL (ref 0.6–1.3)
ERYTHROCYTE [DISTWIDTH] IN BLOOD BY AUTOMATED COUNT: 14.3 % (ref 11.6–15.1)
GFR SERPL CREATININE-BSD FRML MDRD: 67 ML/MIN/1.73SQ M
GLUCOSE SERPL-MCNC: 107 MG/DL (ref 65–140)
HCT VFR BLD AUTO: 46.8 % (ref 36.5–49.3)
HGB BLD-MCNC: 15.5 G/DL (ref 12–17)
MCH RBC QN AUTO: 31.3 PG (ref 26.8–34.3)
MCHC RBC AUTO-ENTMCNC: 33.1 G/DL (ref 31.4–37.4)
MCV RBC AUTO: 95 FL (ref 82–98)
PLATELET # BLD AUTO: 459 THOUSANDS/UL (ref 149–390)
PMV BLD AUTO: 10 FL (ref 8.9–12.7)
POTASSIUM SERPL-SCNC: 4.1 MMOL/L (ref 3.5–5.3)
RBC # BLD AUTO: 4.95 MILLION/UL (ref 3.88–5.62)
SODIUM SERPL-SCNC: 137 MMOL/L (ref 136–145)
WBC # BLD AUTO: 18.9 THOUSAND/UL (ref 4.31–10.16)

## 2021-11-18 PROCEDURE — 80048 BASIC METABOLIC PNL TOTAL CA: CPT | Performed by: UROLOGY

## 2021-11-18 PROCEDURE — 85027 COMPLETE CBC AUTOMATED: CPT | Performed by: UROLOGY

## 2021-11-18 PROCEDURE — 99024 POSTOP FOLLOW-UP VISIT: CPT | Performed by: PHYSICIAN ASSISTANT

## 2021-11-18 RX ADMIN — VERAPAMIL HYDROCHLORIDE 120 MG: 120 TABLET, FILM COATED, EXTENDED RELEASE ORAL at 15:57

## 2021-11-18 RX ADMIN — OXYCODONE HYDROCHLORIDE 10 MG: 10 TABLET ORAL at 15:56

## 2021-11-18 RX ADMIN — ONDANSETRON 4 MG: 2 INJECTION INTRAMUSCULAR; INTRAVENOUS at 17:15

## 2021-11-18 RX ADMIN — BACITRACIN, NEOMYCIN, POLYMYXIN B 1 SMALL APPLICATION: 400; 3.5; 5 OINTMENT TOPICAL at 17:16

## 2021-11-18 RX ADMIN — SODIUM CHLORIDE, SODIUM LACTATE, POTASSIUM CHLORIDE, AND CALCIUM CHLORIDE 125 ML/HR: .6; .31; .03; .02 INJECTION, SOLUTION INTRAVENOUS at 08:31

## 2021-11-18 RX ADMIN — DOCUSATE SODIUM 100 MG: 100 CAPSULE ORAL at 08:03

## 2021-11-18 RX ADMIN — PHENAZOPYRIDINE 200 MG: 100 TABLET ORAL at 11:12

## 2021-11-18 RX ADMIN — HYDROMORPHONE HYDROCHLORIDE 0.5 MG: 1 INJECTION, SOLUTION INTRAMUSCULAR; INTRAVENOUS; SUBCUTANEOUS at 08:02

## 2021-11-18 RX ADMIN — OXYBUTYNIN CHLORIDE 5 MG: 5 TABLET, EXTENDED RELEASE ORAL at 15:56

## 2021-11-18 RX ADMIN — OXYCODONE HYDROCHLORIDE 10 MG: 10 TABLET ORAL at 20:50

## 2021-11-18 RX ADMIN — ACETAMINOPHEN 650 MG: 325 TABLET, FILM COATED ORAL at 05:24

## 2021-11-18 RX ADMIN — SODIUM CHLORIDE, SODIUM LACTATE, POTASSIUM CHLORIDE, AND CALCIUM CHLORIDE 125 ML/HR: .6; .31; .03; .02 INJECTION, SOLUTION INTRAVENOUS at 00:11

## 2021-11-18 RX ADMIN — STANDARDIZED SENNA CONCENTRATE 8.6 MG: 8.6 TABLET ORAL at 08:03

## 2021-11-18 RX ADMIN — ACETAMINOPHEN 650 MG: 325 TABLET, FILM COATED ORAL at 23:32

## 2021-11-18 RX ADMIN — LOSARTAN POTASSIUM 50 MG: 50 TABLET, FILM COATED ORAL at 08:03

## 2021-11-18 RX ADMIN — ACETAMINOPHEN 650 MG: 325 TABLET, FILM COATED ORAL at 11:14

## 2021-11-18 RX ADMIN — HYDROMORPHONE HYDROCHLORIDE 0.5 MG: 1 INJECTION, SOLUTION INTRAMUSCULAR; INTRAVENOUS; SUBCUTANEOUS at 23:33

## 2021-11-18 RX ADMIN — PANTOPRAZOLE SODIUM 40 MG: 40 TABLET, DELAYED RELEASE ORAL at 06:38

## 2021-11-18 RX ADMIN — ATORVASTATIN CALCIUM 20 MG: 20 TABLET, FILM COATED ORAL at 08:03

## 2021-11-18 RX ADMIN — HEPARIN SODIUM 5000 UNITS: 5000 INJECTION INTRAVENOUS; SUBCUTANEOUS at 21:13

## 2021-11-18 RX ADMIN — OXYCODONE HYDROCHLORIDE 10 MG: 10 TABLET ORAL at 05:23

## 2021-11-18 RX ADMIN — BACITRACIN, NEOMYCIN, POLYMYXIN B 1 SMALL APPLICATION: 400; 3.5; 5 OINTMENT TOPICAL at 08:03

## 2021-11-18 RX ADMIN — DOCUSATE SODIUM 100 MG: 100 CAPSULE ORAL at 17:15

## 2021-11-18 RX ADMIN — PANTOPRAZOLE SODIUM 40 MG: 40 TABLET, DELAYED RELEASE ORAL at 15:57

## 2021-11-18 RX ADMIN — ACETAMINOPHEN 650 MG: 325 TABLET, FILM COATED ORAL at 17:15

## 2021-11-18 RX ADMIN — HEPARIN SODIUM 5000 UNITS: 5000 INJECTION INTRAVENOUS; SUBCUTANEOUS at 06:05

## 2021-11-18 RX ADMIN — HEPARIN SODIUM 5000 UNITS: 5000 INJECTION INTRAVENOUS; SUBCUTANEOUS at 15:57

## 2021-11-18 RX ADMIN — PHENAZOPYRIDINE 200 MG: 100 TABLET ORAL at 15:57

## 2021-11-18 RX ADMIN — CEFAZOLIN SODIUM 2000 MG: 2 SOLUTION INTRAVENOUS at 00:11

## 2021-11-19 ENCOUNTER — APPOINTMENT (INPATIENT)
Dept: RADIOLOGY | Facility: HOSPITAL | Age: 56
DRG: 708 | End: 2021-11-19
Payer: COMMERCIAL

## 2021-11-19 LAB
ABO GROUP BLD BPU: NORMAL
ABO GROUP BLD BPU: NORMAL
ANION GAP SERPL CALCULATED.3IONS-SCNC: 7 MMOL/L (ref 4–13)
BPU ID: NORMAL
BPU ID: NORMAL
BUN SERPL-MCNC: 14 MG/DL (ref 5–25)
CALCIUM SERPL-MCNC: 8.5 MG/DL (ref 8.3–10.1)
CHLORIDE SERPL-SCNC: 104 MMOL/L (ref 100–108)
CO2 SERPL-SCNC: 25 MMOL/L (ref 21–32)
CREAT SERPL-MCNC: 1.12 MG/DL (ref 0.6–1.3)
CROSSMATCH: NORMAL
CROSSMATCH: NORMAL
ERYTHROCYTE [DISTWIDTH] IN BLOOD BY AUTOMATED COUNT: 14.4 % (ref 11.6–15.1)
GFR SERPL CREATININE-BSD FRML MDRD: 73 ML/MIN/1.73SQ M
GLUCOSE SERPL-MCNC: 93 MG/DL (ref 65–140)
HCT VFR BLD AUTO: 42.2 % (ref 36.5–49.3)
HGB BLD-MCNC: 14.2 G/DL (ref 12–17)
MAGNESIUM SERPL-MCNC: 1.7 MG/DL (ref 1.6–2.6)
MCH RBC QN AUTO: 31.3 PG (ref 26.8–34.3)
MCHC RBC AUTO-ENTMCNC: 33.6 G/DL (ref 31.4–37.4)
MCV RBC AUTO: 93 FL (ref 82–98)
PLATELET # BLD AUTO: 392 THOUSANDS/UL (ref 149–390)
PMV BLD AUTO: 10.1 FL (ref 8.9–12.7)
POTASSIUM SERPL-SCNC: 3.8 MMOL/L (ref 3.5–5.3)
RBC # BLD AUTO: 4.53 MILLION/UL (ref 3.88–5.62)
SODIUM SERPL-SCNC: 136 MMOL/L (ref 136–145)
UNIT DISPENSE STATUS: NORMAL
UNIT DISPENSE STATUS: NORMAL
UNIT PRODUCT CODE: NORMAL
UNIT PRODUCT CODE: NORMAL
UNIT PRODUCT VOLUME: 350 ML
UNIT PRODUCT VOLUME: 350 ML
UNIT RH: NORMAL
UNIT RH: NORMAL
WBC # BLD AUTO: 16.82 THOUSAND/UL (ref 4.31–10.16)

## 2021-11-19 PROCEDURE — 97166 OT EVAL MOD COMPLEX 45 MIN: CPT

## 2021-11-19 PROCEDURE — 71046 X-RAY EXAM CHEST 2 VIEWS: CPT

## 2021-11-19 PROCEDURE — 97116 GAIT TRAINING THERAPY: CPT

## 2021-11-19 PROCEDURE — 80048 BASIC METABOLIC PNL TOTAL CA: CPT | Performed by: PHYSICIAN ASSISTANT

## 2021-11-19 PROCEDURE — 83735 ASSAY OF MAGNESIUM: CPT | Performed by: PHYSICIAN ASSISTANT

## 2021-11-19 PROCEDURE — 97163 PT EVAL HIGH COMPLEX 45 MIN: CPT

## 2021-11-19 PROCEDURE — 99024 POSTOP FOLLOW-UP VISIT: CPT | Performed by: PHYSICIAN ASSISTANT

## 2021-11-19 PROCEDURE — 85027 COMPLETE CBC AUTOMATED: CPT | Performed by: PHYSICIAN ASSISTANT

## 2021-11-19 RX ORDER — FUROSEMIDE 20 MG/1
20 TABLET ORAL ONCE
Status: COMPLETED | OUTPATIENT
Start: 2021-11-19 | End: 2021-11-19

## 2021-11-19 RX ORDER — HEPARIN SODIUM 5000 [USP'U]/ML
5000 INJECTION, SOLUTION INTRAVENOUS; SUBCUTANEOUS EVERY 8 HOURS SCHEDULED
Status: DISCONTINUED | OUTPATIENT
Start: 2021-11-19 | End: 2021-11-21 | Stop reason: HOSPADM

## 2021-11-19 RX ORDER — BISACODYL 10 MG
10 SUPPOSITORY, RECTAL RECTAL ONCE
Status: COMPLETED | OUTPATIENT
Start: 2021-11-19 | End: 2021-11-19

## 2021-11-19 RX ORDER — KETOROLAC TROMETHAMINE 30 MG/ML
15 INJECTION, SOLUTION INTRAMUSCULAR; INTRAVENOUS EVERY 6 HOURS SCHEDULED
Status: COMPLETED | OUTPATIENT
Start: 2021-11-19 | End: 2021-11-21

## 2021-11-19 RX ADMIN — STANDARDIZED SENNA CONCENTRATE 8.6 MG: 8.6 TABLET ORAL at 10:01

## 2021-11-19 RX ADMIN — BACITRACIN, NEOMYCIN, POLYMYXIN B 1 SMALL APPLICATION: 400; 3.5; 5 OINTMENT TOPICAL at 10:01

## 2021-11-19 RX ADMIN — KETOROLAC TROMETHAMINE 15 MG: 30 INJECTION, SOLUTION INTRAMUSCULAR; INTRAVENOUS at 11:44

## 2021-11-19 RX ADMIN — BACITRACIN, NEOMYCIN, POLYMYXIN B 1 SMALL APPLICATION: 400; 3.5; 5 OINTMENT TOPICAL at 18:03

## 2021-11-19 RX ADMIN — FUROSEMIDE 20 MG: 20 TABLET ORAL at 00:34

## 2021-11-19 RX ADMIN — PHENAZOPYRIDINE 200 MG: 100 TABLET ORAL at 10:02

## 2021-11-19 RX ADMIN — KETOROLAC TROMETHAMINE 15 MG: 30 INJECTION, SOLUTION INTRAMUSCULAR; INTRAVENOUS at 17:44

## 2021-11-19 RX ADMIN — DOCUSATE SODIUM 100 MG: 100 CAPSULE ORAL at 10:01

## 2021-11-19 RX ADMIN — PANTOPRAZOLE SODIUM 40 MG: 40 TABLET, DELAYED RELEASE ORAL at 06:00

## 2021-11-19 RX ADMIN — HYDROMORPHONE HYDROCHLORIDE 0.5 MG: 1 INJECTION, SOLUTION INTRAMUSCULAR; INTRAVENOUS; SUBCUTANEOUS at 05:33

## 2021-11-19 RX ADMIN — OXYCODONE HYDROCHLORIDE 10 MG: 10 TABLET ORAL at 03:13

## 2021-11-19 RX ADMIN — ACETAMINOPHEN 650 MG: 325 TABLET, FILM COATED ORAL at 11:44

## 2021-11-19 RX ADMIN — HEPARIN SODIUM 5000 UNITS: 5000 INJECTION INTRAVENOUS; SUBCUTANEOUS at 05:32

## 2021-11-19 RX ADMIN — LOSARTAN POTASSIUM 50 MG: 50 TABLET, FILM COATED ORAL at 10:01

## 2021-11-19 RX ADMIN — ACETAMINOPHEN 650 MG: 325 TABLET, FILM COATED ORAL at 05:32

## 2021-11-19 RX ADMIN — ACETAMINOPHEN 650 MG: 325 TABLET, FILM COATED ORAL at 23:10

## 2021-11-19 RX ADMIN — KETOROLAC TROMETHAMINE 15 MG: 30 INJECTION, SOLUTION INTRAMUSCULAR; INTRAVENOUS at 23:10

## 2021-11-19 RX ADMIN — BISACODYL 10 MG: 10 SUPPOSITORY RECTAL at 15:47

## 2021-11-19 RX ADMIN — ATORVASTATIN CALCIUM 20 MG: 20 TABLET, FILM COATED ORAL at 10:01

## 2021-11-19 RX ADMIN — HEPARIN SODIUM 5000 UNITS: 5000 INJECTION INTRAVENOUS; SUBCUTANEOUS at 23:10

## 2021-11-19 RX ADMIN — DOCUSATE SODIUM 100 MG: 100 CAPSULE ORAL at 18:03

## 2021-11-19 RX ADMIN — HEPARIN SODIUM 5000 UNITS: 5000 INJECTION INTRAVENOUS; SUBCUTANEOUS at 15:47

## 2021-11-19 RX ADMIN — VERAPAMIL HYDROCHLORIDE 120 MG: 120 TABLET, FILM COATED, EXTENDED RELEASE ORAL at 14:00

## 2021-11-19 RX ADMIN — PANTOPRAZOLE SODIUM 40 MG: 40 TABLET, DELAYED RELEASE ORAL at 15:47

## 2021-11-19 RX ADMIN — ONDANSETRON 4 MG: 2 INJECTION INTRAMUSCULAR; INTRAVENOUS at 11:47

## 2021-11-19 RX ADMIN — ACETAMINOPHEN 650 MG: 325 TABLET, FILM COATED ORAL at 17:43

## 2021-11-20 LAB
ANION GAP SERPL CALCULATED.3IONS-SCNC: 4 MMOL/L (ref 4–13)
BUN SERPL-MCNC: 16 MG/DL (ref 5–25)
CALCIUM SERPL-MCNC: 8.7 MG/DL (ref 8.3–10.1)
CHLORIDE SERPL-SCNC: 101 MMOL/L (ref 100–108)
CO2 SERPL-SCNC: 28 MMOL/L (ref 21–32)
CREAT SERPL-MCNC: 1.14 MG/DL (ref 0.6–1.3)
ERYTHROCYTE [DISTWIDTH] IN BLOOD BY AUTOMATED COUNT: 14 % (ref 11.6–15.1)
GFR SERPL CREATININE-BSD FRML MDRD: 71 ML/MIN/1.73SQ M
GLUCOSE SERPL-MCNC: 73 MG/DL (ref 65–140)
HCT VFR BLD AUTO: 44.4 % (ref 36.5–49.3)
HGB BLD-MCNC: 14.4 G/DL (ref 12–17)
MAGNESIUM SERPL-MCNC: 2 MG/DL (ref 1.6–2.6)
MCH RBC QN AUTO: 30.6 PG (ref 26.8–34.3)
MCHC RBC AUTO-ENTMCNC: 32.4 G/DL (ref 31.4–37.4)
MCV RBC AUTO: 95 FL (ref 82–98)
PHOSPHATE SERPL-MCNC: 2.2 MG/DL (ref 2.7–4.5)
PLATELET # BLD AUTO: 433 THOUSANDS/UL (ref 149–390)
PMV BLD AUTO: 10 FL (ref 8.9–12.7)
POTASSIUM SERPL-SCNC: 3.6 MMOL/L (ref 3.5–5.3)
RBC # BLD AUTO: 4.7 MILLION/UL (ref 3.88–5.62)
SODIUM SERPL-SCNC: 133 MMOL/L (ref 136–145)
WBC # BLD AUTO: 11.39 THOUSAND/UL (ref 4.31–10.16)

## 2021-11-20 PROCEDURE — 84100 ASSAY OF PHOSPHORUS: CPT | Performed by: UROLOGY

## 2021-11-20 PROCEDURE — 80048 BASIC METABOLIC PNL TOTAL CA: CPT | Performed by: UROLOGY

## 2021-11-20 PROCEDURE — 83735 ASSAY OF MAGNESIUM: CPT | Performed by: UROLOGY

## 2021-11-20 PROCEDURE — 85027 COMPLETE CBC AUTOMATED: CPT | Performed by: UROLOGY

## 2021-11-20 PROCEDURE — 99024 POSTOP FOLLOW-UP VISIT: CPT | Performed by: UROLOGY

## 2021-11-20 RX ADMIN — KETOROLAC TROMETHAMINE 15 MG: 30 INJECTION, SOLUTION INTRAMUSCULAR; INTRAVENOUS at 05:27

## 2021-11-20 RX ADMIN — KETOROLAC TROMETHAMINE 15 MG: 30 INJECTION, SOLUTION INTRAMUSCULAR; INTRAVENOUS at 11:34

## 2021-11-20 RX ADMIN — PANTOPRAZOLE SODIUM 40 MG: 40 TABLET, DELAYED RELEASE ORAL at 17:42

## 2021-11-20 RX ADMIN — STANDARDIZED SENNA CONCENTRATE 8.6 MG: 8.6 TABLET ORAL at 09:53

## 2021-11-20 RX ADMIN — ACETAMINOPHEN 650 MG: 325 TABLET, FILM COATED ORAL at 17:42

## 2021-11-20 RX ADMIN — VERAPAMIL HYDROCHLORIDE 120 MG: 120 TABLET, FILM COATED, EXTENDED RELEASE ORAL at 09:54

## 2021-11-20 RX ADMIN — DOCUSATE SODIUM 100 MG: 100 CAPSULE ORAL at 17:42

## 2021-11-20 RX ADMIN — LOSARTAN POTASSIUM 50 MG: 50 TABLET, FILM COATED ORAL at 09:53

## 2021-11-20 RX ADMIN — ACETAMINOPHEN 650 MG: 325 TABLET, FILM COATED ORAL at 11:34

## 2021-11-20 RX ADMIN — BACITRACIN, NEOMYCIN, POLYMYXIN B 1 SMALL APPLICATION: 400; 3.5; 5 OINTMENT TOPICAL at 17:42

## 2021-11-20 RX ADMIN — HEPARIN SODIUM 5000 UNITS: 5000 INJECTION INTRAVENOUS; SUBCUTANEOUS at 15:31

## 2021-11-20 RX ADMIN — KETOROLAC TROMETHAMINE 15 MG: 30 INJECTION, SOLUTION INTRAMUSCULAR; INTRAVENOUS at 17:42

## 2021-11-20 RX ADMIN — ACETAMINOPHEN 650 MG: 325 TABLET, FILM COATED ORAL at 23:30

## 2021-11-20 RX ADMIN — ACETAMINOPHEN 650 MG: 325 TABLET, FILM COATED ORAL at 05:28

## 2021-11-20 RX ADMIN — ATORVASTATIN CALCIUM 20 MG: 20 TABLET, FILM COATED ORAL at 09:53

## 2021-11-20 RX ADMIN — KETOROLAC TROMETHAMINE 15 MG: 30 INJECTION, SOLUTION INTRAMUSCULAR; INTRAVENOUS at 23:30

## 2021-11-20 RX ADMIN — DOCUSATE SODIUM 100 MG: 100 CAPSULE ORAL at 09:53

## 2021-11-20 RX ADMIN — DIBASIC SODIUM PHOSPHATE, MONOBASIC POTASSIUM PHOSPHATE AND MONOBASIC SODIUM PHOSPHATE 1 TABLET: 852; 155; 130 TABLET ORAL at 11:33

## 2021-11-20 RX ADMIN — HEPARIN SODIUM 5000 UNITS: 5000 INJECTION INTRAVENOUS; SUBCUTANEOUS at 05:28

## 2021-11-20 RX ADMIN — BACITRACIN, NEOMYCIN, POLYMYXIN B 1 SMALL APPLICATION: 400; 3.5; 5 OINTMENT TOPICAL at 09:53

## 2021-11-20 RX ADMIN — HEPARIN SODIUM 5000 UNITS: 5000 INJECTION INTRAVENOUS; SUBCUTANEOUS at 21:40

## 2021-11-20 RX ADMIN — PANTOPRAZOLE SODIUM 40 MG: 40 TABLET, DELAYED RELEASE ORAL at 05:28

## 2021-11-21 VITALS
TEMPERATURE: 98 F | BODY MASS INDEX: 29.12 KG/M2 | HEIGHT: 72 IN | OXYGEN SATURATION: 92 % | RESPIRATION RATE: 18 BRPM | WEIGHT: 215 LBS | HEART RATE: 82 BPM | DIASTOLIC BLOOD PRESSURE: 90 MMHG | SYSTOLIC BLOOD PRESSURE: 135 MMHG

## 2021-11-21 PROCEDURE — 99024 POSTOP FOLLOW-UP VISIT: CPT | Performed by: UROLOGY

## 2021-11-21 PROCEDURE — NC001 PR NO CHARGE: Performed by: UROLOGY

## 2021-11-21 RX ORDER — OXYCODONE HYDROCHLORIDE 5 MG/1
5 TABLET ORAL ONCE
Status: COMPLETED | OUTPATIENT
Start: 2021-11-21 | End: 2021-11-21

## 2021-11-21 RX ADMIN — ACETAMINOPHEN 650 MG: 325 TABLET, FILM COATED ORAL at 06:00

## 2021-11-21 RX ADMIN — BACITRACIN, NEOMYCIN, POLYMYXIN B 1 SMALL APPLICATION: 400; 3.5; 5 OINTMENT TOPICAL at 08:48

## 2021-11-21 RX ADMIN — ATORVASTATIN CALCIUM 20 MG: 20 TABLET, FILM COATED ORAL at 08:47

## 2021-11-21 RX ADMIN — KETOROLAC TROMETHAMINE 15 MG: 30 INJECTION, SOLUTION INTRAMUSCULAR; INTRAVENOUS at 06:00

## 2021-11-21 RX ADMIN — STANDARDIZED SENNA CONCENTRATE 8.6 MG: 8.6 TABLET ORAL at 08:47

## 2021-11-21 RX ADMIN — DOCUSATE SODIUM 100 MG: 100 CAPSULE ORAL at 08:47

## 2021-11-21 RX ADMIN — PANTOPRAZOLE SODIUM 40 MG: 40 TABLET, DELAYED RELEASE ORAL at 06:00

## 2021-11-21 RX ADMIN — OXYCODONE HYDROCHLORIDE 5 MG: 5 TABLET ORAL at 09:05

## 2021-11-21 RX ADMIN — VERAPAMIL HYDROCHLORIDE 120 MG: 120 TABLET, FILM COATED, EXTENDED RELEASE ORAL at 08:47

## 2021-11-21 RX ADMIN — HEPARIN SODIUM 5000 UNITS: 5000 INJECTION INTRAVENOUS; SUBCUTANEOUS at 06:00

## 2021-11-21 RX ADMIN — LOSARTAN POTASSIUM 50 MG: 50 TABLET, FILM COATED ORAL at 08:47

## 2021-11-22 ENCOUNTER — TRANSITIONAL CARE MANAGEMENT (OUTPATIENT)
Dept: FAMILY MEDICINE CLINIC | Facility: CLINIC | Age: 56
End: 2021-11-22

## 2021-11-22 DIAGNOSIS — K22.70 BARRETT'S ESOPHAGUS WITHOUT DYSPLASIA: ICD-10-CM

## 2021-11-22 LAB
DME PARACHUTE DELIVERY DATE ACTUAL: NORMAL
DME PARACHUTE DELIVERY DATE ACTUAL: NORMAL
DME PARACHUTE DELIVERY DATE REQUESTED: NORMAL
DME PARACHUTE DELIVERY DATE REQUESTED: NORMAL
DME PARACHUTE ITEM DESCRIPTION: NORMAL
DME PARACHUTE ITEM DESCRIPTION: NORMAL
DME PARACHUTE ORDER STATUS: NORMAL
DME PARACHUTE ORDER STATUS: NORMAL
DME PARACHUTE SUPPLIER NAME: NORMAL
DME PARACHUTE SUPPLIER NAME: NORMAL
DME PARACHUTE SUPPLIER PHONE: NORMAL
DME PARACHUTE SUPPLIER PHONE: NORMAL

## 2021-11-23 ENCOUNTER — APPOINTMENT (OUTPATIENT)
Dept: PHYSICAL THERAPY | Facility: CLINIC | Age: 56
End: 2021-11-23
Payer: COMMERCIAL

## 2021-11-24 RX ORDER — PANTOPRAZOLE SODIUM 40 MG/1
TABLET, DELAYED RELEASE ORAL
Qty: 90 TABLET | Refills: 3 | Status: SHIPPED | OUTPATIENT
Start: 2021-11-24

## 2021-11-26 ENCOUNTER — OFFICE VISIT (OUTPATIENT)
Dept: FAMILY MEDICINE CLINIC | Facility: CLINIC | Age: 56
End: 2021-11-26
Payer: COMMERCIAL

## 2021-11-26 VITALS
WEIGHT: 202 LBS | HEIGHT: 72 IN | OXYGEN SATURATION: 98 % | HEART RATE: 85 BPM | RESPIRATION RATE: 16 BRPM | TEMPERATURE: 98 F | DIASTOLIC BLOOD PRESSURE: 84 MMHG | BODY MASS INDEX: 27.36 KG/M2 | SYSTOLIC BLOOD PRESSURE: 128 MMHG

## 2021-11-26 DIAGNOSIS — I10 ESSENTIAL HYPERTENSION: Primary | ICD-10-CM

## 2021-11-26 DIAGNOSIS — Z90.79 S/P PROSTATECTOMY: ICD-10-CM

## 2021-11-26 DIAGNOSIS — K21.9 GASTROESOPHAGEAL REFLUX DISEASE, UNSPECIFIED WHETHER ESOPHAGITIS PRESENT: ICD-10-CM

## 2021-11-26 DIAGNOSIS — E78.2 MIXED HYPERLIPIDEMIA: ICD-10-CM

## 2021-11-26 PROCEDURE — 3008F BODY MASS INDEX DOCD: CPT | Performed by: FAMILY MEDICINE

## 2021-11-26 PROCEDURE — 99496 TRANSJ CARE MGMT HIGH F2F 7D: CPT | Performed by: FAMILY MEDICINE

## 2021-11-26 PROCEDURE — 1111F DSCHRG MED/CURRENT MED MERGE: CPT | Performed by: FAMILY MEDICINE

## 2021-11-29 ENCOUNTER — TELEPHONE (OUTPATIENT)
Dept: UROLOGY | Facility: AMBULATORY SURGERY CENTER | Age: 56
End: 2021-11-29

## 2021-11-30 ENCOUNTER — OFFICE VISIT (OUTPATIENT)
Dept: PHYSICAL THERAPY | Facility: CLINIC | Age: 56
End: 2021-11-30
Payer: COMMERCIAL

## 2021-11-30 DIAGNOSIS — M54.12 LEFT CERVICAL RADICULOPATHY: Primary | ICD-10-CM

## 2021-11-30 PROCEDURE — 97110 THERAPEUTIC EXERCISES: CPT

## 2021-11-30 PROCEDURE — 97140 MANUAL THERAPY 1/> REGIONS: CPT

## 2021-12-01 ENCOUNTER — PROCEDURE VISIT (OUTPATIENT)
Dept: UROLOGY | Facility: CLINIC | Age: 56
End: 2021-12-01
Payer: COMMERCIAL

## 2021-12-01 VITALS
BODY MASS INDEX: 27.5 KG/M2 | WEIGHT: 203 LBS | SYSTOLIC BLOOD PRESSURE: 132 MMHG | DIASTOLIC BLOOD PRESSURE: 82 MMHG | HEIGHT: 72 IN

## 2021-12-01 DIAGNOSIS — N40.1 BENIGN PROSTATIC HYPERPLASIA WITH LOWER URINARY TRACT SYMPTOMS, SYMPTOM DETAILS UNSPECIFIED: Primary | ICD-10-CM

## 2021-12-01 LAB — POST-VOID RESIDUAL VOLUME, ML POC: 167 ML

## 2021-12-01 PROCEDURE — 51798 US URINE CAPACITY MEASURE: CPT

## 2021-12-01 PROCEDURE — 99024 POSTOP FOLLOW-UP VISIT: CPT | Performed by: UROLOGY

## 2021-12-02 ENCOUNTER — EVALUATION (OUTPATIENT)
Dept: PHYSICAL THERAPY | Facility: CLINIC | Age: 56
End: 2021-12-02
Payer: COMMERCIAL

## 2021-12-02 ENCOUNTER — TELEPHONE (OUTPATIENT)
Dept: UROLOGY | Facility: CLINIC | Age: 56
End: 2021-12-02

## 2021-12-02 DIAGNOSIS — M54.12 LEFT CERVICAL RADICULOPATHY: Primary | ICD-10-CM

## 2021-12-02 PROCEDURE — 97140 MANUAL THERAPY 1/> REGIONS: CPT

## 2021-12-02 PROCEDURE — 97110 THERAPEUTIC EXERCISES: CPT

## 2021-12-06 ENCOUNTER — OFFICE VISIT (OUTPATIENT)
Dept: PHYSICAL THERAPY | Facility: CLINIC | Age: 56
End: 2021-12-06
Payer: COMMERCIAL

## 2021-12-06 DIAGNOSIS — M54.12 LEFT CERVICAL RADICULOPATHY: Primary | ICD-10-CM

## 2021-12-06 PROCEDURE — 97110 THERAPEUTIC EXERCISES: CPT

## 2021-12-06 PROCEDURE — 97112 NEUROMUSCULAR REEDUCATION: CPT

## 2021-12-07 ENCOUNTER — TELEPHONE (OUTPATIENT)
Dept: FAMILY MEDICINE CLINIC | Facility: CLINIC | Age: 56
End: 2021-12-07

## 2021-12-08 ENCOUNTER — TELEPHONE (OUTPATIENT)
Dept: FAMILY MEDICINE CLINIC | Facility: CLINIC | Age: 56
End: 2021-12-08

## 2021-12-08 NOTE — TELEPHONE ENCOUNTER
Called pt, no answer on 12/7/21    ----- Message from [facility] sent at 12/6/2021  9:42 AM EST -----  Regarding: FW: Sick    ----- Message -----  From: Clifton Miller  Sent: 12/6/2021   6:04 AM EST  To: [facility]  Subject: Sick                                             I am not feeling well  I have a cold in my chest and my nose is all stuffy  I feel like its all in my head  I also have a cough

## 2021-12-09 ENCOUNTER — OFFICE VISIT (OUTPATIENT)
Dept: PHYSICAL THERAPY | Facility: CLINIC | Age: 56
End: 2021-12-09
Payer: COMMERCIAL

## 2021-12-09 DIAGNOSIS — M54.12 LEFT CERVICAL RADICULOPATHY: Primary | ICD-10-CM

## 2021-12-09 PROBLEM — N40.0 BPH (BENIGN PROSTATIC HYPERPLASIA): Status: ACTIVE | Noted: 2021-12-09

## 2021-12-09 PROCEDURE — 97112 NEUROMUSCULAR REEDUCATION: CPT

## 2021-12-09 PROCEDURE — 97110 THERAPEUTIC EXERCISES: CPT

## 2021-12-10 ENCOUNTER — OFFICE VISIT (OUTPATIENT)
Dept: UROLOGY | Facility: CLINIC | Age: 56
End: 2021-12-10

## 2021-12-10 VITALS
SYSTOLIC BLOOD PRESSURE: 120 MMHG | BODY MASS INDEX: 27.22 KG/M2 | HEIGHT: 72 IN | DIASTOLIC BLOOD PRESSURE: 88 MMHG | WEIGHT: 201 LBS

## 2021-12-10 DIAGNOSIS — K59.09 OTHER CONSTIPATION: ICD-10-CM

## 2021-12-10 DIAGNOSIS — N40.1 BENIGN LOCALIZED PROSTATIC HYPERPLASIA WITH LOWER URINARY TRACT SYMPTOMS (LUTS): Primary | ICD-10-CM

## 2021-12-10 PROCEDURE — 99024 POSTOP FOLLOW-UP VISIT: CPT | Performed by: UROLOGY

## 2021-12-10 RX ORDER — POLYETHYLENE GLYCOL 3350 17 G/17G
17 POWDER, FOR SOLUTION ORAL DAILY
Qty: 119 G | Refills: 1 | Status: SHIPPED | OUTPATIENT
Start: 2021-12-10 | End: 2022-06-10

## 2021-12-10 RX ORDER — DOCUSATE SODIUM 100 MG/1
100 CAPSULE, LIQUID FILLED ORAL 3 TIMES DAILY
Qty: 42 CAPSULE | Refills: 0 | Status: SHIPPED | OUTPATIENT
Start: 2021-12-10 | End: 2022-06-10

## 2021-12-14 ENCOUNTER — OFFICE VISIT (OUTPATIENT)
Dept: PHYSICAL THERAPY | Facility: CLINIC | Age: 56
End: 2021-12-14
Payer: COMMERCIAL

## 2021-12-14 DIAGNOSIS — M54.12 LEFT CERVICAL RADICULOPATHY: Primary | ICD-10-CM

## 2021-12-14 PROCEDURE — 97112 NEUROMUSCULAR REEDUCATION: CPT

## 2021-12-14 PROCEDURE — 97110 THERAPEUTIC EXERCISES: CPT

## 2021-12-14 PROCEDURE — 97140 MANUAL THERAPY 1/> REGIONS: CPT

## 2021-12-16 ENCOUNTER — OFFICE VISIT (OUTPATIENT)
Dept: PHYSICAL THERAPY | Facility: CLINIC | Age: 56
End: 2021-12-16
Payer: COMMERCIAL

## 2021-12-16 DIAGNOSIS — M54.12 LEFT CERVICAL RADICULOPATHY: Primary | ICD-10-CM

## 2021-12-16 PROCEDURE — 97110 THERAPEUTIC EXERCISES: CPT

## 2021-12-20 ENCOUNTER — HOSPITAL ENCOUNTER (OUTPATIENT)
Dept: RADIOLOGY | Facility: HOSPITAL | Age: 56
Discharge: HOME | End: 2021-12-20
Payer: COMMERCIAL

## 2021-12-20 DIAGNOSIS — M54.12 RADICULOPATHY, CERVICAL: ICD-10-CM

## 2021-12-20 PROCEDURE — G1004 CDSM NDSC: HCPCS

## 2021-12-20 PROCEDURE — 72141 MRI NECK SPINE W/O DYE: CPT

## 2021-12-29 ENCOUNTER — OFFICE VISIT (OUTPATIENT)
Dept: NEUROSURGERY | Facility: CLINIC | Age: 56
End: 2021-12-29
Payer: COMMERCIAL

## 2021-12-29 VITALS
HEIGHT: 72 IN | BODY MASS INDEX: 27.5 KG/M2 | TEMPERATURE: 98.2 F | DIASTOLIC BLOOD PRESSURE: 76 MMHG | SYSTOLIC BLOOD PRESSURE: 114 MMHG | RESPIRATION RATE: 18 BRPM | HEART RATE: 80 BPM | WEIGHT: 203 LBS

## 2021-12-29 DIAGNOSIS — M54.12 RADICULOPATHY, CERVICAL: Primary | ICD-10-CM

## 2021-12-29 PROCEDURE — 3074F SYST BP LT 130 MM HG: CPT | Performed by: NEUROLOGICAL SURGERY

## 2021-12-29 PROCEDURE — 99213 OFFICE O/P EST LOW 20 MIN: CPT | Performed by: NEUROLOGICAL SURGERY

## 2021-12-29 PROCEDURE — 3008F BODY MASS INDEX DOCD: CPT | Performed by: NEUROLOGICAL SURGERY

## 2021-12-29 PROCEDURE — 1036F TOBACCO NON-USER: CPT | Performed by: NEUROLOGICAL SURGERY

## 2021-12-29 PROCEDURE — 3078F DIAST BP <80 MM HG: CPT | Performed by: NEUROLOGICAL SURGERY

## 2022-02-04 DIAGNOSIS — R51.9 HEADACHE: ICD-10-CM

## 2022-02-07 ENCOUNTER — TELEPHONE (OUTPATIENT)
Dept: HEMATOLOGY ONCOLOGY | Facility: MEDICAL CENTER | Age: 57
End: 2022-02-07

## 2022-02-07 NOTE — TELEPHONE ENCOUNTER
Left lab reminder for appt     Made patient aware that appt will now be virtual with [facility]

## 2022-02-08 ENCOUNTER — APPOINTMENT (OUTPATIENT)
Dept: LAB | Facility: HOSPITAL | Age: 57
End: 2022-02-08
Payer: COMMERCIAL

## 2022-02-08 DIAGNOSIS — Z86.39 HISTORY OF IRON DEFICIENCY: ICD-10-CM

## 2022-02-08 DIAGNOSIS — D50.0 CHRONIC BLOOD LOSS ANEMIA: ICD-10-CM

## 2022-02-08 LAB
BASOPHILS # BLD AUTO: 0.16 THOUSANDS/ΜL (ref 0–0.1)
BASOPHILS NFR BLD AUTO: 2 % (ref 0–1)
EOSINOPHIL # BLD AUTO: 0.57 THOUSAND/ΜL (ref 0–0.61)
EOSINOPHIL NFR BLD AUTO: 7 % (ref 0–6)
ERYTHROCYTE [DISTWIDTH] IN BLOOD BY AUTOMATED COUNT: 14 % (ref 11.6–15.1)
FERRITIN SERPL-MCNC: 91 NG/ML (ref 8–388)
HCT VFR BLD AUTO: 49.6 % (ref 36.5–49.3)
HGB BLD-MCNC: 16.5 G/DL (ref 12–17)
IMM GRANULOCYTES # BLD AUTO: 0.04 THOUSAND/UL (ref 0–0.2)
IMM GRANULOCYTES NFR BLD AUTO: 1 % (ref 0–2)
IRON SATN MFR SERPL: 34 % (ref 20–50)
IRON SERPL-MCNC: 123 UG/DL (ref 65–175)
LYMPHOCYTES # BLD AUTO: 2.98 THOUSANDS/ΜL (ref 0.6–4.47)
LYMPHOCYTES NFR BLD AUTO: 35 % (ref 14–44)
MCH RBC QN AUTO: 31 PG (ref 26.8–34.3)
MCHC RBC AUTO-ENTMCNC: 33.3 G/DL (ref 31.4–37.4)
MCV RBC AUTO: 93 FL (ref 82–98)
MONOCYTES # BLD AUTO: 1.31 THOUSAND/ΜL (ref 0.17–1.22)
MONOCYTES NFR BLD AUTO: 16 % (ref 4–12)
NEUTROPHILS # BLD AUTO: 3.35 THOUSANDS/ΜL (ref 1.85–7.62)
NEUTS SEG NFR BLD AUTO: 39 % (ref 43–75)
NRBC BLD AUTO-RTO: 0 /100 WBCS
PLATELET # BLD AUTO: 416 THOUSANDS/UL (ref 149–390)
PMV BLD AUTO: 9.2 FL (ref 8.9–12.7)
RBC # BLD AUTO: 5.32 MILLION/UL (ref 3.88–5.62)
TIBC SERPL-MCNC: 364 UG/DL (ref 250–450)
WBC # BLD AUTO: 8.41 THOUSAND/UL (ref 4.31–10.16)

## 2022-02-08 PROCEDURE — 82728 ASSAY OF FERRITIN: CPT

## 2022-02-08 PROCEDURE — 36415 COLL VENOUS BLD VENIPUNCTURE: CPT

## 2022-02-08 PROCEDURE — 83540 ASSAY OF IRON: CPT

## 2022-02-08 PROCEDURE — 85025 COMPLETE CBC W/AUTO DIFF WBC: CPT

## 2022-02-08 PROCEDURE — 83550 IRON BINDING TEST: CPT

## 2022-02-10 ENCOUNTER — TELEMEDICINE (OUTPATIENT)
Dept: HEMATOLOGY ONCOLOGY | Facility: MEDICAL CENTER | Age: 57
End: 2022-02-10
Payer: COMMERCIAL

## 2022-02-10 DIAGNOSIS — Z90.81 S/P SPLENECTOMY: ICD-10-CM

## 2022-02-10 DIAGNOSIS — Z86.39 HISTORY OF IRON DEFICIENCY: ICD-10-CM

## 2022-02-10 DIAGNOSIS — D50.0 CHRONIC BLOOD LOSS ANEMIA: Primary | ICD-10-CM

## 2022-02-10 PROCEDURE — 99215 OFFICE O/P EST HI 40 MIN: CPT | Performed by: PHYSICIAN ASSISTANT

## 2022-02-10 PROCEDURE — 1036F TOBACCO NON-USER: CPT | Performed by: PHYSICIAN ASSISTANT

## 2022-02-10 NOTE — PROGRESS NOTES
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 in intermittent GI bleeding  Patient notes that over the several weeks he has had more blood-loss be is stool  Patient's hemoglobin remains stable however iron levels are starting to decrease  I have advised the patient to follow-up in one month with CBC  If blood work continues to decrease, IV iron may be necessary to avoid blood transfusion  I have encouraged the patient to follow-up with GI since he is having increase blood loss  I offered to make the patient the appointment with GI however he declined and will follow-up with them today  Follow up:  · CBC in 1 month  · CBC and Iron panel in 3 months    3  S/P splenectomy  Thrombocytosis is present secondary to splenectomy  This remains stable in the 400,000 per unit L range  Patient's white blood cell differential is also slightly abnormal with was likely secondary to splenectomy  Will continue with observation  Follow-up recommended in approximately three months with appointment but one month with blood work follow-up  Patient voiced agreement and understanding to the above  Patient knows to call the Hematology/Oncology office with any questions and concerns regarding the above  Barrier(s) to care: None  The patient is  able to self care  Laura Kellogg PA-C  Medical Oncology/Hematology  [address]  _____________________________________________________________________    The patient was identified by name and date of birth    Ward Donovan  was informed that this is a telemedicine visit and that the visit is being conducted through [facility] and patient was informed this is a secure, HIPAA-complaint platform  He agrees to proceed  My office door was closed  Other methods to assure confidentiality were taken; home visit today- no others around  No one else was in the room  He acknowledged consent and understanding of privacy and security of the video platform  The patient has agreed to participate and understands they can discontinue the visit at any time  Patient's location was verified, [place]  I hold a valid medical license in the [address]  Subjective      Chief Complaint   Patient presents with    Follow-up     no concerns        History of present illness:    This is a 58-year-old male who was admitted to the emergency room after having several weeks of shortness of breath, three episodes of syncope at home in January 2021   Patient's past medical history significant for 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      Patient had been previously worked up for abnormalities by primary care doctor who recommended that the patient follow-up with GI as well as with Cardiology for a stress test   Unfortunately, patient's symptoms progressed and he presented to the emergency room  [address] [place] [address] work demonstrated hemoglobin in the 7 gram/deciliter range   Moreover, the patient was found to have a significant iron deficiency with a ferritin of three and an iron saturation of 1%   Patient underwent three Venofer infusions daily in the hospital   Patient's hemoglobin is in the 7 gram/deciliter range        GI work up in 1/2021-2/2021:  GI has seen the patient in the hospital  [facility] that demonstrated questionable AVM in the small intestine   This area was cauterized   This area was not bleeding   Capsule demonstrated angiectasis and follow up EGD- is planned on 2/26/2021   This procedure did not demonstrate any signs of active bleeding      Patient completed the remainder of Venofer treatments started in the hospital in February 2021 03/23/21:    Patient notes overall feeling well  [employer] work was reviewed   Patient's iron saturation increased to 7% and ferritin at 41   Hemoglobin has completely supplemented however, MCV is still low   Patient's platelet count is still elevated however this may be secondary to splenectomy and not necessarily to iron deficiency    Patient has completed COVID vaccinations      3/30/21:   Ferritin= 23, iron saturation = seven, TIBC 430,  Hemoglobin = 12 4, MCV 80,  Platelet count = 132,  PSA = 3 4     4/9-5/7/21:  5 Venofer 300 mg IV weekly      05/03/21:  Urgent follow up; [facility] on 5/1/2021, ED visit, hemoglobin stable   No bleeding today  1 more IV iron treatment to go  Iron studies requestion on labs from 5/1/21:iron sat supratheraputic       5/12/21:  Colonoscopy was negative for signs of active bleeding   Hemorrhoidal bleeding to blame for bright red blood per rectum   Scope was advanced past the descending colon due to patient's   CT colonoscopy diagnostic without contrast did not demonstrate any lesions      06/03/21:  Patient is feeling better   No more GI bleeding   Hemoglobin = 15 1, platelet count 990 oral seven, MCV = 86, ferritin = 452, iron saturation = 30%, TIBC = 336     8/27/21:    Labs taken every four weeks demonstrate stability   No GI bleeding   Patient feels well   Hemoglobin = 15 1, platelet count = 823, MCV = 93,  Iron saturation = 33, TIBC 324,  Ferritin = 155     11/1/2021: Feels well  1- 3 day episode of Melena + Hematochezia  Hemoglobin = 17 1, platelet count = 416, MCV = 96,  Iron saturation = 38, TIBC 344,  Ferritin = 149    2/8/2022:  WBC = 8 4, hemoglobin = 15 5, hematocrit = 49 6, platelet count = 041,  iron saturation = 34%, TIBC 364    Interval history  02/10/22:  Patient reports having significant blood in his stool including dark, melena as well as hematochezia    Patient has not made an appointment with GI doctor  Discussed decreasing ferritin levels  Review of Systems   Constitutional: Negative for activity change, appetite change, fatigue and fever  HENT: Negative for nosebleeds  Respiratory: Negative for cough, choking and shortness of breath  Cardiovascular: Negative for chest pain, palpitations and leg swelling  Gastrointestinal: Positive for anal bleeding and blood in stool  Negative for abdominal distention, abdominal pain, constipation, diarrhea, nausea and vomiting  Endocrine: Negative for cold intolerance  Genitourinary: Negative for hematuria  Musculoskeletal: Negative for myalgias  Skin: Negative for color change, pallor and rash  Allergic/Immunologic: Negative for immunocompromised state  Neurological: Negative for headaches  Hematological: Negative for adenopathy  Does not bruise/bleed easily  All other systems reviewed and are negative        Past Medical History:   Diagnosis Date    Anemia     Cancer [facility])     prostate    Colon polyp     Elevated PSA     Full dentures     GERD (gastroesophageal reflux disease)     Hemorrhoid     Hyperlipidemia     Sleep apnea     No CPAP    Smoker     TIA (transient ischemic attack)     Transfusion history     Wears glasses      Past Surgical History:   Procedure Laterality Date    ABDOMINAL ADHESION SURGERY N/A 11/17/2021    Procedure: LYSIS ADHESIONS LAPAROSCOPIC [place] De;  Surgeon: Frank Lyon MD;  [facility];  Service: Urology    APPENDECTOMY      COLONOSCOPY      EGD      FOOT SURGERY Right     bone removed    OTHER SURGICAL HISTORY      bone removal right arm-abnormal growth of surface bone    PROSTATE BIOPSY      PROSTATECTOMY N/A 11/17/2021    Procedure: ROBOTIC ASSISTED LAPAROSCOPIC SIMPLE PROSTATECTOMY AND BLADDER NECK RECONSTRUCTION;  Surgeon: Frank Lyon MD;  [facility];  Service: Urology    SPLENECTOMY      ruptured     Family History   Problem Relation Age of Onset    Dementia Father     Heart disease Mother     Hypertension Mother     Hyperlipidemia Mother     Hypertension Sister     Hypertension Brother     No Known Problems Daughter     No Known Problems Son     Cancer Paternal Grandfather         prostate    No Known Problems Son      Social History     Socioeconomic History    Marital status:      Spouse name: None    Number of children: None    Years of education: None    Highest education level: None   Occupational History    Occupation: supervisor     Employer: [employer]   Tobacco Use    Smoking status: Former Smoker     Packs/day: 0 50     Years: 10 00     Pack years: 5 00     Types: Cigarettes     Quit date: 2021     Years since quittin 9    Smokeless tobacco: Never Used   Vaping Use    Vaping Use: Never used   Substance and Sexual Activity    Alcohol use: Not Currently    Drug use: No    Sexual activity: None   Other Topics Concern    None   Social History Narrative    None     Social Determinants of Health     Financial Resource Strain: Not on file   Food Insecurity: Not on file   Transportation Needs: Not on file   Physical Activity: Not on file   Stress: Not on file   Social Connections: Not on file   Intimate Partner Violence: Not on file   Housing Stability: Not on file         Current Outpatient Medications:     acetaminophen (TYLENOL) 325 mg tablet, Take 2 tablets (650 mg total) by mouth every 6 (six) hours as needed for mild pain, headaches or fever, Disp: 30 tablet, Rfl: 0    atorvastatin (LIPITOR) 20 mg tablet, Take 1 tablet (20 mg total) by mouth daily, Disp: 90 tablet, Rfl: 3    losartan (COZAAR) 50 mg tablet, Take 1 tablet (50 mg total) by mouth daily, Disp: 90 tablet, Rfl: 1    omeprazole (PriLOSEC OTC) 20 MG tablet, Take 1 tablet (20 mg total) by mouth daily, Disp: 90 tablet, Rfl: 2    pantoprazole (PROTONIX) 40 mg tablet, TAKE 1 TABLET (40 MG TOTAL) BY MOUTH DAILY 30-60 MIN BEFORE BREAKFAST, Disp: 90 tablet, Rfl: 3    docusate sodium (COLACE) 100 mg capsule, Take 1 capsule (100 mg total) by mouth 3 (three) times a day for 14 days, Disp: 42 capsule, Rfl: 0    polyethylene glycol (MIRALAX) 17 g packet, Take 17 g by mouth daily for 7 days, Disp: 119 g, Rfl: 1    verapamil (CALAN-SR) 120 mg CR tablet, TAKE 1 TABLET BY MOUTH EVERY DAY (Patient not taking: Reported on 2/10/2022), Disp: 90 tablet, Rfl: 1  No Known Allergies    Objective    There were no vitals taken for this visit  Physical Exam  Constitutional:       General: He is not in acute distress  Appearance: He is well-developed  HENT:      Head: Normocephalic and atraumatic  Right Ear: External ear normal       Left Ear: External ear normal       Nose: Nose normal    Eyes:      General: No scleral icterus  Conjunctiva/sclera: Conjunctivae normal    Pulmonary:      Effort: No respiratory distress  Abdominal:      General: There is no distension  Palpations: Abdomen is soft  Skin:     Findings: No rash (on exposed skin  )  Neurological:      Mental Status: He is alert and oriented to person, place, and time  Psychiatric:         Thought Content:  Thought content normal          Result Review  Labs:  Appointment on 02/08/2022   Component Date Value Ref Range Status    WBC 02/08/2022 8 41  4 31 - 10 16 Thousand/uL Final    RBC 02/08/2022 5 32  3 88 - 5 62 Million/uL Final    Hemoglobin 02/08/2022 16 5  12 0 - 17 0 g/dL Final    Hematocrit 02/08/2022 49 6* 36 5 - 49 3 % Final    MCV 02/08/2022 93  82 - 98 fL Final    MCH 02/08/2022 31 0  26 8 - 34 3 pg Final    MCHC 02/08/2022 33 3  31 4 - 37 4 g/dL Final    RDW 02/08/2022 14 0  11 6 - 15 1 % Final    MPV 02/08/2022 9 2  8 9 - 12 7 fL Final    Platelets 95/49/1815 416* 149 - 390 Thousands/uL Final    nRBC 02/08/2022 0  /100 WBCs Final    Neutrophils Relative 02/08/2022 39* 43 - 75 % Final    Immat GRANS % 02/08/2022 1  0 - 2 % Final    Lymphocytes Relative 02/08/2022 35  14 - 44 % Final    Monocytes Relative 02/08/2022 16* 4 - 12 % Final    Eosinophils Relative 02/08/2022 7* 0 - 6 % Final    Basophils Relative 02/08/2022 2* 0 - 1 % Final    Neutrophils Absolute 02/08/2022 3 35  1 85 - 7 62 Thousands/µL Final    Immature Grans Absolute 02/08/2022 0 04  0 00 - 0 20 Thousand/uL Final    Lymphocytes Absolute 02/08/2022 2 98  0 60 - 4 47 Thousands/µL Final    Monocytes Absolute 02/08/2022 1 31* 0 17 - 1 22 Thousand/µL Final    Eosinophils Absolute 02/08/2022 0 57  0 00 - 0 61 Thousand/µL Final    Basophils Absolute 02/08/2022 0 16* 0 00 - 0 10 Thousands/µL Final    Iron Saturation 02/08/2022 34  20 - 50 % Final    TIBC 02/08/2022 364  250 - 450 ug/dL Final    Iron 02/08/2022 123  65 - 175 ug/dL Final    Patients treated with metal-binding drugs (ie  Deferoxamine) may have depressed iron values   Ferritin 02/08/2022 91  8 - 388 ng/mL Final       Please note: This report has been generated by a voice recognition software system  Therefore there may be syntax, spelling, and/or grammatical errors  Please call if you have any questions  VIRTUAL VISIT DISCLAIMER    Randall Quevedo acknowledges that he has consented to an online visit or consultation  he understands that the online visit is based solely on information provided by him, and that, in the absence of a face-to-face physical evaluation by the physician, the diagnosis he receives is both limited and provisional in terms of accuracy and completeness  This is not intended to replace a full medical face-to-face evaluation by the physician  Randall Quevedo understands and accepts these terms

## 2022-03-03 DIAGNOSIS — E78.2 MIXED HYPERLIPIDEMIA: ICD-10-CM

## 2022-03-07 ENCOUNTER — APPOINTMENT (OUTPATIENT)
Dept: LAB | Facility: HOSPITAL | Age: 57
End: 2022-03-07
Payer: COMMERCIAL

## 2022-03-07 DIAGNOSIS — D50.0 CHRONIC BLOOD LOSS ANEMIA: ICD-10-CM

## 2022-03-07 DIAGNOSIS — Z90.81 S/P SPLENECTOMY: ICD-10-CM

## 2022-03-07 DIAGNOSIS — Z86.39 HISTORY OF IRON DEFICIENCY: ICD-10-CM

## 2022-03-07 LAB
BASOPHILS # BLD AUTO: 0.16 THOUSANDS/ΜL (ref 0–0.1)
BASOPHILS NFR BLD AUTO: 2 % (ref 0–1)
EOSINOPHIL # BLD AUTO: 0.58 THOUSAND/ΜL (ref 0–0.61)
EOSINOPHIL NFR BLD AUTO: 7 % (ref 0–6)
ERYTHROCYTE [DISTWIDTH] IN BLOOD BY AUTOMATED COUNT: 14.2 % (ref 11.6–15.1)
HCT VFR BLD AUTO: 51.6 % (ref 36.5–49.3)
HGB BLD-MCNC: 16.9 G/DL (ref 12–17)
IMM GRANULOCYTES # BLD AUTO: 0.05 THOUSAND/UL (ref 0–0.2)
IMM GRANULOCYTES NFR BLD AUTO: 1 % (ref 0–2)
LYMPHOCYTES # BLD AUTO: 3.65 THOUSANDS/ΜL (ref 0.6–4.47)
LYMPHOCYTES NFR BLD AUTO: 40 % (ref 14–44)
MCH RBC QN AUTO: 30.5 PG (ref 26.8–34.3)
MCHC RBC AUTO-ENTMCNC: 32.8 G/DL (ref 31.4–37.4)
MCV RBC AUTO: 93 FL (ref 82–98)
MONOCYTES # BLD AUTO: 1.1 THOUSAND/ΜL (ref 0.17–1.22)
MONOCYTES NFR BLD AUTO: 12 % (ref 4–12)
NEUTROPHILS # BLD AUTO: 3.37 THOUSANDS/ΜL (ref 1.85–7.62)
NEUTS SEG NFR BLD AUTO: 38 % (ref 43–75)
NRBC BLD AUTO-RTO: 0 /100 WBCS
PLATELET # BLD AUTO: 433 THOUSANDS/UL (ref 149–390)
PMV BLD AUTO: 9.6 FL (ref 8.9–12.7)
RBC # BLD AUTO: 5.55 MILLION/UL (ref 3.88–5.62)
WBC # BLD AUTO: 8.91 THOUSAND/UL (ref 4.31–10.16)

## 2022-03-07 PROCEDURE — 36415 COLL VENOUS BLD VENIPUNCTURE: CPT

## 2022-03-07 PROCEDURE — 85025 COMPLETE CBC W/AUTO DIFF WBC: CPT

## 2022-03-08 RX ORDER — ATORVASTATIN CALCIUM 20 MG/1
TABLET, FILM COATED ORAL
Qty: 90 TABLET | Refills: 3 | Status: SHIPPED | OUTPATIENT
Start: 2022-03-08 | End: 2022-05-04 | Stop reason: SDUPTHER

## 2022-03-30 DIAGNOSIS — I10 ESSENTIAL HYPERTENSION: ICD-10-CM

## 2022-03-30 RX ORDER — LOSARTAN POTASSIUM 50 MG/1
TABLET ORAL
Qty: 90 TABLET | Refills: 1 | Status: SHIPPED | OUTPATIENT
Start: 2022-03-30

## 2022-04-30 ENCOUNTER — HOSPITAL ENCOUNTER (EMERGENCY)
Facility: HOSPITAL | Age: 57
Discharge: HOME | End: 2022-04-30
Attending: EMERGENCY MEDICINE
Payer: COMMERCIAL

## 2022-04-30 ENCOUNTER — APPOINTMENT (EMERGENCY)
Dept: RADIOLOGY | Facility: HOSPITAL | Age: 57
End: 2022-04-30
Payer: COMMERCIAL

## 2022-04-30 VITALS
TEMPERATURE: 97.2 F | WEIGHT: 207.67 LBS | SYSTOLIC BLOOD PRESSURE: 143 MMHG | DIASTOLIC BLOOD PRESSURE: 92 MMHG | OXYGEN SATURATION: 99 % | BODY MASS INDEX: 28.17 KG/M2 | HEART RATE: 86 BPM | RESPIRATION RATE: 16 BRPM

## 2022-04-30 DIAGNOSIS — M25.561 ACUTE PAIN OF RIGHT KNEE: Primary | ICD-10-CM

## 2022-04-30 PROCEDURE — 99283 EMERGENCY DEPT VISIT LOW MDM: CPT

## 2022-04-30 PROCEDURE — 96372 THER/PROPH/DIAG INJ SC/IM: CPT

## 2022-04-30 PROCEDURE — 99284 EMERGENCY DEPT VISIT MOD MDM: CPT | Performed by: PHYSICIAN ASSISTANT

## 2022-04-30 PROCEDURE — 73564 X-RAY EXAM KNEE 4 OR MORE: CPT

## 2022-04-30 RX ORDER — PREDNISONE 20 MG/1
60 TABLET ORAL DAILY
Qty: 15 TABLET | Refills: 0 | Status: SHIPPED | OUTPATIENT
Start: 2022-04-30 | End: 2022-05-05

## 2022-04-30 RX ORDER — KETOROLAC TROMETHAMINE 30 MG/ML
15 INJECTION, SOLUTION INTRAMUSCULAR; INTRAVENOUS ONCE
Status: COMPLETED | OUTPATIENT
Start: 2022-04-30 | End: 2022-04-30

## 2022-04-30 RX ADMIN — KETOROLAC TROMETHAMINE 15 MG: 30 INJECTION, SOLUTION INTRAMUSCULAR at 13:11

## 2022-04-30 NOTE — Clinical Note
Paul Lei was seen and treated in our emergency department on 4/30/2022  light duty - standing and ambulating as tolerated until cleared by family doctor or orthopedics    Diagnosis: Right knee pain    Vanesa Tapia  may return to work on return date  He may return on this date: 05/01/2022         If you have any questions or concerns, please don't hesitate to call        Ton Antony PA-C    ______________________________           _______________          _______________  Hospital Representative                              Date                                Time

## 2022-04-30 NOTE — ED PROVIDER NOTES
History  Chief Complaint   Patient presents with    Knee Pain     R knee swelling pain and cracking since yesterday  no known trauma     Patient is a 76-year-old male with past medical history significant for prostate cancer, GERD, previous admission for symptomatic anemia, previous TIA, multiple abdominal surgeries including MATTHEW and prostatectomy, former smoker who presents today for evaluation of right knee pain and swelling  Patient states the knee made a cracking noise and became more swollen since yesterday  Patient denies trauma  He states he has had similar episodes in the past and previously had to have fluid taken off of his knee for effusion  He specifically denies constitutional symptoms including fever, chills, nausea, vomiting, rash or erythema  History provided by:  Patient  Knee Pain  Location:  Knee  Time since incident:  24 hours  Injury: no    Knee location:  R knee  Pain details:     Quality:  Aching, throbbing and dull    Radiates to:  Does not radiate    Severity:  Moderate    Onset quality:  Gradual    Duration:  24 hours    Timing:  Constant    Progression:  Worsening  Chronicity:  New  Dislocation: no    Prior injury to area:  No  Relieved by:  Nothing  Worsened by: Activity, bearing weight and exercise  Ineffective treatments:  Acetaminophen and arthritis medication  Associated symptoms: decreased ROM, stiffness and swelling    Associated symptoms: no back pain, no fever and no muscle weakness    Swelling:     Location:  Leg    Onset quality:  Gradual    Duration:  24 hours    Timing:  Constant    Progression:  Worsening    Chronicity:  New  Risk factors: no concern for non-accidental trauma, no frequent fractures, no known bone disorder, no obesity and no recent illness        Prior to Admission Medications   Prescriptions Last Dose Informant Patient Reported?  Taking?   acetaminophen (TYLENOL) 325 mg tablet  Self No No   Sig: Take 2 tablets (650 mg total) by mouth every 6 (six) hours as needed for mild pain, headaches or fever   atorvastatin (LIPITOR) 20 mg tablet   No No   Sig: TAKE 1 TABLET BY MOUTH EVERY DAY   docusate sodium (COLACE) 100 mg capsule   No No   Sig: Take 1 capsule (100 mg total) by mouth 3 (three) times a day for 14 days   losartan (COZAAR) 50 mg tablet   No No   Sig: TAKE 1 TABLET BY MOUTH EVERY DAY   omeprazole (PriLOSEC OTC) 20 MG tablet  Self No No   Sig: Take 1 tablet (20 mg total) by mouth daily   pantoprazole (PROTONIX) 40 mg tablet  Self No No   Sig: TAKE 1 TABLET (40 MG TOTAL) BY MOUTH DAILY 30-60 MIN BEFORE BREAKFAST   polyethylene glycol (MIRALAX) 17 g packet   No No   Sig: Take 17 g by mouth daily for 7 days   verapamil (CALAN-SR) 120 mg CR tablet   No No   Sig: TAKE 1 TABLET BY MOUTH EVERY DAY   Patient not taking: Reported on 2/10/2022      Facility-Administered Medications: None       Past Medical History:   Diagnosis Date    Anemia     Cancer (HCC)     prostate    Colon polyp     Elevated PSA     Full dentures     GERD (gastroesophageal reflux disease)     Hemorrhoid     Hyperlipidemia     Sleep apnea     No CPAP    Smoker     TIA (transient ischemic attack)     Transfusion history     Wears glasses        Past Surgical History:   Procedure Laterality Date    ABDOMINAL ADHESION SURGERY N/A 11/17/2021    Procedure: LYSIS ADHESIONS LAPAROSCOPIC W ROBOT;  Surgeon: Idania Glass MD;  Location: [facility];  Service: Urology    APPENDECTOMY      COLONOSCOPY      EGD      FOOT SURGERY Right     bone removed    OTHER SURGICAL HISTORY      bone removal right arm-abnormal growth of surface bone    PROSTATE BIOPSY      PROSTATECTOMY N/A 11/17/2021    Procedure: ROBOTIC ASSISTED LAPAROSCOPIC SIMPLE PROSTATECTOMY AND BLADDER NECK RECONSTRUCTION;  Surgeon: Idania Glass MD;  Location: [facility];  Service: Urology    SPLENECTOMY      ruptured       Family History   Problem Relation Age of Onset    Dementia Father     Heart disease Mother  Hypertension Mother     Hyperlipidemia Mother     Hypertension Sister     Hypertension Brother     No Known Problems Daughter     No Known Problems Son     Cancer Paternal Grandfather         prostate    No Known Problems Son      I have reviewed and agree with the history as documented  E-Cigarette/Vaping    E-Cigarette Use Never User      E-Cigarette/Vaping Substances    Nicotine No     THC No     CBD No     Flavoring No     Other No     Unknown No      Social History     Tobacco Use    Smoking status: Former Smoker     Packs/day: 0 50     Years: 10 00     Pack years: 5 00     Types: Cigarettes     Quit date: 2021     Years since quittin 1    Smokeless tobacco: Never Used   Vaping Use    Vaping Use: Never used   Substance Use Topics    Alcohol use: Not Currently    Drug use: No       Review of Systems   Constitutional: Negative for chills and fever  HENT: Negative for ear pain and sore throat  Eyes: Negative for pain and visual disturbance  Respiratory: Negative for cough and shortness of breath  Cardiovascular: Negative for chest pain and palpitations  Gastrointestinal: Negative for abdominal pain and vomiting  Endocrine: Negative  Genitourinary: Negative for dysuria and hematuria  Musculoskeletal: Positive for arthralgias, gait problem, joint swelling and stiffness  Negative for back pain  Skin: Negative for color change and rash  Allergic/Immunologic: Negative  Neurological: Negative for seizures and syncope  Hematological: Bruises/bleeds easily  Psychiatric/Behavioral: Negative  All other systems reviewed and are negative  Physical Exam  Physical Exam  Vitals and nursing note reviewed  Constitutional:       General: He is not in acute distress  Appearance: He is well-developed  He is not toxic-appearing  HENT:      Head: Normocephalic and atraumatic     Eyes:      Conjunctiva/sclera: Conjunctivae normal    Cardiovascular:      Rate and Rhythm: Normal rate and regular rhythm  Heart sounds: No murmur heard  Pulmonary:      Effort: Pulmonary effort is normal  No respiratory distress  Breath sounds: Normal breath sounds  Abdominal:      Palpations: Abdomen is soft  Tenderness: There is no abdominal tenderness  Comments: Multiple surgical scars  Mild rounding   Musculoskeletal:      Cervical back: Neck supple  Right knee: Swelling, effusion and crepitus present  No erythema or bony tenderness  Decreased range of motion  Tenderness present over the medial joint line and lateral joint line  Normal pulse  Left knee: Normal    Skin:     General: Skin is warm and dry  Neurological:      Mental Status: He is alert           Vital Signs  ED Triage Vitals [04/30/22 1157]   Temperature Pulse Respirations Blood Pressure SpO2   (!) 97 2 °F (36 2 °C) 86 16 143/92 99 %      Temp Source Heart Rate Source Patient Position - Orthostatic VS BP Location FiO2 (%)   Tympanic Monitor Sitting Right arm --      Pain Score       9           Vitals:    04/30/22 1157   BP: 143/92   Pulse: 86   Patient Position - Orthostatic VS: Sitting         Visual Acuity      ED Medications  Medications   ketorolac (TORADOL) injection 15 mg (15 mg Intramuscular Given 4/30/22 1311)       Diagnostic Studies  Results Reviewed     None                 XR knee 4+ vw right injury    (Results Pending)              Procedures  Procedures         ED Course                                             MDM  Number of Diagnoses or Management Options  Acute pain of right knee: new and requires workup  Diagnosis management comments: Likely related to arthritis flare versus gout  X-ray without acute findings of bony abnormality  Patient discharged on scheduled prednisone for 5 days- patient has history of bleeding  Patient given Toradol x1 in the ED with good effect  Patient educated on red flag symptoms that would necessitate return to the ED  Follow-up with Orthopedics for further evaluation       Amount and/or Complexity of Data Reviewed  Clinical lab tests: reviewed  Tests in the medicine section of CPT®: reviewed  Decide to obtain previous medical records or to obtain history from someone other than the patient: yes  Review and summarize past medical records: yes  Independent visualization of images, tracings, or specimens: yes    Risk of Complications, Morbidity, and/or Mortality  Presenting problems: moderate  Diagnostic procedures: moderate  Management options: moderate    Patient Progress  Patient progress: stable      Disposition  Final diagnoses:   Acute pain of right knee     Time reflects when diagnosis was documented in both MDM as applicable and the Disposition within this note     Time User Action Codes Description Comment    4/30/2022  1:06 PM Lanisaleem New Add [O58 [id number]] Acute pain of right knee       ED Disposition     ED Disposition Condition Date/Time Comment    Discharge Stable Sat Apr 30, 2022  1:06 PM Di Rojas discharge to home/self care              Follow-up Information     Follow up With Specialties Details Why Contact Info Additional [address] Emergency Department Emergency Medicine Go to  If symptoms worsen [address] [facility] [address]  [address] [address] Emergency Department, [address] Vee Loyd MD Orthopedic Surgery Schedule an appointment as soon as possible for a visit   [address] [address]  [phone]             Discharge Medication List as of 4/30/2022  1:09 PM      START taking these medications    Details   predniSONE 20 mg tablet Take 3 tablets (60 mg total) by mouth daily for 5 days, Starting Sat 4/30/2022, Until Thu 5/5/2022, Normal         CONTINUE these medications which have NOT CHANGED    Details   acetaminophen (TYLENOL) 325 mg tablet Take 2 tablets (650 mg total) by mouth every 6 (six) hours as needed for mild pain, headaches or fever, Starting Wed 1/29/2020, No Print      atorvastatin (LIPITOR) 20 mg tablet TAKE 1 TABLET BY MOUTH EVERY DAY, Normal      docusate sodium (COLACE) 100 mg capsule Take 1 capsule (100 mg total) by mouth 3 (three) times a day for 14 days, Starting Fri 12/10/2021, Until Fri 12/24/2021, Normal      losartan (COZAAR) 50 mg tablet TAKE 1 TABLET BY MOUTH EVERY DAY, Normal      omeprazole (PriLOSEC OTC) 20 MG tablet Take 1 tablet (20 mg total) by mouth daily, Starting Wed 11/24/2021, Normal      pantoprazole (PROTONIX) 40 mg tablet TAKE 1 TABLET (40 MG TOTAL) BY MOUTH DAILY 30-60 MIN BEFORE BREAKFAST, Normal      polyethylene glycol (MIRALAX) 17 g packet Take 17 g by mouth daily for 7 days, Starting Fri 12/10/2021, Until Fri 12/17/2021, Normal      verapamil (CALAN-SR) 120 mg CR tablet TAKE 1 TABLET BY MOUTH EVERY DAY, Normal             No discharge procedures on file      PDMP Review       Value Time User    PDMP Reviewed  Yes 11/17/2021  6:58 AM Yen Garcia MD          ED Provider  Electronically Signed by           Hafsa Cotton PA-C  05/01/22 1524

## 2022-05-04 DIAGNOSIS — E78.2 MIXED HYPERLIPIDEMIA: ICD-10-CM

## 2022-05-06 ENCOUNTER — TELEPHONE (OUTPATIENT)
Dept: HEMATOLOGY ONCOLOGY | Facility: MEDICAL CENTER | Age: 57
End: 2022-05-06

## 2022-05-06 ENCOUNTER — APPOINTMENT (OUTPATIENT)
Dept: LAB | Facility: HOSPITAL | Age: 57
End: 2022-05-06
Payer: COMMERCIAL

## 2022-05-06 DIAGNOSIS — Z86.39 PERSONAL HISTORY OF NUTRITIONAL DEFICIENCY: ICD-10-CM

## 2022-05-06 DIAGNOSIS — D50.0 IRON DEFICIENCY ANEMIA SECONDARY TO BLOOD LOSS (CHRONIC): ICD-10-CM

## 2022-05-06 DIAGNOSIS — Z90.81 S/P SPLENECTOMY: ICD-10-CM

## 2022-05-06 LAB
BASOPHILS # BLD AUTO: 0.09 THOUSANDS/ΜL (ref 0–0.1)
BASOPHILS NFR BLD AUTO: 1 % (ref 0–1)
EOSINOPHIL # BLD AUTO: 0.03 THOUSAND/ΜL (ref 0–0.61)
EOSINOPHIL NFR BLD AUTO: 0 % (ref 0–6)
ERYTHROCYTE [DISTWIDTH] IN BLOOD BY AUTOMATED COUNT: 14.7 % (ref 11.6–15.1)
FERRITIN SERPL-MCNC: 65 NG/ML (ref 8–388)
HCT VFR BLD AUTO: 52 % (ref 36.5–49.3)
HGB BLD-MCNC: 17.1 G/DL (ref 12–17)
IMM GRANULOCYTES # BLD AUTO: 0.11 THOUSAND/UL (ref 0–0.2)
IMM GRANULOCYTES NFR BLD AUTO: 1 % (ref 0–2)
IRON SATN MFR SERPL: 41 % (ref 20–50)
IRON SERPL-MCNC: 154 UG/DL (ref 65–175)
LYMPHOCYTES # BLD AUTO: 1.98 THOUSANDS/ΜL (ref 0.6–4.47)
LYMPHOCYTES NFR BLD AUTO: 23 % (ref 14–44)
MCH RBC QN AUTO: 30.2 PG (ref 26.8–34.3)
MCHC RBC AUTO-ENTMCNC: 32.9 G/DL (ref 31.4–37.4)
MCV RBC AUTO: 92 FL (ref 82–98)
MONOCYTES # BLD AUTO: 0.41 THOUSAND/ΜL (ref 0.17–1.22)
MONOCYTES NFR BLD AUTO: 5 % (ref 4–12)
NEUTROPHILS # BLD AUTO: 5.94 THOUSANDS/ΜL (ref 1.85–7.62)
NEUTS SEG NFR BLD AUTO: 70 % (ref 43–75)
NRBC BLD AUTO-RTO: 0 /100 WBCS
PLATELET # BLD AUTO: 477 THOUSANDS/UL (ref 149–390)
PMV BLD AUTO: 9.3 FL (ref 8.9–12.7)
RBC # BLD AUTO: 5.67 MILLION/UL (ref 3.88–5.62)
TIBC SERPL-MCNC: 378 UG/DL (ref 250–450)
WBC # BLD AUTO: 8.56 THOUSAND/UL (ref 4.31–10.16)

## 2022-05-06 PROCEDURE — 82728 ASSAY OF FERRITIN: CPT

## 2022-05-06 PROCEDURE — 85025 COMPLETE CBC W/AUTO DIFF WBC: CPT

## 2022-05-06 PROCEDURE — 83540 ASSAY OF IRON: CPT

## 2022-05-06 PROCEDURE — 83550 IRON BINDING TEST: CPT

## 2022-05-06 PROCEDURE — 36415 COLL VENOUS BLD VENIPUNCTURE: CPT

## 2022-05-06 RX ORDER — ATORVASTATIN CALCIUM 20 MG/1
20 TABLET, FILM COATED ORAL DAILY
Qty: 90 TABLET | Refills: 0 | Status: SHIPPED | OUTPATIENT
Start: 2022-05-06 | End: 2022-07-25 | Stop reason: SDUPTHER

## 2022-05-06 NOTE — TELEPHONE ENCOUNTER
Patient states he has labs done 3/7  He wants to know if he has to get them done again if so if he can please get a phone call today so he can arrange to get more lab work done as soon as possible before Tuesday   Best call back number is [phone]

## 2022-05-10 ENCOUNTER — OFFICE VISIT (OUTPATIENT)
Dept: HEMATOLOGY ONCOLOGY | Facility: MEDICAL CENTER | Age: 57
End: 2022-05-10

## 2022-05-10 VITALS
DIASTOLIC BLOOD PRESSURE: 90 MMHG | WEIGHT: 215 LBS | HEART RATE: 96 BPM | TEMPERATURE: 97.1 F | RESPIRATION RATE: 18 BRPM | BODY MASS INDEX: 29.12 KG/M2 | SYSTOLIC BLOOD PRESSURE: 148 MMHG | OXYGEN SATURATION: 99 % | HEIGHT: 72 IN

## 2022-05-10 DIAGNOSIS — Z90.81 S/P SPLENECTOMY: ICD-10-CM

## 2022-05-10 DIAGNOSIS — D75.1 POLYCYTHEMIA: Primary | ICD-10-CM

## 2022-05-10 DIAGNOSIS — Z86.39 HISTORY OF IRON DEFICIENCY: ICD-10-CM

## 2022-05-10 PROCEDURE — 99215 OFFICE O/P EST HI 40 MIN: CPT | Performed by: PHYSICIAN ASSISTANT

## 2022-05-10 NOTE — PROGRESS NOTES
[address] [address]  Hematology Ambulatory Follow-Up  Renee Orourke, 1965, [id number]  5/10/2022      Assessment and Plan   1  Polycythemia  Patient has new onset polycythemia  While polycythemia could be related to the splenectomy, I believe it is prudent to check for other causes of polycythemia considering that this is a new symptom  Patient does have episodes of apnea while sleeping to where his children wake him up to make sure he feels okay  Patient has undergone sleep studies in the past without significant findings however this might need to be reexamined  Patient is not a smoker and notes drinking two glasses of water prior to last blood testing  Given the above issues with apnea, I was just to the patient that he start a diet and exercise regimen to lose weight  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  Overall, after evaluation with blood test below I will call the patient-review results and refer out to sleep apnea clinic if needed  - JAK2 V617F,Ql,W/RFL Exons 12,13 and MPL Z190,Y315; Future  - CBC and differential; Future  - Erythropoietin; Future    2  History of iron deficiency  Iron studies are acceptable  Patient is to continue to monitor stool for blood  Patient has follow-up with GI in the future  Patient has delayed the appointment a few times  3  S/P splenectomy  Sports injury in high school  The patient is scheduled for follow-up in approximately 8 weeks  Patient voiced agreement and understanding to the above  Patient advised to call the Hematology/Oncology office with any questions and concerns regarding the above  Barrier(s) to care: None  The patient is able to self care  Laura Kellogg PA-C  Medical Oncology/Hematology  [address]    Subjective     Chief Complaint   Patient presents with    Follow-up     Chronic blood loss anemia       History of present illness:    This is a 67-year-old male who was admitted to the emergency room after having several weeks of shortness of breath, three episodes of syncope at home in January 2021   Patient's past medical history significant for 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      Patient had been previously worked up for abnormalities by primary care doctor who recommended that the patient follow-up with GI as well as with Cardiology for a stress test   Unfortunately, patient's symptoms progressed and he presented to the emergency room  [address] [employer] work demonstrated hemoglobin in the 7 gram/deciliter range   Moreover, the patient was found to have a significant iron deficiency with a ferritin of three and an iron saturation of 1%   Patient underwent three Venofer infusions daily in the hospital   Patient's hemoglobin is in the 7 gram/deciliter range        GI work up in 1/2021-2/2021:  GI has seen the patient in the hospital  Miguel Ángel Gonzalez underwent colonoscopy in EGD that demonstrated questionable AVM in the small intestine   This area was cauterized   This area was not bleeding   Capsule demonstrated angiectasis and follow up EGD- is planned on 2/26/2021   This procedure did not demonstrate any signs of active bleeding      Patient completed the remainder of Venofer treatments started in the hospital in February 2021 03/23/21:    Patient notes overall feeling well  [address] work was reviewed   Patient's iron saturation increased to 7% and ferritin at 41   Hemoglobin has completely supplemented however, MCV is still low   Patient's platelet count is still elevated however this may be secondary to splenectomy and not necessarily to iron deficiency    Patient has completed COVID vaccinations      3/30/21:   Ferritin= 23, iron saturation = seven, TIBC 430,  Hemoglobin = 12 4, MCV 80,  Platelet count = 511,  PSA = 3 4     4/9-5/7/21:  5 Venofer 300 mg IV weekly      05/03/21:  Urgent follow up; SERAFIN on 5/1/2021, ED visit, hemoglobin stable   No bleeding today  1 more IV iron treatment to go  Iron studies requestion on labs from 5/1/21:iron sat supratheraputic       5/12/21:  Colonoscopy was negative for signs of active bleeding   Hemorrhoidal bleeding to blame for bright red blood per rectum   Scope was advanced past the descending colon due to patient's   CT colonoscopy diagnostic without contrast did not demonstrate any lesions      06/03/21:  Patient is feeling better   No more GI bleeding   Hemoglobin = 15 1, platelet count 445 oral seven, MCV = 86, ferritin = 452, iron saturation = 30%, TIBC = 336     8/27/21:    Labs taken every four weeks demonstrate stability   No GI bleeding   Patient feels well   Hemoglobin = 15 1, platelet count = 266, MCV = 93,  Iron saturation = 33, TIBC 324,  Ferritin = 155     11/1/2021: Feels well   1- 3 day episode of Melena + Hematochezia   Hemoglobin = 17 1, platelet count = 995, MCV = 96,  Iron saturation = 38, TIBC 344,  Ferritin = 149     2/8/2022:  WBC = 8 4, hemoglobin = 15 5, hematocrit = 49 6, platelet count = 961,  iron saturation = 34%, TIBC 364    02/10/22:  Patient reports having significant blood in his stool including dark, melena as well as hematochezia  Patient has not made an appointment with GI doctor  Discussed decreasing ferritin levels    5/6/22 WBC = 8 56, hemoglobin = 17 1, MCV = 92, platelet count = 229, ferritin = 65    Interval history:  Patient feels well  He continues to work at [employer] regularly  Patient does note that he still has blood in stool however he does not have melena  Overall, patient is happy with quality of his life  ++ hx of snoring, apnea in sleep  Kids wake him up due to worrying about his apneic episodes  Review of Systems   Constitutional: Positive for fatigue  Negative for activity change, appetite change, chills, fever and unexpected weight change     HENT: Negative for hearing loss, mouth sores, nosebleeds, sore throat, trouble swallowing and voice change  Eyes: Negative for visual disturbance  Respiratory: Negative for cough and shortness of breath  Cardiovascular: Negative for chest pain, palpitations and leg swelling  Gastrointestinal: Negative for abdominal pain, blood in stool, constipation, diarrhea, nausea and vomiting  Endocrine: Negative for cold intolerance  Genitourinary: Negative for decreased urine volume, dysuria and hematuria  Musculoskeletal: Negative for arthralgias and myalgias  Skin: Negative for rash  Neurological: Negative for dizziness, weakness, numbness and headaches  Hematological: Negative for adenopathy  Does not bruise/bleed easily  Psychiatric/Behavioral: Negative for dysphoric mood  Patient Active Problem List   Diagnosis    Essential hypertension    Hyperlipidemia    Arm paresthesia, right    Headache    Osteoarthritis of cervical spine    Colon cancer screening    Elevated PSA    Gastroesophageal reflux disease    Hematochezia    Other emphysema ([address] )    Healthcare maintenance    Crews's esophagus without dysplasia    Tubular adenoma of colon    Myalgia    Hip pain, bilateral    Osteoarthritis, hip, bilateral    Symptomatic anemia    Iron deficiency anemia due to chronic blood loss    S/P splenectomy    Benign localized prostatic hyperplasia with lower urinary tract symptoms (LUTS)    BMI 28 0-28 9,adult    Overweight (BMI 25 0-29  9)    Immunization due    Cervical neuropathy    Lung nodule, multiple    Radiculopathy, cervical    Fatty liver    Preoperative clearance    S/P prostatectomy    BPH (benign prostatic hyperplasia)     Past Medical History:   Diagnosis Date    Anemia     Cancer (HCC)     prostate    Colon polyp     Elevated PSA     Full dentures     GERD (gastroesophageal reflux disease)     Hemorrhoid     Hyperlipidemia     Sleep apnea     No CPAP    Smoker     TIA (transient ischemic attack)  Transfusion history     Wears glasses      Past Surgical History:   Procedure Laterality Date    ABDOMINAL ADHESION SURGERY N/A 2021    Procedure: LYSIS ADHESIONS LAPAROSCOPIC Leon Bury;  Surgeon: Jimmy Siddiqui MD;  [facility];  Service: Urology    APPENDECTOMY      COLONOSCOPY      EGD      FOOT SURGERY Right     bone removed    OTHER SURGICAL HISTORY      bone removal right arm-abnormal growth of surface bone    PROSTATE BIOPSY      PROSTATECTOMY N/A 2021    Procedure: ROBOTIC ASSISTED LAPAROSCOPIC SIMPLE PROSTATECTOMY AND BLADDER NECK RECONSTRUCTION;  Surgeon: Jimmy Siddiqui MD;  [facility];  Service: Urology    SPLENECTOMY      ruptured     Family History   Problem Relation Age of Onset    Dementia Father     Heart disease Mother     Hypertension Mother     Hyperlipidemia Mother     Hypertension Sister     Hypertension Brother     No Known Problems Daughter     No Known Problems Son     Cancer Paternal Grandfather         prostate    No Known Problems Son      Social History     Socioeconomic History    Marital status:      Spouse name: None    Number of children: None    Years of education: None    Highest education level: None   Occupational History    Occupation: supervisor     Employer: [employer]   Tobacco Use    Smoking status: Former Smoker     Packs/day: 0 50     Years: 10 00     Pack years: 5 00     Types: Cigarettes     Quit date: 2021     Years since quittin 2    Smokeless tobacco: Never Used   Vaping Use    Vaping Use: Never used   Substance and Sexual Activity    Alcohol use: Not Currently    Drug use: No    Sexual activity: None   Other Topics Concern    None   Social History Narrative    None     Social Determinants of Health     Financial Resource Strain: Not on file   Food Insecurity: Not on file   Transportation Needs: Not on file   Physical Activity: Not on file   Stress: Not on file   Social Connections: Not on file   Intimate Partner Violence: Not on file   Housing Stability: Not on file       Current Outpatient Medications:     acetaminophen (TYLENOL) 325 mg tablet, Take 2 tablets (650 mg total) by mouth every 6 (six) hours as needed for mild pain, headaches or fever, Disp: 30 tablet, Rfl: 0    atorvastatin (LIPITOR) 20 mg tablet, Take 1 tablet (20 mg total) by mouth daily, Disp: 90 tablet, Rfl: 0    losartan (COZAAR) 50 mg tablet, TAKE 1 TABLET BY MOUTH EVERY DAY, Disp: 90 tablet, Rfl: 1    omeprazole (PriLOSEC OTC) 20 MG tablet, Take 1 tablet (20 mg total) by mouth daily, Disp: 90 tablet, Rfl: 2    pantoprazole (PROTONIX) 40 mg tablet, TAKE 1 TABLET (40 MG TOTAL) BY MOUTH DAILY 30-60 MIN BEFORE BREAKFAST, Disp: 90 tablet, Rfl: 3    docusate sodium (COLACE) 100 mg capsule, Take 1 capsule (100 mg total) by mouth 3 (three) times a day for 14 days, Disp: 42 capsule, Rfl: 0    polyethylene glycol (MIRALAX) 17 g packet, Take 17 g by mouth daily for 7 days, Disp: 119 g, Rfl: 1    verapamil (CALAN-SR) 120 mg CR tablet, TAKE 1 TABLET BY MOUTH EVERY DAY (Patient not taking: Reported on 2/10/2022), Disp: 90 tablet, Rfl: 1  No Known Allergies    Objective   /90 (BP Location: Left arm, Patient Position: Sitting, Cuff Size: Adult)   Pulse 96   Temp (!) 97 1 °F (36 2 °C) (Temporal)   Resp 18   Ht 6' (1 829 m)   Wt 97 5 kg (215 lb)   SpO2 99%   BMI 29 16 kg/m²    Physical Exam  Constitutional:       General: He is not in acute distress  Appearance: He is well-developed  HENT:      Head: Normocephalic and atraumatic  Mouth/Throat:      Pharynx: No oropharyngeal exudate  Eyes:      General: No scleral icterus  Conjunctiva/sclera: Conjunctivae normal       Pupils: Pupils are equal, round, and reactive to light  Cardiovascular:      Rate and Rhythm: Normal rate and regular rhythm  Heart sounds: No murmur heard        Pulmonary:      Effort: Pulmonary effort is normal  No respiratory distress  Breath sounds: Normal breath sounds  Abdominal:      General: Bowel sounds are normal  There is no distension  Palpations: Abdomen is soft  Tenderness: There is no abdominal tenderness  Musculoskeletal:         General: No tenderness  Cervical back: Normal range of motion  Lymphadenopathy:      Cervical: No cervical adenopathy  Skin:     Coloration: Skin is not pale  Findings: No erythema or rash  Neurological:      Mental Status: He is alert and oriented to person, place, and time  Cranial Nerves: No cranial nerve deficit  Result Review  Labs:  No visits with results within 3 Week(s) from this visit     Latest known visit with results is:   Appointment on 03/07/2022   Component Date Value Ref Range Status    WBC 05/06/2022 8 56  4 31 - 10 16 Thousand/uL Final    RBC 05/06/2022 5 67* 3 88 - 5 62 Million/uL Final    Hemoglobin 05/06/2022 17 1* 12 0 - 17 0 g/dL Final    Hematocrit 05/06/2022 52 0* 36 5 - 49 3 % Final    MCV 05/06/2022 92  82 - 98 fL Final    MCH 05/06/2022 30 2  26 8 - 34 3 pg Final    MCHC 05/06/2022 32 9  31 4 - 37 4 g/dL Final    RDW 05/06/2022 14 7  11 6 - 15 1 % Final    MPV 05/06/2022 9 3  8 9 - 12 7 fL Final    Platelets 50/45/0846 477* 149 - 390 Thousands/uL Final    nRBC 05/06/2022 0  /100 WBCs Final    Neutrophils Relative 05/06/2022 70  43 - 75 % Final    Immat GRANS % 05/06/2022 1  0 - 2 % Final    Lymphocytes Relative 05/06/2022 23  14 - 44 % Final    Monocytes Relative 05/06/2022 5  4 - 12 % Final    Eosinophils Relative 05/06/2022 0  0 - 6 % Final    Basophils Relative 05/06/2022 1  0 - 1 % Final    Neutrophils Absolute 05/06/2022 5 94  1 85 - 7 62 Thousands/µL Final    Immature Grans Absolute 05/06/2022 0 11  0 00 - 0 20 Thousand/uL Final    Lymphocytes Absolute 05/06/2022 1 98  0 60 - 4 47 Thousands/µL Final    Monocytes Absolute 05/06/2022 0 41  0 17 - 1 22 Thousand/µL Final    Eosinophils Absolute 05/06/2022 0 03  0 00 - 0 61 Thousand/µL Final    Basophils Absolute 05/06/2022 0 09  0 00 - 0 10 Thousands/µL Final    WBC 03/07/2022 8 91  4 31 - 10 16 Thousand/uL Final    RBC 03/07/2022 5 55  3 88 - 5 62 Million/uL Final    Hemoglobin 03/07/2022 16 9  12 0 - 17 0 g/dL Final    Hematocrit 03/07/2022 51 6* 36 5 - 49 3 % Final    MCV 03/07/2022 93  82 - 98 fL Final    MCH 03/07/2022 30 5  26 8 - 34 3 pg Final    MCHC 03/07/2022 32 8  31 4 - 37 4 g/dL Final    RDW 03/07/2022 14 2  11 6 - 15 1 % Final    MPV 03/07/2022 9 6  8 9 - 12 7 fL Final    Platelets 75/08/1770 433* 149 - 390 Thousands/uL Final    nRBC 03/07/2022 0  /100 WBCs Final    Neutrophils Relative 03/07/2022 38* 43 - 75 % Final    Immat GRANS % 03/07/2022 1  0 - 2 % Final    Lymphocytes Relative 03/07/2022 40  14 - 44 % Final    Monocytes Relative 03/07/2022 12  4 - 12 % Final    Eosinophils Relative 03/07/2022 7* 0 - 6 % Final    Basophils Relative 03/07/2022 2* 0 - 1 % Final    Neutrophils Absolute 03/07/2022 3 37  1 85 - 7 62 Thousands/µL Final    Immature Grans Absolute 03/07/2022 0 05  0 00 - 0 20 Thousand/uL Final    Lymphocytes Absolute 03/07/2022 3 65  0 60 - 4 47 Thousands/µL Final    Monocytes Absolute 03/07/2022 1 10  0 17 - 1 22 Thousand/µL Final    Eosinophils Absolute 03/07/2022 0 58  0 00 - 0 61 Thousand/µL Final    Basophils Absolute 03/07/2022 0 16* 0 00 - 0 10 Thousands/µL Final    Iron Saturation 05/06/2022 41  20 - 50 % Final    TIBC 05/06/2022 378  250 - 450 ug/dL Final    Iron 05/06/2022 154  65 - 175 ug/dL Final    Patients treated with metal-binding drugs (ie  Deferoxamine) may have depressed iron values   Ferritin 05/06/2022 65  8 - 388 ng/mL Final       Please note: This report has been generated by a voice recognition software system  Therefore there may be syntax, spelling, and/or grammatical errors  Please call if you have any questions

## 2022-05-13 LAB
BASOPHILS # BLD AUTO: 0.1 X10E3/UL (ref 0–0.2)
BASOPHILS NFR BLD AUTO: 1 %
EOSINOPHIL # BLD AUTO: 0.4 X10E3/UL (ref 0–0.4)
EOSINOPHIL NFR BLD AUTO: 3 %
ERYTHROCYTE [DISTWIDTH] IN BLOOD BY AUTOMATED COUNT: 14.1 % (ref 11.6–15.4)
HCT VFR BLD AUTO: 52 % (ref 37.5–51)
HGB BLD-MCNC: 17.7 G/DL (ref 13–17.7)
IMM GRANULOCYTES # BLD: 0.2 X10E3/UL (ref 0–0.1)
IMM GRANULOCYTES NFR BLD: 1 %
LYMPHOCYTES # BLD AUTO: 5.4 X10E3/UL (ref 0.7–3.1)
LYMPHOCYTES NFR BLD AUTO: 39 %
MCH RBC QN AUTO: 30.6 PG (ref 26.6–33)
MCHC RBC AUTO-ENTMCNC: 34 G/DL (ref 31.5–35.7)
MCV RBC AUTO: 90 FL (ref 79–97)
MONOCYTES # BLD AUTO: 1.4 X10E3/UL (ref 0.1–0.9)
MONOCYTES NFR BLD AUTO: 10 %
NEUTROPHILS # BLD AUTO: 6.5 X10E3/UL (ref 1.4–7)
NEUTROPHILS NFR BLD AUTO: 46 %
PLATELET # BLD AUTO: 479 X10E3/UL (ref 150–450)
RBC # BLD AUTO: 5.78 X10E6/UL (ref 4.14–5.8)
WBC # BLD AUTO: 14 X10E3/UL (ref 3.4–10.8)

## 2022-05-14 LAB — EPO SERPL-ACNC: 9 MIU/ML (ref 2.6–18.5)

## 2022-05-21 LAB
JAK2 P.V617F BLD/T QL: ABNORMAL
LAB DIRECTOR NAME PROVIDER: ABNORMAL
LABORATORY COMMENT REPORT: ABNORMAL
SL AMB REFLEX: ABNORMAL
SPECIMEN PREPARATION: ABNORMAL

## 2022-05-24 ENCOUNTER — OFFICE VISIT (OUTPATIENT)
Dept: FAMILY MEDICINE CLINIC | Facility: CLINIC | Age: 57
End: 2022-05-24
Payer: COMMERCIAL

## 2022-05-24 VITALS
TEMPERATURE: 97.5 F | WEIGHT: 211 LBS | RESPIRATION RATE: 18 BRPM | OXYGEN SATURATION: 98 % | DIASTOLIC BLOOD PRESSURE: 90 MMHG | SYSTOLIC BLOOD PRESSURE: 130 MMHG | BODY MASS INDEX: 28.58 KG/M2 | HEART RATE: 89 BPM | HEIGHT: 72 IN

## 2022-05-24 DIAGNOSIS — Z12.5 PROSTATE CANCER SCREENING: ICD-10-CM

## 2022-05-24 DIAGNOSIS — Z23 IMMUNIZATION DUE: ICD-10-CM

## 2022-05-24 DIAGNOSIS — Z12.11 COLON CANCER SCREENING: ICD-10-CM

## 2022-05-24 DIAGNOSIS — I10 ESSENTIAL HYPERTENSION: Primary | ICD-10-CM

## 2022-05-24 DIAGNOSIS — E78.2 MIXED HYPERLIPIDEMIA: ICD-10-CM

## 2022-05-24 DIAGNOSIS — Z13.89 SCREENING FOR CARDIOVASCULAR, RESPIRATORY, AND GENITOURINARY DISEASES: ICD-10-CM

## 2022-05-24 DIAGNOSIS — Z13.6 SCREENING FOR CARDIOVASCULAR, RESPIRATORY, AND GENITOURINARY DISEASES: ICD-10-CM

## 2022-05-24 DIAGNOSIS — N40.1 BENIGN LOCALIZED PROSTATIC HYPERPLASIA WITH LOWER URINARY TRACT SYMPTOMS (LUTS): ICD-10-CM

## 2022-05-24 DIAGNOSIS — Z13.83 SCREENING FOR CARDIOVASCULAR, RESPIRATORY, AND GENITOURINARY DISEASES: ICD-10-CM

## 2022-05-24 PROCEDURE — 3725F SCREEN DEPRESSION PERFORMED: CPT | Performed by: FAMILY MEDICINE

## 2022-05-24 PROCEDURE — 3008F BODY MASS INDEX DOCD: CPT | Performed by: FAMILY MEDICINE

## 2022-05-24 PROCEDURE — 90471 IMMUNIZATION ADMIN: CPT

## 2022-05-24 PROCEDURE — 3080F DIAST BP >= 90 MM HG: CPT | Performed by: FAMILY MEDICINE

## 2022-05-24 PROCEDURE — 99214 OFFICE O/P EST MOD 30 MIN: CPT | Performed by: FAMILY MEDICINE

## 2022-05-24 PROCEDURE — 90750 HZV VACC RECOMBINANT IM: CPT

## 2022-05-24 PROCEDURE — 3075F SYST BP GE 130 - 139MM HG: CPT | Performed by: FAMILY MEDICINE

## 2022-05-24 PROCEDURE — 1036F TOBACCO NON-USER: CPT | Performed by: FAMILY MEDICINE

## 2022-05-24 NOTE — PROGRESS NOTES
Assessment/Plan:    No problem-specific Assessment & Plan notes found for this encounter     htn stable  HLD ok on statin  New polycythemia with JAK2 mutation on labs, suggest f/u with hematology  psa ordered as he is due, hx of bph     Diagnoses and all orders for this visit:    Essential hypertension  -     Comprehensive metabolic panel; Future    Colon cancer screening  -     Ambulatory referral to Gastroenterology; Future    Screening for cardiovascular, respiratory, and genitourinary diseases    Mixed hyperlipidemia  -     Lipid Panel with Direct LDL reflex; Future    Prostate cancer screening    Benign localized prostatic hyperplasia with lower urinary tract symptoms (LUTS)  -     PSA Total, Diagnostic; Future    Immunization due  -     Zoster Vaccine Recombinant IM        Return in about 6 months (around 11/24/2022) for Recheck  Subjective:      Patient ID: Pierre Ocasio is a 61 y o  male      Chief Complaint   Patient presents with    Follow-up     DX of Leukemia     sas/cma       HPI  Abnormal labs  No fevers  No weight loss  Some night sweats  No skin changes  No chemical exposures  Quit tob in past 1-2 years  No fam hx of blood cancers      The following portions of the patient's history were reviewed and updated as appropriate: allergies, current medications, past family history, past medical history, past social history, past surgical history and problem list     Review of Systems      Current Outpatient Medications   Medication Sig Dispense Refill    acetaminophen (TYLENOL) 325 mg tablet Take 2 tablets (650 mg total) by mouth every 6 (six) hours as needed for mild pain, headaches or fever 30 tablet 0    atorvastatin (LIPITOR) 20 mg tablet Take 1 tablet (20 mg total) by mouth daily 90 tablet 0    docusate sodium (COLACE) 100 mg capsule Take 1 capsule (100 mg total) by mouth 3 (three) times a day for 14 days 42 capsule 0    losartan (COZAAR) 50 mg tablet TAKE 1 TABLET BY MOUTH EVERY DAY 90 tablet 1    omeprazole (PriLOSEC OTC) 20 MG tablet Take 1 tablet (20 mg total) by mouth daily 90 tablet 2    pantoprazole (PROTONIX) 40 mg tablet TAKE 1 TABLET (40 MG TOTAL) BY MOUTH DAILY 30-60 MIN BEFORE BREAKFAST 90 tablet 3    polyethylene glycol (MIRALAX) 17 g packet Take 17 g by mouth daily for 7 days 119 g 1    verapamil (CALAN-SR) 120 mg CR tablet TAKE 1 TABLET BY MOUTH EVERY DAY 90 tablet 1     No current facility-administered medications for this visit         Objective:    /90   Pulse 89   Temp 97 5 °F (36 4 °C)   Resp 18   Ht 6' (1 829 m)   Wt 95 7 kg (211 lb)   SpO2 98%   BMI 28 62 kg/m²        Physical Exam           Guero Holm, DO

## 2022-05-25 PROBLEM — Z12.5 PROSTATE CANCER SCREENING: Status: ACTIVE | Noted: 2022-05-25

## 2022-05-31 DIAGNOSIS — R51.9 HEADACHE: ICD-10-CM

## 2022-06-01 LAB
ALBUMIN SERPL-MCNC: 4.5 G/DL (ref 3.8–4.9)
ALBUMIN/GLOB SERPL: 1.7 {RATIO} (ref 1.2–2.2)
ALP SERPL-CCNC: 117 IU/L (ref 44–121)
ALT SERPL-CCNC: 24 IU/L (ref 0–44)
AST SERPL-CCNC: 21 IU/L (ref 0–40)
BILIRUB SERPL-MCNC: 0.2 MG/DL (ref 0–1.2)
BUN SERPL-MCNC: 12 MG/DL (ref 6–24)
BUN/CREAT SERPL: 11 (ref 9–20)
CALCIUM SERPL-MCNC: 9.9 MG/DL (ref 8.7–10.2)
CHLORIDE SERPL-SCNC: 104 MMOL/L (ref 96–106)
CHOLEST SERPL-MCNC: 171 MG/DL (ref 100–199)
CO2 SERPL-SCNC: 25 MMOL/L (ref 20–29)
CREAT SERPL-MCNC: 1.07 MG/DL (ref 0.76–1.27)
EGFR: 81 ML/MIN/1.73
GLOBULIN SER-MCNC: 2.7 G/DL (ref 1.5–4.5)
GLUCOSE SERPL-MCNC: 88 MG/DL (ref 65–99)
HDLC SERPL-MCNC: 36 MG/DL
LDLC SERPL CALC-MCNC: 98 MG/DL (ref 0–99)
MICRODELETION SYND BLD/T FISH: NORMAL
POTASSIUM SERPL-SCNC: 5.4 MMOL/L (ref 3.5–5.2)
PROT SERPL-MCNC: 7.2 G/DL (ref 6–8.5)
PSA SERPL-MCNC: 0.7 NG/ML (ref 0–4)
SODIUM SERPL-SCNC: 141 MMOL/L (ref 134–144)
TRIGL SERPL-MCNC: 214 MG/DL (ref 0–149)

## 2022-06-10 ENCOUNTER — OFFICE VISIT (OUTPATIENT)
Dept: UROLOGY | Facility: CLINIC | Age: 57
End: 2022-06-10
Payer: COMMERCIAL

## 2022-06-10 VITALS
BODY MASS INDEX: 28.04 KG/M2 | HEIGHT: 72 IN | SYSTOLIC BLOOD PRESSURE: 124 MMHG | HEART RATE: 64 BPM | DIASTOLIC BLOOD PRESSURE: 78 MMHG | WEIGHT: 207 LBS

## 2022-06-10 DIAGNOSIS — N40.1 BPH WITH OBSTRUCTION/LOWER URINARY TRACT SYMPTOMS: Primary | ICD-10-CM

## 2022-06-10 DIAGNOSIS — N13.8 BPH WITH OBSTRUCTION/LOWER URINARY TRACT SYMPTOMS: Primary | ICD-10-CM

## 2022-06-10 PROCEDURE — 99213 OFFICE O/P EST LOW 20 MIN: CPT | Performed by: PHYSICIAN ASSISTANT

## 2022-06-13 NOTE — PROGRESS NOTES
UROLOGY PROGRESS NOTE   Patient Identifiers: Aby Rowe (MRN [id number])  Date of Service: 6/13/2022    Subjective:    79-year-old man history of BPH with obstruction  He underwent a robotic simple prostatectomy in November  He has a slight stress leak  But is comfortable    No troubles with spraying of the stream     Reason for visit:  BPH follow-up      Objective:     VITALS:    Vitals:    06/10/22 1003   BP: 124/78   Pulse: 64           LABS:  Lab Results   Component Value Date    HGB 17 7 05/13/2022    HCT 52 0 (H) 05/13/2022    WBC 14 0 (H) 05/13/2022     (H) 05/13/2022   ]    Lab Results   Component Value Date    K 5 4 (H) 05/31/2022     05/31/2022    CO2 25 05/31/2022    BUN 12 05/31/2022    CREATININE 1 07 05/31/2022    CALCIUM 8 7 11/20/2021   ]        INPATIENT MEDS:    Current Outpatient Medications:     acetaminophen (TYLENOL) 325 mg tablet, Take 2 tablets (650 mg total) by mouth every 6 (six) hours as needed for mild pain, headaches or fever, Disp: 30 tablet, Rfl: 0    atorvastatin (LIPITOR) 20 mg tablet, Take 1 tablet (20 mg total) by mouth daily, Disp: 90 tablet, Rfl: 0    docusate sodium (COLACE) 100 mg capsule, Take 1 capsule (100 mg total) by mouth 3 (three) times a day for 14 days, Disp: 42 capsule, Rfl: 0    losartan (COZAAR) 50 mg tablet, TAKE 1 TABLET BY MOUTH EVERY DAY, Disp: 90 tablet, Rfl: 1    omeprazole (PriLOSEC OTC) 20 MG tablet, Take 1 tablet (20 mg total) by mouth daily, Disp: 90 tablet, Rfl: 2    pantoprazole (PROTONIX) 40 mg tablet, TAKE 1 TABLET (40 MG TOTAL) BY MOUTH DAILY 30-60 MIN BEFORE BREAKFAST, Disp: 90 tablet, Rfl: 3    polyethylene glycol (MIRALAX) 17 g packet, Take 17 g by mouth daily for 7 days, Disp: 119 g, Rfl: 1    verapamil (CALAN-SR) 120 mg CR tablet, TAKE 1 TABLET BY MOUTH EVERY DAY, Disp: 90 tablet, Rfl: 1      Physical Exam:   /78 (BP Location: Right arm, Patient Position: Sitting, Cuff Size: Adult)   Pulse 64   Ht 6' (1 829 m)   Wt 93 9 kg (207 lb)   BMI 28 07 kg/m²   GEN: no acute distress    RESP: breathing comfortably with no accessory muscle use    ABD: soft, non-tender, non-distended   INCISION:    EXT: no significant peripheral edema     RADIOLOGY:   None     Assessment:   1   BPH status post robotic simple prostatectomy     Plan:   -follow-up in 1 year with PSA prior to visit-  -  -

## 2022-06-21 ENCOUNTER — OFFICE VISIT (OUTPATIENT)
Dept: HEMATOLOGY ONCOLOGY | Facility: MEDICAL CENTER | Age: 57
End: 2022-06-21
Payer: COMMERCIAL

## 2022-06-21 VITALS
BODY MASS INDEX: 28.58 KG/M2 | RESPIRATION RATE: 20 BRPM | HEIGHT: 72 IN | SYSTOLIC BLOOD PRESSURE: 116 MMHG | TEMPERATURE: 97.6 F | OXYGEN SATURATION: 95 % | HEART RATE: 85 BPM | DIASTOLIC BLOOD PRESSURE: 76 MMHG | WEIGHT: 211 LBS

## 2022-06-21 DIAGNOSIS — Z90.81 S/P SPLENECTOMY: ICD-10-CM

## 2022-06-21 DIAGNOSIS — D45 POLYCYTHEMIA VERA (HCC): Primary | ICD-10-CM

## 2022-06-21 DIAGNOSIS — Z86.2 HX OF IRON DEFICIENCY ANEMIA: ICD-10-CM

## 2022-06-21 PROCEDURE — 99215 OFFICE O/P EST HI 40 MIN: CPT | Performed by: PHYSICIAN ASSISTANT

## 2022-06-21 PROCEDURE — 1036F TOBACCO NON-USER: CPT | Performed by: PHYSICIAN ASSISTANT

## 2022-06-21 PROCEDURE — 3008F BODY MASS INDEX DOCD: CPT | Performed by: PHYSICIAN ASSISTANT

## 2022-06-21 PROCEDURE — 3074F SYST BP LT 130 MM HG: CPT | Performed by: PHYSICIAN ASSISTANT

## 2022-06-21 PROCEDURE — 3078F DIAST BP <80 MM HG: CPT | Performed by: PHYSICIAN ASSISTANT

## 2022-06-21 NOTE — PROGRESS NOTES
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 +  This is a 65-year-old male with history of elevated platelets and hemoglobin who recently underwent evaluation for a primary bone marrow disorder and was found to have a JAK2 mutation and now has the diagnosis of polycythemia vera  Patient presented with his significant other today  We discussed the patient's condition, myeloproliferative neoplasm  Patient understands that mutation is driving the increased number of cells  Patient has not had a PE/DVT, but does have cardiovascular risk factors including hyperlipidemia and a questionable history of TIA in the past     Patient filled out the symptom Assessment Tool:  Symptoms include fatigue at a level of a seven, occasional abdominal discomfort, moderate in activity and night sweats and itching both rated at a nine in 10 respectively  Due to patient's age, he is considered low risk  We discussed options  Patient would like to try a phlebotomy alone  Regimen:  Phlebotomy -at present scheduled for every 54 days however this may change depending upon blood work in 28 days following his 1st phlebotomy  Goal hematocrit less than 45%  +  81 mg aspirin daily    - CBC and Platelet; Future      3  Hx of iron deficiency anemia  We discussed that iron deficiency can be somewhat protective for polycythemia as that is the goal of the phlebotomy  However, with the patient's history of bleeding disorders, we may need to transition him to an oral tablets sooner as he may not tolerate lower hematocrit in the previous setting of GI bleeding  Patient is monitored by Gastroenterology  Patient does have semi-regular blood, bright red blood in stool-this is hemorrhoidal in nature        - CBC and differential; Future  - Comprehensive metabolic panel; Future  - Iron Panel (Includes Ferritin, Iron Sat%, Iron, and TIBC); Future      The patient is scheduled for follow-up in approximately 3 motnhs with Dr Sue Aschoff  Patient voiced agreement and understanding to the above  Patient knows to call the Hematology/Oncology office with any questions and concerns regarding the above  Goals and Barriers:    Current Goal: Prolong Survival from Cancer  Barriers: None  Patient's Capacity to Self Care:  Patient able to self care  Laura Kellogg PA-C  Medical Oncology/Hematology  [address]  ______________________________________________________________________________________________________________    Subjective     Chief Complaint   Patient presents with    Follow-up     Polycythemia       History of present illness/Cancer History: This is a 69-year-old male who was admitted to the emergency room after having several weeks of shortness of breath, three episodes of syncope at home in January 2021   Patient's past medical history significant for 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      Patient had been previously worked up for abnormalities by primary care doctor who recommended that the patient follow-up with GI as well as with Cardiology for a stress test   Unfortunately, patient's symptoms progressed and he presented to the emergency room  3 [facility] work demonstrated hemoglobin in the 7 gram/deciliter range   Moreover, the patient was found to have a significant iron deficiency with a ferritin of three and an iron saturation of 1%   Patient underwent three Venofer infusions daily in the hospital   Patient's hemoglobin is in the 7 gram/deciliter range        GI work up in 1/2021-2/2021:  GI has seen the patient in the hospital   Patient underwent colonoscopy in EGD that demonstrated questionable AVM in the small intestine   This area was cauterized   This area was not bleeding   Capsule demonstrated angiectasis and follow up EGD- is planned on 2/26/2021   This procedure did not demonstrate any signs of active bleeding      Patient completed the remainder of Venofer treatments started in the hospital in February 2021 03/23/21:    Patient notes overall feeling well  923 [address]out work was reviewed   Patient's iron saturation increased to 7% and ferritin at 41   Hemoglobin has completely supplemented however, MCV is still low   Patient's platelet count is still elevated however this may be secondary to splenectomy and not necessarily to iron deficiency    Patient has completed COVID vaccinations      3/30/21:   Ferritin= 23, iron saturation = seven, TIBC 430,  Hemoglobin = 12 4, MCV 80,  Platelet count = 456,  PSA = 3 4     4/9-5/7/21:  5 Venofer 300 mg IV weekly      05/03/21:  Urgent follow up; AL[facility]ANDROB[facility] on 5/1/2021, ED visit, hemoglobin stable   No bleeding today  1 more IV iron treatment to go   Iron studies requestion on labs from 5/1/21:iron sat supratheraputic       5/12/21:  Colonoscopy was negative for signs of active bleeding   Hemorrhoidal bleeding to blame for bright red blood per rectum   Scope was advanced past the descending colon due to patient's   CT colonoscopy diagnostic without contrast did not demonstrate any lesions      06/03/21:  Patient is feeling better   No more GI bleeding   Hemoglobin = 15 1, platelet count 400 oral seven, MCV = 86, ferritin = 452, iron saturation = 30%, TIBC = 336     8/27/21:    Labs taken every four weeks demonstrate stability   No GI bleeding   Patient feels well   Hemoglobin = 15 1, platelet count = 798, MCV = 93,  Iron saturation = 33, TIBC 324,  Ferritin = 155     11/1/2021: Feels well   1- 3 day episode of Melena + Hematochezia   Hemoglobin = 17 1, platelet count = 239, MCV = 96,  Iron saturation = 38, TIBC 344,  Ferritin = 149     2/8/2022:  WBC = 8 4, hemoglobin = 15 5, hematocrit = 49 6, platelet count = 398,  iron saturation = 34%, TIBC 364     02/10/22:  Patient reports having significant blood in his stool including dark, melena as well as hematochezia   Patient has not made an appointment with GI doctor   Discussed decreasing ferritin levels     22 WBC = 8 56, hemoglobin = 17 1, MCV = 92, platelet count = 255, ferritin = 65    2022:  Erythropoietin = 9 0, +JAK2 V617F Mutation, other studies did not reflex,  WBC = 14, hemoglobin = 17 7, hematocrit = 52, platelet count = 479    22 PSA = 0 7, CMP within normal limits with the exception of a mildly elevated potassium at 5 4, labs ordered by primary physician  Lab Results   Component Value Date    WBC 14 0 (H) 2022    HGB 17 7 2022    HCT 52 0 (H) 2022    MCV 90 2022     (H) 2022     Lab Results   Component Value Date    SODIUM 141 2022    K 5 4 (H) 2022     2022    CO2 25 2022    AGAP 4 2021    BUN 12 2022    CREATININE 1 07 2022    GLUC 88 2022    GLUF 91 2021    CALCIUM 8 7 2021    AST 21 2022    ALT 24 2022    ALKPHOS 107 10/20/2021    TP 7 2 2022    TBILI 0 2 2022    EGFR 81 2022     Interval history:  Feeling ok-   Symptom Assessment Tool states that fatigue = seven, early satiety = six, abdominal discomfort six, inactivity = five, problems with concentration 0, night sweats nine, itching 10, no answer for bone pain, no fever 0 and no unintentional weight loss in last six months  When patient was asked if he had a heart attack, he states that he is unsure however he has high cholesterol and hypertension  Patient notes that they thought he had a TIA in the past   No history of DVT or pulmonary embolism     ECO - Symptomatic but completely ambulatory    Review of Systems   Constitutional: Positive for fatigue  Negative for activity change, appetite change and fever  HENT: Negative for nosebleeds      Respiratory: Negative for cough, choking and shortness of breath  Cardiovascular: Negative for chest pain, palpitations and leg swelling  Gastrointestinal: Negative for abdominal distention, abdominal pain, anal bleeding, blood in stool, constipation, diarrhea, nausea and vomiting  Endocrine: Negative for cold intolerance  Genitourinary: Negative for hematuria  Musculoskeletal: Negative for myalgias  Skin: Negative for color change, pallor and rash  Allergic/Immunologic: Negative for immunocompromised state  Neurological: Negative for headaches  Hematological: Negative for adenopathy  Does not bruise/bleed easily  All other systems reviewed and are negative        Current Outpatient Medications:     acetaminophen (TYLENOL) 325 mg tablet, Take 2 tablets (650 mg total) by mouth every 6 (six) hours as needed for mild pain, headaches or fever, Disp: 30 tablet, Rfl: 0    atorvastatin (LIPITOR) 20 mg tablet, Take 1 tablet (20 mg total) by mouth daily, Disp: 90 tablet, Rfl: 0    docusate sodium (COLACE) 100 mg capsule, Take 1 capsule (100 mg total) by mouth 3 (three) times a day for 14 days, Disp: 42 capsule, Rfl: 0    losartan (COZAAR) 50 mg tablet, TAKE 1 TABLET BY MOUTH EVERY DAY, Disp: 90 tablet, Rfl: 1    omeprazole (PriLOSEC OTC) 20 MG tablet, Take 1 tablet (20 mg total) by mouth daily, Disp: 90 tablet, Rfl: 2    pantoprazole (PROTONIX) 40 mg tablet, TAKE 1 TABLET (40 MG TOTAL) BY MOUTH DAILY 30-60 MIN BEFORE BREAKFAST, Disp: 90 tablet, Rfl: 3    polyethylene glycol (MIRALAX) 17 g packet, Take 17 g by mouth daily for 7 days, Disp: 119 g, Rfl: 1    verapamil (CALAN-SR) 120 mg CR tablet, TAKE 1 TABLET BY MOUTH EVERY DAY, Disp: 90 tablet, Rfl: 1  No Known Allergies    Advance Directive and Living Will:            Objective   /76 (BP Location: Right arm, Patient Position: Sitting, Cuff Size: Adult)   Pulse 85   Temp 97 6 °F (36 4 °C) (Temporal)   Resp 20   Ht 6' (1 829 m)   Wt 95 7 kg (211 lb) SpO2 95%   BMI 28 62 kg/m²   Wt Readings from Last 6 Encounters:   06/21/22 95 7 kg (211 lb)   06/10/22 93 9 kg (207 lb)   05/24/22 95 7 kg (211 lb)   05/10/22 97 5 kg (215 lb)   04/30/22 94 2 kg (207 lb 10 8 oz)   12/29/21 92 1 kg (203 lb)       Physical Exam  Constitutional:       General: He is not in acute distress  Appearance: He is well-developed  HENT:      Head: Normocephalic and atraumatic  Mouth/Throat:      Pharynx: No oropharyngeal exudate  Eyes:      General: No scleral icterus  Conjunctiva/sclera: Conjunctivae normal       Pupils: Pupils are equal, round, and reactive to light  Cardiovascular:      Rate and Rhythm: Normal rate and regular rhythm  Heart sounds: No murmur heard  Pulmonary:      Effort: Pulmonary effort is normal  No respiratory distress  Breath sounds: Normal breath sounds  Abdominal:      General: Bowel sounds are normal  There is no distension  Palpations: Abdomen is soft  Tenderness: There is no abdominal tenderness  Musculoskeletal:         General: No tenderness  Cervical back: Normal range of motion  Lymphadenopathy:      Cervical: No cervical adenopathy  Skin:     Coloration: Skin is not pale  Findings: No erythema or rash  Neurological:      Mental Status: He is alert and oriented to person, place, and time  Cranial Nerves: No cranial nerve deficit  Pertinent Laboratory Results and Imaging Review:  No visits with results within 3 Week(s) from this visit     Latest known visit with results is:   Orders Only on 05/31/2022   Component Date Value Ref Range Status    Glucose, Random 05/31/2022 88  65 - 99 mg/dL Final    BUN 05/31/2022 12  6 - 24 mg/dL Final    Creatinine 05/31/2022 1 07  0 76 - 1 27 mg/dL Final    eGFR 05/31/2022 81  >59 mL/min/1 73 Final    SL AMB BUN/CREATININE RATIO 05/31/2022 11  9 - 20 Final    Sodium 05/31/2022 141  134 - 144 mmol/L Final    Potassium 05/31/2022 5 4 (A) 3 5 - 5 2 mmol/L Final    Chloride 05/31/2022 104  96 - 106 mmol/L Final    CO2 05/31/2022 25  20 - 29 mmol/L Final    CALCIUM 05/31/2022 9 9  8 7 - 10 2 mg/dL Final    Protein, Total 05/31/2022 7 2  6 0 - 8 5 g/dL Final    Albumin 05/31/2022 4 5  3 8 - 4 9 g/dL Final    Globulin, Total 05/31/2022 2 7  1 5 - 4 5 g/dL Final    Albumin/Globulin Ratio 05/31/2022 1 7  1 2 - 2 2 Final    TOTAL BILIRUBIN 05/31/2022 0 2  0 0 - 1 2 mg/dL Final    Alk Phos Isoenzymes 05/31/2022 117  44 - 121 IU/L Final    AST 05/31/2022 21  0 - 40 IU/L Final    ALT 05/31/2022 24  0 - 44 IU/L Final    Cholesterol, Total 05/31/2022 171  100 - 199 mg/dL Final    Triglycerides 05/31/2022 214 (A) 0 - 149 mg/dL Final    HDL 05/31/2022 36 (A) >39 mg/dL Final    LDL Calculated 05/31/2022 98  0 - 99 mg/dL Final    Prostate Specific Antigen Total 05/31/2022 0 7  0 0 - 4 0 ng/mL Final    Comment: Roche ECLIA methodology  According to the American Urological Association, Serum PSA should  decrease and remain at undetectable levels after radical  prostatectomy  The AUA defines biochemical recurrence as an initial  PSA value 0 2 ng/mL or greater followed by a subsequent confirmatory  PSA value 0 2 ng/mL or greater  Values obtained with different assay methods or kits cannot be used  interchangeably  Results cannot be interpreted as absolute evidence  of the presence or absence of malignant disease   Interpretation 05/31/2022 Note   Final    Supplemental report is available           The following historical data was reviewed:  Past Medical History:   Diagnosis Date    Anemia     Cancer ([facility] [address] )     prostate    Colon polyp     Elevated PSA     Full dentures     GERD (gastroesophageal reflux disease)     Hemorrhoid     Hyperlipidemia     Sleep apnea     No CPAP    Smoker     TIA (transient ischemic attack)     Transfusion history     Wears glasses      Past Surgical History:   Procedure Laterality Date    ABDOMINAL ADHESION SURGERY N/A 2021    Procedure: LYSIS ADHESIONS LAPAROSCOPIC W ROBOT;  Surgeon: Ana María Sheehan MD;  [facility];  Service: Urology    APPENDECTOMY      COLONOSCOPY      EGD      FOOT SURGERY Right     bone removed    OTHER SURGICAL HISTORY      bone removal right arm-abnormal growth of surface bone    PROSTATE BIOPSY      PROSTATECTOMY N/A 2021    Procedure: ROBOTIC ASSISTED LAPAROSCOPIC SIMPLE PROSTATECTOMY AND BLADDER NECK RECONSTRUCTION;  Surgeon: Ana María Sheehan MD;  [facility];  Service: Urology    SPLENECTOMY      ruptured     Social History     Socioeconomic History    Marital status:      Spouse name: None    Number of children: None    Years of education: None    Highest education level: None   Occupational History    Occupation: supervisor     Employer: [employer]   Tobacco Use    Smoking status: Former Smoker     Packs/day: 0 50     Years: 10 00     Pack years: 5 00     Types: Cigarettes     Quit date: 2021     Years since quittin 3    Smokeless tobacco: Never Used   Vaping Use    Vaping Use: Never used   Substance and Sexual Activity    Alcohol use: Not Currently    Drug use: No    Sexual activity: None   Other Topics Concern    None   Social History Narrative    None     Social Determinants of Health     Financial Resource Strain: Not on file   Food Insecurity: Not on file   Transportation Needs: Not on file   Physical Activity: Not on file   Stress: Not on file   Social Connections: Not on file   Intimate Partner Violence: Not on file   Housing Stability: Not on file     Family History   Problem Relation Age of Onset    Dementia Father     Heart disease Mother     Hypertension Mother     Hyperlipidemia Mother     Hypertension Sister     Hypertension Brother     No Known Problems Daughter     No Known Problems Son     Cancer Paternal Grandfather         prostate    No Known Problems Son        Please note:   This report has been generated by a voice recognition software system  Therefore there may be syntax, spelling, and/or grammatical errors  Please call if you have any questions

## 2022-06-24 ENCOUNTER — HOSPITAL ENCOUNTER (OUTPATIENT)
Dept: INFUSION CENTER | Facility: HOSPITAL | Age: 57
Discharge: HOME | End: 2022-06-24
Attending: INTERNAL MEDICINE
Payer: COMMERCIAL

## 2022-06-24 VITALS
HEART RATE: 93 BPM | SYSTOLIC BLOOD PRESSURE: 136 MMHG | TEMPERATURE: 98 F | DIASTOLIC BLOOD PRESSURE: 89 MMHG | RESPIRATION RATE: 16 BRPM

## 2022-06-24 DIAGNOSIS — D64.9 SYMPTOMATIC ANEMIA: ICD-10-CM

## 2022-06-24 DIAGNOSIS — D50.0 IRON DEFICIENCY ANEMIA DUE TO CHRONIC BLOOD LOSS: Primary | ICD-10-CM

## 2022-06-24 LAB
ALBUMIN SERPL-MCNC: 4.2 G/DL (ref 3.8–4.9)
ALBUMIN/GLOB SERPL: 1.7 {RATIO} (ref 1.2–2.2)
ALP SERPL-CCNC: 110 IU/L (ref 44–121)
ALT SERPL-CCNC: 36 IU/L (ref 0–44)
AST SERPL-CCNC: 26 IU/L (ref 0–40)
BASOPHILS # BLD AUTO: 0.2 X10E3/UL (ref 0–0.2)
BASOPHILS NFR BLD AUTO: 2 %
BILIRUB SERPL-MCNC: 0.4 MG/DL (ref 0–1.2)
BUN SERPL-MCNC: 9 MG/DL (ref 6–24)
BUN/CREAT SERPL: 8 (ref 9–20)
CALCIUM SERPL-MCNC: 9.8 MG/DL (ref 8.7–10.2)
CHLORIDE SERPL-SCNC: 100 MMOL/L (ref 96–106)
CO2 SERPL-SCNC: 25 MMOL/L (ref 20–29)
CREAT SERPL-MCNC: 1.17 MG/DL (ref 0.76–1.27)
EGFR: 73 ML/MIN/1.73
EOSINOPHIL # BLD AUTO: 0.5 X10E3/UL (ref 0–0.4)
EOSINOPHIL NFR BLD AUTO: 6 %
ERYTHROCYTE [DISTWIDTH] IN BLOOD BY AUTOMATED COUNT: 14.1 % (ref 11.6–15.4)
FERRITIN SERPL-MCNC: 139 NG/ML (ref 30–400)
GLOBULIN SER-MCNC: 2.5 G/DL (ref 1.5–4.5)
GLUCOSE SERPL-MCNC: 103 MG/DL (ref 65–99)
HCT VFR BLD AUTO: 49.5 % (ref 37.5–51)
HGB BLD-MCNC: 16.8 G/DL (ref 13–17.7)
IMM GRANULOCYTES # BLD: 0 X10E3/UL (ref 0–0.1)
IMM GRANULOCYTES NFR BLD: 0 %
IRON SATN MFR SERPL: 55 % (ref 15–55)
IRON SERPL-MCNC: 183 UG/DL (ref 38–169)
LYMPHOCYTES # BLD AUTO: 3.3 X10E3/UL (ref 0.7–3.1)
LYMPHOCYTES NFR BLD AUTO: 40 %
MCH RBC QN AUTO: 30.4 PG (ref 26.6–33)
MCHC RBC AUTO-ENTMCNC: 33.9 G/DL (ref 31.5–35.7)
MCV RBC AUTO: 90 FL (ref 79–97)
MONOCYTES # BLD AUTO: 1 X10E3/UL (ref 0.1–0.9)
MONOCYTES NFR BLD AUTO: 12 %
NEUTROPHILS # BLD AUTO: 3.3 X10E3/UL (ref 1.4–7)
NEUTROPHILS NFR BLD AUTO: 40 %
PLATELET # BLD AUTO: 408 X10E3/UL (ref 150–450)
POTASSIUM SERPL-SCNC: 4.7 MMOL/L (ref 3.5–5.2)
PROT SERPL-MCNC: 6.7 G/DL (ref 6–8.5)
RBC # BLD AUTO: 5.52 X10E6/UL (ref 4.14–5.8)
SODIUM SERPL-SCNC: 139 MMOL/L (ref 134–144)
TIBC SERPL-MCNC: 330 UG/DL (ref 250–450)
UIBC SERPL-MCNC: 147 UG/DL (ref 111–343)
WBC # BLD AUTO: 8.2 X10E3/UL (ref 3.4–10.8)

## 2022-06-24 PROCEDURE — 99195 PHLEBOTOMY: CPT

## 2022-06-24 NOTE — PROGRESS NOTES
Therapeutic Phlebotomy  [address]  [address] [place] [address], [address] [facility] [address]      Date:   6/24/2022      Pre-Phlebotomy    Vital Signs:    Temp: 98    Pulse: 93    Resp: 16    BP: 136/89    Phlebotomy:    Site: [facility]  Amount Drawn: 450ML  Time Started:    1445  Volume Replaced: N/A  Time Completed:   9911  Replacement Fluid: N/A  Volume collected was lower than prescribed volume: No             Performed by:        Berenice Boyce RN    Date: 6/24/22 Time: 36782      Post Phlebotomy:    Vital Signs:    Temp: 97   Pulse: 98  Resp: 16  BP:     119/84         Donor Reaction:      None           Final Disposition      Destroyed     Patient Education completed:        Yes            Reason: DISCHARGE INSTRUCTIONS

## 2022-07-18 ENCOUNTER — APPOINTMENT (EMERGENCY)
Dept: RADIOLOGY | Facility: HOSPITAL | Age: 57
End: 2022-07-18
Payer: COMMERCIAL

## 2022-07-18 ENCOUNTER — HOSPITAL ENCOUNTER (EMERGENCY)
Facility: HOSPITAL | Age: 57
Discharge: HOME | End: 2022-07-18
Attending: EMERGENCY MEDICINE | Admitting: EMERGENCY MEDICINE
Payer: COMMERCIAL

## 2022-07-18 VITALS
SYSTOLIC BLOOD PRESSURE: 133 MMHG | HEART RATE: 90 BPM | DIASTOLIC BLOOD PRESSURE: 88 MMHG | TEMPERATURE: 98.3 F | OXYGEN SATURATION: 95 % | RESPIRATION RATE: 19 BRPM

## 2022-07-18 DIAGNOSIS — R53.81 MALAISE: ICD-10-CM

## 2022-07-18 DIAGNOSIS — R07.89 ATYPICAL CHEST PAIN: Primary | ICD-10-CM

## 2022-07-18 DIAGNOSIS — L29.9 PRURITUS: ICD-10-CM

## 2022-07-18 LAB
ALBUMIN SERPL BCP-MCNC: 3.9 G/DL (ref 3.5–5)
ALP SERPL-CCNC: 112 U/L (ref 46–116)
ALT SERPL W P-5'-P-CCNC: 56 U/L (ref 12–78)
ANION GAP SERPL CALCULATED.3IONS-SCNC: 8 MMOL/L (ref 4–13)
AST SERPL W P-5'-P-CCNC: 27 U/L (ref 5–45)
BASOPHILS # BLD AUTO: 0.14 THOUSANDS/ΜL (ref 0–0.1)
BASOPHILS NFR BLD AUTO: 1 % (ref 0–1)
BILIRUB SERPL-MCNC: 0.5 MG/DL (ref 0.2–1)
BUN SERPL-MCNC: 12 MG/DL (ref 5–25)
CALCIUM SERPL-MCNC: 8.6 MG/DL (ref 8.3–10.1)
CARDIAC TROPONIN I PNL SERPL HS: 4 NG/L
CHLORIDE SERPL-SCNC: 103 MMOL/L (ref 96–108)
CO2 SERPL-SCNC: 30 MMOL/L (ref 21–32)
CREAT SERPL-MCNC: 1.37 MG/DL (ref 0.6–1.3)
D DIMER PPP FEU-MCNC: 0.28 UG/ML FEU
EOSINOPHIL # BLD AUTO: 0.2 THOUSAND/ΜL (ref 0–0.61)
EOSINOPHIL NFR BLD AUTO: 2 % (ref 0–6)
ERYTHROCYTE [DISTWIDTH] IN BLOOD BY AUTOMATED COUNT: 15.1 % (ref 11.6–15.1)
FLUAV RNA RESP QL NAA+PROBE: NEGATIVE
FLUBV RNA RESP QL NAA+PROBE: NEGATIVE
GFR SERPL CREATININE-BSD FRML MDRD: 56 ML/MIN/1.73SQ M
GLUCOSE SERPL-MCNC: 105 MG/DL (ref 65–140)
HCT VFR BLD AUTO: 49.2 % (ref 36.5–49.3)
HGB BLD-MCNC: 16.5 G/DL (ref 12–17)
IMM GRANULOCYTES # BLD AUTO: 0.06 THOUSAND/UL (ref 0–0.2)
IMM GRANULOCYTES NFR BLD AUTO: 1 % (ref 0–2)
LYMPHOCYTES # BLD AUTO: 2.84 THOUSANDS/ΜL (ref 0.6–4.47)
LYMPHOCYTES NFR BLD AUTO: 26 % (ref 14–44)
MCH RBC QN AUTO: 31.1 PG (ref 26.8–34.3)
MCHC RBC AUTO-ENTMCNC: 33.5 G/DL (ref 31.4–37.4)
MCV RBC AUTO: 93 FL (ref 82–98)
MONOCYTES # BLD AUTO: 1.39 THOUSAND/ΜL (ref 0.17–1.22)
MONOCYTES NFR BLD AUTO: 13 % (ref 4–12)
NEUTROPHILS # BLD AUTO: 6.43 THOUSANDS/ΜL (ref 1.85–7.62)
NEUTS SEG NFR BLD AUTO: 57 % (ref 43–75)
NRBC BLD AUTO-RTO: 0 /100 WBCS
PLATELET # BLD AUTO: 450 THOUSANDS/UL (ref 149–390)
PMV BLD AUTO: 9.4 FL (ref 8.9–12.7)
POTASSIUM SERPL-SCNC: 3.9 MMOL/L (ref 3.5–5.3)
PROT SERPL-MCNC: 7.7 G/DL (ref 6.4–8.4)
RBC # BLD AUTO: 5.3 MILLION/UL (ref 3.88–5.62)
RSV RNA RESP QL NAA+PROBE: NEGATIVE
SARS-COV-2 RNA RESP QL NAA+PROBE: NEGATIVE
SODIUM SERPL-SCNC: 141 MMOL/L (ref 135–147)
WBC # BLD AUTO: 11.06 THOUSAND/UL (ref 4.31–10.16)

## 2022-07-18 PROCEDURE — 85379 FIBRIN DEGRADATION QUANT: CPT | Performed by: EMERGENCY MEDICINE

## 2022-07-18 PROCEDURE — 99284 EMERGENCY DEPT VISIT MOD MDM: CPT | Performed by: EMERGENCY MEDICINE

## 2022-07-18 PROCEDURE — 80053 COMPREHEN METABOLIC PANEL: CPT | Performed by: EMERGENCY MEDICINE

## 2022-07-18 PROCEDURE — 71045 X-RAY EXAM CHEST 1 VIEW: CPT

## 2022-07-18 PROCEDURE — 84484 ASSAY OF TROPONIN QUANT: CPT | Performed by: EMERGENCY MEDICINE

## 2022-07-18 PROCEDURE — 85025 COMPLETE CBC W/AUTO DIFF WBC: CPT | Performed by: EMERGENCY MEDICINE

## 2022-07-18 PROCEDURE — 93005 ELECTROCARDIOGRAM TRACING: CPT

## 2022-07-18 PROCEDURE — 36415 COLL VENOUS BLD VENIPUNCTURE: CPT | Performed by: EMERGENCY MEDICINE

## 2022-07-18 PROCEDURE — 0241U HB NFCT DS VIR RESP RNA 4 TRGT: CPT | Performed by: EMERGENCY MEDICINE

## 2022-07-18 PROCEDURE — 96361 HYDRATE IV INFUSION ADD-ON: CPT

## 2022-07-18 PROCEDURE — 96374 THER/PROPH/DIAG INJ IV PUSH: CPT

## 2022-07-18 PROCEDURE — 99285 EMERGENCY DEPT VISIT HI MDM: CPT

## 2022-07-18 RX ORDER — LORAZEPAM 2 MG/ML
1 INJECTION INTRAMUSCULAR ONCE
Status: COMPLETED | OUTPATIENT
Start: 2022-07-18 | End: 2022-07-18

## 2022-07-18 RX ADMIN — SODIUM CHLORIDE 1000 ML: 0.9 INJECTION, SOLUTION INTRAVENOUS at 21:18

## 2022-07-18 RX ADMIN — LORAZEPAM 1 MG: 2 INJECTION INTRAMUSCULAR; INTRAVENOUS at 21:18

## 2022-07-19 ENCOUNTER — TELEPHONE (OUTPATIENT)
Dept: HEMATOLOGY ONCOLOGY | Facility: MEDICAL CENTER | Age: 57
End: 2022-07-19

## 2022-07-19 ENCOUNTER — OFFICE VISIT (OUTPATIENT)
Dept: HEMATOLOGY ONCOLOGY | Facility: MEDICAL CENTER | Age: 57
End: 2022-07-19
Payer: COMMERCIAL

## 2022-07-19 ENCOUNTER — TELEPHONE (OUTPATIENT)
Dept: HEMATOLOGY ONCOLOGY | Facility: CLINIC | Age: 57
End: 2022-07-19

## 2022-07-19 ENCOUNTER — DOCUMENTATION (OUTPATIENT)
Dept: HEMATOLOGY ONCOLOGY | Facility: MEDICAL CENTER | Age: 57
End: 2022-07-19

## 2022-07-19 VITALS
HEART RATE: 97 BPM | DIASTOLIC BLOOD PRESSURE: 72 MMHG | HEIGHT: 72 IN | OXYGEN SATURATION: 99 % | SYSTOLIC BLOOD PRESSURE: 112 MMHG | TEMPERATURE: 97.8 F | RESPIRATION RATE: 17 BRPM | WEIGHT: 214.3 LBS | BODY MASS INDEX: 29.02 KG/M2

## 2022-07-19 DIAGNOSIS — D45 POLYCYTHEMIA VERA (HCC): ICD-10-CM

## 2022-07-19 DIAGNOSIS — R07.9 CHEST PAIN, UNSPECIFIED TYPE: ICD-10-CM

## 2022-07-19 DIAGNOSIS — R61 NIGHT SWEATS: ICD-10-CM

## 2022-07-19 DIAGNOSIS — L29.9 PRURITUS: ICD-10-CM

## 2022-07-19 DIAGNOSIS — G44.211 INTRACTABLE EPISODIC TENSION-TYPE HEADACHE: Primary | ICD-10-CM

## 2022-07-19 PROCEDURE — 99215 OFFICE O/P EST HI 40 MIN: CPT | Performed by: PHYSICIAN ASSISTANT

## 2022-07-19 RX ORDER — HYDROXYUREA 500 MG/1
500 CAPSULE ORAL DAILY
Qty: 30 CAPSULE | Refills: 0 | Status: SHIPPED | OUTPATIENT
Start: 2022-07-19 | End: 2022-08-11

## 2022-07-19 NOTE — TELEPHONE ENCOUNTER
CALL RETURN FORM   Reason for patient call? Patient calling needs lab orders sent to [facility] [address] in [address]   Patient's primary oncologist?  [facility]   Name of person the patient was calling for? [place]   Any additional information to add, if applicable? Please call [phone] if needed   Informed patient that the message will be forwarded to the team and someone will get back to them as soon as possible    Did you relay this information to the patient?   yes

## 2022-07-19 NOTE — PROGRESS NOTES
[address] HEMATOLOGY ONCOLOGY Katelin Gene  [address] [address]  Oncology Progress Note  Avtar Obregon, 1965, [id number]  7/19/2022        Assessment/Plan:   1  Intractable episodic tension-type headache  New onset headache lasting approximately one week  No significant improvement with a brief for pre from lorazepam in the emergency room however headache never truly dissipated  With risk factors for embolic event such as polycythemia vera, I recommended that the patient undergo MRI of the brain  Neurologically, the patient seems intact other than intractable headache  For additional headache management after MRI, I refer him to PCP     - MRI brain w wo contrast; Future    2  Polycythemia vera ([address] ); 3  Pruritus; 4  Night sweats  This is a 62-year-old male recently diagnosed with JAK2 mutation and polycythemia vera  Patient underwent one IV phlebotomy and hemoglobin has responded appropriately  However, the patient's symptoms including pruritus and night sweats have worsened since his last appointment  With the patient having chest pain and tension style headaches thrombotic workup is underway including imaging of patient's brain to rule out embolic event  Since the patient's symptoms have worsened, I have recommended that the patient undergo chemical therapy for polycythemia  Hydrea will be started today and will be taken daily  I will have the patient follow-up with monthly blood work  I will see him back in two months  This office will check in on him in approximately two weeks  - hydroxyurea (HYDREA) 500 mg capsule; Take 1 capsule (500 mg total) by mouth daily  Dispense: 30 capsule; Refill: 0    5  Chest pain, unspecified type  Patient has followed up with Cardiology in the past   Patient notes that his last stress test was not good    I have asked the patient to follow-up for an additional stress test and evaluation considering recent chest pains  The patient is scheduled for follow-up in approximately 3 weeks  Patient voiced agreement and understanding to the above  Patient knows to call the Hematology/Oncology office with any questions and concerns regarding the above  Goals and Barriers:    Current Goal:   Prolong Survival from Cancer  Barriers: None  Patient's Capacity to Self Care:  Patient able to self care  Laura Kellogg PA-C  Medical Oncology/Hematology  [address]  ______________________________________________________________________________________________________________    Subjective     Chief Complaint   Patient presents with    Follow-up     Polycythemia vera        History of present illness/Cancer History:    This is a 48-year-old male who was admitted to the emergency room after having several weeks of shortness of breath, three episodes of syncope at home in January 2021   Patient's past medical history significant for 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      Patient had been previously worked up for abnormalities by primary care doctor who recommended that the patient follow-up with GI as well as with Cardiology for a stress test   Unfortunately, patient's symptoms progressed and he presented to the emergency room  [address] [place] [address] work demonstrated hemoglobin in the 7 gram/deciliter range   Moreover, the patient was found to have a significant iron deficiency with a ferritin of three and an iron saturation of 1%   Patient underwent three Venofer infusions daily in the hospital   Patient's hemoglobin is in the 7 gram/deciliter range        GI work up in 1/2021-2/2021:  GI has seen the patient in the hospital  [facility] underwent colonoscopy in EGD that demonstrated questionable AVM in the small intestine   This area was cauterized   This area was not bleeding   Capsule demonstrated angiectasis and follow up EGD- is planned on 2/26/2021   This procedure did not demonstrate any signs of active bleeding      Patient completed the remainder of Venofer treatments started in the hospital in February 2021 03/23/21:    Patient notes overall feeling well  [address] work was reviewed   Patient's iron saturation increased to 7% and ferritin at 41   Hemoglobin has completely supplemented however, MCV is still low   Patient's platelet count is still elevated however this may be secondary to splenectomy and not necessarily to iron deficiency    Patient has completed COVID vaccinations      3/30/21:   Ferritin= 23, iron saturation = seven, TIBC 430,  Hemoglobin = 12 4, MCV 80,  Platelet count = 026,  PSA = 3 4     4/9-5/7/21:  5 Venofer 300 mg IV weekly      05/03/21:  Urgent follow up; [facility] on 5/1/2021, ED visit, hemoglobin stable   No bleeding today  1 more IV iron treatment to go   Iron studies requestion on labs from 5/1/21:iron sat supratheraputic       5/12/21:  Colonoscopy was negative for signs of active bleeding   Hemorrhoidal bleeding to blame for bright red blood per rectum   Scope was advanced past the descending colon due to patient's   CT colonoscopy diagnostic without contrast did not demonstrate any lesions      06/03/21:  Patient is feeling better   No more GI bleeding   Hemoglobin = 15 1, platelet count 810 oral seven, MCV = 86, ferritin = 452, iron saturation = 30%, TIBC = 336     8/27/21:    Labs taken every four weeks demonstrate stability   No GI bleeding   Patient feels well   Hemoglobin = 15 1, platelet count = 730, MCV = 93,  Iron saturation = 33, TIBC 324,  Ferritin = 155     11/1/2021: Feels well   1- 3 day episode of Melena + Hematochezia   Hemoglobin = 17 1, platelet count = 935, MCV = 96,  Iron saturation = 38, TIBC 344,  Ferritin = 149     2/8/2022:  WBC = 8 4, hemoglobin = 15 5, hematocrit = 49 6, platelet count = 214,  iron saturation = 34%, TIBC 364     02/10/22:  Patient reports having significant blood in his stool including dark, melena as well as hematochezia   Patient has not made an appointment with GI doctor   Discussed decreasing ferritin levels     22 WBC = 8 56, hemoglobin = 17 1, MCV = 92, platelet count = 900, PFHCOFEB = 65     2022:  Erythropoietin = 9 0, +JAK2 [id number] Mutation, other studies did not reflex,  WBC = 14, hemoglobin = 17 7, hematocrit = 52, platelet count = 493     22 PSA = 0 7, CMP within normal limits with the exception of a mildly elevated potassium at 5 4, labs ordered by primary physician      2021:Symptom Assessment Tool states that fatigue = 7, early satiety = 6, abdominal discomfort 6, inactivity = 5, problems with concentration 0, night sweats 9, itching 10, no answer for bone pain, no fever 0 and no unintentional weight loss in last six months     2022: 1 phlebotomy, pre Hemoglobin = 16 8, hct=49 5    2022: WBC=11, hemoglobin =16 5, hct = 49 2, hjm=122    Interval history:  Patient notes that his night sweats are drenching and he is got generalized pruritus that has worsened in the last month  Patient seems to think that this has all worsened since having phlebotomy  Patient notes that he had chest pain yesterday under went evaluation in the emergency room and was negative for cardiac reasons chest x-ray was also within normal limits  D-dimer was normal   Patient did not undergo any additional testing  Patient notes that his chest pain did resolve with lorazepam   Last stress test was over a year ago  Last office visit with Cardiology was approximately one year ago  Patient notes that he is more symptomatic than what he was before  Patient rates total fatigue at a five, early satiety = seven, abdominal discomfort = seven, inactivity = five, problems with concentration new symptom rated at a four, night sweats worsening at a 10 itching worsening at 10, bone pain about the same at three, occasional fever and no weight loss         ECO - Symptomatic but completely ambulatory    Review of Systems   Constitutional: Positive for activity change and fatigue  Respiratory: Negative for shortness of breath  Cardiovascular: Positive for chest pain (improved from yesterday)  Gastrointestinal: Positive for abdominal pain (intermittant)  Neurological: Positive for headaches  Psychiatric/Behavioral: Positive for decreased concentration  Current Outpatient Medications:     acetaminophen (TYLENOL) 325 mg tablet, Take 2 tablets (650 mg total) by mouth every 6 (six) hours as needed for mild pain, headaches or fever, Disp: 30 tablet, Rfl: 0    atorvastatin (LIPITOR) 20 mg tablet, Take 1 tablet (20 mg total) by mouth daily, Disp: 90 tablet, Rfl: 0    hydroxyurea (HYDREA) 500 mg capsule, Take 1 capsule (500 mg total) by mouth daily, Disp: 30 capsule, Rfl: 0    losartan (COZAAR) 50 mg tablet, TAKE 1 TABLET BY MOUTH EVERY DAY, Disp: 90 tablet, Rfl: 1    omeprazole (PriLOSEC OTC) 20 MG tablet, Take 1 tablet (20 mg total) by mouth daily, Disp: 90 tablet, Rfl: 2    pantoprazole (PROTONIX) 40 mg tablet, TAKE 1 TABLET (40 MG TOTAL) BY MOUTH DAILY 30-60 MIN BEFORE BREAKFAST, Disp: 90 tablet, Rfl: 3    verapamil (CALAN-SR) 120 mg CR tablet, TAKE 1 TABLET BY MOUTH EVERY DAY, Disp: 90 tablet, Rfl: 1    docusate sodium (COLACE) 100 mg capsule, Take 1 capsule (100 mg total) by mouth 3 (three) times a day for 14 days, Disp: 42 capsule, Rfl: 0    polyethylene glycol (MIRALAX) 17 g packet, Take 17 g by mouth daily for 7 days, Disp: 119 g, Rfl: 1  No current facility-administered medications for this visit    No Known Allergies    Advance Directive and Living Will:            Objective   /72 (BP Location: Left arm, Patient Position: Sitting, Cuff Size: Large)   Pulse 97   Temp 97 8 °F (36 6 °C) (Temporal)   Resp 17   Ht 6' (1 829 m)   Wt 97 2 kg (214 lb 4 8 oz)   SpO2 99%   BMI 29 06 kg/m²   Wt Readings from Last 6 Encounters:   07/19/22 97 2 kg (214 lb 4 8 oz)   06/21/22 95 7 kg (211 lb)   06/10/22 93 9 kg (207 lb)   05/24/22 95 7 kg (211 lb)   05/10/22 97 5 kg (215 lb)   04/30/22 94 2 kg (207 lb 10 8 oz)       Physical Exam  Constitutional:       General: He is not in acute distress  Appearance: He is well-developed  HENT:      Head: Normocephalic and atraumatic  Right Ear: External ear normal       Left Ear: External ear normal       Nose: Nose normal    Eyes:      General: No scleral icterus  Conjunctiva/sclera: Conjunctivae normal    Cardiovascular:      Rate and Rhythm: Normal rate  Pulmonary:      Effort: No respiratory distress  Abdominal:      General: There is no distension  Palpations: Abdomen is soft  Skin:     Findings: No rash (on exposed skin  )  Neurological:      Mental Status: He is alert and oriented to person, place, and time  Psychiatric:         Thought Content:  Thought content normal          Pertinent Laboratory Results and Imaging Review:  Admission on 07/18/2022, Discharged on 07/18/2022   Component Date Value Ref Range Status    WBC 07/18/2022 11 06 (A) 4 31 - 10 16 Thousand/uL Final    RBC 07/18/2022 5 30  3 88 - 5 62 Million/uL Final    Hemoglobin 07/18/2022 16 5  12 0 - 17 0 g/dL Final    Hematocrit 07/18/2022 49 2  36 5 - 49 3 % Final    MCV 07/18/2022 93  82 - 98 fL Final    MCH 07/18/2022 31 1  26 8 - 34 3 pg Final    MCHC 07/18/2022 33 5  31 4 - 37 4 g/dL Final    RDW 07/18/2022 15 1  11 6 - 15 1 % Final    MPV 07/18/2022 9 4  8 9 - 12 7 fL Final    Platelets 10/13/8101 450 (A) 149 - 390 Thousands/uL Final    nRBC 07/18/2022 0  /100 WBCs Final    Neutrophils Relative 07/18/2022 57  43 - 75 % Final    Immat GRANS % 07/18/2022 1  0 - 2 % Final    Lymphocytes Relative 07/18/2022 26  14 - 44 % Final    Monocytes Relative 07/18/2022 13 (A) 4 - 12 % Final    Eosinophils Relative 07/18/2022 2  0 - 6 % Final    Basophils Relative 07/18/2022 1  0 - 1 % Final    Neutrophils Absolute 07/18/2022 6 43  1 85 - 7 62 Thousands/µL Final    Immature Grans Absolute 07/18/2022 0 06  0 00 - 0 20 Thousand/uL Final    Lymphocytes Absolute 07/18/2022 2 84  0 60 - 4 47 Thousands/µL Final    Monocytes Absolute 07/18/2022 1 39 (A) 0 17 - 1 22 Thousand/µL Final    Eosinophils Absolute 07/18/2022 0 20  0 00 - 0 61 Thousand/µL Final    Basophils Absolute 07/18/2022 0 14 (A) 0 00 - 0 10 Thousands/µL Final    Sodium 07/18/2022 141  135 - 147 mmol/L Final    Potassium 07/18/2022 3 9  3 5 - 5 3 mmol/L Final    Chloride 07/18/2022 103  96 - 108 mmol/L Final    CO2 07/18/2022 30  21 - 32 mmol/L Final    ANION GAP 07/18/2022 8  4 - 13 mmol/L Final    BUN 07/18/2022 12  5 - 25 mg/dL Final    Creatinine 07/18/2022 1 37 (A) 0 60 - 1 30 mg/dL Final    Standardized to IDMS reference method    Glucose 07/18/2022 105  65 - 140 mg/dL Final    If the patient is fasting, the ADA then defines impaired fasting glucose as > 100 mg/dL and diabetes as > or equal to 123 mg/dL  Specimen collection should occur prior to Sulfasalazine administration due to the potential for falsely depressed results  Specimen collection should occur prior to Sulfapyridine administration due to the potential for falsely elevated results   Calcium 07/18/2022 8 6  8 3 - 10 1 mg/dL Final    AST 07/18/2022 27  5 - 45 U/L Final    Specimen collection should occur prior to Sulfasalazine administration due to the potential for falsely depressed results   ALT 07/18/2022 56  12 - 78 U/L Final    Specimen collection should occur prior to Sulfasalazine administration due to the potential for falsely depressed results       Alkaline Phosphatase 07/18/2022 112  46 - 116 U/L Final    Total Protein 07/18/2022 7 7  6 4 - 8 4 g/dL Final    Albumin 07/18/2022 3 9  3 5 - 5 0 g/dL Final    Total Bilirubin 07/18/2022 0 50  0 20 - 1 00 mg/dL Final    Use of this assay is not recommended for patients undergoing treatment with eltrombopag due to the potential for falsely elevated results   eGFR 07/18/2022 56  ml/min/1 73sq m Final    hs TnI 0hr 07/18/2022 4  "Refer to ACS Flowchart"- see link ng/L Final    Comment:                                              Initial (time 0) result  If >=50 ng/L, Myocardial injury suggested ;  Type of myocardial injury and treatment strategy  to be determined  If 5-49 ng/L, a delta result at 2 hours and or 4 hours will be needed to further evaluate  If <4 ng/L, and chest pain has been >3 hours since onset, patient may qualify for discharge based on the HEART score in the ED  If <5 ng/L and <3hours since onset of chest pain, a delta result at 2 hours will be needed to further evaluate  HS Troponin 99th Percentile URL of a Health Population=12 ng/L with a 95% Confidence Interval of 8-18 ng/L  Second Troponin (time 2 hours)  If calculated delta >= 20 ng/L,  Myocardial injury suggested ; Type of myocardial injury and treatment strategy to be determined  If 5-49 ng/L and the calculated delta is 5-19 ng/L, consult medical service for evaluation  Continue evaluation for ischemia on ecg and other possible etiology and repeat hs troponin at 4 hours  If delta                            is <5 ng/L at 2 hours, consider discharge based on risk stratification via the HEART score (if in ED), or KAL risk score in IP/Observation  HS Troponin 99th Percentile URL of a Health Population=12 ng/L with a 95% Confidence Interval of 8-18 ng/L   D-Dimer, Quant 07/18/2022 0 28  <0 50 ug/ml FEU Final    Reference and upper limits to exclude DVT and PE are the same  Do not use to exclude if clinical symptoms are present      SARS-CoV-2 07/18/2022 Negative  Negative Final    INFLUENZA A PCR 07/18/2022 Negative  Negative Final    INFLUENZA B PCR 07/18/2022 Negative  Negative Final    RSV PCR 07/18/2022 Negative  Negative Final   Orders Only on 06/23/2022   Component Date Value Ref Range Status    White Blood Cell Count 06/23/2022 8 2  3 4 - 10 8 x10E3/uL Final  Red Blood Cell Count 06/23/2022 5 52  4 14 - 5 80 x10E6/uL Final    Hemoglobin 06/23/2022 16 8  13 0 - 17 7 g/dL Final    HCT 06/23/2022 49 5  37 5 - 51 0 % Final    MCV 06/23/2022 90  79 - 97 fL Final    MCH 06/23/2022 30 4  26 6 - 33 0 pg Final    MCHC 06/23/2022 33 9  31 5 - 35 7 g/dL Final    RDW 06/23/2022 14 1  11 6 - 15 4 % Final    Platelet Count 51/21/9245 408  150 - 450 x10E3/uL Final    Neutrophils 06/23/2022 40  Not Estab  % Final    Lymphocytes 06/23/2022 40  Not Estab  % Final    Monocytes 06/23/2022 12  Not Estab  % Final    Eosinophils 06/23/2022 6  Not Estab  % Final    Basophils PCT 06/23/2022 2  Not Estab  % Final    Neutrophils (Absolute) 06/23/2022 3 3  1 4 - 7 0 x10E3/uL Final    Lymphocytes (Absolute) 06/23/2022 3 3 (A) 0 7 - 3 1 x10E3/uL Final    Monocytes (Absolute) 06/23/2022 1 0 (A) 0 1 - 0 9 x10E3/uL Final    Eosinophils (Absolute) 06/23/2022 0 5 (A) 0 0 - 0 4 x10E3/uL Final    Basophils ABS 06/23/2022 0 2  0 0 - 0 2 x10E3/uL Final    Immature Granulocytes 06/23/2022 0  Not Estab  % Final    Immature Granulocytes (Absolute) 06/23/2022 0 0  0 0 - 0 1 x10E3/uL Final    Comment: A hand-written panel/profile was received from your office  In  accordance with the [facility] Ambiguous Test Code Policy dated July 4183, we have assigned CBC with Differential/Platelet, Test Code  #[id number] to this request  If this is not the testing you wished to  receive on this specimen, please contact the [facility] Department to clarify the test order  We  appreciate your business        Glucose, Random 06/23/2022 103 (A) 65 - 99 mg/dL Final    BUN 06/23/2022 9  6 - 24 mg/dL Final    Creatinine 06/23/2022 1 17  0 76 - 1 27 mg/dL Final    eGFR 06/23/2022 73  >59 mL/min/1 73 Final    SL AMB BUN/CREATININE RATIO 06/23/2022 8 (A) 9 - 20 Final    Sodium 06/23/2022 139  134 - 144 mmol/L Final    Potassium 06/23/2022 4 7  3 5 - 5 2 mmol/L Final    Chloride 06/23/2022 100  96 - 106 mmol/L Final    CO2 06/23/2022 25  20 - 29 mmol/L Final    CALCIUM 06/23/2022 9 8  8 7 - 10 2 mg/dL Final    Protein, Total 06/23/2022 6 7  6 0 - 8 5 g/dL Final    Albumin 06/23/2022 4 2  3 8 - 4 9 g/dL Final    Globulin, Total 06/23/2022 2 5  1 5 - 4 5 g/dL Final    Albumin/Globulin Ratio 06/23/2022 1 7  1 2 - 2 2 Final    TOTAL BILIRUBIN 06/23/2022 0 4  0 0 - 1 2 mg/dL Final    Alk Phos Isoenzymes 06/23/2022 110  44 - 121 IU/L Final    AST 06/23/2022 26  0 - 40 IU/L Final    ALT 06/23/2022 36  0 - 44 IU/L Final    Total Iron Binding Capacity (TIBC) 06/23/2022 330  250 - 450 ug/dL Final    UIBC 06/23/2022 147  111 - 343 ug/dL Final    Iron, Serum 06/23/2022 183 (A) 38 - 169 ug/dL Final    Iron Saturation 06/23/2022 55  15 - 55 % Final    Ferritin 06/23/2022 139  30 - 400 ng/mL Final       The following historical data was reviewed:  Past Medical History:   Diagnosis Date    Anemia     Cancer (HCC)     prostate    Colon polyp     Elevated PSA     Full dentures     GERD (gastroesophageal reflux disease)     Hemorrhoid     Hyperlipidemia     Sleep apnea     No CPAP    Smoker     TIA (transient ischemic attack)     Transfusion history     Wears glasses      Past Surgical History:   Procedure Laterality Date    ABDOMINAL ADHESION SURGERY N/A 11/17/2021    Procedure: LYSIS ADHESIONS LAPAROSCOPIC W ROBOT;  Surgeon: Terrence Gomez MD;  [facility];  Service: Urology    APPENDECTOMY      COLONOSCOPY      EGD      FOOT SURGERY Right     bone removed    OTHER SURGICAL HISTORY      bone removal right arm-abnormal growth of surface bone    PROSTATE BIOPSY      PROSTATECTOMY N/A 11/17/2021    Procedure: ROBOTIC ASSISTED LAPAROSCOPIC SIMPLE PROSTATECTOMY AND BLADDER NECK RECONSTRUCTION;  Surgeon: Terrence Gomez MD;  [facility];  Service: Urology    SPLENECTOMY      ruptured     Social History     Socioeconomic History    Marital status:  Spouse name: None    Number of children: None    Years of education: None    Highest education level: None   Occupational History    Occupation: supervisor     Employer: [employer]   Tobacco Use    Smoking status: Former Smoker     Packs/day: 0 50     Years: 10 00     Pack years: 5 00     Types: Cigarettes     Quit date: 2021     Years since quittin 4    Smokeless tobacco: Never Used   Vaping Use    Vaping Use: Never used   Substance and Sexual Activity    Alcohol use: Not Currently    Drug use: No    Sexual activity: None   Other Topics Concern    None   Social History Narrative    None     Social Determinants of Health     Financial Resource Strain: Not on file   Food Insecurity: Not on file   Transportation Needs: Not on file   Physical Activity: Not on file   Stress: Not on file   Social Connections: Not on file   Intimate Partner Violence: Not on file   Housing Stability: Not on file     Family History   Problem Relation Age of Onset    Dementia Father     Heart disease Mother     Hypertension Mother     Hyperlipidemia Mother     Hypertension Sister     Hypertension Brother     No Known Problems Daughter     No Known Problems Son     Cancer Paternal Grandfather         prostate    No Known Problems Son        Please note: This report has been generated by a voice recognition software system  Therefore there may be syntax, spelling, and/or grammatical errors  Please call if you have any questions

## 2022-07-19 NOTE — RESULT ENCOUNTER NOTE
Patient has an active MyChart account and negative COVID/rsv/flu results have been reviewed by the patient and/or proxy

## 2022-07-19 NOTE — DISCHARGE INSTRUCTIONS
Continue to drink lots of fluids    Return to the emergency department if chest pain worsens, if you are having significant shortness of breath

## 2022-07-19 NOTE — PROGRESS NOTES
Contacted Dr Reynaldo Chamberlain office to schedule a follow up and they said that they will be contacting the patient directly after they speak with Dr Crystal Ambriz

## 2022-07-19 NOTE — TELEPHONE ENCOUNTER
Called and left voicemail for patient letting him know that due to starting a new medication, [address] [place] [address] would like for him to go for labs 2 weeks from today, and then 4 weeks after that  I let him know to call the office if he had any questions

## 2022-07-19 NOTE — TELEPHONE ENCOUNTER
Scheduling Appointment     Who Is Calling to Schedule  Patient   Doctor Rogerio Cline [place]   Date and Time 07/19 @ 9:30AM   Reason for scheduling appointment Hospital Follow Up   Patient verbalized understanding   Yes

## 2022-07-19 NOTE — ED PROVIDER NOTES
History  Chief Complaint   Patient presents with    Chest Pain     Pt reporting chest pain x  2 weeks following having blood taken for PV testing  Pt reports pain worse with breathing, also reporting some body aches  Pt slightly anxious at time of triage  Reports pain is mid-sternal, does not radiate but covers most the chest       HPI  Patient is a 61year old male [facility] polycythemia vera presenting for evaluation of 2 weeks of general malaise, fatigue, several days of fever, headache, cough, pleuritic chest pain  Patient states that his chest pain is pleuritic, central, worse with taking a deep breath or coughing  Patient denies significant shortness of breath/dyspnea on exertion  Patient denies lower extremity swelling, calf erythema or pain, recent immobilization or surgery, history of DVT/PE  Patient states that initial symptoms started shortly after phlebotomy however no documentation of this noted  Patient denying chills, rigors  Patient denies productive component to cough, denies hemoptysis, urinary or bowel symptoms  Patient states that he has intermittently had rectal bleeding in the past however denies any recent episodes of this  Patient states that his wife has had URI symptoms over same interval    Prior to Admission Medications   Prescriptions Last Dose Informant Patient Reported?  Taking?   acetaminophen (TYLENOL) 325 mg tablet  Self No No   Sig: Take 2 tablets (650 mg total) by mouth every 6 (six) hours as needed for mild pain, headaches or fever   atorvastatin (LIPITOR) 20 mg tablet  Self No No   Sig: Take 1 tablet (20 mg total) by mouth daily   docusate sodium (COLACE) 100 mg capsule  Self No No   Sig: Take 1 capsule (100 mg total) by mouth 3 (three) times a day for 14 days   losartan (COZAAR) 50 mg tablet  Self No No   Sig: TAKE 1 TABLET BY MOUTH EVERY DAY   omeprazole (PriLOSEC OTC) 20 MG tablet  Self No No   Sig: Take 1 tablet (20 mg total) by mouth daily   pantoprazole (PROTONIX) 40 mg tablet Self No No   Sig: TAKE 1 TABLET (40 MG TOTAL) BY MOUTH DAILY 30-60 MIN BEFORE BREAKFAST   polyethylene glycol (MIRALAX) 17 g packet  Self No No   Sig: Take 17 g by mouth daily for 7 days   verapamil (CALAN-SR) 120 mg CR tablet  Self No No   Sig: TAKE 1 TABLET BY MOUTH EVERY DAY      Facility-Administered Medications: None       Past Medical History:   Diagnosis Date    Anemia     Cancer (HCC)     prostate    Colon polyp     Elevated PSA     Full dentures     GERD (gastroesophageal reflux disease)     Hemorrhoid     Hyperlipidemia     Sleep apnea     No CPAP    Smoker     TIA (transient ischemic attack)     Transfusion history     Wears glasses        Past Surgical History:   Procedure Laterality Date    ABDOMINAL ADHESION SURGERY N/A 11/17/2021    Procedure: LYSIS ADHESIONS LAPAROSCOPIC W ROBOT;  Surgeon: Atilio Foy MD;  [facility];  Service: Urology    APPENDECTOMY      COLONOSCOPY      EGD      FOOT SURGERY Right     bone removed    OTHER SURGICAL HISTORY      bone removal right arm-abnormal growth of surface bone    PROSTATE BIOPSY      PROSTATECTOMY N/A 11/17/2021    Procedure: ROBOTIC ASSISTED LAPAROSCOPIC SIMPLE PROSTATECTOMY AND BLADDER NECK RECONSTRUCTION;  Surgeon: Atilio Foy MD;  [facility];  Service: Urology    SPLENECTOMY      ruptured       Family History   Problem Relation Age of Onset    Dementia Father     Heart disease Mother     Hypertension Mother     Hyperlipidemia Mother     Hypertension Sister     Hypertension Brother     No Known Problems Daughter     No Known Problems Son     Cancer Paternal Grandfather         prostate    No Known Problems Son      I have reviewed and agree with the history as documented      E-Cigarette/Vaping    E-Cigarette Use Never User      E-Cigarette/Vaping Substances    Nicotine No     THC No     CBD No     Flavoring No     Other No     Unknown No      Social History     Tobacco Use    Smoking status: Former Smoker     Packs/day: 0 50     Years: 10 00     Pack years: 5 00     Types: Cigarettes     Quit date: 2021     Years since quittin 4    Smokeless tobacco: Never Used   Vaping Use    Vaping Use: Never used   Substance Use Topics    Alcohol use: Not Currently    Drug use: No       Review of Systems   Constitutional: Positive for fatigue  Negative for chills and fever  HENT: Negative for congestion, rhinorrhea and sore throat  Eyes: Negative for photophobia and visual disturbance  Respiratory: Positive for cough  Negative for chest tightness and shortness of breath  Cardiovascular: Positive for chest pain  Negative for palpitations and leg swelling  Gastrointestinal: Negative for abdominal distention, abdominal pain, diarrhea, nausea and vomiting  Endocrine: Negative for polydipsia and polyuria  Genitourinary: Negative for dysuria and hematuria  Musculoskeletal: Negative for arthralgias and myalgias  Skin: Negative for color change, pallor, rash and wound  Neurological: Positive for headaches  Negative for weakness and numbness  Psychiatric/Behavioral: Negative for confusion  Physical Exam  Physical Exam  Vitals and nursing note reviewed  Constitutional:       General: He is not in acute distress  Appearance: He is well-developed  He is not diaphoretic  HENT:      Head: Normocephalic and atraumatic  Comments: Moist mucous membranes      Right Ear: External ear normal       Left Ear: External ear normal       Nose: Nose normal       Mouth/Throat:      Pharynx: No oropharyngeal exudate  Eyes:      Conjunctiva/sclera: Conjunctivae normal       Pupils: Pupils are equal, round, and reactive to light  Cardiovascular:      Rate and Rhythm: Normal rate and regular rhythm  Heart sounds: Normal heart sounds  No murmur heard  No friction rub  No gallop  Comments: Sinus tachycardia rate of 110  No M/R/G  Extremities warm and well perfused  Pulmonary:      Effort: Pulmonary effort is normal  No respiratory distress  Breath sounds: Normal breath sounds  No wheezing  Comments: No increased WOB  Speaking in complete sentences  Sat'ing 95 percent on room air indicating adequate oxygenation  Lungs CTAB without wheezes, rales, rhonchi   Chest:      Chest wall: No tenderness  Abdominal:      General: Bowel sounds are normal  There is no distension  Palpations: Abdomen is soft  There is no mass  Tenderness: There is no abdominal tenderness  There is no guarding or rebound  Musculoskeletal:         General: No deformity  Comments: No lower extremity swelling, erythema, or calf tenderness  Skin:     General: Skin is warm and dry  Capillary Refill: Capillary refill takes less than 2 seconds  Neurological:      Mental Status: He is alert and oriented to person, place, and time     Psychiatric:         Behavior: Behavior normal          Vital Signs  ED Triage Vitals   Temperature Pulse Respirations Blood Pressure SpO2   07/18/22 2006 07/18/22 2006 07/18/22 2006 07/18/22 2006 07/18/22 2006   98 3 °F (36 8 °C) (!) 111 20 (!) 162/104 98 %      Temp Source Heart Rate Source Patient Position - Orthostatic VS BP Location FiO2 (%)   07/18/22 2006 07/18/22 2006 07/18/22 2006 07/18/22 2006 --   Oral Monitor Sitting Left arm       Pain Score       07/18/22 2145       9           Vitals:    07/18/22 2006 07/18/22 2130 07/18/22 2145 07/18/22 2215   BP: (!) 162/104  133/88    Pulse: (!) 111 98 98 90   Patient Position - Orthostatic VS: Sitting            Visual Acuity      ED Medications  Medications   sodium chloride 0 9 % bolus 1,000 mL (0 mL Intravenous Stopped 7/18/22 2300)   LORazepam (ATIVAN) injection 1 mg (1 mg Intravenous Given 7/18/22 2118)       Diagnostic Studies  Results Reviewed     Procedure Component Value Units Date/Time    COVID/FLU/RSV [[id number]]  (Normal) Collected: 07/18/22 2142    Lab Status: Final result Specimen: Nares from Nose Updated: 07/18/22 8711     SARS-CoV-2 Negative     INFLUENZA A PCR Negative     INFLUENZA B PCR Negative     RSV PCR Negative    Narrative:      FOR PEDIATRIC PATIENTS - copy/paste COVID Guidelines URL to browser: [email or web address]    SARS-CoV-2 assay is a Nucleic Acid Amplification assay intended for the  qualitative detection of nucleic acid from SARS-CoV-2 in nasopharyngeal  swabs  Results are for the presumptive identification of SARS-CoV-2 RNA  Positive results are indicative of infection with SARS-CoV-2, the virus  causing COVID-19, but do not rule out bacterial infection or co-infection  with other viruses  Laboratories within the [place] and its  territories are required to report all positive results to the appropriate  public health authorities  Negative results do not preclude SARS-CoV-2  infection and should not be used as the sole basis for treatment or other  patient management decisions  Negative results must be combined with  clinical observations, patient history, and epidemiological information  This test has not been FDA cleared or approved  This test has been authorized by FDA under an Emergency Use Authorization  (EUA)  This test is only authorized for the duration of time the  declaration that circumstances exist justifying the authorization of the  emergency use of an in vitro diagnostic tests for detection of SARS-CoV-2  virus and/or diagnosis of COVID-19 infection under section 564(b)(1) of  the Act, 21 U  S C  938ENA-5(Q)(8), unless the authorization is terminated  or revoked sooner  The test has been validated but independent review by FDA  and CLIA is pending  Test performed using [facility] GeneXpert: This RT-PCR assay targets N2,  a region unique to SARS-CoV-2  A conserved region in the E-gene was chosen  for pan-Sarbecovirus detection which includes SARS-CoV-2      HS Troponin 0hr (reflex protocol) [[id number]]  (Normal) Collected: 07/18/22 2012    Lab Status: Final result Specimen: Blood from Arm, Right Updated: 07/18/22 2041     hs TnI 0hr 4 ng/L     D-dimer, quantitative [[id number]]  (Normal) Collected: 07/18/22 2019    Lab Status: Final result Specimen: Blood from Arm, Left Updated: 07/18/22 2037     D-Dimer, Quant 0 28 ug/ml FEU     Narrative: In the evaluation for possible pulmonary embolism, in the appropriate (Well's Score of 4 or less) patient, the age adjusted d-dimer cutoff for this patient can be calculated as:    Age x 0 01 (in ug/mL) for Age-adjusted D-dimer exclusion threshold for a patient over 50 years      Comprehensive metabolic panel [[id number]]  (Abnormal) Collected: 07/18/22 2012    Lab Status: Final result Specimen: Blood from Arm, Right Updated: 07/18/22 2033     Sodium 141 mmol/L      Potassium 3 9 mmol/L      Chloride 103 mmol/L      CO2 30 mmol/L      ANION GAP 8 mmol/L      BUN 12 mg/dL      Creatinine 1 37 mg/dL      Glucose 105 mg/dL      Calcium 8 6 mg/dL      AST 27 U/L      ALT 56 U/L      Alkaline Phosphatase 112 U/L      Total Protein 7 7 g/dL      Albumin 3 9 g/dL      Total Bilirubin 0 50 mg/dL      eGFR 56 ml/min/1 73sq m     Narrative:      Raina guidelines for Chronic Kidney Disease (CKD):     Stage 1 with normal or high GFR (GFR > 90 mL/min/1 73 square meters)    Stage 2 Mild CKD (GFR = 60-89 mL/min/1 73 square meters)    Stage 3A Moderate CKD (GFR = 45-59 mL/min/1 73 square meters)    Stage 3B Moderate CKD (GFR = 30-44 mL/min/1 73 square meters)    Stage 4 Severe CKD (GFR = 15-29 mL/min/1 73 square meters)    Stage 5 End Stage CKD (GFR <15 mL/min/1 73 square meters)  Note: GFR calculation is accurate only with a steady state creatinine    CBC and differential [[id number]]  (Abnormal) Collected: 07/18/22 2012    Lab Status: Final result Specimen: Blood from Arm, Right Updated: 07/18/22 2017     WBC 11 06 Thousand/uL      RBC 5 30 Million/uL      Hemoglobin 16 5 g/dL      Hematocrit 49 2 %      MCV 93 fL      MCH 31 1 pg      MCHC 33 5 g/dL      RDW 15 1 %      MPV 9 4 fL      Platelets 947 Thousands/uL      nRBC 0 /100 WBCs      Neutrophils Relative 57 %      Immat GRANS % 1 %      Lymphocytes Relative 26 %      Monocytes Relative 13 %      Eosinophils Relative 2 %      Basophils Relative 1 %      Neutrophils Absolute 6 43 Thousands/µL      Immature Grans Absolute 0 06 Thousand/uL      Lymphocytes Absolute 2 84 Thousands/µL      Monocytes Absolute 1 39 Thousand/µL      Eosinophils Absolute 0 20 Thousand/µL      Basophils Absolute 0 14 Thousands/µL                  XR chest 1 view portable   Final Result by Jaxon Lara MD (07/18 2117)      No acute cardiopulmonary disease                    Workstation performed: [id number]                    Procedures  Procedures         ED Course             HEART Risk Score    Flowsheet Row Most Recent Value   Heart Score Risk Calculator    History 0 Filed at: 07/18/2022 2300   ECG 0 Filed at: 07/18/2022 2300   Age 1 Filed at: 07/18/2022 2300   Risk Factors 1 Filed at: 07/18/2022 2300   Troponin 0 Filed at: 07/18/2022 2300   HEART Score 2 Filed at: 07/18/2022 2300                            Wells' Criteria for PE    Flowsheet Row Most Recent Value   Wells' Criteria for PE    Clinical signs and symptoms of DVT 0 Filed at: 07/18/2022 2300   PE is primary diagnosis or equally likely 0 Filed at: 07/18/2022 2300   HR >100 1 5 Filed at: 07/18/2022 2300   Immobilization at least 3 days or Surgery in the previous 4 weeks 0 Filed at: 07/18/2022 2300   Previous, objectively diagnosed PE or DVT 0 Filed at: 07/18/2022 2300   Hemoptysis 0 Filed at: 07/18/2022 2300   Malignancy with treatment within 6 months or palliative 1 Filed at: 07/18/2022 2300   Wells' Criteria Total 2 5 Filed at: 07/18/2022 2300                MDM  Number of Diagnoses or Management Options  Atypical chest pain  Malaise  Pruritus  Diagnosis management comments: Pleuritic chest pain, tachycardia, general malaise  Patient with HEART score of 2, moderate risk wells  Normal dimer  Sinus tachycardia without ST or T wave abnormalities on EKG  No pneumothorax, pneumonia, or effusion on CXR  Initially somewhat tremulous  Questioned regarding alcohol use and denying  Improvement of HR following IV fluids and ativan  Hemoglobin 16 5  Discharged with verbal and written return precautions and instructions to follow up with hematology/oncology      Disposition  Final diagnoses:   Atypical chest pain   Malaise   Pruritus     Time reflects when diagnosis was documented in both MDM as applicable and the Disposition within this note     Time User Action Codes Description Comment    7/18/2022 10:32 PM Leandra Meehan Add [R07 89] Atypical chest pain     7/18/2022 10:33 PM Leandra Meehan Add [R53 81] Malaise     7/18/2022 10:33 PM Leandra Meehan Add [L29 9] Pruritus       ED Disposition     ED Disposition   Discharge    Condition   Stable    Date/Time   Mon Jul 18, 2022 10:32 PM    Comment   Butch Carrasquillo discharge to home/self care                 Follow-up Information     Follow up With Specialties Details Why Contact Info Additional Information    [facility] Emergency Department Emergency Medicine  If symptoms worsen [address] [address] Emergency Department, [place], [place], [address]          Discharge Medication List as of 7/18/2022 10:35 PM      CONTINUE these medications which have NOT CHANGED    Details   acetaminophen (TYLENOL) 325 mg tablet Take 2 tablets (650 mg total) by mouth every 6 (six) hours as needed for mild pain, headaches or fever, Starting Wed 1/29/2020, No Print      atorvastatin (LIPITOR) 20 mg tablet Take 1 tablet (20 mg total) by mouth daily, Starting Fri 5/6/2022, Normal      docusate sodium (COLACE) 100 mg capsule Take 1 capsule (100 mg total) by mouth 3 (three) times a day for 14 days, Starting Fri 12/10/2021, Until Fri 6/10/2022, Normal      losartan (COZAAR) 50 mg tablet TAKE 1 TABLET BY MOUTH EVERY DAY, Normal      omeprazole (PriLOSEC OTC) 20 MG tablet Take 1 tablet (20 mg total) by mouth daily, Starting Wed 11/24/2021, Normal      pantoprazole (PROTONIX) 40 mg tablet TAKE 1 TABLET (40 MG TOTAL) BY MOUTH DAILY 30-60 MIN BEFORE BREAKFAST, Normal      polyethylene glycol (MIRALAX) 17 g packet Take 17 g by mouth daily for 7 days, Starting Fri 12/10/2021, Until Fri 6/10/2022, Normal      verapamil (CALAN-SR) 120 mg CR tablet TAKE 1 TABLET BY MOUTH EVERY DAY, Normal             No discharge procedures on file      PDMP Review       Value Time User    PDMP Reviewed  Yes 11/17/2021  6:58 AM Tyler Moon MD          ED Provider  Electronically Signed by           Chace Lozano MD  07/19/22  No

## 2022-07-20 ENCOUNTER — HOSPITAL ENCOUNTER (OUTPATIENT)
Dept: RADIOLOGY | Facility: HOSPITAL | Age: 57
Discharge: HOME | End: 2022-07-20
Payer: COMMERCIAL

## 2022-07-20 DIAGNOSIS — G44.211 INTRACTABLE EPISODIC TENSION-TYPE HEADACHE: ICD-10-CM

## 2022-07-20 PROCEDURE — A9585 GADOBUTROL INJECTION: HCPCS | Performed by: PHYSICIAN ASSISTANT

## 2022-07-20 PROCEDURE — 70553 MRI BRAIN STEM W/O & W/DYE: CPT

## 2022-07-20 PROCEDURE — G1004 CDSM NDSC: HCPCS

## 2022-07-20 RX ADMIN — GADOBUTROL 10 ML: 604.72 INJECTION INTRAVENOUS at 16:04

## 2022-07-25 ENCOUNTER — OFFICE VISIT (OUTPATIENT)
Dept: FAMILY MEDICINE CLINIC | Facility: CLINIC | Age: 57
End: 2022-07-25
Payer: COMMERCIAL

## 2022-07-25 VITALS
BODY MASS INDEX: 29.04 KG/M2 | DIASTOLIC BLOOD PRESSURE: 84 MMHG | SYSTOLIC BLOOD PRESSURE: 122 MMHG | TEMPERATURE: 97.8 F | OXYGEN SATURATION: 97 % | HEART RATE: 88 BPM | HEIGHT: 72 IN | RESPIRATION RATE: 18 BRPM | WEIGHT: 214.4 LBS

## 2022-07-25 DIAGNOSIS — Z12.11 COLON CANCER SCREENING: ICD-10-CM

## 2022-07-25 DIAGNOSIS — K22.70 BARRETT'S ESOPHAGUS WITHOUT DYSPLASIA: ICD-10-CM

## 2022-07-25 DIAGNOSIS — I10 ESSENTIAL HYPERTENSION: ICD-10-CM

## 2022-07-25 DIAGNOSIS — E78.2 MIXED HYPERLIPIDEMIA: ICD-10-CM

## 2022-07-25 DIAGNOSIS — R51.9 NONINTRACTABLE HEADACHE, UNSPECIFIED CHRONICITY PATTERN, UNSPECIFIED HEADACHE TYPE: Primary | ICD-10-CM

## 2022-07-25 PROCEDURE — 99214 OFFICE O/P EST MOD 30 MIN: CPT | Performed by: FAMILY MEDICINE

## 2022-07-25 RX ORDER — ATORVASTATIN CALCIUM 20 MG/1
20 TABLET, FILM COATED ORAL DAILY
Qty: 90 TABLET | Refills: 1 | Status: SHIPPED | OUTPATIENT
Start: 2022-07-25 | End: 2022-10-12 | Stop reason: SDUPTHER

## 2022-07-25 RX ORDER — METHYLPREDNISOLONE 4 MG/1
TABLET ORAL
Qty: 21 TABLET | Refills: 0 | Status: SHIPPED | OUTPATIENT
Start: 2022-07-25 | End: 2022-07-31

## 2022-07-25 NOTE — PROGRESS NOTES
Assessment/Plan:    No problem-specific Assessment & Plan notes found for this encounter  If upcoming cardiac w/u neg, then step may be egd since hx of Barretts    Right frontal headache with congestion, MRI brain neg, consider early sinusitis, start medrol, risks aware    htn stable    p-vera on meds, f/u hematology     Diagnoses and all orders for this visit:    Nonintractable headache, unspecified chronicity pattern, unspecified headache type  -     methylPREDNISolone 4 MG tablet therapy pack; Use as directed on package    Colon cancer screening  -     Ambulatory referral to Gastroenterology; Future    Crews's esophagus without dysplasia    Mixed hyperlipidemia  -     atorvastatin (LIPITOR) 20 mg tablet; Take 1 tablet (20 mg total) by mouth daily    Essential hypertension    Other orders  -     ASPIRIN 81 PO; Take by mouth            Return if symptoms worsen or fail to improve  Subjective:      Patient ID: Yong Mcfadden is a 61 y o  male  Chief Complaint   Patient presents with    Follow-up     [facility] ER F/U for chest pain  Leobardo Hansen         HPI  Was in ER  Stress test pending  MRI neg  cxr neg  Ddimer neg    Had chest pain  Chest tightness  Pleuritic  Gradual, worsening  Some lightheadedness  No more cp  Has headache  Right frontal  Not congested  Runny nose right side, clear  Ha worse with flexion    The following portions of the patient's history were reviewed and updated as appropriate: allergies, current medications, past family history, past medical history, past social history, past surgical history and problem list     Review of Systems   Constitutional: Negative for chills and fever           Current Outpatient Medications   Medication Sig Dispense Refill    acetaminophen (TYLENOL) 325 mg tablet Take 2 tablets (650 mg total) by mouth every 6 (six) hours as needed for mild pain, headaches or fever 30 tablet 0    ASPIRIN 81 PO Take by mouth      atorvastatin (LIPITOR) 20 mg tablet Take 1 tablet (20 mg total) by mouth daily 90 tablet 1    hydroxyurea (HYDREA) 500 mg capsule Take 1 capsule (500 mg total) by mouth daily 30 capsule 0    losartan (COZAAR) 50 mg tablet TAKE 1 TABLET BY MOUTH EVERY DAY 90 tablet 1    methylPREDNISolone 4 MG tablet therapy pack Use as directed on package 21 tablet 0    omeprazole (PriLOSEC OTC) 20 MG tablet Take 1 tablet (20 mg total) by mouth daily 90 tablet 2    pantoprazole (PROTONIX) 40 mg tablet TAKE 1 TABLET (40 MG TOTAL) BY MOUTH DAILY 30-60 MIN BEFORE BREAKFAST 90 tablet 3    verapamil (CALAN-SR) 120 mg CR tablet TAKE 1 TABLET BY MOUTH EVERY DAY 90 tablet 1    docusate sodium (COLACE) 100 mg capsule Take 1 capsule (100 mg total) by mouth 3 (three) times a day for 14 days (Patient not taking: Reported on 7/25/2022) 42 capsule 0    polyethylene glycol (MIRALAX) 17 g packet Take 17 g by mouth daily for 7 days (Patient not taking: Reported on 7/25/2022) 119 g 1     No current facility-administered medications for this visit  Objective:    /84   Pulse 88   Temp 97 8 °F (36 6 °C)   Resp 18   Ht 6' (1 829 m)   Wt 97 3 kg (214 lb 6 4 oz)   SpO2 97%   BMI 29 08 kg/m²        Physical Exam  Vitals and nursing note reviewed  Constitutional:       General: He is not in acute distress  Appearance: He is well-developed  He is not ill-appearing  HENT:      Head: Normocephalic  Right Ear: Tympanic membrane normal       Left Ear: Tympanic membrane normal    Eyes:      General: No scleral icterus  Conjunctiva/sclera: Conjunctivae normal    Cardiovascular:      Rate and Rhythm: Normal rate and regular rhythm  Pulmonary:      Effort: Pulmonary effort is normal  No respiratory distress  Breath sounds: No wheezing  Abdominal:      General: There is no distension  Palpations: Abdomen is soft  Tenderness: There is no abdominal tenderness  Musculoskeletal:         General: No deformity  Cervical back: Neck supple     Skin: General: Skin is warm and dry  Coloration: Skin is not pale  Neurological:      Mental Status: He is alert  Psychiatric:         Behavior: Behavior normal          Thought Content:  Thought content normal                 Idalia Floyd DO

## 2022-07-26 LAB
ATRIAL RATE: 100 BPM
ATRIAL RATE: 103 BPM
P AXIS: 16 DEGREES
P AXIS: 35 DEGREES
PR INTERVAL: 168 MS
PR INTERVAL: 172 MS
QRS AXIS: 10 DEGREES
QRS AXIS: 23 DEGREES
QRSD INTERVAL: 84 MS
QRSD INTERVAL: 86 MS
QT INTERVAL: 328 MS
QT INTERVAL: 338 MS
QTC INTERVAL: 429 MS
QTC INTERVAL: 436 MS
T WAVE AXIS: 42 DEGREES
T WAVE AXIS: 59 DEGREES
VENTRICULAR RATE: 100 BPM
VENTRICULAR RATE: 103 BPM

## 2022-07-26 PROCEDURE — 93010 ELECTROCARDIOGRAM REPORT: CPT | Performed by: INTERNAL MEDICINE

## 2022-07-28 ENCOUNTER — OFFICE VISIT (OUTPATIENT)
Dept: CARDIOLOGY CLINIC | Facility: CLINIC | Age: 57
End: 2022-07-28
Payer: COMMERCIAL

## 2022-07-28 VITALS
HEART RATE: 86 BPM | DIASTOLIC BLOOD PRESSURE: 70 MMHG | WEIGHT: 214.8 LBS | BODY MASS INDEX: 29.13 KG/M2 | SYSTOLIC BLOOD PRESSURE: 120 MMHG | OXYGEN SATURATION: 97 %

## 2022-07-28 DIAGNOSIS — R07.2 PRECORDIAL PAIN: Primary | ICD-10-CM

## 2022-07-28 DIAGNOSIS — E78.2 MIXED HYPERLIPIDEMIA: ICD-10-CM

## 2022-07-28 DIAGNOSIS — I10 ESSENTIAL HYPERTENSION: ICD-10-CM

## 2022-07-28 PROCEDURE — 3074F SYST BP LT 130 MM HG: CPT | Performed by: INTERNAL MEDICINE

## 2022-07-28 PROCEDURE — 99214 OFFICE O/P EST MOD 30 MIN: CPT | Performed by: INTERNAL MEDICINE

## 2022-07-28 PROCEDURE — 3078F DIAST BP <80 MM HG: CPT | Performed by: INTERNAL MEDICINE

## 2022-07-28 NOTE — PROGRESS NOTES
Cardiology   Smiley Navas DO, Abdias Roach MD, Gianna Gaitan MD, Pito Chamberlain MD, [facility] [place]  -------------------------------------------------------------------  [facility]  [address], [address], [address] [place] [address], [address] [place] [address] Avenue  [phone]    Cardiology Follow Up  Corazon Coffey  1965  [id number]          Assessment/Plan:    1  Precordial pain    2  Mixed hyperlipidemia    3  Essential hypertension      - patient with chest pain that is pleuritic in nature  ECG was normal in the emergency room along with troponin  He had cardiac workup in the past year including stress test and echocardiogram which were both normal   Recently, he was diagnosed with polycythemia vera  - recommend further workup with CT chest to rule out pulmonary embolism or other intrathoracic pathology which may be causing his pain  - continue aspirin   - will need to repeat BMP prior to CT scan as he had some mild renal insufficiency on recent blood work  - discussed with patient in detail  He should go to the emergency room if pain worsens or if he develops any shortness of breath or hemoptysis  Interval History:     Corazon Coffey is 61 y o  male here for evaluation of chest pain  On July 18th, he was seen in the emergency room at [facility] with chest pain  Pain was present for 2 weeks prior to his ER visit  He describes it as a sharp pain in the center part of his chest   Worsened with inspiration  Pain radiated to back  It developed suddenly, after undergoing therapeutic phlebotomy for treatment of newly diagnosed polycythemia vera  He felt some associated dyspnea  Workup in ER was negative with normal troponin and Chest Xray  He continues to have pain  No relation to exertion  Feels pain is worsened with deep inspiration  The patient has a history of syncope    In underwent cardiac workup in 2021 including echocardiogram, stress test and 48 hour Holter monitor  Cardiac workup was negative at that time  The following portions of the patient's history were reviewed and updated as appropriate: allergies, current medications, past family history, past medical history, past social history, past surgical history, and problem list        Current Outpatient Medications:     ASPIRIN 81 PO, Take by mouth, Disp: , Rfl:     atorvastatin (LIPITOR) 20 mg tablet, Take 1 tablet (20 mg total) by mouth daily, Disp: 90 tablet, Rfl: 1    hydroxyurea (HYDREA) 500 mg capsule, Take 1 capsule (500 mg total) by mouth daily, Disp: 30 capsule, Rfl: 0    losartan (COZAAR) 50 mg tablet, TAKE 1 TABLET BY MOUTH EVERY DAY, Disp: 90 tablet, Rfl: 1    methylPREDNISolone 4 MG tablet therapy pack, Use as directed on package, Disp: 21 tablet, Rfl: 0    omeprazole (PriLOSEC OTC) 20 MG tablet, Take 1 tablet (20 mg total) by mouth daily, Disp: 90 tablet, Rfl: 2    pantoprazole (PROTONIX) 40 mg tablet, TAKE 1 TABLET (40 MG TOTAL) BY MOUTH DAILY 30-60 MIN BEFORE BREAKFAST, Disp: 90 tablet, Rfl: 3    verapamil (CALAN-SR) 120 mg CR tablet, TAKE 1 TABLET BY MOUTH EVERY DAY, Disp: 90 tablet, Rfl: 1    acetaminophen (TYLENOL) 325 mg tablet, Take 2 tablets (650 mg total) by mouth every 6 (six) hours as needed for mild pain, headaches or fever, Disp: 30 tablet, Rfl: 0    docusate sodium (COLACE) 100 mg capsule, Take 1 capsule (100 mg total) by mouth 3 (three) times a day for 14 days (Patient not taking: Reported on 7/25/2022), Disp: 42 capsule, Rfl: 0    polyethylene glycol (MIRALAX) 17 g packet, Take 17 g by mouth daily for 7 days (Patient not taking: Reported on 7/25/2022), Disp: 119 g, Rfl: 1        Review of Systems:  Review of Systems   Respiratory: Positive for shortness of breath  Cardiovascular: Positive for chest pain  Negative for palpitations and leg swelling  Musculoskeletal: Positive for arthralgias     All other systems reviewed and are negative  Physical Exam:  Vitals:  Vitals:    07/28/22 1544   BP: 120/70   BP Location: Left arm   Patient Position: Sitting   Cuff Size: Standard   Pulse: 86   SpO2: 97%   Weight: 97 4 kg (214 lb 12 8 oz)     Physical Exam   Constitutional: He appears healthy  No distress  Eyes: Pupils are equal, round, and reactive to light  Conjunctivae are normal    Neck: No JVD present  Cardiovascular: Normal rate, regular rhythm and normal heart sounds  Exam reveals no gallop and no friction rub  No murmur heard  Pulmonary/Chest: Effort normal and breath sounds normal  He has no wheezes  He has no rales  Musculoskeletal:         General: No tenderness, deformity or edema  Cervical back: Normal range of motion and neck supple  Neurological: He is alert and oriented to person, place, and time  Skin: Skin is warm and dry  Cardiographics:  EKG: Personally reviewed Normal ECG  Last known EF: 55%    This note was completed in part utilizing [employer] Fluency Direct Software  Grammatical errors, random word insertions, spelling mistakes, and incomplete sentences can be an occasional consequence of this system secondary to software limitations, ambient noise, and hardware issues  If you have any questions or concerns about the content, text, or information contained within the body of this dictation, please contact the provider for clarification

## 2022-08-02 ENCOUNTER — HOSPITAL ENCOUNTER (OUTPATIENT)
Dept: RADIOLOGY | Facility: HOSPITAL | Age: 57
Discharge: HOME | End: 2022-08-02
Attending: INTERNAL MEDICINE
Payer: COMMERCIAL

## 2022-08-02 DIAGNOSIS — R07.2 PRECORDIAL PAIN: ICD-10-CM

## 2022-08-02 PROCEDURE — G1004 CDSM NDSC: HCPCS

## 2022-08-02 PROCEDURE — 71275 CT ANGIOGRAPHY CHEST: CPT

## 2022-08-02 RX ADMIN — IOHEXOL 65 ML: 350 INJECTION, SOLUTION INTRAVENOUS at 13:17

## 2022-08-03 LAB
ALBUMIN SERPL-MCNC: 4.4 G/DL (ref 3.8–4.9)
ALBUMIN/GLOB SERPL: 1.8 {RATIO} (ref 1.2–2.2)
ALP SERPL-CCNC: 107 IU/L (ref 44–121)
ALT SERPL-CCNC: 44 IU/L (ref 0–44)
AST SERPL-CCNC: 22 IU/L (ref 0–40)
BASOPHILS # BLD AUTO: 0.1 X10E3/UL (ref 0–0.2)
BASOPHILS NFR BLD AUTO: 1 %
BILIRUB SERPL-MCNC: 0.4 MG/DL (ref 0–1.2)
BUN SERPL-MCNC: 13 MG/DL (ref 6–24)
BUN/CREAT SERPL: 10 (ref 9–20)
CALCIUM SERPL-MCNC: 9.9 MG/DL (ref 8.7–10.2)
CHLORIDE SERPL-SCNC: 100 MMOL/L (ref 96–106)
CO2 SERPL-SCNC: 25 MMOL/L (ref 20–29)
CREAT SERPL-MCNC: 1.24 MG/DL (ref 0.76–1.27)
EGFR: 68 ML/MIN/1.73
EOSINOPHIL # BLD AUTO: 0.6 X10E3/UL (ref 0–0.4)
EOSINOPHIL NFR BLD AUTO: 5 %
ERYTHROCYTE [DISTWIDTH] IN BLOOD BY AUTOMATED COUNT: 14.6 % (ref 11.6–15.4)
GLOBULIN SER-MCNC: 2.5 G/DL (ref 1.5–4.5)
GLUCOSE SERPL-MCNC: 109 MG/DL (ref 65–99)
HCT VFR BLD AUTO: 50.2 % (ref 37.5–51)
HGB BLD-MCNC: 17.2 G/DL (ref 13–17.7)
IMM GRANULOCYTES # BLD: 0.1 X10E3/UL (ref 0–0.1)
IMM GRANULOCYTES NFR BLD: 1 %
LYMPHOCYTES # BLD AUTO: 4.8 X10E3/UL (ref 0.7–3.1)
LYMPHOCYTES NFR BLD AUTO: 40 %
MCH RBC QN AUTO: 31 PG (ref 26.6–33)
MCHC RBC AUTO-ENTMCNC: 34.3 G/DL (ref 31.5–35.7)
MCV RBC AUTO: 91 FL (ref 79–97)
MONOCYTES # BLD AUTO: 1.6 X10E3/UL (ref 0.1–0.9)
MONOCYTES NFR BLD AUTO: 14 %
NEUTROPHILS # BLD AUTO: 4.7 X10E3/UL (ref 1.4–7)
NEUTROPHILS NFR BLD AUTO: 39 %
PLATELET # BLD AUTO: 499 X10E3/UL (ref 150–450)
POTASSIUM SERPL-SCNC: 5.4 MMOL/L (ref 3.5–5.2)
PROT SERPL-MCNC: 6.9 G/DL (ref 6–8.5)
RBC # BLD AUTO: 5.54 X10E6/UL (ref 4.14–5.8)
SODIUM SERPL-SCNC: 140 MMOL/L (ref 134–144)
WBC # BLD AUTO: 12 X10E3/UL (ref 3.4–10.8)

## 2022-08-10 ENCOUNTER — HOSPITAL ENCOUNTER (EMERGENCY)
Facility: HOSPITAL | Age: 57
Discharge: HOME | End: 2022-08-11
Attending: EMERGENCY MEDICINE
Payer: OTHER MISCELLANEOUS

## 2022-08-10 ENCOUNTER — APPOINTMENT (EMERGENCY)
Dept: RADIOLOGY | Facility: HOSPITAL | Age: 57
End: 2022-08-10
Payer: OTHER MISCELLANEOUS

## 2022-08-10 VITALS
OXYGEN SATURATION: 97 % | RESPIRATION RATE: 20 BRPM | SYSTOLIC BLOOD PRESSURE: 136 MMHG | TEMPERATURE: 98.4 F | DIASTOLIC BLOOD PRESSURE: 95 MMHG | HEART RATE: 106 BPM

## 2022-08-10 DIAGNOSIS — D45 POLYCYTHEMIA VERA (HCC): ICD-10-CM

## 2022-08-10 DIAGNOSIS — S90.32XA CONTUSION OF LEFT FOOT, INITIAL ENCOUNTER: Primary | ICD-10-CM

## 2022-08-10 PROCEDURE — 73630 X-RAY EXAM OF FOOT: CPT

## 2022-08-10 PROCEDURE — 99283 EMERGENCY DEPT VISIT LOW MDM: CPT

## 2022-08-10 RX ORDER — OXYCODONE HYDROCHLORIDE AND ACETAMINOPHEN 5; 325 MG/1; MG/1
2 TABLET ORAL ONCE
Status: COMPLETED | OUTPATIENT
Start: 2022-08-10 | End: 2022-08-10

## 2022-08-10 RX ADMIN — OXYCODONE HYDROCHLORIDE AND ACETAMINOPHEN 2 TABLET: 5; 325 TABLET ORAL at 22:34

## 2022-08-11 PROCEDURE — 99284 EMERGENCY DEPT VISIT MOD MDM: CPT | Performed by: EMERGENCY MEDICINE

## 2022-08-11 RX ORDER — SENNOSIDES 8.6 MG
650 CAPSULE ORAL EVERY 8 HOURS PRN
Qty: 30 TABLET | Refills: 0 | Status: SHIPPED | OUTPATIENT
Start: 2022-08-11 | End: 2022-10-26

## 2022-08-11 RX ORDER — HYDROXYUREA 500 MG/1
500 CAPSULE ORAL DAILY
Qty: 30 CAPSULE | Refills: 2 | Status: SHIPPED | OUTPATIENT
Start: 2022-08-11

## 2022-08-11 NOTE — ED PROVIDER NOTES
History  Chief Complaint   Patient presents with    Foot Injury     States short time ago large piece of wood fell on top of L foot     11-year-old male with past history of hyperlipidemia, GERD, daily smoker, LALIT not on CPAP, anemia, TIA, prostate cancer, presents to the ED for evaluation of left foot pain  Patient works at a [employer]  Patient states that earlier today a large piece of wooden log fell from a height of 5 ft onto the top of his left foot  Patient was wearing sneakers  Patient now has pain to the midfoot region  Patient came to the ED for further evaluation  Patient's tetanus is up-to-date  History provided by:  Patient      Prior to Admission Medications   Prescriptions Last Dose Informant Patient Reported? Taking?    ASPIRIN 81 PO  Self Yes No   Sig: Take by mouth   acetaminophen (TYLENOL) 325 mg tablet  Self No No   Sig: Take 2 tablets (650 mg total) by mouth every 6 (six) hours as needed for mild pain, headaches or fever   atorvastatin (LIPITOR) 20 mg tablet  Self No No   Sig: Take 1 tablet (20 mg total) by mouth daily   docusate sodium (COLACE) 100 mg capsule  Self No No   Sig: Take 1 capsule (100 mg total) by mouth 3 (three) times a day for 14 days   Patient not taking: Reported on 7/25/2022   hydroxyurea (HYDREA) 500 mg capsule  Self No No   Sig: Take 1 capsule (500 mg total) by mouth daily   losartan (COZAAR) 50 mg tablet  Self No No   Sig: TAKE 1 TABLET BY MOUTH EVERY DAY   omeprazole (PriLOSEC OTC) 20 MG tablet  Self No No   Sig: Take 1 tablet (20 mg total) by mouth daily   pantoprazole (PROTONIX) 40 mg tablet  Self No No   Sig: TAKE 1 TABLET (40 MG TOTAL) BY MOUTH DAILY 30-60 MIN BEFORE BREAKFAST   polyethylene glycol (MIRALAX) 17 g packet  Self No No   Sig: Take 17 g by mouth daily for 7 days   Patient not taking: Reported on 7/25/2022   verapamil (CALAN-SR) 120 mg CR tablet  Self No No   Sig: TAKE 1 TABLET BY MOUTH EVERY DAY      Facility-Administered Medications: None       Past Medical History:   Diagnosis Date    Anemia     Cancer ([address] )     prostate    Colon polyp     Elevated PSA     Full dentures     GERD (gastroesophageal reflux disease)     Hemorrhoid     Hyperlipidemia     Sleep apnea     No CPAP    Smoker     TIA (transient ischemic attack)     Transfusion history     Wears glasses        Past Surgical History:   Procedure Laterality Date    ABDOMINAL ADHESION SURGERY N/A 2021    Procedure: LYSIS ADHESIONS LAPAROSCOPIC Remington Sailors;  Surgeon: Corrine Lanza MD;  [facility];  Service: Urology    APPENDECTOMY      COLONOSCOPY      EGD      FOOT SURGERY Right     bone removed    OTHER SURGICAL HISTORY      bone removal right arm-abnormal growth of surface bone    PROSTATE BIOPSY      PROSTATECTOMY N/A 2021    Procedure: ROBOTIC ASSISTED LAPAROSCOPIC SIMPLE PROSTATECTOMY AND BLADDER NECK RECONSTRUCTION;  Surgeon: Corrine Lanza MD;  [facility];  Service: Urology    SPLENECTOMY      ruptured       Family History   Problem Relation Age of Onset    Dementia Father     Heart disease Mother     Hypertension Mother     Hyperlipidemia Mother     Hypertension Sister     Hypertension Brother     No Known Problems Daughter     No Known Problems Son     Cancer Paternal Grandfather         prostate    No Known Problems Son      I have reviewed and agree with the history as documented      E-Cigarette/Vaping    E-Cigarette Use Never User      E-Cigarette/Vaping Substances    Nicotine No     THC No     CBD No     Flavoring No     Other No     Unknown No      Social History     Tobacco Use    Smoking status: Former Smoker     Packs/day: 0 50     Years: 10 00     Pack years: 5 00     Types: Cigarettes     Quit date: 2021     Years since quittin 4    Smokeless tobacco: Never Used   Vaping Use    Vaping Use: Never used   Substance Use Topics    Alcohol use: Not Currently    Drug use: No       Review of Systems   Constitutional: Negative for activity change, fatigue and fever  HENT: Negative for congestion, ear discharge and sore throat  Eyes: Negative for pain and redness  Respiratory: Negative for cough, chest tightness, shortness of breath and wheezing  Cardiovascular: Negative for chest pain  Gastrointestinal: Negative for abdominal pain, diarrhea, nausea and vomiting  Endocrine: Negative for cold intolerance  Genitourinary: Negative for dysuria and urgency  Musculoskeletal: Positive for arthralgias  Negative for back pain  Neurological: Negative for dizziness, weakness and headaches  Psychiatric/Behavioral: Negative for agitation and behavioral problems  Physical Exam  Physical Exam  Vitals and nursing note reviewed  Constitutional:       Appearance: He is well-developed  HENT:      Head: Normocephalic and atraumatic  Nose: Nose normal    Eyes:      Conjunctiva/sclera: Conjunctivae normal    Cardiovascular:      Rate and Rhythm: Normal rate and regular rhythm  Heart sounds: Normal heart sounds  Pulmonary:      Effort: Pulmonary effort is normal       Breath sounds: Normal breath sounds  Abdominal:      General: Bowel sounds are normal  There is no distension  Palpations: Abdomen is soft  Tenderness: There is no abdominal tenderness  Musculoskeletal:         General: Normal range of motion  Cervical back: Normal range of motion and neck supple  Comments: Posterior tibial pulse intact to left lower extremity  DP pulse present by Doppler to left lower extremity  Mild ecchymosis with a superficial abrasion noted to the dorsal aspect of mid left foot  Area is tender to palpation  Skin:     General: Skin is warm  Neurological:      General: No focal deficit present  Mental Status: He is alert and oriented to person, place, and time     Psychiatric:         Mood and Affect: Mood normal          Behavior: Behavior normal          Thought Content: Thought content normal          Judgment: Judgment normal          Vital Signs  ED Triage Vitals [08/10/22 2216]   Temperature Pulse Respirations Blood Pressure SpO2   98 4 °F (36 9 °C) (!) 106 20 136/95 97 %      Temp Source Heart Rate Source Patient Position - Orthostatic VS BP Location FiO2 (%)   Tympanic Monitor Sitting Right arm --      Pain Score       10 - Worst Possible Pain           Vitals:    08/10/22 2216   BP: 136/95   Pulse: (!) 106   Patient Position - Orthostatic VS: Sitting         Visual Acuity      ED Medications  Medications   oxyCODONE-acetaminophen (PERCOCET) 5-325 mg per tablet 2 tablet (2 tablets Oral Given 8/10/22 2234)       Diagnostic Studies  Results Reviewed     None                 XR foot 3+ views LEFT   Final Result by Diamond Amaral DO (08/11 1648)      No acute osseous abnormality  No retained radiopaque foreign body  Workstation performed: [id number]                    Procedures  Procedures         ED Course                                             MDM  Number of Diagnoses or Management Options  Contusion of left foot, initial encounter: new and requires workup  Diagnosis management comments: Obtain x-ray of left foot       Amount and/or Complexity of Data Reviewed  Review and summarize past medical records: yes  Independent visualization of images, tracings, or specimens: yes    Risk of Complications, Morbidity, and/or Mortality  General comments: X-ray did not show any acute bony abnormalities  Patient's pain improved in the ED with Percocet  At this time patient's symptoms are consistent with contusion and superficial abrasion  Patient told to apply Neosporin to the abrasion  Ace wrap, surgical shoe, as well as crutches given to patient for comfort  Patient is discharged home with Tylenol and follow-up to podiatry  Patient of for something stronger for pain however patient refused    Close return instructions given to return to the ER for any worsening symptoms  Patient agrees with discharge plan  Patient well appearing at time of discharge  Please Note: Fluency Direct voice recognition software may have been used in the creation of this document  Wrong words or sound a like substitutions may have occurred due to the inherent limitations of the voice software  Patient Progress  Patient progress: stable      Disposition  Final diagnoses:   Contusion of left foot, initial encounter     Time reflects when diagnosis was documented in both MDM as applicable and the Disposition within this note     Time User Action Codes Description Comment    8/11/2022  1:28 AM Carolina Lose Add [S90 32XA] Contusion of left foot, initial encounter       ED Disposition     ED Disposition   Discharge    Condition   Stable    Date/Time   Thu Aug 11, 2022  1:28 AM    Comment   Rickey Gunderson discharge to home/self care  Follow-up Information     Follow up With Specialties Details Why Contact Info    Gary Montgomery DPM Podiatry Schedule an appointment as soon as possible for a visit   [address] [place] [address] In 1 week  [address] [place] [address]  [phone]            Patient's Medications   Discharge Prescriptions    ACETAMINOPHEN (TYLENOL) 650 MG CR TABLET    Take 1 tablet (650 mg total) by mouth every 8 (eight) hours as needed for mild pain       Start Date: 8/11/2022 End Date: --       Order Dose: 650 mg       Quantity: 30 tablet    Refills: 0       No discharge procedures on file      PDMP Review       Value Time User    PDMP Reviewed  Yes 11/17/2021  6:58 AM Keli Issa MD          ED Provider  Electronically Signed by           Monika Alvarado DO  08/11/22 5967

## 2022-08-17 ENCOUNTER — TELEPHONE (OUTPATIENT)
Dept: HEMATOLOGY ONCOLOGY | Facility: CLINIC | Age: 57
End: 2022-08-17

## 2022-08-17 NOTE — TELEPHONE ENCOUNTER
Lab orders faxed to [facility] for phlebotomy on 8/19/2022  Patient will go for labs today

## 2022-08-17 NOTE — TELEPHONE ENCOUNTER
CALL RETURN FORM   Reason for patient call? Patient Calling in to verify if he will still need to keep his infusion appointment for 8/19/22  Patient stating he was given a new medication but not sure if he should also have infusion as well   Patient's primary oncologist? Susie Seen    Name of person the patient was calling for? Jessa Cost   Any additional information to add, if applicable? Best call back number [phone]   Informed patient that the message will be forwarded to the team and someone will get back to them as soon as possible    Did you relay this information to the patient?   yes

## 2022-08-18 ENCOUNTER — OFFICE VISIT (OUTPATIENT)
Dept: PODIATRY | Facility: CLINIC | Age: 57
End: 2022-08-18
Payer: OTHER MISCELLANEOUS

## 2022-08-18 VITALS
RESPIRATION RATE: 20 BRPM | DIASTOLIC BLOOD PRESSURE: 95 MMHG | HEIGHT: 72 IN | WEIGHT: 214 LBS | BODY MASS INDEX: 28.99 KG/M2 | SYSTOLIC BLOOD PRESSURE: 136 MMHG

## 2022-08-18 DIAGNOSIS — T14.8XXA SUBCUTANEOUS HEMATOMA: ICD-10-CM

## 2022-08-18 DIAGNOSIS — S97.82XA CRUSHING INJURY OF LEFT FOOT, INITIAL ENCOUNTER: Primary | ICD-10-CM

## 2022-08-18 LAB
BASOPHILS # BLD AUTO: 0.1 X10E3/UL (ref 0–0.2)
BASOPHILS NFR BLD AUTO: 1 %
EOSINOPHIL # BLD AUTO: 0.2 X10E3/UL (ref 0–0.4)
EOSINOPHIL NFR BLD AUTO: 3 %
ERYTHROCYTE [DISTWIDTH] IN BLOOD BY AUTOMATED COUNT: 15.5 % (ref 11.6–15.4)
HCT VFR BLD AUTO: 48.3 % (ref 37.5–51)
HGB BLD-MCNC: 16.6 G/DL (ref 13–17.7)
IMM GRANULOCYTES # BLD: 0 X10E3/UL (ref 0–0.1)
IMM GRANULOCYTES NFR BLD: 0 %
LYMPHOCYTES # BLD AUTO: 2.9 X10E3/UL (ref 0.7–3.1)
LYMPHOCYTES NFR BLD AUTO: 41 %
MCH RBC QN AUTO: 31 PG (ref 26.6–33)
MCHC RBC AUTO-ENTMCNC: 34.4 G/DL (ref 31.5–35.7)
MCV RBC AUTO: 90 FL (ref 79–97)
MONOCYTES # BLD AUTO: 0.8 X10E3/UL (ref 0.1–0.9)
MONOCYTES NFR BLD AUTO: 11 %
NEUTROPHILS # BLD AUTO: 3.1 X10E3/UL (ref 1.4–7)
NEUTROPHILS NFR BLD AUTO: 44 %
PLATELET # BLD AUTO: 351 X10E3/UL (ref 150–450)
RBC # BLD AUTO: 5.36 X10E6/UL (ref 4.14–5.8)
WBC # BLD AUTO: 7.1 X10E3/UL (ref 3.4–10.8)

## 2022-08-18 PROCEDURE — 99203 OFFICE O/P NEW LOW 30 MIN: CPT | Performed by: PODIATRIST

## 2022-08-18 NOTE — PROGRESS NOTES
Assessment/Plan:    Patient evaluated, educated the patient on the etiology of his pain possibly due to trapped subcutaneous hematoma secondary to his crush injury from the with a log hitting his foot, discussed x-rays results with the patient no radiographical signs of fracture at this time, will order MRI to evaluate for potential soft tissue injury and to plan for surgical evacuation of the hematoma, patient to practice RI CE therapy, educated patient on a nonweightbearing status and the use of a Cam boot for the left foot, patient use tramadol for pain management     Diagnoses and all orders for this visit:    Crushing injury of left foot, initial encounter  -     MRI foot/forefoot toes left wo contrast; Future  -     traMADol-acetaminophen (ULTRACET) 37 5-325 mg per tablet; Take 1 tablet by mouth every 6 (six) hours as needed for moderate pain    Subcutaneous hematoma          Subjective:      Patient ID: David Mueller is a 61 y o  male  59-year-old male with past history of hyperlipidemia, GERD, daily smoker, LALIT not on CPAP, anemia, TIA, prostate cancer, referred from ED for evaluation of left foot pain  Patient works at a [employer]  Patient states that earlier today a large piece of wooden log fell from a height of 5 ft onto the top of his left foot  Patient was wearing sneakers  Patient now has pain to the midfoot region  The following portions of the patient's history were reviewed and updated as appropriate: allergies, current medications, past family history, past medical history, past social history, past surgical history and problem list     Review of Systems   Constitutional: Negative  Respiratory: Negative  Cardiovascular: Negative  Musculoskeletal: Positive for arthralgias  Skin: Negative                  Historical Information   Past Medical History:   Diagnosis Date    Anemia     Cancer [facility])     prostate    Colon polyp     Elevated PSA     Full dentures  GERD (gastroesophageal reflux disease)     Hemorrhoid     Hyperlipidemia     Sleep apnea     No CPAP    Smoker     TIA (transient ischemic attack)     Transfusion history     Wears glasses      Past Surgical History:   Procedure Laterality Date    ABDOMINAL ADHESION SURGERY N/A 2021    Procedure: Christian s;  Surgeon: Doris Ferrell MD;  [facility];  Service: Urology    APPENDECTOMY      COLONOSCOPY      EGD      FOOT SURGERY Right     bone removed    OTHER SURGICAL HISTORY      bone removal right arm-abnormal growth of surface bone    PROSTATE BIOPSY      PROSTATECTOMY N/A 2021    Procedure: ROBOTIC ASSISTED LAPAROSCOPIC SIMPLE PROSTATECTOMY AND BLADDER NECK RECONSTRUCTION;  Surgeon: Doris Ferrell MD;  [facility];  Service: Urology    SPLENECTOMY      ruptured     Social History   Social History     Substance and Sexual Activity   Alcohol Use Not Currently     Social History     Substance and Sexual Activity   Drug Use No     Social History     Tobacco Use   Smoking Status Former Smoker    Packs/day: 0 50    Years: 10 00    Pack years: 5 00    Types: Cigarettes    Quit date: 2021    Years since quittin 4   Smokeless Tobacco Never Used     Family History:   Family History   Problem Relation Age of Onset    Dementia Father     Heart disease Mother     Hypertension Mother     Hyperlipidemia Mother     Hypertension Sister     Hypertension Brother     No Known Problems Daughter     No Known Problems Son     Cancer Paternal Grandfather         prostate    No Known Problems Son        Meds/Allergies   all medications and allergies reviewed  No Known Allergies    Objective:      /95   Resp 20   Ht 6' (1 829 m)   Wt 97 1 kg (214 lb)   BMI 29 02 kg/m²          Physical Exam  Constitutional:       General: He is not in acute distress  Appearance: He is well-developed   He is not ill-appearing, toxic-appearing or diaphoretic  HENT:      Head: Normocephalic and atraumatic  Cardiovascular:      Pulses: Normal pulses  Dorsalis pedis pulses are 2+ on the right side and 2+ on the left side  Posterior tibial pulses are 2+ on the right side and 2+ on the left side  Comments: Palpable pedal pulse, CFT is less than 3 seconds, temperature gradient within normal limits, pedal hair present, no trophic skin changes  Pulmonary:      Effort: No respiratory distress  Musculoskeletal:         General: Normal range of motion  Comments: No pain on palpation on range of motion of ankle joint subtalar joint metatarsal joint tarsal joints bilateral foot   Feet:      Right foot:      Protective Sensation: 10 sites tested  10 sites sensed  Skin integrity: No ulcer, blister, skin breakdown or erythema  Left foot:      Protective Sensation: 10 sites tested  10 sites sensed  Skin integrity: No ulcer, blister, skin breakdown or erythema  Skin:     Capillary Refill: Capillary refill takes 2 to 3 seconds  Coloration: Skin is not pale  Comments: Marked pain on palpation to the dorsal aspect of the left foot along the base of the 3rd 4th and 5th metatarsals, localized fluctuant dorsal foot, superficial abrasion, no ascending cellulitis, ecchymosis noted along the dorsal lateral aspect of left   Neurological:      Mental Status: He is alert and oriented to person, place, and time  Comments:  muscle power 5/5, protective sensations intact, no focal motor deficit        Foot Exam    Right Foot/Ankle     Inspection and Palpation  Skin Exam: no blister, no maceration, no ulcer and no erythema     Neurovascular  Dorsalis pedis: 2+  Posterior tibial: 2+      Left Foot/Ankle      Inspection and Palpation  Skin Exam: no blister, no maceration, no ulcer and no erythema     Neurovascular  Dorsalis pedis: 2+  Posterior tibial: 2+              Portions of the record may have been created with voice recognition software  Occasional wrong word or "sound a like" substitutions may have occurred due to the inherent limitations of voice recognition software  Read the chart carefully and recognize, using context, where substitutions have occurred

## 2022-08-19 LAB
ALBUMIN SERPL-MCNC: 4.5 G/DL (ref 3.8–4.9)
ALBUMIN/GLOB SERPL: 1.9 {RATIO} (ref 1.2–2.2)
ALP SERPL-CCNC: 111 IU/L (ref 44–121)
ALT SERPL-CCNC: 40 IU/L (ref 0–44)
AST SERPL-CCNC: 28 IU/L (ref 0–40)
BILIRUB SERPL-MCNC: 0.4 MG/DL (ref 0–1.2)
BUN SERPL-MCNC: 13 MG/DL (ref 6–24)
BUN/CREAT SERPL: 11 (ref 9–20)
CALCIUM SERPL-MCNC: 9.6 MG/DL (ref 8.7–10.2)
CHLORIDE SERPL-SCNC: 101 MMOL/L (ref 96–106)
CO2 SERPL-SCNC: 21 MMOL/L (ref 20–29)
CREAT SERPL-MCNC: 1.19 MG/DL (ref 0.76–1.27)
EGFR: 71 ML/MIN/1.73
GLOBULIN SER-MCNC: 2.4 G/DL (ref 1.5–4.5)
GLUCOSE SERPL-MCNC: 115 MG/DL (ref 65–99)
POTASSIUM SERPL-SCNC: 4.2 MMOL/L (ref 3.5–5.2)
PROT SERPL-MCNC: 6.9 G/DL (ref 6–8.5)
SODIUM SERPL-SCNC: 139 MMOL/L (ref 134–144)

## 2022-08-23 DIAGNOSIS — I71.20 THORACIC AORTIC ANEURYSM WITHOUT RUPTURE (HCC): Primary | ICD-10-CM

## 2022-08-30 ENCOUNTER — OFFICE VISIT (OUTPATIENT)
Dept: GASTROENTEROLOGY | Facility: CLINIC | Age: 57
End: 2022-08-30
Payer: COMMERCIAL

## 2022-08-30 VITALS
WEIGHT: 209 LBS | SYSTOLIC BLOOD PRESSURE: 124 MMHG | DIASTOLIC BLOOD PRESSURE: 95 MMHG | HEIGHT: 72 IN | TEMPERATURE: 97.7 F | HEART RATE: 96 BPM | BODY MASS INDEX: 28.31 KG/M2

## 2022-08-30 DIAGNOSIS — D50.0 IRON DEFICIENCY ANEMIA DUE TO CHRONIC BLOOD LOSS: ICD-10-CM

## 2022-08-30 DIAGNOSIS — K21.9 GASTROESOPHAGEAL REFLUX DISEASE WITHOUT ESOPHAGITIS: Primary | ICD-10-CM

## 2022-08-30 DIAGNOSIS — K63.5 POLYP OF COLON, UNSPECIFIED PART OF COLON, UNSPECIFIED TYPE: ICD-10-CM

## 2022-08-30 PROCEDURE — 99214 OFFICE O/P EST MOD 30 MIN: CPT | Performed by: INTERNAL MEDICINE

## 2022-08-30 RX ORDER — FAMOTIDINE 40 MG/1
40 TABLET, FILM COATED ORAL DAILY
Qty: 30 TABLET | Refills: 2 | Status: SHIPPED | OUTPATIENT
Start: 2022-08-30 | End: 2022-11-28

## 2022-08-30 NOTE — PATIENT INSTRUCTIONS
Scheduled date of colonoscopy (as of today): 10/28/22  Physician performing colonoscopy: Kinza Alva  [facility]  Bowel prep reviewed with patient: LISA TWO DAY  Instructions reviewed with patient by: VIRGINIA  Clearances: MARCELLE

## 2022-08-30 NOTE — PROGRESS NOTES
[facility]  Progress Note - Ifrah Funes 61 y o  male MRN: [id number]    Unit/Bed#:  Encounter: [id number]    Assessment/Plan:    1  Iron deficiency anemia-appears to have resolved, had underwent EGD with push enteroscopy last year which was notable for AVMs which were cauterized  He underwent subsequent capsule endoscopy which showed several AVMs some bleeding  Single balloon enteroscopy subsequently did not show any signs of active bleeding  Patient was receiving Venofer treatments which she has completed as of February of 2021  He underwent a repeat colonoscopy at that time for history of adenomatous polyps however this was incomplete secondary to tortuous colon  He underwent CT colonography which did not show any masses or lesions greater than 6 mm  He was recommended a repeat colonoscopy in 1 year interval   -will check celiac serologies given history of iron deficiency anemia  2  Tubular adenoma-repeat colonoscopy this time given previous was incomplete however CT colonography without any large polyps or masses  Index colonoscopy by me in 2020 did show multiple polyps, several of these were tubular adenomas   -would recommend scheduling the procedure with Artur Eloise   -will schedule with 2 day prep    3  GERD-symptoms controlled with pantoprazole 40 mg twice daily  Would recommend to decrease the pantoprazole to 40 mg once daily given long-term side effect profile associated with PPI use  Would recommend adding famotidine at bedtime instead  Will hold off on endoscopy at this time as patient did not have any evidence of Crews's esophagus  Subjective: This is a 58-year-old male with history of polycythemia vera, hypertension, hyperlipidemia, BPH status post prostatectomy, iron deficiency anemia, now with normal iron studies and normal hemoglobin presents for follow-up  Patient underwent EGD and single balloon enteroscopy with EGD in February of 2021   Capsule endoscopy was notable for several bleeding and nonbleeding AVMs  Patient subsequently underwent single balloon enteroscopy which did not yield any bleeding  Patient was subsequently planned for colonoscopy however this was an incomplete study due to tortuosity  CT colonography subsequently did not show any colon polyps or lesions greater than 6 mm  Patient did undergo colonoscopy in 2020 by me which was notable for 6 colon polyps  He has several adenomatous polyps which were removed and was recommended a repeat at 1 year interval   Patient reports that he has been doing well, reports that acid reflux is well controlled with pantoprazole 40 mg twice daily  He does report occasional symptoms of acid reflux he only takes it once a day  Denies any dysphagia odynophagia loss of appetite or early satiety  No change in bowel habits or unintentional weight loss  No reports of melena, reports occasional hematochezia after having on bowel movement  Patient is not on any antiplatelets or anticoagulants  Objective:     Vitals: There were no vitals taken for this visit  ,There is no height or weight on file to calculate BMI  [unfilled]    Physical Exam:    GEN: wn/wd, NAD  HEENT: MMM, anciteric  CV: RRR, no m/r/g  CHEST: CTA b/l, no w/r/r  ABD: +BS, soft, NT/ND, multiple abdominal scars  EXT: no c/c/e  SKIN: no rashes  NEURO: aaox3      Invasive Devices  Report    Drain  Duration           Urethral Catheter Three way 286 days                        Lab, Imaging and other studies:     No visits with results within 1 Day(s) from this visit     Latest known visit with results is:   Orders Only on 08/18/2022   Component Date Value    White Blood Cell Count 08/18/2022 7 1     Red Blood Cell Count 08/18/2022 5 36     Hemoglobin 08/18/2022 16 6     HCT 08/18/2022 48 3     MCV 08/18/2022 90     MCH 08/18/2022 31 0     MCHC 08/18/2022 34 4     RDW 08/18/2022 15 5 (A)    Platelet Count 11/79/0570 351     Neutrophils 08/18/2022 44     Lymphocytes 08/18/2022 41     Monocytes 08/18/2022 11     Eosinophils 08/18/2022 3     Basophils PCT 08/18/2022 1     Neutrophils (Absolute) 08/18/2022 3 1     Lymphocytes (Absolute) 08/18/2022 2 9     Monocytes (Absolute) 08/18/2022 0 8     Eosinophils (Absolute) 08/18/2022 0 2     Basophils ABS 08/18/2022 0 1     Immature Granulocytes 08/18/2022 0     Immature Granulocytes (A* 08/18/2022 0 0     Glucose, Random 08/18/2022 115 (A)    BUN 08/18/2022 13     Creatinine 08/18/2022 1 19     eGFR 08/18/2022 71     SL AMB BUN/CREATININE RA* 08/18/2022 11     Sodium 08/18/2022 139     Potassium 08/18/2022 4 2     Chloride 08/18/2022 101     CO2 08/18/2022 21     CALCIUM 08/18/2022 9 6     Protein, Total 08/18/2022 6 9     Albumin 08/18/2022 4 5     Globulin, Total 08/18/2022 2 4     Albumin/Globulin Ratio 08/18/2022 1 9     TOTAL BILIRUBIN 08/18/2022 0 4     Alk Phos Isoenzymes 08/18/2022 111     AST 08/18/2022 28     ALT 08/18/2022 40          I have personally reviewed pertinent films in PACS    No current facility-administered medications for this visit

## 2022-08-30 NOTE — LETTER
August 30, 2022     Jairokristie HuertaDO  [address] [place] [address]    Patient: Robbin Valderrama   YOB: 1965   Date of Visit: 8/30/2022       Dear Dr Mary Butler: Thank you for referring Robbin Valderrama to me for evaluation  Below are my notes for this consultation  If you have questions, please do not hesitate to call me  I look forward to following your patient along with you  Sincerely,        Jeramie Sanders MD        CC: No Recipients  Jeramie Sanders MD  8/30/2022  8:56 AM  Sign when Signing Visit  [facility]  Progress Note - Robbin Valderrama 61 y o  male MRN: [id number]    Unit/Bed#:  Encounter: [id number]    Assessment/Plan:    1  Iron deficiency anemia-appears to have resolved, had underwent EGD with push enteroscopy last year which was notable for AVMs which were cauterized  He underwent subsequent capsule endoscopy which showed several AVMs some bleeding  Single balloon enteroscopy subsequently did not show any signs of active bleeding  Patient was receiving Venofer treatments which she has completed as of February of 2021  He underwent a repeat colonoscopy at that time for history of adenomatous polyps however this was incomplete secondary to tortuous colon  He underwent CT colonography which did not show any masses or lesions greater than 6 mm  He was recommended a repeat colonoscopy in 1 year interval   -will check celiac serologies given history of iron deficiency anemia  2  Tubular adenoma-repeat colonoscopy this time given previous was incomplete however CT colonography without any large polyps or masses  Index colonoscopy by me in 2020 did show multiple polyps, several of these were tubular adenomas   -would recommend scheduling the procedure with Francia Lozano   -will schedule with 2 day prep    3  GERD-symptoms controlled with pantoprazole 40 mg twice daily    Would recommend to decrease the pantoprazole to 40 mg once daily given long-term side effect profile associated with PPI use  Would recommend adding famotidine at bedtime instead  Will hold off on endoscopy at this time as patient did not have any evidence of Crews's esophagus  Subjective: This is a 54-year-old male with history of polycythemia vera, hypertension, hyperlipidemia, BPH status post prostatectomy, iron deficiency anemia, now with normal iron studies and normal hemoglobin presents for follow-up  Patient underwent EGD and single balloon enteroscopy with EGD in February of 2021  Capsule endoscopy was notable for several bleeding and nonbleeding AVMs  Patient subsequently underwent single balloon enteroscopy which did not yield any bleeding  Patient was subsequently planned for colonoscopy however this was an incomplete study due to tortuosity  CT colonography subsequently did not show any colon polyps or lesions greater than 6 mm  Patient did undergo colonoscopy in 2020 by me which was notable for 6 colon polyps  He has several adenomatous polyps which were removed and was recommended a repeat at 1 year interval   Patient reports that he has been doing well, reports that acid reflux is well controlled with pantoprazole 40 mg twice daily  He does report occasional symptoms of acid reflux he only takes it once a day  Denies any dysphagia odynophagia loss of appetite or early satiety  No change in bowel habits or unintentional weight loss  No reports of melena, reports occasional hematochezia after having on bowel movement  Patient is not on any antiplatelets or anticoagulants  Objective:     Vitals: There were no vitals taken for this visit  ,There is no height or weight on file to calculate BMI  [unfilled]    Physical Exam:    GEN: wn/wd, NAD  HEENT: MMM, anciteric  CV: RRR, no m/r/g  CHEST: CTA b/l, no w/r/r  ABD: +BS, soft, NT/ND, multiple abdominal scars    EXT: no c/c/e  SKIN: no rashes  NEURO: aaox3      Invasive Devices  Report Drain  Duration           Urethral Catheter Three way 286 days                        Lab, Imaging and other studies:     No visits with results within 1 Day(s) from this visit  Latest known visit with results is:   Orders Only on 08/18/2022   Component Date Value    White Blood Cell Count 08/18/2022 7 1     Red Blood Cell Count 08/18/2022 5 36     Hemoglobin 08/18/2022 16 6     HCT 08/18/2022 48 3     MCV 08/18/2022 90     MCH 08/18/2022 31 0     MCHC 08/18/2022 34 4     RDW 08/18/2022 15 5 (A)    Platelet Count 24/15/5284 351     Neutrophils 08/18/2022 44     Lymphocytes 08/18/2022 41     Monocytes 08/18/2022 11     Eosinophils 08/18/2022 3     Basophils PCT 08/18/2022 1     Neutrophils (Absolute) 08/18/2022 3 1     Lymphocytes (Absolute) 08/18/2022 2 9     Monocytes (Absolute) 08/18/2022 0 8     Eosinophils (Absolute) 08/18/2022 0 2     Basophils ABS 08/18/2022 0 1     Immature Granulocytes 08/18/2022 0     Immature Granulocytes (A* 08/18/2022 0 0     Glucose, Random 08/18/2022 115 (A)    BUN 08/18/2022 13     Creatinine 08/18/2022 1 19     eGFR 08/18/2022 71     SL AMB BUN/CREATININE RA* 08/18/2022 11     Sodium 08/18/2022 139     Potassium 08/18/2022 4 2     Chloride 08/18/2022 101     CO2 08/18/2022 21     CALCIUM 08/18/2022 9 6     Protein, Total 08/18/2022 6 9     Albumin 08/18/2022 4 5     Globulin, Total 08/18/2022 2 4     Albumin/Globulin Ratio 08/18/2022 1 9     TOTAL BILIRUBIN 08/18/2022 0 4     Alk Phos Isoenzymes 08/18/2022 111     AST 08/18/2022 28     ALT 08/18/2022 40          I have personally reviewed pertinent films in PACS    No current facility-administered medications for this visit

## 2022-09-01 ENCOUNTER — OFFICE VISIT (OUTPATIENT)
Dept: PODIATRY | Facility: CLINIC | Age: 57
End: 2022-09-01

## 2022-09-01 VITALS — WEIGHT: 209 LBS | BODY MASS INDEX: 28.31 KG/M2 | HEIGHT: 72 IN

## 2022-09-01 DIAGNOSIS — T14.8XXA SUBCUTANEOUS HEMATOMA: Primary | ICD-10-CM

## 2022-09-01 RX ORDER — DOXYCYCLINE HYCLATE 100 MG/1
100 CAPSULE ORAL EVERY 12 HOURS SCHEDULED
Qty: 14 CAPSULE | Refills: 0 | Status: SHIPPED | OUTPATIENT
Start: 2022-09-01 | End: 2022-09-08

## 2022-09-03 LAB
ENDOMYSIUM IGA SER QL: NEGATIVE
GLIADIN PEPTIDE IGA SER-ACNC: 33 UNITS (ref 0–19)
GLIADIN PEPTIDE IGG SER-ACNC: 5 UNITS (ref 0–19)
IGA SERPL-MCNC: 306 MG/DL (ref 90–386)
TTG IGA SER-ACNC: <2 U/ML (ref 0–3)
TTG IGG SER-ACNC: 3 U/ML (ref 0–5)

## 2022-09-09 ENCOUNTER — OFFICE VISIT (OUTPATIENT)
Dept: PODIATRY | Facility: CLINIC | Age: 57
End: 2022-09-09

## 2022-09-09 VITALS
SYSTOLIC BLOOD PRESSURE: 124 MMHG | RESPIRATION RATE: 18 BRPM | BODY MASS INDEX: 28.31 KG/M2 | WEIGHT: 209 LBS | HEIGHT: 72 IN | DIASTOLIC BLOOD PRESSURE: 95 MMHG

## 2022-09-09 DIAGNOSIS — S97.82XA CRUSHING INJURY OF LEFT FOOT, INITIAL ENCOUNTER: ICD-10-CM

## 2022-09-09 DIAGNOSIS — T14.8XXA SUBCUTANEOUS HEMATOMA: Primary | ICD-10-CM

## 2022-09-16 ENCOUNTER — TELEPHONE (OUTPATIENT)
Dept: HEMATOLOGY ONCOLOGY | Facility: CLINIC | Age: 57
End: 2022-09-16

## 2022-09-16 NOTE — TELEPHONE ENCOUNTER
Voicemail was left for patient reminding him to have labs completed prior to appt on Tuesday with carlo england     hopeline number left for r/s

## 2022-09-19 LAB
BASOPHILS # BLD AUTO: 0.1 X10E3/UL (ref 0–0.2)
BASOPHILS NFR BLD AUTO: 1 %
EOSINOPHIL # BLD AUTO: 0.3 X10E3/UL (ref 0–0.4)
EOSINOPHIL NFR BLD AUTO: 4 %
ERYTHROCYTE [DISTWIDTH] IN BLOOD BY AUTOMATED COUNT: 16.5 % (ref 11.6–15.4)
HCT VFR BLD AUTO: 48.1 % (ref 37.5–51)
HGB BLD-MCNC: 16.8 G/DL (ref 13–17.7)
IMM GRANULOCYTES # BLD: 0 X10E3/UL (ref 0–0.1)
IMM GRANULOCYTES NFR BLD: 1 %
LYMPHOCYTES # BLD AUTO: 3 X10E3/UL (ref 0.7–3.1)
LYMPHOCYTES NFR BLD AUTO: 39 %
MCH RBC QN AUTO: 33 PG (ref 26.6–33)
MCHC RBC AUTO-ENTMCNC: 34.9 G/DL (ref 31.5–35.7)
MCV RBC AUTO: 95 FL (ref 79–97)
MONOCYTES # BLD AUTO: 1 X10E3/UL (ref 0.1–0.9)
MONOCYTES NFR BLD AUTO: 14 %
NEUTROPHILS # BLD AUTO: 3.1 X10E3/UL (ref 1.4–7)
NEUTROPHILS NFR BLD AUTO: 41 %
PLATELET # BLD AUTO: 350 X10E3/UL (ref 150–450)
RBC # BLD AUTO: 5.09 X10E6/UL (ref 4.14–5.8)
WBC # BLD AUTO: 7.6 X10E3/UL (ref 3.4–10.8)

## 2022-09-20 ENCOUNTER — OFFICE VISIT (OUTPATIENT)
Dept: HEMATOLOGY ONCOLOGY | Facility: MEDICAL CENTER | Age: 57
End: 2022-09-20
Payer: COMMERCIAL

## 2022-09-20 VITALS
WEIGHT: 213 LBS | HEART RATE: 90 BPM | HEIGHT: 72 IN | OXYGEN SATURATION: 97 % | SYSTOLIC BLOOD PRESSURE: 136 MMHG | TEMPERATURE: 97.8 F | DIASTOLIC BLOOD PRESSURE: 90 MMHG | BODY MASS INDEX: 28.85 KG/M2 | RESPIRATION RATE: 18 BRPM

## 2022-09-20 DIAGNOSIS — D45 POLYCYTHEMIA VERA (HCC): Primary | ICD-10-CM

## 2022-09-20 DIAGNOSIS — Z90.81 S/P SPLENECTOMY: ICD-10-CM

## 2022-09-20 DIAGNOSIS — R73.01 ABNORMAL FASTING GLUCOSE: ICD-10-CM

## 2022-09-20 DIAGNOSIS — Z72.0 TOBACCO USE: ICD-10-CM

## 2022-09-20 DIAGNOSIS — Z86.2 HX OF IRON DEFICIENCY ANEMIA: ICD-10-CM

## 2022-09-20 LAB
ALBUMIN SERPL-MCNC: 4.4 G/DL (ref 3.8–4.9)
ALBUMIN/GLOB SERPL: 1.8 {RATIO} (ref 1.2–2.2)
ALP SERPL-CCNC: 110 IU/L (ref 44–121)
ALT SERPL-CCNC: 35 IU/L (ref 0–44)
AST SERPL-CCNC: 24 IU/L (ref 0–40)
BILIRUB SERPL-MCNC: 0.3 MG/DL (ref 0–1.2)
BUN SERPL-MCNC: 10 MG/DL (ref 6–24)
BUN/CREAT SERPL: 9 (ref 9–20)
CALCIUM SERPL-MCNC: 9.4 MG/DL (ref 8.7–10.2)
CHLORIDE SERPL-SCNC: 102 MMOL/L (ref 96–106)
CO2 SERPL-SCNC: 23 MMOL/L (ref 20–29)
CREAT SERPL-MCNC: 1.13 MG/DL (ref 0.76–1.27)
EGFR: 75 ML/MIN/1.73
GLOBULIN SER-MCNC: 2.4 G/DL (ref 1.5–4.5)
GLUCOSE SERPL-MCNC: 122 MG/DL (ref 65–99)
POTASSIUM SERPL-SCNC: 4.6 MMOL/L (ref 3.5–5.2)
PROT SERPL-MCNC: 6.8 G/DL (ref 6–8.5)
SODIUM SERPL-SCNC: 140 MMOL/L (ref 134–144)

## 2022-09-20 PROCEDURE — 99215 OFFICE O/P EST HI 40 MIN: CPT | Performed by: PHYSICIAN ASSISTANT

## 2022-09-20 NOTE — PROGRESS NOTES
[address] [facility]EMATOLOGY ONCOLOGY [place] [id number] [facility] [id number]  Oncology Progress Note  Aixa Quarles, 1965, [id number]  9/20/2022        Assessment/Plan:   1  Polycythemia vera ([facility] [address] )  Jak2 Mutated  This is a 62-year-old male with past medical history of the above  Patient recently started on hydroxyurea in July of 2022  Patient's blood work demonstrates a hematocrit of 40% and a normal platelet count  Patient no longer has headaches night sweats and pruritus have improved  Patient will continue on medication  Patient will continue to go for blood work every four weeks  Prescription was provided for him today  Since patient is doing well, patient will be back to the office in approximately three months  Patient voiced understanding and agreement to the above  Regimen:  Hydrea 500 mg PO daily      - CBC and differential; Standing  - Comprehensive metabolic panel; Standing  - CBC and differential  - Comprehensive metabolic panel    2  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  3  Hx of iron deficiency anemia  Patient's history of iron-deficiency is likely related to GI bleeding  Patient's GI workup is still ongoing  Last colonoscopy was few years ago along with endoscopy  Patient has subsequent appointment for colonoscopy later this month  Patient is not anemic  4  Tobacco use  Advised discontinuation of smoking  5  Abnormal fasting glucose  Had a brief discussion with the patient regarding abnormal fasting glucose  Patient is borderline diabetic  I have advised the patient to continue follow-up with Dr Gabriela Hernandez, PCP however, weight loss and dieting is advised  Patient was counseled attempted to discuss the seriousness of this disorder  Patient will receive further counseling with PCP  The patient is scheduled for follow-up in approximately 3 months  Patient voiced agreement and understanding to the above  Patient knows to call the Hematology/Oncology office with any questions and concerns regarding the above  Goals and Barriers:    Current Goal:   Prolong Survival from Cancer  Barriers: None  Patient's Capacity to Self Care:  Patient able to self care  Laura Kellogg PA-C  Medical Oncology/Hematology  [address]  ______________________________________________________________________________________________________________    Subjective     Chief Complaint   Patient presents with    Follow-up     Polycythemia vera ([facility] [address] )       History of present illness/Cancer History:    This is a 62-year-old male who was admitted to the emergency room after having several weeks of shortness of breath, three episodes of syncope at home in January 2021   Patient's past medical history significant for 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      Patient had been previously worked up for abnormalities by primary care doctor who recommended that the patient follow-up with GI as well as with Cardiology for a stress test   Unfortunately, patient's symptoms progressed and he presented to the emergency room  3 [facility] work demonstrated hemoglobin in the 7 gram/deciliter range   Moreover, the patient was found to have a significant iron deficiency with a ferritin of three and an iron saturation of 1%   Patient underwent three Venofer infusions daily in the hospital   Patient's hemoglobin is in the 7 gram/deciliter range        GI work up in 1/2021-2/2021:  GI has seen the patient in the [facility] underwent colonoscopy in EGD that demonstrated questionable AVM in the small intestine   This area was cauterized   This area was not bleeding   Capsule demonstrated angiectasis and follow up EGD- is planned on 2/26/2021   This procedure did not demonstrate any signs of active bleeding      Patient completed the remainder of Venofer treatments started in the hospital in February 2021 03/23/21:    Patient notes overall feeling well  [address] [employer] work was reviewed   Patient's iron saturation increased to 7% and ferritin at 41   Hemoglobin has completely supplemented however, MCV is still low   Patient's platelet count is still elevated however this may be secondary to splenectomy and not necessarily to iron deficiency    Patient has completed COVID vaccinations      3/30/21:   Ferritin= 23, iron saturation = seven, TIBC 430,  Hemoglobin = 12 4, MCV 80,  Platelet count = 523,  PSA = 3 4     4/9-5/7/21:  5 Venofer 300 mg IV weekly      05/03/21:  Urgent follow up; [facility] on 5/1/2021, ED visit, hemoglobin stable   No bleeding today  1 more IV iron treatment to go   Iron studies requestion on labs from 5/1/21:iron sat supratheraputic       5/12/21:  Colonoscopy was negative for signs of active bleeding   Hemorrhoidal bleeding to blame for bright red blood per rectum   Scope was advanced past the descending colon due to patient's   CT colonoscopy diagnostic without contrast did not demonstrate any lesions      06/03/21:  Patient is feeling better   No more GI bleeding   Hemoglobin = 15 1, platelet count 593 oral seven, MCV = 86, ferritin = 452, iron saturation = 30%, TIBC = 336     8/27/21:    Labs taken every four weeks demonstrate stability   No GI bleeding   Patient feels well   Hemoglobin = 15 1, platelet count = 342, MCV = 93,  Iron saturation = 33, TIBC 324,  Ferritin = 155     11/1/2021: Feels well   1- 3 day episode of Melena + Hematochezia   Hemoglobin = 17 1, platelet count = 447, MCV = 96,  Iron saturation = 38, TIBC 344,  Ferritin = 149     2/8/2022:  WBC = 8 4, hemoglobin = 15 5, hematocrit = 49 6, platelet count = 287,  iron saturation = 34%, TIBC 364     02/10/22:  Patient reports having significant blood in his stool including dark, melena as well as hematochezia   Patient has not made an appointment with GI doctor   Discussed decreasing ferritin levels     5/6/22 WBC = 8 56, hemoglobin = 17 1, MCV = 92, platelet count = 448, QLWPBLAD = 65     2022:  Erythropoietin = 9 0, +JAK2 V617F Mutation, other studies did not reflex,  WBC = 14, hemoglobin = 17 7, hematocrit = 52, platelet count = 408     22 PSA = 0 7, CMP within normal limits with the exception of a mildly elevated potassium at 5 4, labs ordered by primary physician      2021:Symptom Assessment Tool states that fatigue = 7, early satiety = 6, abdominal discomfort 6, inactivity = 5, problems with concentration 0, night sweats 9, itching 10, no answer for bone pain, no fever 0 and no unintentional weight loss in last six months      2022: 1 phlebotomy, pre Hemoglobin = 16 8, hct=49 5     2022: WBC=11, hemoglobin =16 5, hct = 49 2, smx=824; Started Hydrea  Phlebotomies stopped due to headache- worsening      2022:  HCT = 48 1, Hemoglobin =16 8, plt 350    Lab Results   Component Value Date    WBC 7 6 2022    HGB 16 8 2022    HCT 48 1 2022    MCV 95 2022     2022     Lab Results   Component Value Date    SODIUM 140 2022    K 4 6 2022     2022    CO2 23 2022    AGAP 8 2022    BUN 10 2022    CREATININE 1 13 2022    GLUC 122 (H) 2022    GLUF 91 2021    CALCIUM 8 6 2022    AST 24 2022    ALT 35 2022    ALKPHOS 112 2022    TP 6 8 2022    TBILI 0 3 2022    EGFR 75 2022     Interval history:  Feeling well  Dropped a 4x4 on foot- MRI pending  Out of work  Improved symptoms     ECO - Asymptomatic    Review of Systems   Constitutional: Negative for activity change, appetite change, fatigue and fever  HENT: Negative for nosebleeds  Respiratory: Negative for cough, choking and shortness of breath  Cardiovascular: Negative for chest pain, palpitations and leg swelling     Gastrointestinal: Negative for abdominal distention, abdominal pain, anal bleeding, blood in stool, constipation, diarrhea, nausea and vomiting  Endocrine: Negative for cold intolerance  Genitourinary: Negative for hematuria  Musculoskeletal: Positive for arthralgias  Negative for myalgias  Skin: Negative for color change, pallor and rash  Allergic/Immunologic: Negative for immunocompromised state  Neurological: Negative for headaches  Hematological: Negative for adenopathy  Does not bruise/bleed easily  All other systems reviewed and are negative        Current Outpatient Medications:     acetaminophen (TYLENOL) 325 mg tablet, Take 2 tablets (650 mg total) by mouth every 6 (six) hours as needed for mild pain, headaches or fever, Disp: 30 tablet, Rfl: 0    ASPIRIN 81 PO, Take by mouth, Disp: , Rfl:     atorvastatin (LIPITOR) 20 mg tablet, Take 1 tablet (20 mg total) by mouth daily, Disp: 90 tablet, Rfl: 1    famotidine (PEPCID) 40 MG tablet, Take 1 tablet (40 mg total) by mouth daily, Disp: 30 tablet, Rfl: 2    hydroxyurea (HYDREA) 500 mg capsule, TAKE 1 CAPSULE (500 MG TOTAL) BY MOUTH DAILY, Disp: 30 capsule, Rfl: 2    losartan (COZAAR) 50 mg tablet, TAKE 1 TABLET BY MOUTH EVERY DAY, Disp: 90 tablet, Rfl: 1    mupirocin (BACTROBAN) 2 % ointment, Apply topically daily, Disp: 22 g, Rfl: 0    verapamil (CALAN-SR) 120 mg CR tablet, TAKE 1 TABLET BY MOUTH EVERY DAY, Disp: 90 tablet, Rfl: 1    acetaminophen (TYLENOL) 650 mg CR tablet, Take 1 tablet (650 mg total) by mouth every 8 (eight) hours as needed for mild pain (Patient not taking: No sig reported), Disp: 30 tablet, Rfl: 0    docusate sodium (COLACE) 100 mg capsule, Take 1 capsule (100 mg total) by mouth 3 (three) times a day for 14 days (Patient not taking: Reported on 7/25/2022), Disp: 42 capsule, Rfl: 0    pantoprazole (PROTONIX) 40 mg tablet, TAKE 1 TABLET (40 MG TOTAL) BY MOUTH DAILY 30-60 MIN BEFORE BREAKFAST (Patient not taking: Reported on 9/20/2022), Disp: 90 tablet, Rfl: 3    polyethylene glycol (GOLYTELY) 4000 mL solution, Take 4,000 mL by mouth once for 1 dose, Disp: 4000 mL, Rfl: 0    polyethylene glycol (MIRALAX) 17 g packet, Take 17 g by mouth daily for 7 days (Patient not taking: Reported on 7/25/2022), Disp: 119 g, Rfl: 1    traMADol-acetaminophen (ULTRACET) 37 5-325 mg per tablet, Take 1 tablet by mouth every 6 (six) hours as needed for moderate pain (Patient not taking: Reported on 9/20/2022), Disp: 30 tablet, Rfl: 0  No Known Allergies    Advance Directive and Living Will:            Objective   /90 (BP Location: Left arm, Patient Position: Sitting, Cuff Size: Adult)   Pulse 90   Temp 97 8 °F (36 6 °C) (Temporal)   Resp 18   Ht 6' (1 829 m)   Wt 96 6 kg (213 lb)   SpO2 97%   BMI 28 89 kg/m²   Wt Readings from Last 6 Encounters:   09/20/22 96 6 kg (213 lb)   09/09/22 94 8 kg (209 lb)   09/01/22 94 8 kg (209 lb)   08/30/22 94 8 kg (209 lb)   08/18/22 97 1 kg (214 lb)   07/28/22 97 4 kg (214 lb 12 8 oz)       Physical Exam  Constitutional:       General: He is not in acute distress  Appearance: He is well-developed  HENT:      Head: Normocephalic and atraumatic  Right Ear: External ear normal       Left Ear: External ear normal       Nose: Nose normal    Eyes:      General: No scleral icterus  Conjunctiva/sclera: Conjunctivae normal    Cardiovascular:      Rate and Rhythm: Normal rate  Pulmonary:      Effort: No respiratory distress  Abdominal:      General: There is no distension  Palpations: Abdomen is soft  Skin:     Findings: No rash (on exposed skin  )  Neurological:      Mental Status: He is alert and oriented to person, place, and time  Psychiatric:         Thought Content:  Thought content normal          Pertinent Laboratory Results and Imaging Review:  Orders Only on 09/19/2022   Component Date Value Ref Range Status    White Blood Cell Count 09/19/2022 7 6  3 4 - 10 8 x10E3/uL Final    Red Blood Cell Count 09/19/2022 5 09  4 14 - 5 80 x10E6/uL Final    Hemoglobin 09/19/2022 16 8  13 0 - 17 7 g/dL Final    HCT 09/19/2022 48 1  37 5 - 51 0 % Final    MCV 09/19/2022 95  79 - 97 fL Final    MCH 09/19/2022 33 0  26 6 - 33 0 pg Final    MCHC 09/19/2022 34 9  31 5 - 35 7 g/dL Final    RDW 09/19/2022 16 5 (A) 11 6 - 15 4 % Final    Platelet Count 48/77/0181 350  150 - 450 x10E3/uL Final    Neutrophils 09/19/2022 41  Not Estab  % Final    Lymphocytes 09/19/2022 39  Not Estab  % Final    Monocytes 09/19/2022 14  Not Estab  % Final    Eosinophils 09/19/2022 4  Not Estab  % Final    Basophils PCT 09/19/2022 1  Not Estab  % Final    Neutrophils (Absolute) 09/19/2022 3 1  1 4 - 7 0 x10E3/uL Final    Lymphocytes (Absolute) 09/19/2022 3 0  0 7 - 3 1 x10E3/uL Final    Monocytes (Absolute) 09/19/2022 1 0 (A) 0 1 - 0 9 x10E3/uL Final    Eosinophils (Absolute) 09/19/2022 0 3  0 0 - 0 4 x10E3/uL Final    Basophils ABS 09/19/2022 0 1  0 0 - 0 2 x10E3/uL Final    Immature Granulocytes 09/19/2022 1  Not Estab  % Final    Immature Granulocytes (Absolute) 09/19/2022 0 0  0 0 - 0 1 x10E3/uL Final    Glucose, Random 09/19/2022 122 (A) 65 - 99 mg/dL Final    Comment:                **Effective September 26, 2022 Glucose reference**                   interval will be changing to:                                                              70 - 99      BUN 09/19/2022 10  6 - 24 mg/dL Final    Creatinine 09/19/2022 1 13  0 76 - 1 27 mg/dL Final    eGFR 09/19/2022 75  >59 mL/min/1 73 Final    SL AMB BUN/CREATININE RATIO 09/19/2022 9  9 - 20 Final    Sodium 09/19/2022 140  134 - 144 mmol/L Final    Potassium 09/19/2022 4 6  3 5 - 5 2 mmol/L Final    Chloride 09/19/2022 102  96 - 106 mmol/L Final    CO2 09/19/2022 23  20 - 29 mmol/L Final    CALCIUM 09/19/2022 9 4  8 7 - 10 2 mg/dL Final    Protein, Total 09/19/2022 6 8  6 0 - 8 5 g/dL Final    Albumin 09/19/2022 4 4  3 8 - 4 9 g/dL Final    Globulin, Total 09/19/2022 2 4  1 5 - 4 5 g/dL Final    Albumin/Globulin Ratio 09/19/2022 1 8  1 2 - 2 2 Final    TOTAL BILIRUBIN 09/19/2022 0 3  0 0 - 1 2 mg/dL Final    Alk Phos Isoenzymes 09/19/2022 110  44 - 121 IU/L Final    AST 09/19/2022 24  0 - 40 IU/L Final    ALT 09/19/2022 35  0 - 44 IU/L Final   Orders Only on 09/01/2022   Component Date Value Ref Range Status    Gliadin IgA 09/01/2022 33 (A) 0 - 19 units Final    Comment:                    Negative                   0 - 19                     Weak Positive             20 - 30                     Moderate to Strong Positive   >30      Gliadin IgG 09/01/2022 5  0 - 19 units Final    Comment:                    Negative                   0 - 19                     Weak Positive             20 - 30                     Moderate to Strong Positive   >30      TISSUE TRANSGLUTAMINASE IGA 09/01/2022 <2  0 - 3 U/mL Final    Comment:                               Negative        0 -  3                                Weak Positive   4 - 10                                Positive           >10   Tissue Transglutaminase (tTG) has been identified   as the endomysial antigen  Studies have demonstr-   ated that endomysial IgA antibodies have over 99%   specificity for gluten sensitive enteropathy        Tissue Transglut Ab IGG 09/01/2022 3  0 - 5 U/mL Final    Comment:                               Negative        0 - 5                                Weak Positive   6 - 9                                Positive           >9      Endomysial IgA 09/01/2022 Negative  Negative Final    IgA 09/01/2022 306  90 - 386 mg/dL Final       The following historical data was reviewed:  Past Medical History:   Diagnosis Date    Anemia     Cancer (HCC)     prostate    Colon polyp     Elevated PSA     Full dentures     GERD (gastroesophageal reflux disease)     Hemorrhoid     Hyperlipidemia     Sleep apnea     No CPAP    Smoker     TIA (transient ischemic attack)     Transfusion history     Wears glasses      Past Surgical History: Procedure Laterality Date    ABDOMINAL ADHESION SURGERY N/A 2021    Procedure: LYSIS ADHESIONS LAPAROSCOPIC Jessa Andrade;  Surgeon: Anita Stokes MD;  [facility];  Service: Urology    APPENDECTOMY      COLONOSCOPY      EGD      FOOT SURGERY Right     bone removed    OTHER SURGICAL HISTORY      bone removal right arm-abnormal growth of surface bone    PROSTATE BIOPSY      PROSTATECTOMY N/A 2021    Procedure: ROBOTIC ASSISTED LAPAROSCOPIC SIMPLE PROSTATECTOMY AND BLADDER NECK RECONSTRUCTION;  Surgeon: Anita Stokes MD;  [facility];  Service: Urology    SPLENECTOMY      ruptured     Social History     Socioeconomic History    Marital status:      Spouse name: None    Number of children: None    Years of education: None    Highest education level: None   Occupational History    Occupation: supervisor     Employer: [employer]   Tobacco Use    Smoking status: Former Smoker     Packs/day: 0 50     Years: 10 00     Pack years: 5 00     Types: Cigarettes     Quit date: 2021     Years since quittin 5    Smokeless tobacco: Never Used   Vaping Use    Vaping Use: Never used   Substance and Sexual Activity    Alcohol use: Not Currently    Drug use: No    Sexual activity: None   Other Topics Concern    None   Social History Narrative    None     Social Determinants of Health     Financial Resource Strain: Not on file   Food Insecurity: Not on file   Transportation Needs: Not on file   Physical Activity: Not on file   Stress: Not on file   Social Connections: Not on file   Intimate Partner Violence: Not on file   Housing Stability: Not on file     Family History   Problem Relation Age of Onset    Dementia Father     Heart disease Mother     Hypertension Mother     Hyperlipidemia Mother     Hypertension Sister     Hypertension Brother     No Known Problems Daughter     No Known Problems Son     Cancer Paternal Grandfather         prostate    No Known Problems Son        Please note: This report has been generated by a voice recognition software system  Therefore there may be syntax, spelling, and/or grammatical errors  Please call if you have any questions

## 2022-09-23 DIAGNOSIS — I10 ESSENTIAL HYPERTENSION: ICD-10-CM

## 2022-09-23 RX ORDER — LOSARTAN POTASSIUM 50 MG/1
TABLET ORAL
Qty: 90 TABLET | Refills: 1 | Status: SHIPPED | OUTPATIENT
Start: 2022-09-23

## 2022-10-11 PROBLEM — Z12.5 PROSTATE CANCER SCREENING: Status: RESOLVED | Noted: 2022-05-25 | Resolved: 2022-10-11

## 2022-10-12 DIAGNOSIS — E78.2 MIXED HYPERLIPIDEMIA: ICD-10-CM

## 2022-10-12 RX ORDER — ATORVASTATIN CALCIUM 20 MG/1
20 TABLET, FILM COATED ORAL DAILY
Qty: 90 TABLET | Refills: 1 | Status: SHIPPED | OUTPATIENT
Start: 2022-10-12

## 2022-10-22 LAB
ALBUMIN SERPL-MCNC: 4.6 G/DL (ref 3.8–4.9)
ALBUMIN/GLOB SERPL: 2.2 {RATIO} (ref 1.2–2.2)
ALP SERPL-CCNC: 106 IU/L (ref 44–121)
ALT SERPL-CCNC: 37 IU/L (ref 0–44)
AST SERPL-CCNC: 24 IU/L (ref 0–40)
BASOPHILS # BLD AUTO: 0.1 X10E3/UL (ref 0–0.2)
BASOPHILS NFR BLD AUTO: 2 %
BILIRUB SERPL-MCNC: 0.3 MG/DL (ref 0–1.2)
BUN SERPL-MCNC: 10 MG/DL (ref 6–24)
BUN/CREAT SERPL: 9 (ref 9–20)
CALCIUM SERPL-MCNC: 9.6 MG/DL (ref 8.7–10.2)
CHLORIDE SERPL-SCNC: 104 MMOL/L (ref 96–106)
CO2 SERPL-SCNC: 23 MMOL/L (ref 20–29)
CREAT SERPL-MCNC: 1.12 MG/DL (ref 0.76–1.27)
EGFR: 76 ML/MIN/1.73
EOSINOPHIL # BLD AUTO: 0.3 X10E3/UL (ref 0–0.4)
EOSINOPHIL NFR BLD AUTO: 5 %
ERYTHROCYTE [DISTWIDTH] IN BLOOD BY AUTOMATED COUNT: 17.7 % (ref 11.6–15.4)
GLOBULIN SER-MCNC: 2.1 G/DL (ref 1.5–4.5)
GLUCOSE SERPL-MCNC: 92 MG/DL (ref 70–99)
HCT VFR BLD AUTO: 47.3 % (ref 37.5–51)
HGB BLD-MCNC: 16.8 G/DL (ref 13–17.7)
IMM GRANULOCYTES # BLD: 0 X10E3/UL (ref 0–0.1)
IMM GRANULOCYTES NFR BLD: 0 %
LYMPHOCYTES # BLD AUTO: 2.6 X10E3/UL (ref 0.7–3.1)
LYMPHOCYTES NFR BLD AUTO: 48 %
MCH RBC QN AUTO: 34.6 PG (ref 26.6–33)
MCHC RBC AUTO-ENTMCNC: 35.5 G/DL (ref 31.5–35.7)
MCV RBC AUTO: 98 FL (ref 79–97)
MONOCYTES # BLD AUTO: 0.8 X10E3/UL (ref 0.1–0.9)
MONOCYTES NFR BLD AUTO: 15 %
NEUTROPHILS # BLD AUTO: 1.7 X10E3/UL (ref 1.4–7)
NEUTROPHILS NFR BLD AUTO: 30 %
PLATELET # BLD AUTO: 304 X10E3/UL (ref 150–450)
POTASSIUM SERPL-SCNC: 4.8 MMOL/L (ref 3.5–5.2)
PROT SERPL-MCNC: 6.7 G/DL (ref 6–8.5)
RBC # BLD AUTO: 4.85 X10E6/UL (ref 4.14–5.8)
SODIUM SERPL-SCNC: 140 MMOL/L (ref 134–144)
WBC # BLD AUTO: 5.5 X10E3/UL (ref 3.4–10.8)

## 2022-10-27 RX ORDER — SODIUM CHLORIDE, SODIUM LACTATE, POTASSIUM CHLORIDE, CALCIUM CHLORIDE 600; 310; 30; 20 MG/100ML; MG/100ML; MG/100ML; MG/100ML
125 INJECTION, SOLUTION INTRAVENOUS CONTINUOUS
Status: CANCELLED | OUTPATIENT
Start: 2022-10-27

## 2022-10-28 ENCOUNTER — ANESTHESIA EVENT (OUTPATIENT)
Dept: GASTROENTEROLOGY | Facility: AMBULARY SURGERY CENTER | Age: 57
End: 2022-10-28

## 2022-10-28 ENCOUNTER — ANESTHESIA (OUTPATIENT)
Dept: GASTROENTEROLOGY | Facility: AMBULARY SURGERY CENTER | Age: 57
End: 2022-10-28

## 2022-10-28 ENCOUNTER — HOSPITAL ENCOUNTER (OUTPATIENT)
Dept: GASTROENTEROLOGY | Facility: AMBULARY SURGERY CENTER | Age: 57
Setting detail: OUTPATIENT SURGERY
End: 2022-10-28
Attending: INTERNAL MEDICINE
Payer: COMMERCIAL

## 2022-10-28 VITALS
RESPIRATION RATE: 18 BRPM | HEART RATE: 78 BPM | SYSTOLIC BLOOD PRESSURE: 127 MMHG | OXYGEN SATURATION: 97 % | TEMPERATURE: 97.5 F | DIASTOLIC BLOOD PRESSURE: 84 MMHG

## 2022-10-28 DIAGNOSIS — K63.5 POLYP OF COLON, UNSPECIFIED PART OF COLON, UNSPECIFIED TYPE: ICD-10-CM

## 2022-10-28 PROBLEM — C95.90 LEUKEMIA (HCC): Status: ACTIVE | Noted: 2022-10-28

## 2022-10-28 RX ORDER — SODIUM CHLORIDE, SODIUM LACTATE, POTASSIUM CHLORIDE, CALCIUM CHLORIDE 600; 310; 30; 20 MG/100ML; MG/100ML; MG/100ML; MG/100ML
125 INJECTION, SOLUTION INTRAVENOUS CONTINUOUS
Status: DISCONTINUED | OUTPATIENT
Start: 2022-10-28 | End: 2022-11-01 | Stop reason: HOSPADM

## 2022-10-28 RX ORDER — PROPOFOL 10 MG/ML
INJECTION, EMULSION INTRAVENOUS CONTINUOUS PRN
Status: DISCONTINUED | OUTPATIENT
Start: 2022-10-28 | End: 2022-10-28

## 2022-10-28 RX ORDER — ONDANSETRON 2 MG/ML
4 INJECTION INTRAMUSCULAR; INTRAVENOUS ONCE AS NEEDED
Status: CANCELLED | OUTPATIENT
Start: 2022-10-28

## 2022-10-28 RX ORDER — PROPOFOL 10 MG/ML
INJECTION, EMULSION INTRAVENOUS AS NEEDED
Status: DISCONTINUED | OUTPATIENT
Start: 2022-10-28 | End: 2022-10-28

## 2022-10-28 RX ORDER — LIDOCAINE HYDROCHLORIDE 10 MG/ML
INJECTION, SOLUTION EPIDURAL; INFILTRATION; INTRACAUDAL; PERINEURAL AS NEEDED
Status: DISCONTINUED | OUTPATIENT
Start: 2022-10-28 | End: 2022-10-28

## 2022-10-28 RX ADMIN — PROPOFOL 100 MG: 10 INJECTION, EMULSION INTRAVENOUS at 09:04

## 2022-10-28 RX ADMIN — LIDOCAINE HYDROCHLORIDE 50 MG: 10 INJECTION, SOLUTION EPIDURAL; INFILTRATION; INTRACAUDAL; PERINEURAL at 09:04

## 2022-10-28 RX ADMIN — PROPOFOL 20 MG: 10 INJECTION, EMULSION INTRAVENOUS at 09:05

## 2022-10-28 RX ADMIN — PROPOFOL 140 MCG/KG/MIN: 10 INJECTION, EMULSION INTRAVENOUS at 09:06

## 2022-10-28 RX ADMIN — SODIUM CHLORIDE, SODIUM LACTATE, POTASSIUM CHLORIDE, AND CALCIUM CHLORIDE: .6; .31; .03; .02 INJECTION, SOLUTION INTRAVENOUS at 08:57

## 2022-10-28 NOTE — ANESTHESIA PREPROCEDURE EVALUATION
Procedure:  COLONOSCOPY    Relevant Problems   ANESTHESIA (within normal limits)      CARDIO   (+) Essential hypertension   (+) Hyperlipidemia      GI/HEPATIC   (+) Fatty liver   (+) Gastroesophageal reflux disease      /RENAL   (+) BPH (benign prostatic hyperplasia)   (+) Benign localized prostatic hyperplasia with lower urinary tract symptoms (LUTS)      HEMATOLOGY   (+) Iron deficiency anemia due to chronic blood loss   (+) Symptomatic anemia      MUSCULOSKELETAL   (+) Osteoarthritis of cervical spine   (+) Osteoarthritis, hip, bilateral      NEURO/PSYCH   (+) Arm paresthesia, right   (+) Headache      PULMONARY   (+) Other emphysema (HCC)      Other   (+) Leukemia (HCC)   (+) Polycythemia vera (HCC)        Physical Exam    Airway    Mallampati score: II  TM Distance: >3 FB  Neck ROM: full     Dental       Cardiovascular  Rhythm: regular, Rate: normal,     Pulmonary  Breath sounds clear to auscultation,     Other Findings        Anesthesia Plan  ASA Score- 3     Anesthesia Type- IV sedation with anesthesia with ASA Monitors  Additional Monitors:   Airway Plan:           Plan Factors-Exercise tolerance (METS): >4 METS  Chart reviewed  EKG reviewed  Existing labs reviewed  Patient summary reviewed  Patient is not a current smoker  Induction-     Postoperative Plan-     Informed Consent- Anesthetic plan and risks discussed with patient  I personally reviewed this patient with the CRNA  Discussed and agreed on the Anesthesia Plan with the CRNA  Kiarn Ca

## 2022-10-28 NOTE — H&P
History and Physical -  [facility]  Asifbrianfoster Jacob 61 y o  male MRN: [id number]        HPI:  59-year-old male with history of colon polyps had incomplete colonoscopy last year due to long tortuous colon  Reports having regular bowel movements      Historical Information   Past Medical History:   Diagnosis Date   • Anemia    • Cancer [facility])     prostate   • Colon polyp    • Elevated PSA    • Full dentures    • GERD (gastroesophageal reflux disease)    • Hemorrhoid    • Hyperlipidemia    • Leukemia (HCC)     oral chemotherapy   • Polycythemia vera (HCC)    • Sleep apnea     No CPAP   • Smoker    • TIA (transient ischemic attack)    • Transfusion history    • Wears glasses      Past Surgical History:   Procedure Laterality Date   • ABDOMINAL ADHESION SURGERY N/A 2021    Procedure: LYSIS ADHESIONS LAPAROSCOPIC Bobetta Denver;  Surgeon: Christopher Garcia MD;  [facility];  Service: Urology   • APPENDECTOMY     • COLONOSCOPY     • EGD     • FOOT SURGERY Right     bone removed-left foot-removal of blood clots from an injury   • OTHER SURGICAL HISTORY      bone removal right arm-abnormal growth of surface bone   • PROSTATE BIOPSY     • PROSTATECTOMY N/A 2021    Procedure: ROBOTIC ASSISTED LAPAROSCOPIC SIMPLE PROSTATECTOMY AND BLADDER NECK RECONSTRUCTION;  Surgeon: Christopher Garcia MD;  [facility];  Service: Urology   • SPLENECTOMY      ruptured     Social History   Social History     Substance and Sexual Activity   Alcohol Use Not Currently     Social History     Substance and Sexual Activity   Drug Use No     Social History     Tobacco Use   Smoking Status Former Smoker   • Packs/day: 0 50   • Years: 10 00   • Pack years: 5 00   • Types: Cigarettes   • Quit date: 2021   • Years since quittin 6   Smokeless Tobacco Never Used     Family History   Problem Relation Age of Onset   • Dementia Father    • Heart disease Mother    • Hypertension Mother    • Hyperlipidemia Mother • Hypertension Sister    • Hypertension Brother    • No Known Problems Daughter    • No Known Problems Son    • Cancer Paternal Grandfather         prostate   • No Known Problems Son        Meds/Allergies     (Not in a hospital admission)      No Known Allergies    Objective     Blood pressure 137/93, pulse 85, temperature 97 5 °F (36 4 °C), temperature source Oral, resp  rate 18, SpO2 99 %      PHYSICAL EXAM:    Gen: NAD  CV: S1 & S2 normal, RRR  CHEST: Clear to auscultate  ABD: soft, NT/ND, good bowel sounds  EXT: no edema    ASSESSMENT:     History of colon polyps    PLAN:    Colonoscopy

## 2022-11-04 ENCOUNTER — APPOINTMENT (OUTPATIENT)
Dept: RADIOLOGY | Facility: CLINIC | Age: 57
End: 2022-11-04

## 2022-11-04 ENCOUNTER — OFFICE VISIT (OUTPATIENT)
Dept: FAMILY MEDICINE CLINIC | Facility: CLINIC | Age: 57
End: 2022-11-04

## 2022-11-04 VITALS
RESPIRATION RATE: 17 BRPM | DIASTOLIC BLOOD PRESSURE: 72 MMHG | BODY MASS INDEX: 28.25 KG/M2 | HEART RATE: 87 BPM | TEMPERATURE: 98 F | HEIGHT: 72 IN | WEIGHT: 208.6 LBS | SYSTOLIC BLOOD PRESSURE: 130 MMHG | OXYGEN SATURATION: 99 %

## 2022-11-04 DIAGNOSIS — Z23 IMMUNIZATION DUE: ICD-10-CM

## 2022-11-04 DIAGNOSIS — K21.9 GASTROESOPHAGEAL REFLUX DISEASE, UNSPECIFIED WHETHER ESOPHAGITIS PRESENT: ICD-10-CM

## 2022-11-04 DIAGNOSIS — I10 ESSENTIAL HYPERTENSION: ICD-10-CM

## 2022-11-04 DIAGNOSIS — R07.89 OTHER CHEST PAIN: Primary | ICD-10-CM

## 2022-11-04 DIAGNOSIS — E78.49 OTHER HYPERLIPIDEMIA: ICD-10-CM

## 2022-11-04 DIAGNOSIS — R07.89 OTHER CHEST PAIN: ICD-10-CM

## 2022-11-04 PROBLEM — R13.19 ESOPHAGEAL DYSPHAGIA: Status: ACTIVE | Noted: 2022-11-04

## 2022-11-04 NOTE — PROGRESS NOTES
Assessment/Plan:    No problem-specific Assessment & Plan notes found for this encounter  Chest wall pain suspect strain, can use ice locally    Pleuritic component, check cxr to r/o aspiration after vomiting    Htn/hld stable    Dysphagia, r/o esophagitis after vomiting, offered 10d course of carafate liquid, f/u if no better, may need egd if persist     Diagnoses and all orders for this visit:    Other chest pain  -     XR chest pa & lateral; Future    Essential hypertension    Other hyperlipidemia    Gastroesophageal reflux disease, unspecified whether esophagitis present    Immunization due  -     influenza vaccine, quadrivalent, recombinant, PF, 0 5 mL, for patients 18 yr+ (FLUBLOK)          Return if symptoms worsen or fail to improve  Subjective:      Patient ID: Duc Rocha is a 61 y o  male  Chief Complaint   Patient presents with   • trouble swallowing     Started last Friday nm  lpn   • Chest Pain     Upper left chest area hurts        HPI  Last week had colonoscopy  Vomited afterwards at home  Several times per pt  No sob currently  No cough or choking recalled  Some dysphagia with food  Pain with breathing    On famotidine    Stool watery  No blood seen    The following portions of the patient's history were reviewed and updated as appropriate: allergies, current medications, past family history, past medical history, past social history, past surgical history and problem list     Review of Systems   Constitutional: Negative for chills and fever           Current Outpatient Medications   Medication Sig Dispense Refill   • acetaminophen (TYLENOL) 325 mg tablet Take 2 tablets (650 mg total) by mouth every 6 (six) hours as needed for mild pain, headaches or fever 30 tablet 0   • ASPIRIN 81 PO Take by mouth every morning     • atorvastatin (LIPITOR) 20 mg tablet Take 1 tablet (20 mg total) by mouth daily 90 tablet 1   • famotidine (PEPCID) 40 MG tablet Take 1 tablet (40 mg total) by mouth daily (Patient taking differently: Take 40 mg by mouth daily at bedtime) 30 tablet 2   • hydroxyurea (HYDREA) 500 mg capsule TAKE 1 CAPSULE (500 MG TOTAL) BY MOUTH DAILY (Patient taking differently: Take 500 mg by mouth every evening) 30 capsule 2   • losartan (COZAAR) 50 mg tablet TAKE 1 TABLET BY MOUTH EVERY DAY (Patient taking differently: 50 mg every morning) 90 tablet 1   • verapamil (CALAN-SR) 120 mg CR tablet TAKE 1 TABLET BY MOUTH EVERY DAY (Patient taking differently: 120 mg every morning) 90 tablet 1     No current facility-administered medications for this visit  Objective:    /72   Pulse 87   Temp 98 °F (36 7 °C)   Resp 17   Ht 6' (1 829 m)   Wt 94 6 kg (208 lb 9 6 oz)   SpO2 99%   BMI 28 29 kg/m²        Physical Exam  Vitals and nursing note reviewed  Constitutional:       General: He is not in acute distress  Appearance: He is well-developed  He is not ill-appearing  HENT:      Head: Normocephalic  Right Ear: Tympanic membrane normal       Left Ear: Tympanic membrane normal    Eyes:      General: No scleral icterus  Conjunctiva/sclera: Conjunctivae normal    Cardiovascular:      Rate and Rhythm: Normal rate and regular rhythm  Heart sounds: No murmur heard  Pulmonary:      Effort: Pulmonary effort is normal  No respiratory distress  Breath sounds: Normal breath sounds  No wheezing, rhonchi or rales  Comments: Reproducible left chest wall tenderness with palpation  Abdominal:      General: There is no distension  Palpations: Abdomen is soft  Tenderness: There is no rebound  Musculoskeletal:         General: No deformity  Cervical back: Neck supple  Skin:     General: Skin is warm and dry  Coloration: Skin is not pale  Findings: No rash  Neurological:      Mental Status: He is alert  Psychiatric:         Mood and Affect: Mood normal          Behavior: Behavior normal          Thought Content:  Thought content normal  Fariba Rodriguez

## 2022-11-07 DIAGNOSIS — D45 POLYCYTHEMIA VERA (HCC): ICD-10-CM

## 2022-11-07 RX ORDER — HYDROXYUREA 500 MG/1
500 CAPSULE ORAL EVERY EVENING
Qty: 30 CAPSULE | Refills: 2 | Status: SHIPPED | OUTPATIENT
Start: 2022-11-07

## 2022-11-19 DIAGNOSIS — K21.9 GASTROESOPHAGEAL REFLUX DISEASE WITHOUT ESOPHAGITIS: ICD-10-CM

## 2022-11-21 RX ORDER — FAMOTIDINE 40 MG/1
40 TABLET, FILM COATED ORAL DAILY
Qty: 30 TABLET | Refills: 3 | Status: SHIPPED | OUTPATIENT
Start: 2022-11-21 | End: 2023-02-19

## 2022-11-21 NOTE — PROGRESS NOTES
Assessment/Plan:    Patient left foot was scrubbed prepped and draped the usual aseptic manner, local anesthesia was obtained using 10 cc of lidocaine 1% infiltrated proximal to the hematoma site, stab incision was performed down to subcutaneous tissue, and hematoma was drained , raised probed with blunt instruments, area flushed with copious amount of sterile saline, compressive dressing applied with topical antibiotics, patient to start oral antibiotics prophylaxis, discussed with the patient dressing changes, patient to return next week for re-evaluation   Diagnoses and all orders for this visit:    Subcutaneous hematoma  -     doxycycline hyclate (VIBRAMYCIN) 100 mg capsule; Take 1 capsule (100 mg total) by mouth every 12 (twelve) hours for 7 days  -     Discontinue: mupirocin (BACTROBAN) 2 % ointment; Apply topically daily          Subjective:      Patient ID: Jermaine Mackey is a 61 y o  male  79-year-old male with past history of hyperlipidemia, GERD, daily smoker, LALIT not on CPAP, anemia, TIA, prostate cancer, referred from ED for evaluation of left foot pain  Patient in the office for possible hematoma evacuation, patient did not obtain MRI as discussed      The following portions of the patient's history were reviewed and updated as appropriate: allergies, current medications, past family history, past medical history, past social history, past surgical history and problem list     Review of Systems   Constitutional: Negative  Respiratory: Negative  Cardiovascular: Negative  Musculoskeletal: Positive for arthralgias  Skin: Negative                  Historical Information   Past Medical History:   Diagnosis Date   • Anemia    • Cancer [facility])     prostate   • Colon polyp    • Elevated PSA    • Full dentures    • GERD (gastroesophageal reflux disease)    • Hemorrhoid    • Hyperlipidemia    • Leukemia (HCC)     oral chemotherapy   • Polycythemia vera ([facility] [address] )    • Sleep apnea     No CPAP   • Smoker • TIA (transient ischemic attack)    • Transfusion history    • Wears glasses      Past Surgical History:   Procedure Laterality Date   • ABDOMINAL ADHESION SURGERY N/A 2021    Procedure: Girtha Saris;  Surgeon: Celena Villa MD;  [facility];  Service: Urology   • APPENDECTOMY     • COLONOSCOPY     • EGD     • FOOT SURGERY Right     bone removed-left foot-removal of blood clots from an injury   • OTHER SURGICAL HISTORY      bone removal right arm-abnormal growth of surface bone   • PROSTATE BIOPSY     • PROSTATECTOMY N/A 2021    Procedure: ROBOTIC ASSISTED LAPAROSCOPIC SIMPLE PROSTATECTOMY AND BLADDER NECK RECONSTRUCTION;  Surgeon: Celena Villa MD;  [facility];  Service: Urology   • SPLENECTOMY      ruptured     Social History   Social History     Substance and Sexual Activity   Alcohol Use Not Currently     Social History     Substance and Sexual Activity   Drug Use No     Social History     Tobacco Use   Smoking Status Former   • Packs/day: 0 50   • Years: 10 00   • Pack years: 5 00   • Types: Cigarettes   • Quit date: 2021   • Years since quittin 7   Smokeless Tobacco Never     Family History:   Family History   Problem Relation Age of Onset   • Dementia Father    • Heart disease Mother    • Hypertension Mother    • Hyperlipidemia Mother    • Hypertension Sister    • Hypertension Brother    • No Known Problems Daughter    • No Known Problems Son    • Cancer Paternal Grandfather         prostate   • No Known Problems Son        Meds/Allergies   all medications and allergies reviewed  No Known Allergies    Objective:      Ht 6' (1 829 m)   Wt 94 8 kg (209 lb)   BMI 28 35 kg/m²          Physical Exam  Constitutional:       General: He is not in acute distress  Appearance: He is well-developed  He is not ill-appearing, toxic-appearing or diaphoretic  HENT:      Head: Normocephalic and atraumatic     Cardiovascular:      Pulses: Normal pulses  Dorsalis pedis pulses are 2+ on the right side and 2+ on the left side  Posterior tibial pulses are 2+ on the right side and 2+ on the left side  Comments: Palpable pedal pulse, CFT is less than 3 seconds, temperature gradient within normal limits, pedal hair present, no trophic skin changes  Pulmonary:      Effort: No respiratory distress  Musculoskeletal:         General: Normal range of motion  Comments: No pain on palpation on range of motion of ankle joint subtalar joint metatarsal joint tarsal joints bilateral foot   Feet:      Right foot:      Protective Sensation: 10 sites tested  10 sites sensed  Skin integrity: No ulcer, blister, skin breakdown or erythema  Left foot:      Protective Sensation: 10 sites tested  10 sites sensed  Skin integrity: No ulcer, blister, skin breakdown or erythema  Skin:     Capillary Refill: Capillary refill takes 2 to 3 seconds  Coloration: Skin is not pale  Comments: Marked pain on palpation to the dorsal aspect of the left foot along the base of the 3rd 4th and 5th metatarsals, localized fluctuant dorsal foot, superficial abrasion, no ascending cellulitis, ecchymosis noted along the dorsal lateral aspect of left   Neurological:      Mental Status: He is alert and oriented to person, place, and time  Comments:  muscle power 5/5, protective sensations intact, no focal motor deficit  Foot Exam    Right Foot/Ankle     Inspection and Palpation  Skin Exam: no blister, no maceration, no ulcer and no erythema     Neurovascular  Dorsalis pedis: 2+  Posterior tibial: 2+      Left Foot/Ankle      Inspection and Palpation  Skin Exam: no blister, no maceration, no ulcer and no erythema     Neurovascular  Dorsalis pedis: 2+  Posterior tibial: 2+              Portions of the record may have been created with voice recognition software   Occasional wrong word or "sound a like" substitutions may have occurred due to the inherent limitations of voice recognition software  Read the chart carefully and recognize, using context, where substitutions have occurred

## 2022-11-22 LAB
ALBUMIN SERPL-MCNC: 4.5 G/DL (ref 3.8–4.9)
ALBUMIN/GLOB SERPL: 1.7 {RATIO} (ref 1.2–2.2)
ALP SERPL-CCNC: 125 IU/L (ref 44–121)
ALT SERPL-CCNC: 29 IU/L (ref 0–44)
AST SERPL-CCNC: 24 IU/L (ref 0–40)
BASOPHILS # BLD AUTO: 0.1 X10E3/UL (ref 0–0.2)
BASOPHILS NFR BLD AUTO: 1 %
BILIRUB SERPL-MCNC: 0.4 MG/DL (ref 0–1.2)
BUN SERPL-MCNC: 9 MG/DL (ref 6–24)
BUN/CREAT SERPL: 8 (ref 9–20)
CALCIUM SERPL-MCNC: 9.6 MG/DL (ref 8.7–10.2)
CHLORIDE SERPL-SCNC: 101 MMOL/L (ref 96–106)
CO2 SERPL-SCNC: 24 MMOL/L (ref 20–29)
CREAT SERPL-MCNC: 1.16 MG/DL (ref 0.76–1.27)
EGFR: 73 ML/MIN/1.73
EOSINOPHIL # BLD AUTO: 0.2 X10E3/UL (ref 0–0.4)
EOSINOPHIL NFR BLD AUTO: 4 %
ERYTHROCYTE [DISTWIDTH] IN BLOOD BY AUTOMATED COUNT: 15.9 % (ref 11.6–15.4)
GLOBULIN SER-MCNC: 2.6 G/DL (ref 1.5–4.5)
GLUCOSE SERPL-MCNC: 110 MG/DL (ref 70–99)
HCT VFR BLD AUTO: 47.3 % (ref 37.5–51)
HGB BLD-MCNC: 16.6 G/DL (ref 13–17.7)
IMM GRANULOCYTES # BLD: 0 X10E3/UL (ref 0–0.1)
IMM GRANULOCYTES NFR BLD: 0 %
LYMPHOCYTES # BLD AUTO: 3 X10E3/UL (ref 0.7–3.1)
LYMPHOCYTES NFR BLD AUTO: 48 %
MCH RBC QN AUTO: 34.9 PG (ref 26.6–33)
MCHC RBC AUTO-ENTMCNC: 35.1 G/DL (ref 31.5–35.7)
MCV RBC AUTO: 99 FL (ref 79–97)
MONOCYTES # BLD AUTO: 0.8 X10E3/UL (ref 0.1–0.9)
MONOCYTES NFR BLD AUTO: 12 %
NEUTROPHILS # BLD AUTO: 2.2 X10E3/UL (ref 1.4–7)
NEUTROPHILS NFR BLD AUTO: 35 %
PLATELET # BLD AUTO: 338 X10E3/UL (ref 150–450)
POTASSIUM SERPL-SCNC: 4.5 MMOL/L (ref 3.5–5.2)
PROT SERPL-MCNC: 7.1 G/DL (ref 6–8.5)
RBC # BLD AUTO: 4.76 X10E6/UL (ref 4.14–5.8)
SODIUM SERPL-SCNC: 138 MMOL/L (ref 134–144)
WBC # BLD AUTO: 6.3 X10E3/UL (ref 3.4–10.8)

## 2022-11-28 NOTE — PROGRESS NOTES
Assessment/Plan:    Subcutaneous hematoma resolving, patient to continue monitoring signs of infection, patient use compression dressing, patient educated on proper shoe gear and the use of supportive shoes with orthotics, patient to return to the office condition recur  Diagnoses and all orders for this visit:    Subcutaneous hematoma    Crushing injury of left foot, initial encounter          Subjective:      Patient ID: Suzi Home is a 61 y o  male  Follow-up on hematoma evacuation, patient report compliance with dressing changes, patient report improvement although some pain persist, patient denies constitutional symptoms  The following portions of the patient's history were reviewed and updated as appropriate: allergies, current medications, past family history, past medical history, past social history, past surgical history and problem list     Review of Systems   Constitutional: Negative  Respiratory: Negative  Cardiovascular: Negative  Musculoskeletal: Positive for arthralgias  Skin: Negative                  Historical Information   Past Medical History:   Diagnosis Date   • Anemia    • Cancer [facility])     prostate   • Colon polyp    • Elevated PSA    • Full dentures    • GERD (gastroesophageal reflux disease)    • Hemorrhoid    • Hyperlipidemia    • Leukemia (HCC)     oral chemotherapy   • Polycythemia vera (HCC)    • Sleep apnea     No CPAP   • Smoker    • TIA (transient ischemic attack)    • Transfusion history    • Wears glasses      Past Surgical History:   Procedure Laterality Date   • ABDOMINAL ADHESION SURGERY N/A 11/17/2021    Procedure: LYSIS ADHESIONS LAPAROSCOPIC W ROBOT;  Surgeon: Corrinne Scriver, MD;  [facility];  Service: Urology   • APPENDECTOMY     • COLONOSCOPY     • EGD     • FOOT SURGERY Right     bone removed-left foot-removal of blood clots from an injury   • OTHER SURGICAL HISTORY      bone removal right arm-abnormal growth of surface bone   • PROSTATE BIOPSY     • PROSTATECTOMY N/A 2021    Procedure: ROBOTIC ASSISTED LAPAROSCOPIC SIMPLE PROSTATECTOMY AND BLADDER NECK RECONSTRUCTION;  Surgeon: Corina Dumont MD;  [facility];  Service: Urology   • SPLENECTOMY      ruptured     Social History   Social History     Substance and Sexual Activity   Alcohol Use Not Currently     Social History     Substance and Sexual Activity   Drug Use No     Social History     Tobacco Use   Smoking Status Former   • Packs/day: 0 50   • Years: 10 00   • Pack years: 5 00   • Types: Cigarettes   • Quit date: 2021   • Years since quittin 7   Smokeless Tobacco Never     Family History:   Family History   Problem Relation Age of Onset   • Dementia Father    • Heart disease Mother    • Hypertension Mother    • Hyperlipidemia Mother    • Hypertension Sister    • Hypertension Brother    • No Known Problems Daughter    • No Known Problems Son    • Cancer Paternal Grandfather         prostate   • No Known Problems Son        Meds/Allergies   all medications and allergies reviewed  No Known Allergies    Objective:      /95   Resp 18   Ht 6' (1 829 m)   Wt 94 8 kg (209 lb)   BMI 28 35 kg/m²          Physical Exam  Constitutional:       General: He is not in acute distress  Appearance: He is well-developed  He is not ill-appearing, toxic-appearing or diaphoretic  HENT:      Head: Normocephalic and atraumatic  Cardiovascular:      Pulses: Normal pulses  Dorsalis pedis pulses are 2+ on the right side and 2+ on the left side  Posterior tibial pulses are 2+ on the right side and 2+ on the left side  Comments: Palpable pedal pulse, CFT is less than 3 seconds, temperature gradient within normal limits, pedal hair present, no trophic skin changes  Pulmonary:      Effort: No respiratory distress  Musculoskeletal:         General: Normal range of motion        Comments: No pain on palpation on range of motion of ankle joint subtalar joint metatarsal joint tarsal joints bilateral foot   Feet:      Right foot:      Protective Sensation: 10 sites tested  10 sites sensed  Skin integrity: No ulcer, blister, skin breakdown or erythema  Left foot:      Protective Sensation: 10 sites tested  10 sites sensed  Skin integrity: No ulcer, blister, skin breakdown or erythema  Skin:     Capillary Refill: Capillary refill takes 2 to 3 seconds  Coloration: Skin is not pale  Comments: Marked improvement in hematoma, superficial eschar noted to the site of the drainage, no erythema no edema no clinical signs of infection no ascending cellulitis   Neurological:      Mental Status: He is alert and oriented to person, place, and time  Comments:  muscle power 5/5, protective sensations intact, no focal motor deficit  Foot Exam    Right Foot/Ankle     Inspection and Palpation  Skin Exam: no blister, no maceration, no ulcer and no erythema     Neurovascular  Dorsalis pedis: 2+  Posterior tibial: 2+      Left Foot/Ankle      Inspection and Palpation  Skin Exam: no blister, no maceration, no ulcer and no erythema     Neurovascular  Dorsalis pedis: 2+  Posterior tibial: 2+              Portions of the record may have been created with voice recognition software  Occasional wrong word or "sound a like" substitutions may have occurred due to the inherent limitations of voice recognition software  Read the chart carefully and recognize, using context, where substitutions have occurred

## 2022-12-17 LAB
ALBUMIN SERPL-MCNC: 4.2 G/DL (ref 3.8–4.9)
ALBUMIN/GLOB SERPL: 1.6 {RATIO} (ref 1.2–2.2)
ALP SERPL-CCNC: 102 IU/L (ref 44–121)
ALT SERPL-CCNC: 35 IU/L (ref 0–44)
AST SERPL-CCNC: 26 IU/L (ref 0–40)
BASOPHILS # BLD AUTO: 0.1 X10E3/UL (ref 0–0.2)
BASOPHILS NFR BLD AUTO: 1 %
BILIRUB SERPL-MCNC: 0.5 MG/DL (ref 0–1.2)
BUN SERPL-MCNC: 9 MG/DL (ref 6–24)
BUN/CREAT SERPL: 9 (ref 9–20)
CALCIUM SERPL-MCNC: 9.3 MG/DL (ref 8.7–10.2)
CHLORIDE SERPL-SCNC: 102 MMOL/L (ref 96–106)
CO2 SERPL-SCNC: 25 MMOL/L (ref 20–29)
CREAT SERPL-MCNC: 1.05 MG/DL (ref 0.76–1.27)
EGFR: 82 ML/MIN/1.73
EOSINOPHIL # BLD AUTO: 0.2 X10E3/UL (ref 0–0.4)
EOSINOPHIL NFR BLD AUTO: 5 %
ERYTHROCYTE [DISTWIDTH] IN BLOOD BY AUTOMATED COUNT: 15 % (ref 11.6–15.4)
GLOBULIN SER-MCNC: 2.7 G/DL (ref 1.5–4.5)
GLUCOSE SERPL-MCNC: 109 MG/DL (ref 70–99)
HCT VFR BLD AUTO: 42.2 % (ref 37.5–51)
HGB BLD-MCNC: 15.1 G/DL (ref 13–17.7)
IMM GRANULOCYTES # BLD: 0 X10E3/UL (ref 0–0.1)
IMM GRANULOCYTES NFR BLD: 0 %
LYMPHOCYTES # BLD AUTO: 2.6 X10E3/UL (ref 0.7–3.1)
LYMPHOCYTES NFR BLD AUTO: 48 %
MCH RBC QN AUTO: 36.3 PG (ref 26.6–33)
MCHC RBC AUTO-ENTMCNC: 35.8 G/DL (ref 31.5–35.7)
MCV RBC AUTO: 101 FL (ref 79–97)
MONOCYTES # BLD AUTO: 0.7 X10E3/UL (ref 0.1–0.9)
MONOCYTES NFR BLD AUTO: 13 %
NEUTROPHILS # BLD AUTO: 1.8 X10E3/UL (ref 1.4–7)
NEUTROPHILS NFR BLD AUTO: 33 %
PLATELET # BLD AUTO: 392 X10E3/UL (ref 150–450)
POTASSIUM SERPL-SCNC: 4.5 MMOL/L (ref 3.5–5.2)
PROT SERPL-MCNC: 6.9 G/DL (ref 6–8.5)
RBC # BLD AUTO: 4.16 X10E6/UL (ref 4.14–5.8)
SODIUM SERPL-SCNC: 140 MMOL/L (ref 134–144)
WBC # BLD AUTO: 5.4 X10E3/UL (ref 3.4–10.8)

## 2022-12-20 ENCOUNTER — OFFICE VISIT (OUTPATIENT)
Dept: HEMATOLOGY ONCOLOGY | Facility: MEDICAL CENTER | Age: 57
End: 2022-12-20

## 2022-12-20 VITALS
DIASTOLIC BLOOD PRESSURE: 78 MMHG | TEMPERATURE: 97.8 F | OXYGEN SATURATION: 100 % | HEART RATE: 85 BPM | RESPIRATION RATE: 20 BRPM | BODY MASS INDEX: 28.93 KG/M2 | HEIGHT: 72 IN | SYSTOLIC BLOOD PRESSURE: 118 MMHG | WEIGHT: 213.6 LBS

## 2022-12-20 DIAGNOSIS — D45 POLYCYTHEMIA VERA (HCC): Primary | ICD-10-CM

## 2022-12-20 DIAGNOSIS — Z86.2 HX OF IRON DEFICIENCY ANEMIA: ICD-10-CM

## 2022-12-20 DIAGNOSIS — Z90.81 S/P SPLENECTOMY: ICD-10-CM

## 2022-12-20 NOTE — PROGRESS NOTES
[address] HEMATOLOGY ONCOLOGY Vicky Jensen  [facility] [address]  Oncology Progress Note  Nuzhat Canela, 1965, [id number]  12/20/2022    Assessment/Plan:   1  Polycythemia vera ([facility] )  Jak2 positive  This is a 50-year-old male with past medical history of polycythemia vera  Patient continues on hydroxyurea with good tolerance and great response  Patient has sufficient refills  Patient will go for blood work every 3 months and will follow-up with me in 6  He understands to contact the office if he has any issues in the meantime  Regimen:  Hydrea 500 mg PO daily  81 mg aspirin p o  daily    - CBC and differential; Future  - CBC and differential; Future  - Comprehensive metabolic panel; Future  - Comprehensive metabolic panel; Future    2  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  3  Hx of iron deficiency anemia  Patient has history of GI bleeding, hemorrhoids  Patient underwent colonoscopy that did not demonstrate any signs of abnormalities beyond hemorrhoids  Patient will continue to observe  The patient is scheduled for follow-up in approximately 6 months with   [facility] [place]  Patient voiced agreement and understanding to the above  Patient knows to call the Hematology/Oncology office with any questions and concerns regarding the above  Goals and Barriers:    Current Goal:   Prolong Survival from Cancer  Barriers: None  Patient's Capacity to Self Care:  Patient able to self care  Laura Kellogg PA-C  Medical Oncology/Hematology  [address]  ______________________________________________________________________________________________________________    Subjective     Chief Complaint   Patient presents with   • Follow-up     Polycythemia vera ([facility] )       History of present illness/Cancer History:    This is a 50-year-old male who was admitted to the emergency room after having several weeks of shortness of breath, three episodes of syncope at home in January 2021   Patient's past medical history significant for 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      Patient had been previously worked up for abnormalities by primary care doctor who recommended that the patient follow-up with GI as well as with Cardiology for a stress test   Unfortunately, patient's symptoms progressed and he presented to the emergency room  3 [employer] Neurostim work demonstrated hemoglobin in the 7 gram/deciliter range   Moreover, the patient was found to have a significant iron deficiency with a ferritin of three and an iron saturation of 1%   Patient underwent three Venofer infusions daily in the hospital   Patient's hemoglobin is in the 7 gram/deciliter range        GI work up in 1/2021-2/2021:  GI has seen the patient in the hospital  Melissa Colin underwent colonoscopy in EGD that demonstrated questionable AVM in the small intestine   This area was cauterized   This area was not bleeding   Capsule demonstrated angiectasis and follow up EGD- is planned on 2/26/2021   This procedure did not demonstrate any signs of active bleeding      Patient completed the remainder of Venofer treatments started in the hospital in February 2021 03/23/21:    Patient notes overall feeling well  3 Nordic Neurostim work was reviewed   Patient's iron saturation increased to 7% and ferritin at 41   Hemoglobin has completely supplemented however, MCV is still low   Patient's platelet count is still elevated however this may be secondary to splenectomy and not necessarily to iron deficiency    Patient has completed COVID vaccinations      3/30/21:   Ferritin= 23, iron saturation = seven, TIBC 430,  Hemoglobin = 12 4, MCV 80,  Platelet count = 313,  PSA = 3 4     4/9-5/7/21:  5 Venofer 300 mg IV weekly      05/03/21:  Urgent follow up; [facility] on 5/1/2021, ED visit, hemoglobin stable   No bleeding today  1 more IV iron treatment to go   Iron studies requestion on labs from 5/1/21:iron sat supratheraputic       5/12/21:  Colonoscopy was negative for signs of active bleeding   Hemorrhoidal bleeding to blame for bright red blood per rectum   Scope was advanced past the descending colon due to patient's   CT colonoscopy diagnostic without contrast did not demonstrate any lesions      06/03/21:  Patient is feeling better   No more GI bleeding   Hemoglobin = 15 1, platelet count 522 oral seven, MCV = 86, ferritin = 452, iron saturation = 30%, TIBC = 336     8/27/21:    Labs taken every four weeks demonstrate stability   No GI bleeding   Patient feels well   Hemoglobin = 15 1, platelet count = 292, MCV = 93,  Iron saturation = 33, TIBC 324,  Ferritin = 155     11/1/2021: Feels well   1- 3 day episode of Melena + Hematochezia   Hemoglobin = 17 1, platelet count = 401, MCV = 96,  Iron saturation = 38, TIBC 344,  Ferritin = 149     2/8/2022:  WBC = 8 4, hemoglobin = 15 5, hematocrit = 49 6, platelet count = 012,  iron saturation = 34%, TIBC 364     02/10/22:  Patient reports having significant blood in his stool including dark, melena as well as hematochezia   Patient has not made an appointment with GI doctor   Discussed decreasing ferritin levels     5/6/22 WBC = 8 56, hemoglobin = 17 1, MCV = 92, platelet count = 013, XFXOCWDY = 65     5/13/2022:  Erythropoietin = 9 0, +JAK2 [id number] Mutation, other studies did not reflex,  WBC = 14, hemoglobin = 17 7, hematocrit = 52, platelet count = 101     5/31/22 PSA = 0 7, CMP within normal limits with the exception of a mildly elevated potassium at 5 4, labs ordered by primary physician      6/21/2021:Symptom Assessment Tool states that fatigue = 7, early satiety = 6, abdominal discomfort 6, inactivity = 5, problems with concentration 0, night sweats 9, itching 10, no answer for bone pain, no fever 0 and no unintentional weight loss in last six months      6/24/2022: 1 phlebotomy, pre Hemoglobin = 16 8, hct=49 5     7/19/2022: WBC=11, hemoglobin =16 5, hct = 49 2, boh=399; Started Hydrea  Phlebotomies stopped due to headache- worsening       2022:  HCT = 48 1, Hemoglobin =16 8, plt 350    2022 hemoglobin = 15 1, hematocrit 42 2, plt =392    Lab Results   Component Value Date    WBC 5 4 2022    HGB 15 1 2022    HCT 42 2 2022     (H) 2022     2022     Lab Results   Component Value Date    SODIUM 140 2022    K 4 5 2022     2022    CO2 25 2022    AGAP 8 2022    BUN 9 2022    CREATININE 1 05 2022    GLUC 109 (H) 2022    GLUF 91 2021    CALCIUM 8 6 2022    AST 26 2022    ALT 35 2022    ALKPHOS 112 2022    TP 6 9 2022    TBILI 0 5 2022    EGFR 82 2022     Interval history:  Feeling well, foot improving, hit in the head by box, no LOC at work  Otherwise tolerating med ok  Colon in 10/2022 OK, follow up 5 years     ECO - Asymptomatic    Review of Systems   Constitutional: Positive for fatigue  Negative for activity change, appetite change and fever  HENT: Negative for nosebleeds  Respiratory: Negative for cough, choking and shortness of breath  Cardiovascular: Negative for chest pain, palpitations and leg swelling  Gastrointestinal: Negative for abdominal distention, abdominal pain, anal bleeding, blood in stool, constipation, diarrhea, nausea and vomiting  Endocrine: Negative for cold intolerance  Genitourinary: Negative for hematuria  Musculoskeletal: Negative for myalgias  Skin: Negative for color change, pallor and rash  Allergic/Immunologic: Negative for immunocompromised state  Neurological: Negative for headaches  Hematological: Negative for adenopathy  Does not bruise/bleed easily  All other systems reviewed and are negative        Current Outpatient Medications:   •  acetaminophen (TYLENOL) 325 mg tablet, Take 2 tablets (650 mg total) by mouth every 6 (six) hours as needed for mild pain, headaches or fever, Disp: 30 tablet, Rfl: 0  •  ASPIRIN 81 PO, Take by mouth every morning, Disp: , Rfl:   •  atorvastatin (LIPITOR) 20 mg tablet, Take 1 tablet (20 mg total) by mouth daily, Disp: 90 tablet, Rfl: 1  •  famotidine (PEPCID) 40 MG tablet, Take 1 tablet (40 mg total) by mouth daily, Disp: 30 tablet, Rfl: 3  •  hydroxyurea (HYDREA) 500 mg capsule, Take 1 capsule (500 mg total) by mouth every evening, Disp: 30 capsule, Rfl: 2  •  losartan (COZAAR) 50 mg tablet, TAKE 1 TABLET BY MOUTH EVERY DAY (Patient taking differently: 50 mg every morning), Disp: 90 tablet, Rfl: 1  •  verapamil (CALAN-SR) 120 mg CR tablet, TAKE 1 TABLET BY MOUTH EVERY DAY (Patient taking differently: 120 mg every morning), Disp: 90 tablet, Rfl: 1  No Known Allergies    Advance Directive and Living Will:            Objective   /78 (BP Location: Right arm, Patient Position: Sitting, Cuff Size: Large)   Pulse 85   Temp 97 8 °F (36 6 °C)   Resp 20   Ht 6' (1 829 m)   Wt 96 9 kg (213 lb 9 6 oz)   SpO2 100%   BMI 28 97 kg/m²   Wt Readings from Last 6 Encounters:   12/20/22 96 9 kg (213 lb 9 6 oz)   11/04/22 94 6 kg (208 lb 9 6 oz)   09/20/22 96 6 kg (213 lb)   09/09/22 94 8 kg (209 lb)   09/01/22 94 8 kg (209 lb)   08/30/22 94 8 kg (209 lb)       Physical Exam  Constitutional:       General: He is not in acute distress  Appearance: He is well-developed  HENT:      Head: Normocephalic and atraumatic  Mouth/Throat:      Pharynx: No oropharyngeal exudate  Eyes:      General: No scleral icterus  Conjunctiva/sclera: Conjunctivae normal       Pupils: Pupils are equal, round, and reactive to light  Cardiovascular:      Rate and Rhythm: Normal rate and regular rhythm  Heart sounds: No murmur heard  Pulmonary:      Effort: Pulmonary effort is normal  No respiratory distress  Breath sounds: Normal breath sounds     Abdominal:      General: Bowel sounds are normal  There is no distension  Palpations: Abdomen is soft  Tenderness: There is no abdominal tenderness  Musculoskeletal:         General: No tenderness  Cervical back: Normal range of motion  Lymphadenopathy:      Cervical: No cervical adenopathy  Skin:     Coloration: Skin is not pale  Findings: No erythema or rash  Neurological:      Mental Status: He is alert and oriented to person, place, and time  Cranial Nerves: No cranial nerve deficit           Pertinent Laboratory Results and Imaging Review:  Orders Only on 12/16/2022   Component Date Value Ref Range Status   • White Blood Cell Count 12/16/2022 5 4  3 4 - 10 8 x10E3/uL Final   • Red Blood Cell Count 12/16/2022 4 16  4 14 - 5 80 x10E6/uL Final   • Hemoglobin 12/16/2022 15 1  13 0 - 17 7 g/dL Final   • HCT 12/16/2022 42 2  37 5 - 51 0 % Final   • MCV 12/16/2022 101 (H)  79 - 97 fL Final   • MCH 12/16/2022 36 3 (H)  26 6 - 33 0 pg Final   • MCHC 12/16/2022 35 8 (H)  31 5 - 35 7 g/dL Final   • RDW 12/16/2022 15 0  11 6 - 15 4 % Final   • Platelet Count 97/56/5909 392  150 - 450 x10E3/uL Final   • Neutrophils 12/16/2022 33  Not Estab  % Final   • Lymphocytes 12/16/2022 48  Not Estab  % Final   • Monocytes 12/16/2022 13  Not Estab  % Final   • Eosinophils 12/16/2022 5  Not Estab  % Final   • Basophils PCT 12/16/2022 1  Not Estab  % Final   • Neutrophils (Absolute) 12/16/2022 1 8  1 4 - 7 0 x10E3/uL Final   • Lymphocytes (Absolute) 12/16/2022 2 6  0 7 - 3 1 x10E3/uL Final   • Monocytes (Absolute) 12/16/2022 0 7  0 1 - 0 9 x10E3/uL Final   • Eosinophils (Absolute) 12/16/2022 0 2  0 0 - 0 4 x10E3/uL Final   • Basophils ABS 12/16/2022 0 1  0 0 - 0 2 x10E3/uL Final   • Immature Granulocytes 12/16/2022 0  Not Estab  % Final   • Immature Granulocytes (Absolute) 12/16/2022 0 0  0 0 - 0 1 x10E3/uL Final   • Glucose, Random 12/16/2022 109 (H)  70 - 99 mg/dL Final   • BUN 12/16/2022 9  6 - 24 mg/dL Final   • Creatinine 12/16/2022 1 05  0 76 - 1 27 mg/dL Final   • eGFR 12/16/2022 82  >59 mL/min/1 73 Final   • SL AMB BUN/CREATININE RATIO 12/16/2022 9  9 - 20 Final   • Sodium 12/16/2022 140  134 - 144 mmol/L Final   • Potassium 12/16/2022 4 5  3 5 - 5 2 mmol/L Final   • Chloride 12/16/2022 102  96 - 106 mmol/L Final   • CO2 12/16/2022 25  20 - 29 mmol/L Final   • CALCIUM 12/16/2022 9 3  8 7 - 10 2 mg/dL Final   • Protein, Total 12/16/2022 6 9  6 0 - 8 5 g/dL Final   • Albumin 12/16/2022 4 2  3 8 - 4 9 g/dL Final   • Globulin, Total 12/16/2022 2 7  1 5 - 4 5 g/dL Final   • Albumin/Globulin Ratio 12/16/2022 1 6  1 2 - 2 2 Final   • TOTAL BILIRUBIN 12/16/2022 0 5  0 0 - 1 2 mg/dL Final   • Alk Phos Isoenzymes 12/16/2022 102  44 - 121 IU/L Final   • AST 12/16/2022 26  0 - 40 IU/L Final   • ALT 12/16/2022 35  0 - 44 IU/L Final   Orders Only on 11/21/2022   Component Date Value Ref Range Status   • White Blood Cell Count 11/21/2022 6 3  3 4 - 10 8 x10E3/uL Final   • Red Blood Cell Count 11/21/2022 4 76  4 14 - 5 80 x10E6/uL Final   • Hemoglobin 11/21/2022 16 6  13 0 - 17 7 g/dL Final   • HCT 11/21/2022 47 3  37 5 - 51 0 % Final   • MCV 11/21/2022 99 (H)  79 - 97 fL Final   • MCH 11/21/2022 34 9 (H)  26 6 - 33 0 pg Final   • MCHC 11/21/2022 35 1  31 5 - 35 7 g/dL Final   • RDW 11/21/2022 15 9 (H)  11 6 - 15 4 % Final   • Platelet Count 15/30/6322 338  150 - 450 x10E3/uL Final   • Neutrophils 11/21/2022 35  Not Estab  % Final   • Lymphocytes 11/21/2022 48  Not Estab  % Final   • Monocytes 11/21/2022 12  Not Estab  % Final   • Eosinophils 11/21/2022 4  Not Estab  % Final   • Basophils PCT 11/21/2022 1  Not Estab  % Final   • Neutrophils (Absolute) 11/21/2022 2 2  1 4 - 7 0 x10E3/uL Final   • Lymphocytes (Absolute) 11/21/2022 3 0  0 7 - 3 1 x10E3/uL Final   • Monocytes (Absolute) 11/21/2022 0 8  0 1 - 0 9 x10E3/uL Final   • Eosinophils (Absolute) 11/21/2022 0 2  0 0 - 0 4 x10E3/uL Final   • Basophils ABS 11/21/2022 0 1  0 0 - 0 2 x10E3/uL Final   • Immature Granulocytes 11/21/2022 0  Not Estab  % Final   • Immature Granulocytes (Absolute) 11/21/2022 0 0  0 0 - 0 1 x10E3/uL Final   • Glucose, Random 11/21/2022 110 (H)  70 - 99 mg/dL Final   • BUN 11/21/2022 9  6 - 24 mg/dL Final   • Creatinine 11/21/2022 1 16  0 76 - 1 27 mg/dL Final   • eGFR 11/21/2022 73  >59 mL/min/1 73 Final   • SL AMB BUN/CREATININE RATIO 11/21/2022 8 (L)  9 - 20 Final   • Sodium 11/21/2022 138  134 - 144 mmol/L Final   • Potassium 11/21/2022 4 5  3 5 - 5 2 mmol/L Final   • Chloride 11/21/2022 101  96 - 106 mmol/L Final   • CO2 11/21/2022 24  20 - 29 mmol/L Final   • CALCIUM 11/21/2022 9 6  8 7 - 10 2 mg/dL Final   • Protein, Total 11/21/2022 7 1  6 0 - 8 5 g/dL Final   • Albumin 11/21/2022 4 5  3 8 - 4 9 g/dL Final   • Globulin, Total 11/21/2022 2 6  1 5 - 4 5 g/dL Final   • Albumin/Globulin Ratio 11/21/2022 1 7  1 2 - 2 2 Final   • TOTAL BILIRUBIN 11/21/2022 0 4  0 0 - 1 2 mg/dL Final   • Alk Phos Isoenzymes 11/21/2022 125 (H)  44 - 121 IU/L Final   • AST 11/21/2022 24  0 - 40 IU/L Final   • ALT 11/21/2022 29  0 - 44 IU/L Final   Orders Only on 10/21/2022   Component Date Value Ref Range Status   • White Blood Cell Count 10/21/2022 5 5  3 4 - 10 8 x10E3/uL Final   • Red Blood Cell Count 10/21/2022 4 85  4 14 - 5 80 x10E6/uL Final   • Hemoglobin 10/21/2022 16 8  13 0 - 17 7 g/dL Final   • HCT 10/21/2022 47 3  37 5 - 51 0 % Final   • MCV 10/21/2022 98 (H)  79 - 97 fL Final   • MCH 10/21/2022 34 6 (H)  26 6 - 33 0 pg Final   • MCHC 10/21/2022 35 5  31 5 - 35 7 g/dL Final   • RDW 10/21/2022 17 7 (H)  11 6 - 15 4 % Final   • Platelet Count 42/71/7069 304  150 - 450 x10E3/uL Final   • Neutrophils 10/21/2022 30  Not Estab  % Final   • Lymphocytes 10/21/2022 48  Not Estab  % Final   • Monocytes 10/21/2022 15  Not Estab  % Final   • Eosinophils 10/21/2022 5  Not Estab  % Final   • Basophils PCT 10/21/2022 2  Not Estab  % Final   • Neutrophils (Absolute) 10/21/2022 1 7  1 4 - 7 0 x10E3/uL Final   • Lymphocytes (Absolute) 10/21/2022 2 6  0 7 - 3 1 x10E3/uL Final   • Monocytes (Absolute) 10/21/2022 0 8  0 1 - 0 9 x10E3/uL Final   • Eosinophils (Absolute) 10/21/2022 0 3  0 0 - 0 4 x10E3/uL Final   • Basophils ABS 10/21/2022 0 1  0 0 - 0 2 x10E3/uL Final   • Immature Granulocytes 10/21/2022 0  Not Estab  % Final   • Immature Granulocytes (Absolute) 10/21/2022 0 0  0 0 - 0 1 x10E3/uL Final   • Glucose, Random 10/21/2022 92  70 - 99 mg/dL Final   • BUN 10/21/2022 10  6 - 24 mg/dL Final   • Creatinine 10/21/2022 1 12  0 76 - 1 27 mg/dL Final   • eGFR 10/21/2022 76  >59 mL/min/1 73 Final   • SL AMB BUN/CREATININE RATIO 10/21/2022 9  9 - 20 Final   • Sodium 10/21/2022 140  134 - 144 mmol/L Final   • Potassium 10/21/2022 4 8  3 5 - 5 2 mmol/L Final   • Chloride 10/21/2022 104  96 - 106 mmol/L Final   • CO2 10/21/2022 23  20 - 29 mmol/L Final   • CALCIUM 10/21/2022 9 6  8 7 - 10 2 mg/dL Final   • Protein, Total 10/21/2022 6 7  6 0 - 8 5 g/dL Final   • Albumin 10/21/2022 4 6  3 8 - 4 9 g/dL Final   • Globulin, Total 10/21/2022 2 1  1 5 - 4 5 g/dL Final   • Albumin/Globulin Ratio 10/21/2022 2 2  1 2 - 2 2 Final   • TOTAL BILIRUBIN 10/21/2022 0 3  0 0 - 1 2 mg/dL Final   • Alk Phos Isoenzymes 10/21/2022 106  44 - 121 IU/L Final   • AST 10/21/2022 24  0 - 40 IU/L Final   • ALT 10/21/2022 37  0 - 44 IU/L Final       The following historical data was reviewed:  Past Medical History:   Diagnosis Date   • Anemia    • Cancer (HCC)     prostate   • Colon polyp    • Elevated PSA    • Full dentures    • GERD (gastroesophageal reflux disease)    • Hemorrhoid    • Hyperlipidemia    • Leukemia ([address] )     oral chemotherapy   • Polycythemia vera ([facility] )    • Sleep apnea     No CPAP   • Smoker    • TIA (transient ischemic attack)    • Transfusion history    • Wears glasses      Past Surgical History:   Procedure Laterality Date   • ABDOMINAL ADHESION SURGERY N/A 11/17/2021    Procedure: LYSIS ADHESIONS LAPAROSCOPIC W ROBOT;  Surgeon: Dinesh Perry, MD;  Location: [facility];  Service: Urology   • APPENDECTOMY     • COLONOSCOPY     • EGD     • FOOT SURGERY Right     bone removed-left foot-removal of blood clots from an injury   • OTHER SURGICAL HISTORY      bone removal right arm-abnormal growth of surface bone   • PROSTATE BIOPSY     • PROSTATECTOMY N/A 2021    Procedure: ROBOTIC ASSISTED LAPAROSCOPIC SIMPLE PROSTATECTOMY AND BLADDER NECK RECONSTRUCTION;  Surgeon: My Horta MD;  Location: [facility];  Service: Urology   • SPLENECTOMY      ruptured     Social History     Socioeconomic History   • Marital status:      Spouse name: None   • Number of children: None   • Years of education: None   • Highest education level: None   Occupational History   • Occupation: supervisor     Employer: [employer]   Tobacco Use   • Smoking status: Former     Packs/day: 0 50     Years: 10 00     Pack years: 5 00     Types: Cigarettes     Quit date: 2021     Years since quittin 8   • Smokeless tobacco: Never   Vaping Use   • Vaping Use: Never used   Substance and Sexual Activity   • Alcohol use: Not Currently   • Drug use: No   • Sexual activity: None   Other Topics Concern   • None   Social History Narrative   • None     Social Determinants of Health     Financial Resource Strain: Not on file   Food Insecurity: Not on file   Transportation Needs: Not on file   Physical Activity: Not on file   Stress: Not on file   Social Connections: Not on file   Intimate Partner Violence: Not on file   Housing Stability: Not on file     Family History   Problem Relation Age of Onset   • Dementia Father    • Heart disease Mother    • Hypertension Mother    • Hyperlipidemia Mother    • Hypertension Sister    • Hypertension Brother    • No Known Problems Daughter    • No Known Problems Son    • Cancer Paternal Grandfather         prostate   • No Known Problems Son        Please note: This report has been generated by a voice recognition software system   Therefore there may be syntax, spelling, and/or grammatical errors  Please call if you have any questions

## 2023-01-05 NOTE — TELEPHONE ENCOUNTER
----- Message from Jaret Hernandez MD sent at 5/14/2021 12:58 PM EDT -----  Please inform the patient that the CT colonography did not show any large polyps, would recommend repeat colonoscopy in 1 year  I would recommend colonoscopy with Dr Janell Roach  Thanks  Additionally, he has enlarged prostate, would recommend follow-up with urology as well, I believe he sees them already  Additionally he has some fatty changes within the liver- would recommend to work on healthy weight loss, his liver functions were normal on the most recent blood work  Recall for colonoscopy in 1 year  Detail Level: Detailed Quality 110: Preventive Care And Screening: Influenza Immunization: Influenza Immunization Administered during Influenza season Quality 111:Pneumonia Vaccination Status For Older Adults: Pneumococcal vaccine (PPSV23) administered on or after patient’s 60th birthday and before the end of the measurement period

## 2023-01-06 DIAGNOSIS — R51.9 HEADACHE: ICD-10-CM

## 2023-01-25 ENCOUNTER — OFFICE VISIT (OUTPATIENT)
Dept: CARDIOLOGY CLINIC | Facility: CLINIC | Age: 58
End: 2023-01-25

## 2023-01-25 VITALS
HEART RATE: 80 BPM | SYSTOLIC BLOOD PRESSURE: 122 MMHG | BODY MASS INDEX: 28.17 KG/M2 | TEMPERATURE: 97.9 F | DIASTOLIC BLOOD PRESSURE: 94 MMHG | WEIGHT: 208 LBS | OXYGEN SATURATION: 99 % | HEIGHT: 72 IN

## 2023-01-25 DIAGNOSIS — I10 ESSENTIAL HYPERTENSION: Primary | ICD-10-CM

## 2023-01-25 DIAGNOSIS — R55 SYNCOPE, NEAR: ICD-10-CM

## 2023-01-25 DIAGNOSIS — I71.20 THORACIC AORTIC ANEURYSM WITHOUT RUPTURE, UNSPECIFIED PART: ICD-10-CM

## 2023-01-25 DIAGNOSIS — R06.02 SHORTNESS OF BREATH: ICD-10-CM

## 2023-01-25 DIAGNOSIS — E78.2 MIXED HYPERLIPIDEMIA: ICD-10-CM

## 2023-01-25 NOTE — PROGRESS NOTES
Cardiology   [place] DO Aixa, Shon Duffy MD, Rivas Hamilton MD, Alycia Chester MD, [facility] [place]  -------------------------------------------------------------------  [facility]  [address], One [facility],[address], [address] [place] [address], [address] [place] [address] Avenue  [phone]    Cardiology Follow Up  Miguel Angel Nicolas  1965  [id number]          Assessment/Plan:    1  Essential hypertension    2  Thoracic aortic aneurysm without rupture, unspecified part    3  Syncope, near    4  Shortness of breath    5  Mixed hyperlipidemia      - patient with ongoing chest pain and shortness of breath with exertion  He had stress test in 2021 which was negative  He had CT scan last year which was negative for pulmonary embolism or intrathoracic pathology  We will repeat stress test given ongoing exertional symptoms     - Repeat chest CT in August 2023 for follow-up of thoracic aortic aneurysm  - continue aspirin   -Lipid panel ordered to be done with his next blood work  Interval History:     Miguel Angel Nicolas is 61 y o  male here for follow up of hypertension and dyslipidemia  He has intermittent episodes of chest pain and shortness of breath with exertion  He denies and LE edema, orthopnea or PND  He had chest pain last visit as well  A CT chest was ordered to rule out PE as he has a history of polycythemia vera  CT was negative for PE but he was noted to have a 41 mm fusiform ectasia of the ascending thoracic aorta  His previous diameter was 39 mm  Repeat CT ordered for August     The patient has a history of syncope  He underwent cardiac workup in 2021 including echocardiogram, stress test and 48 hour Holter monitor  Cardiac workup was negative at that time         The following portions of the patient's history were reviewed and updated as appropriate: allergies, current medications, past family history, past medical history, past social history, past surgical history, and problem list        Current Outpatient Medications:   •  acetaminophen (TYLENOL) 325 mg tablet, Take 2 tablets (650 mg total) by mouth every 6 (six) hours as needed for mild pain, headaches or fever, Disp: 30 tablet, Rfl: 0  •  ASPIRIN 81 PO, Take by mouth every morning, Disp: , Rfl:   •  atorvastatin (LIPITOR) 20 mg tablet, Take 1 tablet (20 mg total) by mouth daily, Disp: 90 tablet, Rfl: 1  •  famotidine (PEPCID) 40 MG tablet, Take 1 tablet (40 mg total) by mouth daily, Disp: 30 tablet, Rfl: 3  •  hydroxyurea (HYDREA) 500 mg capsule, Take 1 capsule (500 mg total) by mouth every evening, Disp: 30 capsule, Rfl: 2  •  losartan (COZAAR) 50 mg tablet, TAKE 1 TABLET BY MOUTH EVERY DAY (Patient taking differently: 50 mg every morning), Disp: 90 tablet, Rfl: 1  •  verapamil (CALAN-SR) 120 mg CR tablet, TAKE 1 TABLET BY MOUTH EVERY DAY, Disp: 90 tablet, Rfl: 1        Review of Systems:  Review of Systems   Respiratory: Positive for shortness of breath  Cardiovascular: Positive for chest pain  Negative for palpitations and leg swelling  Musculoskeletal: Positive for arthralgias  All other systems reviewed and are negative  Physical Exam:  Vitals:  Vitals:    01/25/23 0916   BP: 122/94   BP Location: Left arm   Patient Position: Sitting   Cuff Size: Standard   Pulse: 80   Temp: 97 9 °F (36 6 °C)   SpO2: 99%   Weight: 94 3 kg (208 lb)   Height: 6' (1 829 m)     Physical Exam   Constitutional: He appears healthy  No distress  Eyes: Pupils are equal, round, and reactive to light  Conjunctivae are normal    Neck: No JVD present  Cardiovascular: Normal rate, regular rhythm and normal heart sounds  Exam reveals no gallop and no friction rub  No murmur heard  Pulmonary/Chest: Effort normal and breath sounds normal  He has no wheezes  He has no rales  Musculoskeletal:         General: No tenderness, deformity or edema  Cervical back: Normal range of motion and neck supple     Neurological: He is alert and oriented to person, place, and time  Skin: Skin is warm and dry  Cardiographics:  EKG: Personally reviewed Normal ECG  Last known EF: 55%    This note was completed in part utilizing M-Modal Fluency Direct Software  Grammatical errors, random word insertions, spelling mistakes, and incomplete sentences can be an occasional consequence of this system secondary to software limitations, ambient noise, and hardware issues  If you have any questions or concerns about the content, text, or information contained within the body of this dictation, please contact the provider for clarification

## 2023-02-04 DIAGNOSIS — D45 POLYCYTHEMIA VERA (HCC): ICD-10-CM

## 2023-02-06 RX ORDER — HYDROXYUREA 500 MG/1
500 CAPSULE ORAL EVERY EVENING
Qty: 30 CAPSULE | Refills: 3 | Status: SHIPPED | OUTPATIENT
Start: 2023-02-06

## 2023-02-07 ENCOUNTER — HOSPITAL ENCOUNTER (OUTPATIENT)
Dept: RADIOLOGY | Facility: HOSPITAL | Age: 58
Discharge: HOME | End: 2023-02-07
Attending: INTERNAL MEDICINE

## 2023-02-07 ENCOUNTER — HOSPITAL ENCOUNTER (OUTPATIENT)
Dept: NON INVASIVE DIAGNOSTICS | Facility: HOSPITAL | Age: 58
Discharge: HOME | End: 2023-02-07
Attending: INTERNAL MEDICINE

## 2023-02-07 DIAGNOSIS — I10 ESSENTIAL HYPERTENSION: ICD-10-CM

## 2023-02-07 DIAGNOSIS — R06.02 SHORTNESS OF BREATH: ICD-10-CM

## 2023-02-07 LAB
ALBUMIN SERPL-MCNC: 4.2 G/DL (ref 3.8–4.9)
ALBUMIN/GLOB SERPL: 1.6 {RATIO} (ref 1.2–2.2)
ALP SERPL-CCNC: 107 IU/L (ref 44–121)
ALT SERPL-CCNC: 40 IU/L (ref 0–44)
AST SERPL-CCNC: 29 IU/L (ref 0–40)
BASOPHILS # BLD AUTO: 0.1 X10E3/UL (ref 0–0.2)
BASOPHILS NFR BLD AUTO: 1 %
BILIRUB SERPL-MCNC: 0.3 MG/DL (ref 0–1.2)
BUN SERPL-MCNC: 13 MG/DL (ref 6–24)
BUN/CREAT SERPL: 12 (ref 9–20)
CALCIUM SERPL-MCNC: 9.2 MG/DL (ref 8.7–10.2)
CHEST PAIN STATEMENT: NORMAL
CHLORIDE SERPL-SCNC: 103 MMOL/L (ref 96–106)
CHOLEST SERPL-MCNC: 141 MG/DL (ref 100–199)
CO2 SERPL-SCNC: 20 MMOL/L (ref 20–29)
CREAT SERPL-MCNC: 1.13 MG/DL (ref 0.76–1.27)
EGFR: 75 ML/MIN/1.73
EOSINOPHIL # BLD AUTO: 0.3 X10E3/UL (ref 0–0.4)
EOSINOPHIL NFR BLD AUTO: 5 %
ERYTHROCYTE [DISTWIDTH] IN BLOOD BY AUTOMATED COUNT: 13.9 % (ref 11.6–15.4)
GLOBULIN SER-MCNC: 2.6 G/DL (ref 1.5–4.5)
GLUCOSE SERPL-MCNC: 98 MG/DL (ref 70–99)
HCT VFR BLD AUTO: 44 % (ref 37.5–51)
HDLC SERPL-MCNC: 35 MG/DL
HGB BLD-MCNC: 15.7 G/DL (ref 13–17.7)
IMM GRANULOCYTES # BLD: 0 X10E3/UL (ref 0–0.1)
IMM GRANULOCYTES NFR BLD: 0 %
LDLC SERPL CALC-MCNC: 72 MG/DL (ref 0–99)
LYMPHOCYTES # BLD AUTO: 2.9 X10E3/UL (ref 0.7–3.1)
LYMPHOCYTES NFR BLD AUTO: 52 %
MAX DIASTOLIC BP: 84 MMHG
MAX HEART RATE: 102 BPM
MAX HR PERCENT: 62 %
MAX HR: 102 BPM
MAX PREDICTED HEART RATE: 162 BPM
MAX. SYSTOLIC BP: 126 MMHG
MCH RBC QN AUTO: 37.2 PG (ref 26.6–33)
MCHC RBC AUTO-ENTMCNC: 35.7 G/DL (ref 31.5–35.7)
MCV RBC AUTO: 104 FL (ref 79–97)
MONOCYTES # BLD AUTO: 0.9 X10E3/UL (ref 0.1–0.9)
MONOCYTES NFR BLD AUTO: 15 %
NEUTROPHILS # BLD AUTO: 1.6 X10E3/UL (ref 1.4–7)
NEUTROPHILS NFR BLD AUTO: 27 %
PLATELET # BLD AUTO: 288 X10E3/UL (ref 150–450)
POTASSIUM SERPL-SCNC: 5 MMOL/L (ref 3.5–5.2)
PROT SERPL-MCNC: 6.8 G/DL (ref 6–8.5)
PROTOCOL NAME: NORMAL
RATE PRESSURE PRODUCT: NORMAL
RBC # BLD AUTO: 4.22 X10E6/UL (ref 4.14–5.8)
REASON FOR TERMINATION: NORMAL
SL AMB VLDL CHOLESTEROL CALC: 34 MG/DL (ref 5–40)
SL CV REST NUCLEAR ISOTOPE DOSE: 11 MCI
SL CV STRESS NUCLEAR ISOTOPE DOSE: 32.4 MCI
SL CV STRESS RECOVERY BP: NORMAL MMHG
SL CV STRESS RECOVERY HR: 86 BPM
SL CV STRESS RECOVERY O2 SAT: 100 %
SODIUM SERPL-SCNC: 140 MMOL/L (ref 134–144)
STRESS ANGINA INDEX: 0
STRESS BASELINE BP: NORMAL MMHG
STRESS BASELINE HR: 71 BPM
STRESS DUKE TREADMILL SCORE: 3
STRESS O2 SAT REST: 100 %
STRESS PEAK HR: 102 BPM
STRESS POST ESTIMATED WORKLOAD: 1.6 METS
STRESS POST EXERCISE DUR MIN: 3 MIN
STRESS POST O2 SAT PEAK: 99 %
STRESS POST PEAK BP: 120 MMHG
STRESS ST DEPRESSION: 0 MM
TARGET HR FORMULA: NORMAL
TEST INDICATION: NORMAL
TIME IN EXERCISE PHASE: NORMAL
TRIGL SERPL-MCNC: 202 MG/DL (ref 0–149)
WBC # BLD AUTO: 5.7 X10E3/UL (ref 3.4–10.8)

## 2023-02-07 RX ADMIN — REGADENOSON 0.4 MG: 0.08 INJECTION, SOLUTION INTRAVENOUS at 09:41

## 2023-02-09 ENCOUNTER — TELEPHONE (OUTPATIENT)
Dept: CARDIOLOGY CLINIC | Facility: CLINIC | Age: 58
End: 2023-02-09

## 2023-02-09 NOTE — TELEPHONE ENCOUNTER
----- Message from Drew Acuña DO sent at 2/9/2023 11:35 AM EST -----  Can you please let the patient know stress test was normal

## 2023-02-20 DIAGNOSIS — K21.9 GASTROESOPHAGEAL REFLUX DISEASE WITHOUT ESOPHAGITIS: ICD-10-CM

## 2023-02-20 RX ORDER — FAMOTIDINE 40 MG/1
TABLET, FILM COATED ORAL
Qty: 90 TABLET | Refills: 1 | Status: SHIPPED | OUTPATIENT
Start: 2023-02-20

## 2023-04-01 DIAGNOSIS — I10 ESSENTIAL HYPERTENSION: ICD-10-CM

## 2023-04-01 RX ORDER — LOSARTAN POTASSIUM 50 MG/1
TABLET ORAL
Qty: 90 TABLET | Refills: 0 | Status: SHIPPED | OUTPATIENT
Start: 2023-04-01

## 2023-05-12 DIAGNOSIS — D45 POLYCYTHEMIA VERA (HCC): ICD-10-CM

## 2023-05-12 RX ORDER — HYDROXYUREA 500 MG/1
500 CAPSULE ORAL EVERY EVENING
Qty: 90 CAPSULE | Refills: 1 | Status: SHIPPED | OUTPATIENT
Start: 2023-05-12

## 2023-06-26 ENCOUNTER — TELEPHONE (OUTPATIENT)
Dept: HEMATOLOGY ONCOLOGY | Facility: CLINIC | Age: 58
End: 2023-06-26

## 2023-06-26 NOTE — TELEPHONE ENCOUNTER
Left message for patient to have labs completed prior to appointment with Amelia Burciaga on 6/27/23

## 2023-06-27 ENCOUNTER — OFFICE VISIT (OUTPATIENT)
Dept: HEMATOLOGY ONCOLOGY | Facility: CLINIC | Age: 58
End: 2023-06-27
Payer: COMMERCIAL

## 2023-06-27 VITALS
RESPIRATION RATE: 17 BRPM | BODY MASS INDEX: 28.85 KG/M2 | HEIGHT: 72 IN | OXYGEN SATURATION: 97 % | DIASTOLIC BLOOD PRESSURE: 66 MMHG | SYSTOLIC BLOOD PRESSURE: 98 MMHG | HEART RATE: 96 BPM | TEMPERATURE: 98.3 F | WEIGHT: 213 LBS

## 2023-06-27 DIAGNOSIS — Z90.81 S/P SPLENECTOMY: ICD-10-CM

## 2023-06-27 DIAGNOSIS — D45 POLYCYTHEMIA VERA (HCC): Primary | ICD-10-CM

## 2023-06-27 DIAGNOSIS — R21 SKIN RASH: ICD-10-CM

## 2023-06-27 PROBLEM — C95.90 LEUKEMIA (HCC): Status: RESOLVED | Noted: 2022-10-28 | Resolved: 2023-06-27

## 2023-06-27 PROCEDURE — 99214 OFFICE O/P EST MOD 30 MIN: CPT | Performed by: PHYSICIAN ASSISTANT

## 2023-06-27 NOTE — PROGRESS NOTES
[address] [place] [id number]  Oncology Progress Note  Vikki Granados, 1965, [id number]  6/27/2023        Assessment/Plan:   1  Polycythemia vera ([address] )  Jak2 positive    This is a 50-year-old male with past medical history of polycythemia vera  He continues on Hydrea with good tolerance  Patient unfortunately has not gone for blood work as advised at his last appointment  Patient was supposed to go for blood work every 6 weeks but has not even gone in the past 3 months  He will go today  Patient will continue on regimen as outlined before  Patient will follow-up in 3 months  Regimen:  Hydrea 500 mg PO daily  81 mg aspirin p o  daily    - CBC and differential; Future  - Comprehensive metabolic panel; Future  - CBC and differential; Future  - Comprehensive metabolic panel; Future    2  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  - CBC and differential; Future  - Comprehensive metabolic panel; Future  - CBC and differential; Future  - Comprehensive metabolic panel; Future    The patient is scheduled for follow-up in approximately 3 months  Patient voiced agreement and understanding to the above  Patient knows to call the Hematology/Oncology office with any questions and concerns regarding the above  Goals and Barriers:    Current Goal:   Prolong Survival from Cancer  Barriers: None  Patient's Capacity to Self Care:  Patient able to self care  Laura Kellogg PA-C  Medical Oncology/Hematology  [address]  ______________________________________________________________________________________________________________    Subjective     Chief Complaint   Patient presents with   • Follow-up       History of present illness/Cancer History:    This is a 50-year-old male who was admitted to the emergency room after having several weeks of shortness of breath, three episodes of syncope at home in January 2021   Patient's past medical history significant for 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      Patient had been previously worked up for abnormalities by primary care doctor who recommended that the patient follow-up with GI as well as with Cardiology for a stress test   Unfortunately, patient's symptoms progressed and he presented to the emergency room  3 Cartavi work demonstrated hemoglobin in the 7 gram/deciliter range   Moreover, the patient was found to have a significant iron deficiency with a ferritin of three and an iron saturation of 1%   Patient underwent three Venofer infusions daily in the hospital   Patient's hemoglobin is in the 7 gram/deciliter range        GI work up in 1/2021-2/2021:  GI has seen the patient in the hospital  Dianelys Martínez underwent colonoscopy in EGD that demonstrated questionable AVM in the small intestine   This area was cauterized   This area was not bleeding   Capsule demonstrated angiectasis and follow up EGD- is planned on 2/26/2021   This procedure did not demonstrate any signs of active bleeding      Patient completed the remainder of Venofer treatments started in the hospital in February 2021 03/23/21:    Patient notes overall feeling well  3 Cartavi work was reviewed   Patient's iron saturation increased to 7% and ferritin at 41   Hemoglobin has completely supplemented however, MCV is still low   Patient's platelet count is still elevated however this may be secondary to splenectomy and not necessarily to iron deficiency    Patient has completed COVID vaccinations      3/30/21:   Ferritin= 23, iron saturation = seven, TIBC 430,  Hemoglobin = 12 4, MCV 80,  Platelet count = 472,  PSA = 3 4     4/9-5/7/21:  5 Venofer 300 mg IV weekly      05/03/21:  Urgent follow up; [facility] on 5/1/2021, ED visit, hemoglobin stable   No bleeding today  1 more IV iron treatment to go   Iron studies requestion on labs from 5/1/21:iron sat supratheraputic       5/12/21:  Colonoscopy was negative for signs of active bleeding   Hemorrhoidal bleeding to blame for bright red blood per rectum   Scope was advanced past the descending colon due to patient's   CT colonoscopy diagnostic without contrast did not demonstrate any lesions      06/03/21:  Patient is feeling better   No more GI bleeding   Hemoglobin = 15 1, platelet count 863 oral seven, MCV = 86, ferritin = 452, iron saturation = 30%, TIBC = 336     8/27/21:    Labs taken every four weeks demonstrate stability   No GI bleeding   Patient feels well   Hemoglobin = 15 1, platelet count = 380, MCV = 93,  Iron saturation = 33, TIBC 324,  Ferritin = 155     11/1/2021: Feels well   1- 3 day episode of Melena + Hematochezia   Hemoglobin = 17 1, platelet count = 885, MCV = 96,  Iron saturation = 38, TIBC 344,  Ferritin = 149     2/8/2022:  WBC = 8 4, hemoglobin = 15 5, hematocrit = 49 6, platelet count = 305,  iron saturation = 34%, TIBC 364     02/10/22:  Patient reports having significant blood in his stool including dark, melena as well as hematochezia   Patient has not made an appointment with GI doctor   Discussed decreasing ferritin levels     5/6/22 WBC = 8 56, hemoglobin = 17 1, MCV = 92, platelet count = 693, PAVCMGRC = 65     5/13/2022:  Erythropoietin = 9 0, +JAK2 V617F Mutation, other studies did not reflex,  WBC = 14, hemoglobin = 17 7, hematocrit = 52, platelet count = 552     5/31/22 PSA = 0 7, CMP within normal limits with the exception of a mildly elevated potassium at 5 4, labs ordered by primary physician      6/21/2021:Symptom Assessment Tool states that fatigue = 7, early satiety = 6, abdominal discomfort 6, inactivity = 5, problems with concentration 0, night sweats 9, itching 10, no answer for bone pain, no fever 0 and no unintentional weight loss in last six months      6/24/2022: 1 phlebotomy, pre Hemoglobin = 16 8, hct=49 5     7/19/2022: WBC=11, hemoglobin =16 5, hct = 49 2, jyd=580; Started Hydrea   Phlebotomies stopped due to headache- worsening       2022:  HCT = 48 1, Hemoglobin =16 8, plt 350     2022 hemoglobin = 15 1, hematocrit 42 2, plt =392    Interval history: Feeling well able to keep up with his granddaughter  Patient does have an itchy skin rash on his lower extremity  This does not appear to be the same itchiness that accompanies uncontrolled polycythemia vera  Patient does not have any problems with increased heat in the shower  Rash is persistent on lower leg  ECO - Symptomatic but completely ambulatory    Review of Systems   Constitutional: Negative for activity change, appetite change, fatigue and fever  HENT: Negative for nosebleeds  Respiratory: Negative for cough, choking and shortness of breath  Cardiovascular: Negative for chest pain, palpitations and leg swelling  Gastrointestinal: Negative for abdominal distention, abdominal pain, anal bleeding, blood in stool, constipation, diarrhea, nausea and vomiting  Endocrine: Negative for cold intolerance  Genitourinary: Negative for hematuria  Musculoskeletal: Negative for myalgias  Skin: Negative for color change, pallor and rash  Allergic/Immunologic: Negative for immunocompromised state  Neurological: Negative for headaches  Hematological: Negative for adenopathy  Does not bruise/bleed easily  All other systems reviewed and are negative        Current Outpatient Medications:   •  acetaminophen (TYLENOL) 325 mg tablet, Take 2 tablets (650 mg total) by mouth every 6 (six) hours as needed for mild pain, headaches or fever, Disp: 30 tablet, Rfl: 0  •  ASPIRIN 81 PO, Take by mouth every morning, Disp: , Rfl:   •  atorvastatin (LIPITOR) 20 mg tablet, TAKE 1 TABLET BY MOUTH EVERY DAY, Disp: 90 tablet, Rfl: 1  •  famotidine (PEPCID) 40 MG tablet, TAKE 1 TABLET BY MOUTH EVERY DAY, Disp: 90 tablet, Rfl: 1  •  hydroxyurea (HYDREA) 500 mg capsule, TAKE 1 CAPSULE (500 MG TOTAL) BY MOUTH EVERY EVENING, Disp: 90 capsule, Rfl: 1  •  losartan (COZAAR) 50 mg tablet, TAKE 1 TABLET BY MOUTH EVERY DAY, Disp: 90 tablet, Rfl: 0  •  verapamil (CALAN-SR) 120 mg CR tablet, TAKE 1 TABLET BY MOUTH EVERY DAY, Disp: 90 tablet, Rfl: 1  No Known Allergies    Advance Directive and Living Will:            Objective   BP 98/66 (BP Location: Left arm, Patient Position: Sitting, Cuff Size: Adult)   Pulse 96   Temp 98 3 °F (36 8 °C) (Temporal)   Resp 17   Ht 6' (1 829 m)   Wt 96 6 kg (213 lb)   SpO2 97%   BMI 28 89 kg/m²   Wt Readings from Last 6 Encounters:   06/27/23 96 6 kg (213 lb)   01/25/23 94 3 kg (208 lb)   12/20/22 96 9 kg (213 lb 9 6 oz)   11/04/22 94 6 kg (208 lb 9 6 oz)   09/20/22 96 6 kg (213 lb)   09/09/22 94 8 kg (209 lb)       Physical Exam  Constitutional:       General: He is not in acute distress  Appearance: He is well-developed  HENT:      Head: Normocephalic and atraumatic  Mouth/Throat:      Pharynx: No oropharyngeal exudate  Eyes:      General: No scleral icterus  Conjunctiva/sclera: Conjunctivae normal       Pupils: Pupils are equal, round, and reactive to light  Cardiovascular:      Rate and Rhythm: Normal rate and regular rhythm  Heart sounds: No murmur heard  Pulmonary:      Effort: Pulmonary effort is normal  No respiratory distress  Breath sounds: Normal breath sounds  Abdominal:      General: Bowel sounds are normal  There is no distension  Palpations: Abdomen is soft  Tenderness: There is no abdominal tenderness  Musculoskeletal:         General: No tenderness  Cervical back: Normal range of motion  Lymphadenopathy:      Cervical: No cervical adenopathy  Skin:     Coloration: Skin is not pale  Findings: No erythema or rash  Neurological:      Mental Status: He is alert and oriented to person, place, and time  Cranial Nerves: No cranial nerve deficit           Pertinent Laboratory Results and Imaging Review:  No visits with results within 3 Week(s) from this visit  Latest known visit with results is:   Hospital Outpatient Visit on 02/07/2023   Component Date Value Ref Range Status   • Protocol Name 02/07/2023 [facility]   Final   • Time In Exercise Phase 02/07/2023 00:03:00   Final   • MAX   SYSTOLIC BP 18/39/1465 650  mmHg Final   • Max Diastolic Bp 64/79/9901 84  mmHg Final   • Max Heart Rate 02/07/2023 102  BPM Final   • Max Predicted Heart Rate 02/07/2023 162  BPM Final   • Reason for Termination 02/07/2023 PROTOCOL COMPLETED   Final   • Test Indication 02/07/2023 Chest Discomfort, SOB   Final   • Target Hr Formular 02/07/2023 (220 - Age)*100%   Final   • Chest Pain Statement 02/07/2023 none   Final       The following historical data was reviewed:  Past Medical History:   Diagnosis Date   • Anemia    • Cancer (HCC)     prostate   • Colon polyp    • Elevated PSA    • Full dentures    • GERD (gastroesophageal reflux disease)    • Hemorrhoid    • Hyperlipidemia    • Leukemia (HCC)     oral chemotherapy   • Polycythemia vera (HCC)    • Sleep apnea     No CPAP   • Smoker    • TIA (transient ischemic attack)    • Transfusion history    • Wears glasses      Past Surgical History:   Procedure Laterality Date   • ABDOMINAL ADHESION SURGERY N/A 11/17/2021    Procedure: LYSIS ADHESIONS LAPAROSCOPIC W ROBOT;  Surgeon: Sujatha Chow MD;  Location: [facility];  Service: Urology   • APPENDECTOMY     • COLONOSCOPY     • EGD     • FOOT SURGERY Right     bone removed-left foot-removal of blood clots from an injury   • OTHER SURGICAL HISTORY      bone removal right arm-abnormal growth of surface bone   • PROSTATE BIOPSY     • PROSTATECTOMY N/A 11/17/2021    Procedure: ROBOTIC ASSISTED LAPAROSCOPIC SIMPLE PROSTATECTOMY AND BLADDER NECK RECONSTRUCTION;  Surgeon: Sujatha Chow MD;  Location: [facility];  Service: Urology   • SPLENECTOMY      ruptured     Social History     Socioeconomic History   • Marital status:      Spouse name: Not on file   • Number of children: Not on file   • Years of education: Not on file   • Highest education level: Not on file   Occupational History   • Occupation: supervisor     Employer: [employer]   Tobacco Use   • Smoking status: Former     Packs/day: 0 50     Years: 10 00     Total pack years: 5 00     Types: Cigarettes     Quit date: 2021     Years since quittin 3   • Smokeless tobacco: Never   Vaping Use   • Vaping Use: Never used   Substance and Sexual Activity   • Alcohol use: Not Currently   • Drug use: No   • Sexual activity: Not on file   Other Topics Concern   • Not on file   Social History Narrative   • Not on file     Social Determinants of Health     Financial Resource Strain: Not on file   Food Insecurity: Not on file   Transportation Needs: Not on file   Physical Activity: Not on file   Stress: Not on file   Social Connections: Not on file   Intimate Partner Violence: Not on file   Housing Stability: Not on file     Family History   Problem Relation Age of Onset   • Dementia Father    • Heart disease Mother    • Hypertension Mother    • Hyperlipidemia Mother    • Hypertension Sister    • Hypertension Brother    • No Known Problems Daughter    • No Known Problems Son    • Cancer Paternal Grandfather         prostate   • No Known Problems Son        Please note: This report has been generated by a voice recognition software system  Therefore there may be syntax, spelling, and/or grammatical errors  Please call if you have any questions

## 2023-06-27 NOTE — PATIENT INSTRUCTIONS
Aquaphor Healing Ointment, Advanced Therapy Skin Protectant, Dry Skin Body Moisturizer, Multi-Purpose Healing Ointment, For Dry, Cracked Skin & Minor Cuts & Burns, [place] [address] Oncology and Hematology Team  [facility] - [phone]    Your Team Members:  Advanced Practitioner:  Joce Shanks PA-C  Oncology Nurse:   Thalia Duggan RN ([phone]) M-F 8am - 4:30pm    Please answer Private and Unavailable Calls - this may be your team(s) contacting you 0  If you have medical questions/concerns/issues - contact us either by (1) My Chart (2) [facility]

## 2023-06-29 DIAGNOSIS — R51.9 HEADACHE: ICD-10-CM

## 2023-07-07 ENCOUNTER — APPOINTMENT (OUTPATIENT)
Dept: LAB | Facility: HOSPITAL | Age: 58
End: 2023-07-07
Payer: COMMERCIAL

## 2023-07-07 ENCOUNTER — TELEPHONE (OUTPATIENT)
Dept: HEMATOLOGY ONCOLOGY | Facility: CLINIC | Age: 58
End: 2023-07-07

## 2023-07-07 DIAGNOSIS — I10 ESSENTIAL HYPERTENSION: ICD-10-CM

## 2023-07-07 DIAGNOSIS — Z90.81 S/P SPLENECTOMY: ICD-10-CM

## 2023-07-07 DIAGNOSIS — D45 POLYCYTHEMIA VERA (HCC): ICD-10-CM

## 2023-07-07 LAB
ALBUMIN SERPL BCP-MCNC: 4.1 G/DL (ref 3.5–5)
ALP SERPL-CCNC: 81 U/L (ref 34–104)
ALT SERPL W P-5'-P-CCNC: 42 U/L (ref 7–52)
ANION GAP SERPL CALCULATED.3IONS-SCNC: 6 MMOL/L
AST SERPL W P-5'-P-CCNC: 27 U/L (ref 13–39)
BASOPHILS # BLD AUTO: 0.06 THOUSANDS/ÂΜL (ref 0–0.1)
BASOPHILS NFR BLD AUTO: 1 % (ref 0–1)
BILIRUB SERPL-MCNC: 0.51 MG/DL (ref 0.2–1)
BUN SERPL-MCNC: 11 MG/DL (ref 5–25)
CALCIUM SERPL-MCNC: 9 MG/DL (ref 8.4–10.2)
CHLORIDE SERPL-SCNC: 103 MMOL/L (ref 96–108)
CHOLEST SERPL-MCNC: 140 MG/DL
CO2 SERPL-SCNC: 26 MMOL/L (ref 21–32)
CREAT SERPL-MCNC: 1.05 MG/DL (ref 0.6–1.3)
EOSINOPHIL # BLD AUTO: 0.28 THOUSAND/ÂΜL (ref 0–0.61)
EOSINOPHIL NFR BLD AUTO: 5 % (ref 0–6)
ERYTHROCYTE [DISTWIDTH] IN BLOOD BY AUTOMATED COUNT: 13.9 % (ref 11.6–15.1)
GFR SERPL CREATININE-BSD FRML MDRD: 77 ML/MIN/1.73SQ M
GLUCOSE P FAST SERPL-MCNC: 110 MG/DL (ref 65–99)
HCT VFR BLD AUTO: 45.6 % (ref 36.5–49.3)
HDLC SERPL-MCNC: 34 MG/DL
HGB BLD-MCNC: 15.9 G/DL (ref 12–17)
IMM GRANULOCYTES # BLD AUTO: 0.01 THOUSAND/UL (ref 0–0.2)
IMM GRANULOCYTES NFR BLD AUTO: 0 % (ref 0–2)
LDLC SERPL CALC-MCNC: 71 MG/DL (ref 0–100)
LYMPHOCYTES # BLD AUTO: 2.46 THOUSANDS/ÂΜL (ref 0.6–4.47)
LYMPHOCYTES NFR BLD AUTO: 48 % (ref 14–44)
MCH RBC QN AUTO: 39.7 PG (ref 26.8–34.3)
MCHC RBC AUTO-ENTMCNC: 34.9 G/DL (ref 31.4–37.4)
MCV RBC AUTO: 114 FL (ref 82–98)
MONOCYTES # BLD AUTO: 0.66 THOUSAND/ÂΜL (ref 0.17–1.22)
MONOCYTES NFR BLD AUTO: 13 % (ref 4–12)
NEUTROPHILS # BLD AUTO: 1.72 THOUSANDS/ÂΜL (ref 1.85–7.62)
NEUTS SEG NFR BLD AUTO: 33 % (ref 43–75)
NONHDLC SERPL-MCNC: 106 MG/DL
NRBC BLD AUTO-RTO: 0 /100 WBCS
PLATELET # BLD AUTO: 285 THOUSANDS/UL (ref 149–390)
PMV BLD AUTO: 9.8 FL (ref 8.9–12.7)
POTASSIUM SERPL-SCNC: 4.1 MMOL/L (ref 3.5–5.3)
PROT SERPL-MCNC: 6.9 G/DL (ref 6.4–8.4)
RBC # BLD AUTO: 4.01 MILLION/UL (ref 3.88–5.62)
SODIUM SERPL-SCNC: 135 MMOL/L (ref 135–147)
TRIGL SERPL-MCNC: 173 MG/DL
WBC # BLD AUTO: 5.19 THOUSAND/UL (ref 4.31–10.16)

## 2023-07-07 RX ORDER — LOSARTAN POTASSIUM 50 MG/1
TABLET ORAL
Qty: 90 TABLET | Refills: 1 | Status: SHIPPED | OUTPATIENT
Start: 2023-07-07

## 2023-07-07 NOTE — TELEPHONE ENCOUNTER
----- Message from Kate Santana PA-C sent at 7/7/2023  1:00 PM EDT -----  Labs look great! Please notify. Phoned patient at request of Eleazar Velasquez PA-C. Left voice message that labs look great.
pt with second visit to ed with vomiting after taking liquids; pt to be treated with ivf, bentyl, and reglan

## 2023-07-09 PROBLEM — M79.10 MYALGIA: Status: RESOLVED | Noted: 2020-12-29 | Resolved: 2023-07-09

## 2023-07-09 PROBLEM — M25.552 HIP PAIN, BILATERAL: Status: RESOLVED | Noted: 2020-12-29 | Resolved: 2023-07-09

## 2023-07-09 PROBLEM — K63.5 POLYP OF COLON: Status: RESOLVED | Noted: 2022-08-30 | Resolved: 2023-07-09

## 2023-07-09 PROBLEM — K92.1 HEMATOCHEZIA: Status: RESOLVED | Noted: 2020-04-24 | Resolved: 2023-07-09

## 2023-07-09 PROBLEM — R07.89 OTHER CHEST PAIN: Status: RESOLVED | Noted: 2022-11-04 | Resolved: 2023-07-09

## 2023-07-09 PROBLEM — R51.9 HEADACHE: Status: RESOLVED | Noted: 2020-01-28 | Resolved: 2023-07-09

## 2023-07-09 PROBLEM — Z01.818 PREOPERATIVE CLEARANCE: Status: RESOLVED | Noted: 2021-11-09 | Resolved: 2023-07-09

## 2023-07-09 PROBLEM — R20.2 ARM PARESTHESIA, RIGHT: Status: RESOLVED | Noted: 2020-01-28 | Resolved: 2023-07-09

## 2023-07-09 PROBLEM — N40.0 BPH (BENIGN PROSTATIC HYPERPLASIA): Status: RESOLVED | Noted: 2021-12-09 | Resolved: 2023-07-09

## 2023-07-09 PROBLEM — D64.9 SYMPTOMATIC ANEMIA: Status: RESOLVED | Noted: 2021-01-15 | Resolved: 2023-07-09

## 2023-07-09 PROBLEM — D50.0 IRON DEFICIENCY ANEMIA DUE TO CHRONIC BLOOD LOSS: Status: RESOLVED | Noted: 2021-01-19 | Resolved: 2023-07-09

## 2023-07-09 PROBLEM — G54.2 CERVICAL NEUROPATHY: Status: RESOLVED | Noted: 2021-10-25 | Resolved: 2023-07-09

## 2023-07-09 PROBLEM — M25.551 HIP PAIN, BILATERAL: Status: RESOLVED | Noted: 2020-12-29 | Resolved: 2023-07-09

## 2023-07-13 ENCOUNTER — OFFICE VISIT (OUTPATIENT)
Dept: FAMILY MEDICINE CLINIC | Facility: CLINIC | Age: 58
End: 2023-07-13
Payer: COMMERCIAL

## 2023-07-13 VITALS
RESPIRATION RATE: 18 BRPM | HEIGHT: 72 IN | DIASTOLIC BLOOD PRESSURE: 80 MMHG | SYSTOLIC BLOOD PRESSURE: 114 MMHG | HEART RATE: 87 BPM | WEIGHT: 212.4 LBS | TEMPERATURE: 97.4 F | BODY MASS INDEX: 28.77 KG/M2

## 2023-07-13 DIAGNOSIS — R73.03 PREDIABETES: ICD-10-CM

## 2023-07-13 DIAGNOSIS — Z12.5 PROSTATE CANCER SCREENING: ICD-10-CM

## 2023-07-13 DIAGNOSIS — Z23 IMMUNIZATION DUE: ICD-10-CM

## 2023-07-13 DIAGNOSIS — J43.8 OTHER EMPHYSEMA (HCC): ICD-10-CM

## 2023-07-13 DIAGNOSIS — I10 ESSENTIAL HYPERTENSION: Primary | ICD-10-CM

## 2023-07-13 DIAGNOSIS — E78.49 OTHER HYPERLIPIDEMIA: ICD-10-CM

## 2023-07-13 DIAGNOSIS — D45 POLYCYTHEMIA VERA (HCC): ICD-10-CM

## 2023-07-13 PROCEDURE — 90750 HZV VACC RECOMBINANT IM: CPT

## 2023-07-13 PROCEDURE — 90471 IMMUNIZATION ADMIN: CPT

## 2023-07-13 PROCEDURE — 99214 OFFICE O/P EST MOD 30 MIN: CPT | Performed by: FAMILY MEDICINE

## 2023-07-13 RX ORDER — ATORVASTATIN CALCIUM 20 MG/1
20 TABLET, FILM COATED ORAL DAILY
Qty: 90 TABLET | Refills: 1 | Status: SHIPPED | OUTPATIENT
Start: 2023-07-13

## 2023-07-13 NOTE — PROGRESS NOTES
Assessment/Plan:    No problem-specific Assessment & Plan notes found for this encounter. psa yearly, send to [address] group if gets any issues  htn stable on meds  HLD stable on meds, work in diet arder  PVR, f/u oncology  Prediabetes, watch carbs, try to exercise more  Copd by hx, pulm if any issues     Diagnoses and all orders for this visit:    Essential hypertension  -     Comprehensive metabolic panel; Future    Prostate cancer screening  -     PSA, ultrasensitive; Future    Other emphysema (HCC)    Other hyperlipidemia  -     atorvastatin (LIPITOR) 20 mg tablet; Take 1 tablet (20 mg total) by mouth daily    Prediabetes  -     Comprehensive metabolic panel; Future  -     Hemoglobin A1C; Future    Immunization due  -     Zoster Vaccine Recombinant IM    Polycythemia vera ([address])        Return in about 26 weeks (around 1/11/2024) for Recheck. Subjective:      Patient ID: Jose Kim is a 61 y.o. male. Chief Complaint   Patient presents with   • Follow-up     Blood pressure f/u, blood work f/u [email or web address]       HPI  Labs reviewed  TAA followed by cardio  Lots of ice tea with sugar  Small dinner  Wt same  Not much water per day    The following portions of the patient's history were reviewed and updated as appropriate: allergies, current medications, past family history, past medical history, past social history, past surgical history and problem list.    Review of Systems   Constitutional: Negative for chills and fever. Respiratory: Negative for shortness of breath.           Current Outpatient Medications   Medication Sig Dispense Refill   • acetaminophen (TYLENOL) 325 mg tablet Take 2 tablets (650 mg total) by mouth every 6 (six) hours as needed for mild pain, headaches or fever 30 tablet 0   • ASPIRIN 81 PO Take by mouth every morning     • atorvastatin (LIPITOR) 20 mg tablet Take 1 tablet (20 mg total) by mouth daily 90 tablet 1   • famotidine (PEPCID) 40 MG tablet TAKE 1 TABLET BY MOUTH EVERY DAY 90 tablet 0   • hydroxyurea (HYDREA) 500 mg capsule TAKE 1 CAPSULE (500 MG TOTAL) BY MOUTH EVERY EVENING 90 capsule 1   • losartan (COZAAR) 50 mg tablet TAKE 1 TABLET BY MOUTH EVERY DAY 90 tablet 1   • verapamil (CALAN-SR) 120 mg CR tablet Take 1 tablet (120 mg total) by mouth daily 90 tablet 0     No current facility-administered medications for this visit. Objective:    /80   Pulse 87   Temp (!) 97.4 °F (36.3 °C)   Resp 18   Ht 6' (1.829 m)   Wt 96.3 kg (212 lb 6.4 oz)   BMI 28.81 kg/m²        Physical Exam  Vitals and nursing note reviewed. Constitutional:       Appearance: He is well-developed. He is not ill-appearing. HENT:      Head: Normocephalic. Right Ear: Tympanic membrane normal.      Left Ear: Tympanic membrane normal.   Eyes:      General: No scleral icterus. Conjunctiva/sclera: Conjunctivae normal.   Cardiovascular:      Rate and Rhythm: Normal rate and regular rhythm. Heart sounds: No murmur heard. Pulmonary:      Effort: Pulmonary effort is normal. No respiratory distress. Abdominal:      Palpations: Abdomen is soft. Tenderness: There is no abdominal tenderness. Musculoskeletal:         General: No deformity. Cervical back: Neck supple. Skin:     General: Skin is warm and dry. Coloration: Skin is not pale. Neurological:      Mental Status: He is alert. Motor: No weakness. Gait: Gait normal.   Psychiatric:         Mood and Affect: Mood normal.         Behavior: Behavior normal.         Thought Content:  Thought content normal.             Kennedy Molina DO

## 2023-08-01 ENCOUNTER — APPOINTMENT (OUTPATIENT)
Dept: LAB | Facility: HOSPITAL | Age: 58
End: 2023-08-01
Attending: FAMILY MEDICINE
Payer: COMMERCIAL

## 2023-08-01 ENCOUNTER — HOSPITAL ENCOUNTER (OUTPATIENT)
Dept: RADIOLOGY | Facility: HOSPITAL | Age: 58
Discharge: HOME | End: 2023-08-01
Attending: INTERNAL MEDICINE
Payer: COMMERCIAL

## 2023-08-01 DIAGNOSIS — R73.03 PREDIABETES: ICD-10-CM

## 2023-08-01 DIAGNOSIS — I71.20 THORACIC AORTIC ANEURYSM WITHOUT RUPTURE (HCC): ICD-10-CM

## 2023-08-01 DIAGNOSIS — Z12.5 PROSTATE CANCER SCREENING: ICD-10-CM

## 2023-08-01 DIAGNOSIS — I10 ESSENTIAL HYPERTENSION: ICD-10-CM

## 2023-08-01 PROCEDURE — 36415 COLL VENOUS BLD VENIPUNCTURE: CPT

## 2023-08-01 PROCEDURE — 84153 ASSAY OF PSA TOTAL: CPT

## 2023-08-01 PROCEDURE — 71250 CT THORAX DX C-: CPT

## 2023-08-01 PROCEDURE — G1004 CDSM NDSC: HCPCS

## 2023-08-02 LAB — PSA SERPL DL<=0.01 NG/ML-MCNC: 0.54 NG/ML (ref 0–4)

## 2023-08-04 ENCOUNTER — TELEPHONE (OUTPATIENT)
Dept: FAMILY MEDICINE CLINIC | Facility: CLINIC | Age: 58
End: 2023-08-04

## 2023-08-04 ENCOUNTER — OFFICE VISIT (OUTPATIENT)
Dept: FAMILY MEDICINE CLINIC | Facility: CLINIC | Age: 58
End: 2023-08-04
Payer: COMMERCIAL

## 2023-08-04 ENCOUNTER — APPOINTMENT (EMERGENCY)
Dept: RADIOLOGY | Facility: HOSPITAL | Age: 58
DRG: 603 | End: 2023-08-04
Payer: COMMERCIAL

## 2023-08-04 ENCOUNTER — HOSPITAL ENCOUNTER (INPATIENT)
Facility: HOSPITAL | Age: 58
LOS: 3 days | Discharge: HOME | DRG: 603 | End: 2023-08-09
Attending: EMERGENCY MEDICINE | Admitting: INTERNAL MEDICINE
Payer: COMMERCIAL

## 2023-08-04 VITALS
TEMPERATURE: 101.4 F | OXYGEN SATURATION: 95 % | HEIGHT: 72 IN | HEART RATE: 102 BPM | WEIGHT: 212.2 LBS | SYSTOLIC BLOOD PRESSURE: 130 MMHG | DIASTOLIC BLOOD PRESSURE: 80 MMHG | BODY MASS INDEX: 28.74 KG/M2 | RESPIRATION RATE: 18 BRPM

## 2023-08-04 DIAGNOSIS — R06.2 WHEEZING: ICD-10-CM

## 2023-08-04 DIAGNOSIS — R07.9 CHEST PAIN, UNSPECIFIED TYPE: Primary | ICD-10-CM

## 2023-08-04 DIAGNOSIS — J45.909 ASTHMA: ICD-10-CM

## 2023-08-04 DIAGNOSIS — R07.9 CHEST PAIN, UNSPECIFIED: ICD-10-CM

## 2023-08-04 DIAGNOSIS — L03.90 CELLULITIS WITH LYMPHANGITIS: Primary | ICD-10-CM

## 2023-08-04 DIAGNOSIS — I77.810 AORTIC ECTASIA, THORACIC (HCC): ICD-10-CM

## 2023-08-04 DIAGNOSIS — M25.431 SWOLLEN WRIST, RIGHT: ICD-10-CM

## 2023-08-04 PROBLEM — G47.30 SLEEP APNEA: Status: ACTIVE | Noted: 2023-08-04

## 2023-08-04 PROBLEM — C95.90 LEUKEMIA (HCC): Status: ACTIVE | Noted: 2023-08-04

## 2023-08-04 PROBLEM — G45.9 TIA (TRANSIENT ISCHEMIC ATTACK): Status: ACTIVE | Noted: 2023-08-04

## 2023-08-04 PROBLEM — Z86.73 HISTORY OF TIA (TRANSIENT ISCHEMIC ATTACK): Status: ACTIVE | Noted: 2023-08-04

## 2023-08-04 PROBLEM — L03.113 CELLULITIS OF RIGHT WRIST: Status: ACTIVE | Noted: 2023-08-04

## 2023-08-04 LAB
ALBUMIN SERPL BCP-MCNC: 4.6 G/DL (ref 3.5–5)
ALP SERPL-CCNC: 94 U/L (ref 34–104)
ALT SERPL W P-5'-P-CCNC: 46 U/L (ref 7–52)
ANION GAP SERPL CALCULATED.3IONS-SCNC: 8 MMOL/L
AST SERPL W P-5'-P-CCNC: 25 U/L (ref 13–39)
BASOPHILS # BLD AUTO: 0.09 THOUSANDS/ÂΜL (ref 0–0.1)
BASOPHILS NFR BLD AUTO: 1 % (ref 0–1)
BILIRUB SERPL-MCNC: 0.55 MG/DL (ref 0.2–1)
BNP SERPL-MCNC: 19 PG/ML (ref 0–100)
BUN SERPL-MCNC: 10 MG/DL (ref 5–25)
CALCIUM SERPL-MCNC: 9.8 MG/DL (ref 8.4–10.2)
CARDIAC TROPONIN I PNL SERPL HS: 3 NG/L
CHLORIDE SERPL-SCNC: 100 MMOL/L (ref 96–108)
CO2 SERPL-SCNC: 26 MMOL/L (ref 21–32)
CREAT SERPL-MCNC: 1.15 MG/DL (ref 0.6–1.3)
EOSINOPHIL # BLD AUTO: 0.08 THOUSAND/ÂΜL (ref 0–0.61)
EOSINOPHIL NFR BLD AUTO: 1 % (ref 0–6)
ERYTHROCYTE [DISTWIDTH] IN BLOOD BY AUTOMATED COUNT: 13.7 % (ref 11.6–15.1)
GFR SERPL CREATININE-BSD FRML MDRD: 69 ML/MIN/1.73SQ M
GLUCOSE SERPL-MCNC: 90 MG/DL (ref 65–140)
HCT VFR BLD AUTO: 45.8 % (ref 36.5–49.3)
HGB BLD-MCNC: 16 G/DL (ref 12–17)
IMM GRANULOCYTES # BLD AUTO: 0.06 THOUSAND/UL (ref 0–0.2)
IMM GRANULOCYTES NFR BLD AUTO: 1 % (ref 0–2)
LACTATE SERPL-SCNC: 1.1 MMOL/L (ref 0.5–2)
LYMPHOCYTES # BLD AUTO: 1.85 THOUSANDS/ÂΜL (ref 0.6–4.47)
LYMPHOCYTES NFR BLD AUTO: 17 % (ref 14–44)
MAGNESIUM SERPL-MCNC: 1.8 MG/DL (ref 1.9–2.7)
MCH RBC QN AUTO: 39.3 PG (ref 26.8–34.3)
MCHC RBC AUTO-ENTMCNC: 34.9 G/DL (ref 31.4–37.4)
MCV RBC AUTO: 113 FL (ref 82–98)
MONOCYTES # BLD AUTO: 1.03 THOUSAND/ÂΜL (ref 0.17–1.22)
MONOCYTES NFR BLD AUTO: 9 % (ref 4–12)
NEUTROPHILS # BLD AUTO: 7.82 THOUSANDS/ÂΜL (ref 1.85–7.62)
NEUTS SEG NFR BLD AUTO: 71 % (ref 43–75)
NRBC BLD AUTO-RTO: 0 /100 WBCS
PLATELET # BLD AUTO: 342 THOUSANDS/UL (ref 149–390)
PMV BLD AUTO: 9.8 FL (ref 8.9–12.7)
POTASSIUM SERPL-SCNC: 3.9 MMOL/L (ref 3.5–5.3)
PROT SERPL-MCNC: 8 G/DL (ref 6.4–8.4)
RBC # BLD AUTO: 4.07 MILLION/UL (ref 3.88–5.62)
SARS-COV-2 RNA RESP QL NAA+PROBE: NEGATIVE
SODIUM SERPL-SCNC: 134 MMOL/L (ref 135–147)
WBC # BLD AUTO: 10.93 THOUSAND/UL (ref 4.31–10.16)

## 2023-08-04 PROCEDURE — 99285 EMERGENCY DEPT VISIT HI MDM: CPT

## 2023-08-04 PROCEDURE — 83605 ASSAY OF LACTIC ACID: CPT | Performed by: EMERGENCY MEDICINE

## 2023-08-04 PROCEDURE — 93005 ELECTROCARDIOGRAM TRACING: CPT

## 2023-08-04 PROCEDURE — G1004 CDSM NDSC: HCPCS

## 2023-08-04 PROCEDURE — 36415 COLL VENOUS BLD VENIPUNCTURE: CPT | Performed by: EMERGENCY MEDICINE

## 2023-08-04 PROCEDURE — 87154 CUL TYP ID BLD PTHGN 6+ TRGT: CPT | Performed by: EMERGENCY MEDICINE

## 2023-08-04 PROCEDURE — 93000 ELECTROCARDIOGRAM COMPLETE: CPT | Performed by: FAMILY MEDICINE

## 2023-08-04 PROCEDURE — 99285 EMERGENCY DEPT VISIT HI MDM: CPT | Performed by: EMERGENCY MEDICINE

## 2023-08-04 PROCEDURE — 87635 SARS-COV-2 COVID-19 AMP PRB: CPT | Performed by: EMERGENCY MEDICINE

## 2023-08-04 PROCEDURE — 73110 X-RAY EXAM OF WRIST: CPT

## 2023-08-04 PROCEDURE — 96374 THER/PROPH/DIAG INJ IV PUSH: CPT

## 2023-08-04 PROCEDURE — 71275 CT ANGIOGRAPHY CHEST: CPT

## 2023-08-04 PROCEDURE — 87040 BLOOD CULTURE FOR BACTERIA: CPT | Performed by: EMERGENCY MEDICINE

## 2023-08-04 PROCEDURE — 74174 CTA ABD&PLVS W/CONTRAST: CPT

## 2023-08-04 PROCEDURE — 94640 AIRWAY INHALATION TREATMENT: CPT

## 2023-08-04 PROCEDURE — 83880 ASSAY OF NATRIURETIC PEPTIDE: CPT | Performed by: EMERGENCY MEDICINE

## 2023-08-04 PROCEDURE — 85025 COMPLETE CBC W/AUTO DIFF WBC: CPT | Performed by: EMERGENCY MEDICINE

## 2023-08-04 PROCEDURE — 99214 OFFICE O/P EST MOD 30 MIN: CPT | Performed by: FAMILY MEDICINE

## 2023-08-04 PROCEDURE — 83735 ASSAY OF MAGNESIUM: CPT | Performed by: EMERGENCY MEDICINE

## 2023-08-04 PROCEDURE — 80053 COMPREHEN METABOLIC PANEL: CPT | Performed by: EMERGENCY MEDICINE

## 2023-08-04 PROCEDURE — 99222 1ST HOSP IP/OBS MODERATE 55: CPT | Performed by: NURSE PRACTITIONER

## 2023-08-04 PROCEDURE — 84484 ASSAY OF TROPONIN QUANT: CPT | Performed by: EMERGENCY MEDICINE

## 2023-08-04 RX ORDER — LOSARTAN POTASSIUM 50 MG/1
50 TABLET ORAL DAILY
Status: DISCONTINUED | OUTPATIENT
Start: 2023-08-05 | End: 2023-08-05

## 2023-08-04 RX ORDER — VANCOMYCIN HYDROCHLORIDE 1 G/200ML
10 INJECTION, SOLUTION INTRAVENOUS ONCE
Status: COMPLETED | OUTPATIENT
Start: 2023-08-04 | End: 2023-08-05

## 2023-08-04 RX ORDER — SACCHAROMYCES BOULARDII 250 MG
250 CAPSULE ORAL 2 TIMES DAILY
Status: DISCONTINUED | OUTPATIENT
Start: 2023-08-05 | End: 2023-08-09 | Stop reason: HOSPADM

## 2023-08-04 RX ORDER — HYDROXYUREA 500 MG/1
500 CAPSULE ORAL
Status: DISCONTINUED | OUTPATIENT
Start: 2023-08-05 | End: 2023-08-09 | Stop reason: HOSPADM

## 2023-08-04 RX ORDER — ATORVASTATIN CALCIUM 20 MG/1
20 TABLET, FILM COATED ORAL DAILY
Status: DISCONTINUED | OUTPATIENT
Start: 2023-08-05 | End: 2023-08-09 | Stop reason: HOSPADM

## 2023-08-04 RX ORDER — MAGNESIUM SULFATE 1 G/100ML
1 INJECTION INTRAVENOUS ONCE
Status: COMPLETED | OUTPATIENT
Start: 2023-08-05 | End: 2023-08-05

## 2023-08-04 RX ORDER — ONDANSETRON 2 MG/ML
4 INJECTION INTRAMUSCULAR; INTRAVENOUS EVERY 6 HOURS PRN
Status: DISCONTINUED | OUTPATIENT
Start: 2023-08-04 | End: 2023-08-09 | Stop reason: HOSPADM

## 2023-08-04 RX ORDER — ASPIRIN 81 MG/1
81 TABLET, CHEWABLE ORAL EVERY MORNING
Status: DISCONTINUED | OUTPATIENT
Start: 2023-08-05 | End: 2023-08-09 | Stop reason: HOSPADM

## 2023-08-04 RX ORDER — ACETAMINOPHEN 325 MG/1
650 TABLET ORAL ONCE
Status: COMPLETED | OUTPATIENT
Start: 2023-08-04 | End: 2023-08-04

## 2023-08-04 RX ORDER — SODIUM CHLORIDE 9 MG/ML
60 INJECTION, SOLUTION INTRAVENOUS CONTINUOUS
Status: DISPENSED | OUTPATIENT
Start: 2023-08-05 | End: 2023-08-06

## 2023-08-04 RX ORDER — FAMOTIDINE 20 MG/1
40 TABLET, FILM COATED ORAL DAILY
Status: DISCONTINUED | OUTPATIENT
Start: 2023-08-05 | End: 2023-08-09 | Stop reason: HOSPADM

## 2023-08-04 RX ORDER — IPRATROPIUM BROMIDE AND ALBUTEROL SULFATE 2.5; .5 MG/3ML; MG/3ML
3 SOLUTION RESPIRATORY (INHALATION) ONCE
Status: COMPLETED | OUTPATIENT
Start: 2023-08-04 | End: 2023-08-04

## 2023-08-04 RX ORDER — ACETAMINOPHEN 325 MG/1
650 TABLET ORAL EVERY 6 HOURS PRN
Status: DISCONTINUED | OUTPATIENT
Start: 2023-08-04 | End: 2023-08-09 | Stop reason: HOSPADM

## 2023-08-04 RX ORDER — ENOXAPARIN SODIUM 100 MG/ML
40 INJECTION SUBCUTANEOUS DAILY
Status: DISCONTINUED | OUTPATIENT
Start: 2023-08-05 | End: 2023-08-05

## 2023-08-04 RX ORDER — IPRATROPIUM BROMIDE AND ALBUTEROL SULFATE 2.5; .5 MG/3ML; MG/3ML
3 SOLUTION RESPIRATORY (INHALATION) ONCE
Status: DISCONTINUED | OUTPATIENT
Start: 2023-08-04 | End: 2023-08-04

## 2023-08-04 RX ORDER — ALBUTEROL SULFATE 2.5 MG/3ML
2.5 SOLUTION RESPIRATORY (INHALATION) EVERY 4 HOURS PRN
Status: DISCONTINUED | OUTPATIENT
Start: 2023-08-04 | End: 2023-08-09 | Stop reason: HOSPADM

## 2023-08-04 RX ORDER — METHYLPREDNISOLONE SODIUM SUCCINATE 125 MG/2ML
80 INJECTION, POWDER, LYOPHILIZED, FOR SOLUTION INTRAMUSCULAR; INTRAVENOUS ONCE
Status: COMPLETED | OUTPATIENT
Start: 2023-08-04 | End: 2023-08-04

## 2023-08-04 RX ORDER — FLUTICASONE FUROATE AND VILANTEROL 100; 25 UG/1; UG/1
1 POWDER RESPIRATORY (INHALATION) DAILY
Status: DISCONTINUED | OUTPATIENT
Start: 2023-08-05 | End: 2023-08-09 | Stop reason: HOSPADM

## 2023-08-04 RX ADMIN — METHYLPREDNISOLONE SODIUM SUCCINATE 80 MG: 125 INJECTION, POWDER, FOR SOLUTION INTRAMUSCULAR; INTRAVENOUS at 17:46

## 2023-08-04 RX ADMIN — IPRATROPIUM BROMIDE AND ALBUTEROL SULFATE 3 ML: 2.5; .5 SOLUTION RESPIRATORY (INHALATION) at 20:56

## 2023-08-04 RX ADMIN — VANCOMYCIN HYDROCHLORIDE 1000 MG: 1 INJECTION, SOLUTION INTRAVENOUS at 21:25

## 2023-08-04 RX ADMIN — IOHEXOL 100 ML: 350 INJECTION, SOLUTION INTRAVENOUS at 18:14

## 2023-08-04 RX ADMIN — ACETAMINOPHEN 650 MG: 325 TABLET ORAL at 17:45

## 2023-08-04 NOTE — TELEPHONE ENCOUNTER
LMOM for pt to call back as he needs an appt. If this is not possible please go to [facility].   Johana Rodriguez

## 2023-08-04 NOTE — ED PROVIDER NOTES
History  Chief Complaint   Patient presents with   • Chest Pain     Sent from PCP office. EKG done at office. States CP for 3 days, sternum   • Cellulitis     Redness and swelling right wrist for 2 days , now red streak going up arm      Patient is a 41-year-old male, that presents to the emergency department with complaint of 3 days of chest pain. He localizes the worst of his pain to the substernal aspect of his chest.  He denies radiation of pain. He has a known history of ectasia of his thoracic aorta, hyperlipidemia, GERD, hypertension. Patient also with erythema noted to right wrist, now radiating up the medial aspect of his arm. He also complains of nonproductive cough, was unaware that he had a fever until he arrived. History provided by:  Patient   used: No    Chest Pain  Associated symptoms: no abdominal pain, no back pain, no cough, no fever, no nausea, no palpitations, no shortness of breath and not vomiting        Prior to Admission Medications   Prescriptions Last Dose Informant Patient Reported? Taking?    ASPIRIN 81 PO 8/4/2023 Self Yes Yes   Sig: Take by mouth every morning   acetaminophen (TYLENOL) 325 mg tablet  Self No No   Sig: Take 2 tablets (650 mg total) by mouth every 6 (six) hours as needed for mild pain, headaches or fever   atorvastatin (LIPITOR) 20 mg tablet 8/4/2023 Self No Yes   Sig: Take 1 tablet (20 mg total) by mouth daily   famotidine (PEPCID) 40 MG tablet 8/4/2023 Self No Yes   Sig: TAKE 1 TABLET BY MOUTH EVERY DAY   hydroxyurea (HYDREA) 500 mg capsule 8/4/2023 Self No Yes   Sig: TAKE 1 CAPSULE (500 MG TOTAL) BY MOUTH EVERY EVENING   losartan (COZAAR) 50 mg tablet 8/4/2023 Self No Yes   Sig: TAKE 1 TABLET BY MOUTH EVERY DAY   verapamil (CALAN-SR) 120 mg CR tablet 8/4/2023 Self No Yes   Sig: Take 1 tablet (120 mg total) by mouth daily      Facility-Administered Medications: None       Past Medical History:   Diagnosis Date   • Anemia    • Asthma    • Cancer [facility])     prostate   • Colon polyp    • Elevated PSA    • Full dentures    • GERD (gastroesophageal reflux disease)    • Hemorrhoid    • Hyperlipidemia    • Leukemia (HCC)     oral chemotherapy   • Polycythemia vera (HCC)    • Sleep apnea     No CPAP   • Smoker    • TIA (transient ischemic attack)    • Transfusion history    • Wears glasses        Past Surgical History:   Procedure Laterality Date   • ABDOMINAL ADHESION SURGERY N/A 2021    Procedure: LYSIS ADHESIONS LAPAROSCOPIC W ROBOT;  Surgeon: Judy Kothari MD;  Location: [facility];  Service: Urology   • APPENDECTOMY     • COLONOSCOPY     • EGD     • FOOT SURGERY Right     bone removed-left foot-removal of blood clots from an injury   • OTHER SURGICAL HISTORY      bone removal right arm-abnormal growth of surface bone   • PROSTATE BIOPSY     • PROSTATECTOMY N/A 2021    Procedure: ROBOTIC ASSISTED LAPAROSCOPIC SIMPLE PROSTATECTOMY AND BLADDER NECK RECONSTRUCTION;  Surgeon: Judy Kothari MD;  Location: [facility];  Service: Urology   • SPLENECTOMY      ruptured       Family History   Problem Relation Age of Onset   • Dementia Father    • Heart disease Mother    • Hypertension Mother    • Hyperlipidemia Mother    • Hypertension Sister    • Hypertension Brother    • No Known Problems Daughter    • No Known Problems Son    • Cancer Paternal Grandfather         prostate   • No Known Problems Son      I have reviewed and agree with the history as documented.     E-Cigarette/Vaping   • E-Cigarette Use Never User      E-Cigarette/Vaping Substances   • Nicotine No    • THC No    • CBD No    • Flavoring No    • Other No    • Unknown No      Social History     Tobacco Use   • Smoking status: Former     Packs/day: 0.50     Years: 10.00     Total pack years: 5.00     Types: Cigarettes     Start date: 6/15/2011     Quit date: 2021     Years since quittin.4   • Smokeless tobacco: Never   Vaping Use   • Vaping Use: Never used   Substance Use Topics   • Alcohol use: Not Currently   • Drug use: No       Review of Systems   Constitutional: Negative for chills and fever. Respiratory: Negative for cough, shortness of breath and wheezing. Cardiovascular: Positive for chest pain. Negative for palpitations. Gastrointestinal: Negative for abdominal pain, constipation, diarrhea, nausea and vomiting. Genitourinary: Negative for dysuria, flank pain, hematuria and urgency. Musculoskeletal: Negative for back pain. Skin: Positive for color change. Negative for rash. All other systems reviewed and are negative. Physical Exam  Physical Exam  Vitals and nursing note reviewed. Constitutional:       Appearance: He is well-developed. HENT:      Head: Normocephalic and atraumatic. Eyes:      Pupils: Pupils are equal, round, and reactive to light. Cardiovascular:      Rate and Rhythm: Normal rate and regular rhythm. Heart sounds: Normal heart sounds. Pulmonary:      Effort: Pulmonary effort is normal.      Breath sounds: Examination of the right-upper field reveals wheezing. Examination of the left-upper field reveals wheezing. Examination of the right-middle field reveals wheezing. Examination of the left-middle field reveals wheezing. Wheezing present. Abdominal:      General: Bowel sounds are normal. There is no distension. Palpations: Abdomen is soft. There is no mass. Tenderness: There is no abdominal tenderness. There is no guarding or rebound. Skin:     General: Skin is warm and dry. Capillary Refill: Capillary refill takes less than 2 seconds. Findings: Erythema present. Neurological:      General: No focal deficit present. Mental Status: He is alert and oriented to person, place, and time. Psychiatric:         Mood and Affect: Mood is anxious. Behavior: Behavior normal.         Thought Content:  Thought content normal.         Judgment: Judgment normal.         Vital Signs  ED Triage Vitals [08/04/23 1711]   Temperature Pulse Respirations Blood Pressure SpO2   (!) 100.6 °F (38.1 °C) (!) 109 (!) 28 128/87 98 %      Temp Source Heart Rate Source Patient Position - Orthostatic VS BP Location FiO2 (%)   Tympanic Monitor Sitting Left arm --      Pain Score       10 - Worst Possible Pain           Vitals:    08/04/23 1900 08/04/23 1945 08/04/23 2000 08/04/23 2015   BP: 152/72 115/72 108/77 106/77   Pulse: 99 90 91 86   Patient Position - Orthostatic VS: Lying Sitting Sitting          Visual Acuity      ED Medications  Medications   acetaminophen (TYLENOL) tablet 650 mg (650 mg Oral Given 8/4/23 1745)   methylPREDNISolone sodium succinate (Solu-MEDROL) injection 80 mg (80 mg Intravenous Given 8/4/23 1746)   iohexol (OMNIPAQUE) 350 MG/ML injection (SINGLE-DOSE) 100 mL (100 mL Intravenous Given 8/4/23 1814)   vancomycin (VANCOCIN) IVPB (premix in dextrose) 1,000 mg 200 mL (1,000 mg Intravenous New Bag 8/4/23 2125)   ipratropium-albuterol (DUO-NEB) 0.5-2.5 mg/3 mL inhalation solution 3 mL (3 mL Nebulization Given 8/4/23 2056)       Diagnostic Studies  Results Reviewed     Procedure Component Value Units Date/Time    Blood culture #1 [[id number]] Collected: 08/04/23 1739    Lab Status: Preliminary result Specimen: Blood from Arm, Right Updated: 08/04/23 2201     Blood Culture Received in Microbiology Lab. Culture in Progress. Blood culture #2 [[id number]] Collected: 08/04/23 1733    Lab Status: Preliminary result Specimen: Blood from Arm, Left Updated: 08/04/23 2201     Blood Culture Received in Microbiology Lab. Culture in Progress. COVID only [[id number]]  (Normal) Collected: 08/04/23 1744    Lab Status: Final result Specimen: Nares from Nose Updated: 08/04/23 1841     SARS-CoV-2 Negative    Narrative:      FOR PEDIATRIC PATIENTS - copy/paste COVID Guidelines URL to browser: [email or web address]    SARS-CoV-2 assay is a Nucleic Acid Amplification assay intended for the  qualitative detection of nucleic acid from SARS-CoV-2 in nasopharyngeal  swabs. Results are for the presumptive identification of SARS-CoV-2 RNA. Positive results are indicative of infection with SARS-CoV-2, the virus  causing COVID-19, but do not rule out bacterial infection or co-infection  with other viruses. Laboratories within the [facility] and its  territories are required to report all positive results to the appropriate  public health authorities. Negative results do not preclude SARS-CoV-2  infection and should not be used as the sole basis for treatment or other  patient management decisions. Negative results must be combined with  clinical observations, patient history, and epidemiological information. This test has not been FDA cleared or approved. This test has been authorized by FDA under an Emergency Use Authorization  (EUA). This test is only authorized for the duration of time the  declaration that circumstances exist justifying the authorization of the  emergency use of an in vitro diagnostic tests for detection of SARS-CoV-2  virus and/or diagnosis of COVID-19 infection under section 564(b)(1) of  the Act, 21 U. S.C. 079JXX-5(B)(3), unless the authorization is terminated  or revoked sooner. The test has been validated but independent review by FDA  and CLIA is pending. Test performed using [employer] GeneXpert: This RT-PCR assay targets N2,  a region unique to SARS-CoV-2. A conserved region in the E-gene was chosen  for pan-Sarbecovirus detection which includes SARS-CoV-2. According to CMS-2020-01-R, this platform meets the definition of high-throughput technology.     B-Type Natriuretic Peptide(BNP) [[id number]]  (Normal) Collected: 08/04/23 1733    Lab Status: Final result Specimen: Blood from Arm, Left Updated: 08/04/23 1817     BNP 19 pg/mL     HS Troponin 0hr (reflex protocol) [[id number]]  (Normal) Collected: 08/04/23 1733    Lab Status: Final result Specimen: Blood from Arm, Left Updated: 08/04/23 1806     hs TnI 0hr 3 ng/L     Lactic acid, plasma (w/reflex if result > 2.0) [[id number]]  (Normal) Collected: 08/04/23 1733    Lab Status: Final result Specimen: Blood from Arm, Left Updated: 08/04/23 1758     LACTIC ACID 1.1 mmol/L     Narrative:      Result may be elevated if tourniquet was used during collection.     Comprehensive metabolic panel [[id number]]  (Abnormal) Collected: 08/04/23 1733    Lab Status: Final result Specimen: Blood from Arm, Left Updated: 08/04/23 1758     Sodium 134 mmol/L      Potassium 3.9 mmol/L      Chloride 100 mmol/L      CO2 26 mmol/L      ANION GAP 8 mmol/L      BUN 10 mg/dL      Creatinine 1.15 mg/dL      Glucose 90 mg/dL      Calcium 9.8 mg/dL      AST 25 U/L      ALT 46 U/L      Alkaline Phosphatase 94 U/L      Total Protein 8.0 g/dL      Albumin 4.6 g/dL      Total Bilirubin 0.55 mg/dL      eGFR 69 ml/min/1.73sq m     Narrative:      [place] guidelines for Chronic Kidney Disease (CKD):   •  Stage 1 with normal or high GFR (GFR > 90 mL/min/1.73 square meters)  •  Stage 2 Mild CKD (GFR = 60-89 mL/min/1.73 square meters)  •  Stage 3A Moderate CKD (GFR = 45-59 mL/min/1.73 square meters)  •  Stage 3B Moderate CKD (GFR = 30-44 mL/min/1.73 square meters)  •  Stage 4 Severe CKD (GFR = 15-29 mL/min/1.73 square meters)  •  Stage 5 End Stage CKD (GFR <15 mL/min/1.73 square meters)  Note: GFR calculation is accurate only with a steady state creatinine    Magnesium [[id number]]  (Abnormal) Collected: 08/04/23 1733    Lab Status: Final result Specimen: Blood from Arm, Left Updated: 08/04/23 1758     Magnesium 1.8 mg/dL     CBC and differential [[id number]]  (Abnormal) Collected: 08/04/23 1733    Lab Status: Final result Specimen: Blood from Arm, Left Updated: 08/04/23 1739     WBC 10.93 Thousand/uL      RBC 4.07 Million/uL      Hemoglobin 16.0 g/dL      Hematocrit 45.8 %       fL      MCH 39.3 pg      MCHC 34.9 g/dL      RDW 13.7 %      MPV 9.8 fL      Platelets 623 Thousands/uL      nRBC 0 /100 WBCs      Neutrophils Relative 71 %      Immat GRANS % 1 %      Lymphocytes Relative 17 %      Monocytes Relative 9 %      Eosinophils Relative 1 %      Basophils Relative 1 %      Neutrophils Absolute 7.82 Thousands/µL      Immature Grans Absolute 0.06 Thousand/uL      Lymphocytes Absolute 1.85 Thousands/µL      Monocytes Absolute 1.03 Thousand/µL      Eosinophils Absolute 0.08 Thousand/µL      Basophils Absolute 0.09 Thousands/µL                  XR wrist 3+ views RIGHT   Final Result by Mare Wilks MD (08/04 2017)      No acute osseous abnormality. Workstation performed: [id number]         CTA dissection protocol chest/abdomen/pelvis   Final Result by Mare Wilks MD (08/04 2014)      1. Stable ectasia of the ascending thoracic aorta measuring 40 mm. No acute aortic pathology. 2.  No acute findings in the chest, abdomen, or pelvis.          Workstation performed: [id number]                    Procedures  ECG 12 Lead Documentation Only    Date/Time: 8/4/2023 5:21 PM    Performed by: Sommer Stein DO  Authorized by: Sommer Stein DO    Indications / Diagnosis:  Cp  ECG reviewed by me, the ED Provider: yes    Patient location:  ED  Previous ECG:     Previous ECG:  Unavailable    Comparison to cardiac monitor: Yes    Interpretation:     Interpretation: non-specific    Rate:     ECG rate:  106bpm    ECG rate assessment: tachycardic    Rhythm:     Rhythm: sinus tachycardia    Ectopy:     Ectopy: none    QRS:     QRS axis:  Normal  Conduction:     Conduction: normal    ST segments:     ST segments:  Normal  T waves:     T waves: normal               ED Course             HEART Risk Score    Flowsheet Row Most Recent Value   Heart Score Risk Calculator    History 1 Filed at: 08/04/2023 2000   ECG 0 Filed at: 08/04/2023 2000   Age 1 Filed at: 08/04/2023 2000   Risk Factors 1 Filed at: 08/04/2023 2000   Troponin 0 Filed at: 08/04/2023 2000   HEART Score 3 Filed at: 08/04/2023 2000                        SBIRT 22yo+    Flowsheet Row Most Recent Value   Initial Alcohol Screen: US AUDIT-C     1. How often do you have a drink containing alcohol? 0 Filed at: 08/04/2023 1715   Audit-C Score 0 Filed at: 08/04/2023 1715   DIANE: How many times in the past year have you. .. Used an illegal drug or used a prescription medication for non-medical reasons? Never Filed at: 08/04/2023 1715                    Medical Decision Making  Patient ED with complaint of chest pain and erythema to his right upper extremity. Differential diagnosis for chest pain includes but is not limited to ACS, aortic dissection, pneumonia, CHF, viral URI. Amount and/or Complexity of Data Reviewed  Labs: ordered. Radiology: ordered. Risk  OTC drugs. Prescription drug management. Decision regarding hospitalization. Disposition  Final diagnoses:   Cellulitis with lymphangitis   Chest pain, unspecified   Aortic ectasia, thoracic ([address])   Wheezing     Time reflects when diagnosis was documented in both MDM as applicable and the Disposition within this note     Time User Action Codes Description Comment    8/4/2023  8:50 PM Rana Romp Add [L03.90] Cellulitis with lymphangitis     8/4/2023  8:50 PM Rana Romp O Add [R07.9] Chest pain, unspecified     8/4/2023  8:50 PM Rana Romp O Add [I77.810] Aortic ectasia, thoracic ([address])     8/4/2023  8:55 PM Rana Romp Add [R06.2] Wheezing       ED Disposition     ED Disposition   Admit    Condition   Stable    Date/Time   Fri Aug 4, 2023  8:50 PM    Comment   Case was discussed with Allison EMERSON and the patient's admission status was agreed to be Admission Status: observation status to the service of Dr. Terra Villar .            Follow-up Information    None         Current Discharge Medication List      CONTINUE these medications which have NOT CHANGED    Details   ASPIRIN 81 PO Take by mouth every morning      atorvastatin (LIPITOR) 20 mg tablet Take 1 tablet (20 mg total) by mouth daily  Qty: 90 tablet, Refills: 1    Associated Diagnoses: Other hyperlipidemia      famotidine (PEPCID) 40 MG tablet TAKE 1 TABLET BY MOUTH EVERY DAY  Qty: 90 tablet, Refills: 0    Associated Diagnoses: Gastroesophageal reflux disease without esophagitis      hydroxyurea (HYDREA) 500 mg capsule TAKE 1 CAPSULE (500 MG TOTAL) BY MOUTH EVERY EVENING  Qty: 90 capsule, Refills: 1    Associated Diagnoses: Polycythemia vera (HCC)      losartan (COZAAR) 50 mg tablet TAKE 1 TABLET BY MOUTH EVERY DAY  Qty: 90 tablet, Refills: 1    Associated Diagnoses: Essential hypertension      verapamil (CALAN-SR) 120 mg CR tablet Take 1 tablet (120 mg total) by mouth daily  Qty: 90 tablet, Refills: 0    Associated Diagnoses: Headache      acetaminophen (TYLENOL) 325 mg tablet Take 2 tablets (650 mg total) by mouth every 6 (six) hours as needed for mild pain, headaches or fever  Qty: 30 tablet, Refills: 0    Associated Diagnoses: Headache; Neck pain             No discharge procedures on file.     PDMP Review       Value Time User    PDMP Reviewed  Yes 11/17/2021  6:58 AM Cristopher Whitten MD          ED Provider  Electronically Signed by           Fredo Aparicio DO  08/04/23 8234

## 2023-08-04 NOTE — PROGRESS NOTES
Assessment/Plan:    1. Chest pain, unspecified type  -     POCT ECG    2. Swollen wrist, right    EKG - Sinus tachycardia - no signs of ischemia      Pt evaluated. Wrist is red - swollen. He has what looks like an entry wound with a break of the skin but when the palmar aspect of the worst is examined - he has a red line tracking up the forearm to his bicept. PT is tachy he has a fever. Pt does not feel well his finger are tingling. PT came to be seen on his lunch breakj at [employer]. Pt was sent to the emergency room  II called and spoke with triage at the ed and advised I was sending pt over      There are no Patient Instructions on file for this visit. No follow-ups on file. Subjective:      Patient ID: Jose Kim is a 61 y.o. male. Chief Complaint   Patient presents with   • Rash     On right wrist, started 3 days ago, LEIDY Seymour/ROEL   • Chills   • Fever   • Fatigue   • shortness of breath   • pain in middle of chest      During inhale and exhale, all symptoms started this morning, LEIDY Seymour/ROEL       Pt unknown to me sched for a same day appt for red swollen rash    Pt states he is having chest pain    Pt has swollen red area on the dorsum of he rt hand. Now pt states he sees a red area that is running up his arm. The brandon are at the dorsum of the hand has been opresent for two days  Pt called this am for an appt, went to work but now has a a red area tracking up( his arm. Pt states huis hands are tibngly this started thsi am at work  Hands are cold as well  States it hurts to breath as well          The following portions of the patient's history were reviewed and updated as appropriate: allergies, current medications, past family history, past medical history, past social history, past surgical history and problem list.    Review of Systems   Constitutional: Positive for chills and fever. Respiratory: Positive for shortness of breath. Cardiovascular: Positive for chest pain.    Skin: Positive for color change and wound. Current Outpatient Medications   Medication Sig Dispense Refill   • acetaminophen (TYLENOL) 325 mg tablet Take 2 tablets (650 mg total) by mouth every 6 (six) hours as needed for mild pain, headaches or fever 30 tablet 0   • ASPIRIN 81 PO Take by mouth every morning     • atorvastatin (LIPITOR) 20 mg tablet Take 1 tablet (20 mg total) by mouth daily 90 tablet 1   • famotidine (PEPCID) 40 MG tablet TAKE 1 TABLET BY MOUTH EVERY DAY 90 tablet 0   • hydroxyurea (HYDREA) 500 mg capsule TAKE 1 CAPSULE (500 MG TOTAL) BY MOUTH EVERY EVENING 90 capsule 1   • losartan (COZAAR) 50 mg tablet TAKE 1 TABLET BY MOUTH EVERY DAY 90 tablet 1   • verapamil (CALAN-SR) 120 mg CR tablet Take 1 tablet (120 mg total) by mouth daily 90 tablet 0     No current facility-administered medications for this visit. Objective:    /80   Pulse 102   Temp (!) 101.4 °F (38.6 °C) (Temporal)   Resp 18   Ht 6' (1.829 m)   Wt 96.3 kg (212 lb 3.2 oz)   SpO2 95%   BMI 28.78 kg/m²        Physical Exam  Constitutional:       Appearance: He is ill-appearing. Cardiovascular:      Rate and Rhythm: Tachycardia present. Heart sounds: No murmur heard. Pulmonary:      Effort: No respiratory distress. Breath sounds: No stridor. No wheezing, rhonchi or rales. Chest:      Chest wall: No tenderness.    Musculoskeletal:      Comments: Swollen wrist   Skin:     Comments: Break in skin on rt wrist   Swollen wrist   Red  Warm  Line tracking up the arm to his bicept                Mayford Splinter, DO

## 2023-08-04 NOTE — TELEPHONE ENCOUNTER
----- Message from Gail Haas sent at 8/4/2023 10:45 AM EDT -----  Regarding: Hand swollen and red  Contact: [phone]  My hand is swollen and warm and red and feel like I have a fever don't know if I got bit by something.

## 2023-08-05 PROBLEM — J45.909 ASTHMA: Status: ACTIVE | Noted: 2023-08-05

## 2023-08-05 PROBLEM — K62.5 RECTAL BLEEDING: Status: ACTIVE | Noted: 2023-08-05

## 2023-08-05 LAB
ANION GAP SERPL CALCULATED.3IONS-SCNC: 6 MMOL/L
BASOPHILS # BLD AUTO: 0.05 THOUSANDS/ÂΜL (ref 0–0.1)
BASOPHILS NFR BLD AUTO: 1 % (ref 0–1)
BUN SERPL-MCNC: 14 MG/DL (ref 5–25)
CALCIUM SERPL-MCNC: 9.1 MG/DL (ref 8.4–10.2)
CHLORIDE SERPL-SCNC: 106 MMOL/L (ref 96–108)
CO2 SERPL-SCNC: 23 MMOL/L (ref 21–32)
CREAT SERPL-MCNC: 1.04 MG/DL (ref 0.6–1.3)
EOSINOPHIL # BLD AUTO: 0 THOUSAND/ÂΜL (ref 0–0.61)
EOSINOPHIL NFR BLD AUTO: 0 % (ref 0–6)
ERYTHROCYTE [DISTWIDTH] IN BLOOD BY AUTOMATED COUNT: 13.7 % (ref 11.6–15.1)
GFR SERPL CREATININE-BSD FRML MDRD: 78 ML/MIN/1.73SQ M
GLUCOSE P FAST SERPL-MCNC: 148 MG/DL (ref 65–99)
GLUCOSE SERPL-MCNC: 148 MG/DL (ref 65–140)
HCT VFR BLD AUTO: 42.3 % (ref 36.5–49.3)
HGB BLD-MCNC: 14.8 G/DL (ref 12–17)
IMM GRANULOCYTES # BLD AUTO: 0.12 THOUSAND/UL (ref 0–0.2)
IMM GRANULOCYTES NFR BLD AUTO: 1 % (ref 0–2)
LYMPHOCYTES # BLD AUTO: 1 THOUSANDS/ÂΜL (ref 0.6–4.47)
LYMPHOCYTES NFR BLD AUTO: 9 % (ref 14–44)
MAGNESIUM SERPL-MCNC: 2.1 MG/DL (ref 1.9–2.7)
MCH RBC QN AUTO: 39.2 PG (ref 26.8–34.3)
MCHC RBC AUTO-ENTMCNC: 35 G/DL (ref 31.4–37.4)
MCV RBC AUTO: 112 FL (ref 82–98)
MONOCYTES # BLD AUTO: 0.3 THOUSAND/ÂΜL (ref 0.17–1.22)
MONOCYTES NFR BLD AUTO: 3 % (ref 4–12)
NEUTROPHILS # BLD AUTO: 9.13 THOUSANDS/ÂΜL (ref 1.85–7.62)
NEUTS SEG NFR BLD AUTO: 86 % (ref 43–75)
NRBC BLD AUTO-RTO: 0 /100 WBCS
PLATELET # BLD AUTO: 341 THOUSANDS/UL (ref 149–390)
PMV BLD AUTO: 10.1 FL (ref 8.9–12.7)
POTASSIUM SERPL-SCNC: 4.2 MMOL/L (ref 3.5–5.3)
RBC # BLD AUTO: 3.78 MILLION/UL (ref 3.88–5.62)
SODIUM SERPL-SCNC: 135 MMOL/L (ref 135–147)
WBC # BLD AUTO: 10.6 THOUSAND/UL (ref 4.31–10.16)

## 2023-08-05 PROCEDURE — 83735 ASSAY OF MAGNESIUM: CPT | Performed by: NURSE PRACTITIONER

## 2023-08-05 PROCEDURE — 85025 COMPLETE CBC W/AUTO DIFF WBC: CPT | Performed by: NURSE PRACTITIONER

## 2023-08-05 PROCEDURE — 94760 N-INVAS EAR/PLS OXIMETRY 1: CPT

## 2023-08-05 PROCEDURE — 99232 SBSQ HOSP IP/OBS MODERATE 35: CPT | Performed by: INTERNAL MEDICINE

## 2023-08-05 PROCEDURE — 80048 BASIC METABOLIC PNL TOTAL CA: CPT | Performed by: NURSE PRACTITIONER

## 2023-08-05 RX ORDER — VANCOMYCIN HYDROCHLORIDE 1 G/200ML
1000 INJECTION, SOLUTION INTRAVENOUS EVERY 12 HOURS
Status: DISCONTINUED | OUTPATIENT
Start: 2023-08-05 | End: 2023-08-05

## 2023-08-05 RX ORDER — CEFAZOLIN SODIUM 2 G/50ML
2000 SOLUTION INTRAVENOUS EVERY 8 HOURS
Status: DISCONTINUED | OUTPATIENT
Start: 2023-08-05 | End: 2023-08-07

## 2023-08-05 RX ADMIN — FLUTICASONE FUROATE AND VILANTEROL TRIFENATATE 1 PUFF: 100; 25 POWDER RESPIRATORY (INHALATION) at 08:02

## 2023-08-05 RX ADMIN — SODIUM CHLORIDE 60 ML/HR: 0.9 INJECTION, SOLUTION INTRAVENOUS at 01:04

## 2023-08-05 RX ADMIN — SODIUM CHLORIDE 60 ML/HR: 0.9 INJECTION, SOLUTION INTRAVENOUS at 01:06

## 2023-08-05 RX ADMIN — AMPICILLIN SODIUM AND SULBACTAM SODIUM 3 G: 2; 1 INJECTION, POWDER, FOR SOLUTION INTRAMUSCULAR; INTRAVENOUS at 07:28

## 2023-08-05 RX ADMIN — Medication 250 MG: at 17:00

## 2023-08-05 RX ADMIN — SODIUM CHLORIDE 60 ML/HR: 0.9 INJECTION, SOLUTION INTRAVENOUS at 00:03

## 2023-08-05 RX ADMIN — SODIUM CHLORIDE 60 ML/HR: 0.9 INJECTION, SOLUTION INTRAVENOUS at 01:05

## 2023-08-05 RX ADMIN — ASPIRIN 81 MG CHEWABLE TABLET 81 MG: 81 TABLET CHEWABLE at 08:00

## 2023-08-05 RX ADMIN — VANCOMYCIN HYDROCHLORIDE 1000 MG: 1 INJECTION, SOLUTION INTRAVENOUS at 08:44

## 2023-08-05 RX ADMIN — CEFAZOLIN SODIUM 2000 MG: 2 SOLUTION INTRAVENOUS at 19:56

## 2023-08-05 RX ADMIN — FAMOTIDINE 40 MG: 20 TABLET, FILM COATED ORAL at 08:00

## 2023-08-05 RX ADMIN — Medication 250 MG: at 08:00

## 2023-08-05 RX ADMIN — CEFAZOLIN SODIUM 2000 MG: 2 SOLUTION INTRAVENOUS at 11:41

## 2023-08-05 RX ADMIN — HYDROXYUREA 500 MG: 500 CAPSULE ORAL at 17:00

## 2023-08-05 RX ADMIN — SODIUM CHLORIDE 60 ML/HR: 0.9 INJECTION, SOLUTION INTRAVENOUS at 02:42

## 2023-08-05 RX ADMIN — AMPICILLIN SODIUM AND SULBACTAM SODIUM 3 G: 2; 1 INJECTION, POWDER, FOR SOLUTION INTRAMUSCULAR; INTRAVENOUS at 01:06

## 2023-08-05 RX ADMIN — ATORVASTATIN CALCIUM 20 MG: 20 TABLET, FILM COATED ORAL at 08:00

## 2023-08-05 RX ADMIN — MAGNESIUM SULFATE HEPTAHYDRATE 1 G: 1 INJECTION, SOLUTION INTRAVENOUS at 00:11

## 2023-08-05 NOTE — ASSESSMENT & PLAN NOTE
· On losartan and verapamil at home.     · BP has been soft with systolics ranging in the 369J  · Monitor off home antihypertensives

## 2023-08-05 NOTE — ASSESSMENT & PLAN NOTE
· Patient denies history of COPD, reports history of asthma. Reports history of light smoking for about 15 years. · Diffuse wheezing on auscultation. Wheezing at home per wife. Patient denies SOB. · Given neb treatment in ED.      · Will order Breo, albuterol as needed  · Monitor symptoms

## 2023-08-05 NOTE — PLAN OF CARE
Problem: PAIN - ADULT  Goal: Verbalizes/displays adequate comfort level or baseline comfort level  Description: Interventions:  - Encourage patient to monitor pain and request assistance  - Assess pain using appropriate pain scale  - Administer analgesics based on type and severity of pain and evaluate response  - Implement non-pharmacological measures as appropriate and evaluate response  - Consider cultural and social influences on pain and pain management  - Notify physician/advanced practitioner if interventions unsuccessful or patient reports new pain  Outcome: Progressing     Problem: INFECTION - ADULT  Goal: Absence of fever/infection during neutropenic period  Description: INTERVENTIONS:  - Monitor WBC    Outcome: Progressing     Problem: DISCHARGE PLANNING  Goal: Discharge to home or other facility with appropriate resources  Description: INTERVENTIONS:  - Identify barriers to discharge w/patient and caregiver  - Arrange for needed discharge resources and transportation as appropriate  - Identify discharge learning needs (meds, wound care, etc.)  - Arrange for interpretive services to assist at discharge as needed  - Refer to Case Management Department for coordinating discharge planning if the patient needs post-hospital services based on physician/advanced practitioner order or complex needs related to functional status, cognitive ability, or social support system  Outcome: Progressing     Problem: Knowledge Deficit  Goal: Patient/family/caregiver demonstrates understanding of disease process, treatment plan, medications, and discharge instructions  Description: Complete learning assessment and assess knowledge base.   Interventions:  - Provide teaching at level of understanding  - Provide teaching via preferred learning methods  Outcome: Progressing     Problem: MUSCULOSKELETAL - ADULT  Goal: Maintain or return mobility to safest level of function  Description: INTERVENTIONS:  - Assess patient's ability to carry out ADLs; assess patient's baseline for ADL function and identify physical deficits which impact ability to perform ADLs (bathing, care of mouth/teeth, toileting, grooming, dressing, etc.)  - Assess/evaluate cause of self-care deficits   - Assess range of motion  - Assess patient's mobility  - Assess patient's need for assistive devices and provide as appropriate  - Encourage maximum independence but intervene and supervise when necessary  - Involve family in performance of ADLs  - Assess for home care needs following discharge   - Consider OT consult to assist with ADL evaluation and planning for discharge  - Provide patient education as appropriate  Outcome: Progressing  Goal: Maintain proper alignment of affected body part  Description: INTERVENTIONS:  - Support, maintain and protect limb and body alignment  - Provide patient/ family with appropriate education  Outcome: Progressing     Problem: RESPIRATORY - ADULT  Goal: Achieves optimal ventilation and oxygenation  Description: INTERVENTIONS:  - Assess for changes in respiratory status  - Assess for changes in mentation and behavior  - Position to facilitate oxygenation and minimize respiratory effort  - Oxygen administered by appropriate delivery if ordered  - Initiate smoking cessation education as indicated  - Encourage broncho-pulmonary hygiene including cough, deep breathe, Incentive Spirometry  - Assess the need for suctioning and aspirate as needed  - Assess and instruct to report SOB or any respiratory difficulty  - Respiratory Therapy support as indicated  Outcome: Progressing

## 2023-08-05 NOTE — ASSESSMENT & PLAN NOTE
· Reports 2 episodes of painless rectal bleeding a few days ago. Reports dark red blood with some clots. Reports history of GI bleed 2 years ago. Had EGD and colonoscopy, underwent capsule endoscopy as well. 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 EGD with push endoscopy in January 2021 showed small AVM in distal duodenum. Colonoscopy at the same time showed 1 polyp, internal and external hemorrhoids. Colonoscopy in August 2022 showed long and tortuous colon, internal hemorrhoids.     · Avoid pharmacologic DVT prophylaxis for now  · Continue baby aspirin  · Monitor CBC

## 2023-08-05 NOTE — H&P
[id number]  [place]&P  Name: Dimitri Chester 61 y.o. male I MRN: [id number]  Unit/Bed#: 2 [address] Date of Admission: 8/4/2023   Date of Service: 8/5/2023 I Hospital Day: 0      Assessment/Plan   * Cellulitis of right wrist  Assessment & Plan  Patient presents with right wrist infection started 2 days ago, worsening, now with red streak up to right forearm noticed today. Patient unclear cause of infection,? Insect bite. Small dry openings on right wrist.  Reports intermittent chest pain for about 3 days. Denies history of MRSA. · Patient is status post splenectomy due to sports injury as a teenage. Immunocompromised. · Fever 100.6 in ED. WBC 10.93.  · X-ray right wrist no acute findings  · Patient given vancomycin in ED. We will continue. We will add Unasyn. Consult pharmacy for [facility] management. · Communicated with hand surgery, recommend warm moist compresses 4 times daily. · Follow-up blood cultures  · Repeat CBC with differential in the morning  · Consult ID    Chest pain  Assessment & Plan  · EKG sinus tach, no ischemic changes  · Troponin 3  · BNP 19  · CTA dissection protocol - Stable ectasia of the ascending thoracic aorta measuring 40 mm. No acute aortic pathology. · Noncardiac chest pain  · Nuclear stress test in February 2023 was negative. · Monitor symptoms    Rectal bleeding  Assessment & Plan  · Reports 2 episodes of painless rectal bleeding a few days ago. Reports dark red blood with some clots. Reports history of GI bleed 2 years ago. Had EGD and colonoscopy, underwent capsule endoscopy as well. · Hemoglobin 16 today. 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 Colonoscopy at the same time showed 1 polyp, internal and external hemorrhoids.   · Colonoscopy in August 2022 showed long and tortuous colon, internal hemorrhoids. · Avoid pharmacologic DVT prophylaxis  · Continue baby aspirin  · Monitor CBC  · Consider GI eval for recurrent bleeding    Hypertension  Assessment & Plan  · On losartan and verapamil at home. · Hold losartan and continue verapamil with holding parameter in view of soft BP. · Monitor BP closely        Asthma  Assessment & Plan  · Patient denies history of COPD, reports history of asthma. Reports history of light smoking for about 15 years. · Diffuse wheezing on auscultation. Wheezing at home per wife. Patient denies SOB. · Given neb treatment in ED. 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 Anticipated Length of Stay:  Patient will be admitted on an Observation basis with an anticipated length of stay of  < 2 midnights. Justification for Hospital Stay: right wrist infection    Total Time for Visit, including Counseling / Coordination of Care: 45 minutes. Greater than 50% of this total time spent on direct patient counseling and coordination of care.     Chief Complaint:   Right wrist infection and chest pain    History of Present Illness:    Beauty Sicard is a 61 y.o. male with PMH of TIA, hypertension, polycythemia vera, splenectomy, BPH, GERD, asthma, sleep apnea who presents with  right wrist infection started 2 days ago, worsening, now with red streak up to right forearm noticed today. Patient unclear cause of infection,? Insect bite. Reports intermittent chest pain for about 3 days. Patient denies fever chills at home. Denies headache, dizziness, SOB, nausea vomiting diarrhea constipation. Reports right wrist pain. Patient reports chest pain is like someone sitting on the chest, no associated nausea vomiting diaphoresis dizziness, reports pain radiates to left upper arm at times. Patient denies history of MRSA. Wife at bedside reports patient has wheezing at home. Review of Systems:    Review of Systems   Respiratory: Positive for wheezing. Cardiovascular: Positive for chest pain. Musculoskeletal:        Right wrist pain   Skin:        Right wrist is red, tender, red streak up to elbow noticed today   All other systems reviewed and are negative.       Past Medical and Surgical History:     Past Medical History:   Diagnosis Date   • Anemia    • Arthritis    • Asthma    • Cancer [facility])     prostate   • Colon polyp    • Elevated PSA    • Full dentures    • GERD (gastroesophageal reflux disease)    • Hemorrhoid    • Hyperlipidemia    • Hypertension    • Polycythemia vera ([address])    • Sleep apnea     No CPAP   • Smoker    • TIA (transient ischemic attack)    • Transfusion history    • Wears glasses        Past Surgical History:   Procedure Laterality Date   • ABDOMINAL ADHESION SURGERY N/A 11/17/2021    Procedure: LYSIS ADHESIONS LAPAROSCOPIC W ROBOT;  Surgeon: Suleiman Payan MD;  Location: [facility];  Service: Urology   • APPENDECTOMY     • COLONOSCOPY     • EGD     • FOOT SURGERY Right     bone removed-left foot-removal of blood clots from an injury   • OTHER SURGICAL HISTORY      bone removal right arm-abnormal growth of surface bone   • PROSTATE BIOPSY     • PROSTATECTOMY N/A 2021    Procedure: ROBOTIC ASSISTED LAPAROSCOPIC SIMPLE PROSTATECTOMY AND BLADDER NECK RECONSTRUCTION;  Surgeon: Miguel Quach MD;  Location: [facility];  Service: Urology   • SPLENECTOMY      ruptured       Meds/Allergies:    Prior to Admission medications    Medication Sig Start Date End Date Taking? Authorizing Provider   acetaminophen (TYLENOL) 325 mg tablet Take 2 tablets (650 mg total) by mouth every 6 (six) hours as needed for mild pain, headaches or fever 20  Yes LAUREN Pedro   ASPIRIN 81 PO Take by mouth every morning   Yes Historical Provider, MD   atorvastatin (LIPITOR) 20 mg tablet Take 1 tablet (20 mg total) by mouth daily 23  Yes Twylla Apt, DO   famotidine (PEPCID) 40 MG tablet TAKE 1 TABLET BY MOUTH EVERY DAY 23  Yes Raf Dela Cruz PA-C   hydroxyurea (HYDREA) 500 mg capsule TAKE 1 CAPSULE (500 MG TOTAL) BY MOUTH EVERY EVENING 23  Yes Nancy Gasca MD   losartan (COZAAR) 50 mg tablet TAKE 1 TABLET BY MOUTH EVERY DAY 23  Yes Twylla Apt, DO   verapamil (CALAN-SR) 120 mg CR tablet Take 1 tablet (120 mg total) by mouth daily 23  Yes Twylla Apt, DO     I have reviewed home medications with patient personally.     Allergies: No Known Allergies    Social History:     Marital Status:    Occupation: Works at [employer]  Patient Pre-hospital Living Situation: Family  Patient Pre-hospital Level of Mobility: Independent  Patient Pre-hospital Diet Restrictions: Cardiac diet  Substance Use History:   Social History     Substance and Sexual Activity   Alcohol Use Not Currently     Social History     Tobacco Use   Smoking Status Former   • Packs/day: 0.50   • Years: 10.00   • Total pack years: 5.00   • Types: Cigarettes   • Start date: 6/15/2011   • Quit date: 2021   • Years since quittin.4   Smokeless Tobacco Never     Social History     Substance and Sexual Activity   Drug Use No       Family History:    non-contributory    Physical Exam:     Vitals:   Blood Pressure: 104/70 (08/05/23 0008)  Pulse: 70 (08/05/23 0008)  Temperature: 97.6 °F (36.4 °C) (08/05/23 0008)  Temp Source: Oral (08/04/23 2017)  Respirations: 16 (08/05/23 0008)  Height: 6' (182.9 cm) (08/04/23 2347)  Weight - Scale: 95.7 kg (211 lb) (08/04/23 2347)  SpO2: 94 % (08/05/23 0008)    Physical Exam  Vitals and nursing note reviewed. Constitutional:       Appearance: He is well-developed. HENT:      Head: Normocephalic and atraumatic. Neck:      Thyroid: No thyromegaly. Vascular: No JVD. Trachea: No tracheal deviation. Cardiovascular:      Rate and Rhythm: Normal rate and regular rhythm. Heart sounds: Normal heart sounds. Pulmonary:      Effort: Pulmonary effort is normal. No respiratory distress. Breath sounds: Wheezing present. No rales. Comments: Diffuse expiratory wheezing on auscultation, on room air, satting adequately  Abdominal:      General: Bowel sounds are normal. There is no distension. Palpations: Abdomen is soft. Tenderness: There is no abdominal tenderness. There is no guarding. Musculoskeletal:      Cervical back: Neck supple. Skin:     General: Skin is warm and dry. Comments: Right wrist reddened, increased warmth, tender, small dry openings to right wrist, red streak on inner forearm up to [facility]. Neurological:      General: No focal deficit present. Mental Status: He is alert and oriented to person, place, and time. Psychiatric:         Mood and Affect: Mood normal.         Judgment: Judgment normal.         Additional Data:     Lab Results: I have personally reviewed pertinent reports.       Results from last 7 days   Lab Units 08/04/23  1733   WBC Thousand/uL 10.93*   HEMOGLOBIN g/dL 16.0   HEMATOCRIT % 45.8   PLATELETS Thousands/uL 342   NEUTROS PCT % 71   LYMPHS PCT % 17   MONOS PCT % 9   EOS PCT % 1     Results from last 7 days   Lab Units 08/04/23  1733   POTASSIUM mmol/L 3.9   CHLORIDE mmol/L 100   CO2 mmol/L 26   BUN mg/dL 10   CREATININE mg/dL 1.15   CALCIUM mg/dL 9.8   ALK PHOS U/L 94   ALT U/L 46   AST U/L 25           Imaging: I have personally reviewed pertinent reports. CT chest wo contrast    Result Date: 8/4/2023  Narrative: CT CHEST WITHOUT IV CONTRAST INDICATION:   I71.20: Thoracic aortic aneurysm, without rupture, unspecified. COMPARISON: CT chest 8/2/2020. TECHNIQUE: CT examination of the chest was performed without intravenous contrast. Multiplanar 2D reformatted images were created from the source data. This examination, like all CT scans performed in the [facility], was performed utilizing techniques to minimize radiation dose exposure, including the use of iterative reconstruction and automated exposure control. Radiation dose length product (DLP) for this visit:  358 mGy-cm FINDINGS: LARGE AIRWAYS: Large airways are clear with no tracheal or endobronchial lesion. LUNGS: Bibasilar dependent changes. No consolidation. No edema. No suspicious mass or nodule. Left lower lobe 3 mm solid nodule is unchanged since 11/2021 and considered benign (series 4/74). PLEURA: No pneumothorax. No pleural effusion. HEART: Normal cardiac size and morphology. No coronary artery calcification. No pericardial effusion or thickening. VESSELS: Stable ectasia of ascending thoracic aorta measuring 40 mm (previously 40 mm) at the level of the right pulmonary artery. No significant atherosclerotic aortic plaque. 3 vessel aortic arch. Normal caliber main pulmonary artery. No filling defects to suggest pulmonary embolism in the main or lobar pulmonary arteries. MEDIASTINUM AND MARIAELENA: Within normal limits. CHEST WALL AND LOWER NECK:   Within normal limits. ABDOMEN: Hepatic steatosis. Splenectomy with splenosis in the left upper quadrant. Impression: 1.   Stable ectasia of the ascending thoracic aorta measuring 40 mm. 2.  No acute findings in the chest. Workstation performed: [id number]     XR wrist 3+ views RIGHT    Result Date: 8/4/2023  Narrative: RIGHT WRIST INDICATION:   pain. COMPARISON:  None VIEWS:  XR WRIST 3+ VW RIGHT Images: 3 FINDINGS: There is no acute fracture or dislocation. No significant degenerative changes. No lytic or blastic osseous lesion. There is no soft tissue gas or radiopaque foreign body. Impression: No acute osseous abnormality. Workstation performed: [id number]     CTA dissection protocol chest/abdomen/pelvis    Result Date: 8/4/2023  Narrative: CTA - CHEST, ABDOMEN AND PELVIS - WITHOUT AND WITH IV CONTRAST INDICATION:   hx of aortica ectasia/chest pain. COMPARISON: CT chest 8/2/2022, 8/1/2023. CT abdomen/pelvis 5/1/2021. TECHNIQUE: CT examination of the chest, abdomen and pelvis was performed both prior to and after the administration of intravenous contrast.  The noncontrast portion of this examination was performed utilizing low radiation dose technique. Thin section angiographic arterial phase post contrast technique was used in order to evaluate for aortic dissection. 3D reformatted images and volume rendering were performed on an independent workstation. Additionally, axial, sagittal, and coronal 2D reformatted images were created from the source data and submitted for interpretation. Radiation dose length product (DLP) for this visit:  1306.48 mGy-cm . This examination, like all CT scans performed in the [facility], was performed utilizing techniques to minimize radiation dose exposure, including the use of iterative reconstruction and automated exposure control. IV Contrast:  100 mL of iohexol (OMNIPAQUE) Enteric Contrast:  Enteric contrast was not administered. FINDINGS: VESSELS: Stable ectasia of the ascending thoracic aorta measuring 40 mm at the level of the right pulmonary artery. No atherosclerotic plaque in the thoracic aorta. Three-vessel aortic arch.  Normal caliber abdominal aorta with minimal atherosclerotic plaque. There is no aortic dissection, intramural hematoma, or penetrating atherosclerotic ulcer. The bilateral common iliac, internal iliac, and external iliac arteries are patent with mild atherosclerotic plaque. The bilateral common femoral and visualized bilateral deep femoral and femoral arteries are patent without significant plaque. The celiac, SMA, and VICKY are patent; conventional celiac anatomy. The renal arteries are patent. CHEST: LARGE AIRWAYS: Large airways are clear with no tracheal or endobronchial lesion. LUNGS: Bibasilar dependent changes. No consolidation. No edema. No suspicious mass or nodule. Left lower lobe 3 mm solid nodule is unchanged since 11/2021 and considered benign (series 4/83). PLEURA: No pneumothorax. No pleural effusion. HEART: Normal cardiac size and morphology. No coronary artery calcification. No pericardial effusion or thickening. VESSELS: Normal caliber main pulmonary artery. No filling defects to suggest pulmonary embolism in the main or lobar pulmonary arteries. MEDIASTINUM AND MARIAELENA: Within normal limits. CHEST WALL AND LOWER NECK:   Within normal limits. ABDOMEN: LIVER/BILIARY TREE: Normal size and morphology. Hepatic steatosis with focal fatty sparing adjacent to the gallbladder fossa. No suspicious lesions within the limitations of a single phase exam. GALLBLADDER: No calcified gallstones. Normal wall thickness. No pericholecystic fluid. SPLEEN: Splenectomy with splenosis in the left upper quadrant. PANCREAS: Pancreatic parenchyma within normal limits. No main pancreatic ductal dilatation. ADRENAL GLANDS: Within normal limits. KIDNEYS/URETERS: Normal size and position. Symmetric enhancement. No suspicious lesion. No calcified stones or hydronephrosis. Ureters within normal limits. STOMACH AND BOWEL: Stomach within normal limits. Normal caliber small bowel. Normal caliber large bowel. The colon is diffusely underdistended limiting evaluation. APPENDIX: The appendix is not definitively visualized, however there are no secondary signs of acute appendicitis. ABDOMINOPELVIC CAVITY: No ascites. No intraperitoneal free air. No lymphadenopathy. No retroperitoneal hematoma. PELVIS REPRODUCTIVE ORGANS: Enlarged prostate. Symmetric seminal vesicles. URINARY BLADDER: Within normal limits. ABDOMINAL WALL/INGUINAL REGIONS: Small fat-containing right inguinal hernia. BONES: Moderate multilevel spondylosis. Limbus vertebra at L5. There is grade 1 degenerative retrolisthesis of L4 on L5 and L5 on S1. No acute fracture. No destructive osseous lesion. Impression: 1. Stable ectasia of the ascending thoracic aorta measuring 40 mm. No acute aortic pathology. 2.  No acute findings in the chest, abdomen, or pelvis. Workstation performed: [id number]       EKG, Pathology, and Other Studies Reviewed on Admission:   · EKG: Sinus tach    Allscripts Records Reviewed: Yes     ** Please Note: Dragon 360 Dictation voice to text software may have been used in the creation of this document.  **

## 2023-08-05 NOTE — PROGRESS NOTES
[id number] [facility]  Progress Note  Name: Alexandra Pena  MRN: [id number]  Unit/Bed#: 2 [address] Date of Admission: 8/4/2023   Date of Service: 8/5/2023 I Hospital Day: 0    Assessment/Plan   * Cellulitis of right wrist  Assessment & Plan  · Patient presents with 48-hour history of erythema and swelling of the dorsum of the right hand with energetic spread of the right forearm. Was also febrile with leukocytosis in the ER. · Denies any injury or trauma to the affected area. No open cuts or sores. Denies any animal bites. Denies any insect bites. · History of splenectomy; though again no history of animal bites    · Initially admitted on vancomycin and Unasyn  · We will de-escalate antibiotics to IV cefazolin  · Follow-up any additional ID recommendations  · Follow blood cultures        Rectal bleeding  Assessment & Plan  · Reports 2 episodes of painless rectal bleeding a few days ago. Reports dark red blood with some clots. Reports history of GI bleed 2 years ago. Had EGD and colonoscopy, underwent capsule endoscopy as well. 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 EGD with push endoscopy in January 2021 showed small AVM in distal duodenum. Colonoscopy at the same time showed 1 polyp, internal and external hemorrhoids. Colonoscopy in August 2022 showed long and tortuous colon, internal hemorrhoids. 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 Reports history of light smoking for about 15 years. · Diffuse wheezing on auscultation. Wheezing at home per wife. Patient denies SOB. · Given neb treatment in ED.      · Will order Breo, albuterol as needed  · Monitor symptoms    History of TIA (transient ischemic attack)  Assessment & Plan  · Continue baby aspirin, Lipitor    Sleep apnea  Assessment & Plan  · Was never offered CPAP by provider per patient  · Follow-up PCP as outpatient    Chest pain  Assessment & Plan  · Reported chest pressure on admission in the emergency room  · EKG with sinus tachycardia but no ischemic changes  · Troponin negative, BMP negative  · CTA dissection protocol - Stable ectasia of the ascending thoracic aorta measuring 40 mm. No acute aortic pathology. · Nuclear stress test in February 2023 was negative. · Chest pain resolved today    Polycythemia vera ([address])  Assessment & Plan  · Continue hydroxyurea and baby aspirin  · Follows hematology as outpatient    Benign localized prostatic hyperplasia with lower urinary tract symptoms (LUTS)  Assessment & Plan  · Status post prostatectomy    Chronic GERD  Assessment & Plan  · Continue Pepcid    Other hyperlipidemia  Assessment & Plan  · Continue statin    Hypertension  Assessment & Plan  · On losartan and verapamil at home. · BP has been soft with systolics ranging in the 878S  · Monitor off home antihypertensives              VTE Pharmacologic Prophylaxis:   Moderate Risk (Score 3-4) - Pharmacological DVT Prophylaxis Contraindicated. Sequential Compression Devices Ordered. Patient Centered Rounds: I performed bedside rounds with nursing staff today. Discussions with Specialists or Other Care Team Provider: DHEERAJ.     Education and Discussions with Family / Patient: Patient declined call to . Total Time Spent on Date of Encounter in care of patient: 35 minutes This time was spent on one or more of the following: performing physical exam; counseling and coordination of care; obtaining or reviewing history; documenting in the medical record; reviewing/ordering tests, medications or procedures; communicating with other healthcare professionals and discussing with patient's family/caregivers.     Current Length of Stay: 0 day(s)  Current Patient Status: Observation   Certification Statement: The patient will continue to require additional inpatient hospital stay due to IV abx's  Discharge Plan: Anticipate discharge in 24-48 hrs to home. Code Status: Level 1 - Full Code    Subjective:   Patient seen and examined. No events overnight. He has been remains afebrile. Mild improvement in the erythema on his arm. No new complaints. Objective:     Vitals:   Temp (24hrs), Av.8 °F (37.1 °C), Min:97.5 °F (36.4 °C), Max:101.4 °F (38.6 °C)    Temp:  [97.5 °F (36.4 °C)-101.4 °F (38.6 °C)] 97.6 °F (36.4 °C)  HR:  [] 76  Resp:  [16-28] 18  BP: (104-152)/(70-87) 109/73  SpO2:  [93 %-98 %] 95 %  Body mass index is 28.62 kg/m². Input and Output Summary (last 24 hours):   No intake or output data in the 24 hours ending 23 1118    PHYSICAL EXAM:    Vitals signs reviewed  Constitutional   Awake and cooperative. NAD. Head/Neck   Normocephalic. Atraumatic. HEENT   No scleral icterus. EOMI. Heart   Regular rate and rhythm. No murmers. Lungs   Clear to auscultation bilaterally. Respirations unlaboured. Abdomen   Soft. Nontender. Nondistended. Skin   Skin color normal. No rashes. Extremities   No deformities. No peripheral edema. Erythema over the dorsum of the right hand/wrist with lymphangitic spread up the right forearm, overall erythema mildly improved from the lines of demarcation from yesterday. Neuro   Alert and oriented. No new deficits. Psych   Mood stable.  Affect normal.         Additional Data:     Labs:  Results from last 7 days   Lab Units 23  0622   WBC Thousand/uL 10.60*   HEMOGLOBIN g/dL 14.8   HEMATOCRIT % 42.3   PLATELETS Thousands/uL 341   NEUTROS PCT % 86*   LYMPHS PCT % 9*   MONOS PCT % 3*   EOS PCT % 0     Results from last 7 days   Lab Units 23  0622 23  1733   SODIUM mmol/L 135 134*   POTASSIUM mmol/L 4.2 3.9   CHLORIDE mmol/L 106 100   CO2 mmol/L 23 26   BUN mg/dL 14 10   CREATININE mg/dL 1.04 1.15   ANION GAP mmol/L 6 8   CALCIUM mg/dL 9.1 9.8   ALBUMIN g/dL  --  4.6   TOTAL BILIRUBIN mg/dL  --  0.55   ALK PHOS U/L  --  94   ALT U/L  --  46   AST U/L  --  25   GLUCOSE RANDOM mg/dL 148* 90                 Results from last 7 days   Lab Units 08/04/23  1733   LACTIC ACID mmol/L 1.1       Lines/Drains:  Invasive Devices     Peripheral Intravenous Line  Duration           Peripheral IV 08/04/23 Left Antecubital <1 day    Peripheral IV 08/05/23 Left;Ventral (anterior) Forearm <1 day          Drain  Duration           Urethral Catheter Three way 626 days                           Imaging: No pertinent imaging reviewed. Recent Cultures (last 7 days):   Results from last 7 days   Lab Units 08/04/23  1739 08/04/23  1733   BLOOD CULTURE  Received in Microbiology Lab. Culture in Progress. Received in Microbiology Lab. Culture in Progress. Last 24 Hours Medication List:   Current Facility-Administered Medications   Medication Dose Route Frequency Provider Last Rate   • acetaminophen  650 mg Oral Q6H PRN LAUREN Pedro     • albuterol  2.5 mg Nebulization Q4H PRN LAUREN Pedro     • aspirin  81 mg Oral QAM LAUREN Pedro     • atorvastatin  20 mg Oral Daily LAUREN Pedro     • cefazolin  2,000 mg Intravenous Q8H John Kiser DO     • famotidine  40 mg Oral Daily LAUREN Pedro     • Fluticasone Furoate-Vilanterol  1 puff Inhalation Daily LAUREN Pedro     • hydroxyurea  500 mg Oral After LAUREN Mobley     • ondansetron  4 mg Intravenous Q6H PRN LAUREN Pedro     • saccharomyces boulardii  250 mg Oral BID LAUREN Pedro     • sodium chloride  60 mL/hr Intravenous Continuous LAUREN Pedro 60 mL/hr (08/05/23 0242)   • verapamil  120 mg Oral Daily LAUREN Pedro          Today, Patient Was Seen By: John Kiser DO    **Please Note: This note may have been constructed using a voice recognition system. **

## 2023-08-05 NOTE — UTILIZATION REVIEW
Initial Clinical Review    Admission: Date/Time/Statement:   Admission Orders (From admission, onward)     Ordered        08/04/23 2055  Place in Observation  Once                      Orders Placed This Encounter   Procedures   • Place in Observation     Standing Status:   Standing     Number of Occurrences:   1     Order Specific Question:   Level of Care     Answer:   Med Surg [16]     ED Arrival Information     Expected   -    Arrival   8/4/2023 17:03    Acuity   Emergent            Means of arrival   Walk-In    Escorted by   Family Member    Service   Hospitalist    Admission type   Emergency            Arrival complaint   chest pain, dizziness           Chief Complaint   Patient presents with   • Chest Pain     Sent from PCP office. EKG done at office. States CP for 3 days, sternum   • Cellulitis     Redness and swelling right wrist for 2 days , now red streak going up arm      Initial Presentation: 61 [facility]. [facility]. Patient c/o  3 days of chest pain. worse @  substernal aspect of his chest. Known history of ectasia of his thoracic aorta, hyperlipidemia, GERD, hypertension. Patient also with erythema noted to right wrist, now radiating up the medial aspect of his arm. He also complains of nonproductive cough. On exam (+) wheezes, anxious. Plan IVF, IV Antibiotics and supportive care    Erythema present.                   ED Triage Vitals [08/04/23 1711]   Temperature Pulse Respirations Blood Pressure SpO2   (!) 100.6 °F (38.1 °C) (!) 109 (!) 28 128/87 98 %      Temp Source Heart Rate Source Patient Position - Orthostatic VS BP Location FiO2 (%)   Tympanic Monitor Sitting Left arm --      Pain Score       10 - Worst Possible Pain          Wt Readings from Last 1 Encounters:   08/04/23 95.7 kg (211 lb)     Additional Vital Signs:   Patient Position - Orthostatic VS           08/05/23 07:34:38 97.6 °F (36.4 °C) 76 -- 109/73 85 95 % -- --   08/05/23 03:12:52 97.5 °F (36.4 °C) 74 18 106/73 84 96 % -- --   08/05/23 0058 -- -- -- -- -- 93 % None (Room air) --   08/05/23 00:08:29 97.6 °F (36.4 °C) 70 16 104/70 81 94 % -- --   08/04/23 2017 98.3 °F (36.8 °C) -- -- -- -- -- -- --   08/04/23 2015 -- 86 20 106/77 86 96 % -- --   08/04/23 2000 -- 91 -- 108/77 87 95 % None (Room air) Sitting   08/04/23 1945 -- 90 20 115/72 90 96 % None (Room air) Sitting   08/04/23 1900 -- 99 20 152/72 101 96 % None (Room air) Lying   08/04/23 1845 -- 99 -- 134/87 106 97 % None (Room air) Sitting       Pertinent Labs/Diagnostic Test Results:   ECG 08-04-23  ECG rate:  106bpm     ECG rate assessment: tachycardic     Rhythm:     Rhythm: sinus tachycardia     Ectopy:     Ectopy: none     QRS:     QRS axis:  Normal   Conduction:     Conduction: normal     ST segments:     ST segments:  Normal   T waves:     T waves: normal                   XR wrist 3+ views RIGHT    (08/04 2017)      No acute osseous abnormality. CTA dissection protocol chest/abdomen/pelvis    (08/04 2014)      1. Stable ectasia of the ascending thoracic aorta measuring 40 mm. No acute aortic pathology. 2.  No acute findings in the chest, abdomen, or pelvis.      Results from last 7 days   Lab Units 08/04/23  1744   SARS-COV-2  Negative     Results from last 7 days   Lab Units 08/05/23  0622 08/04/23  1733   WBC Thousand/uL 10.60* 10.93*   HEMOGLOBIN g/dL 14.8 16.0   HEMATOCRIT % 42.3 45.8   PLATELETS Thousands/uL 341 342   NEUTROS ABS Thousands/µL 9.13* 7.82*         Results from last 7 days   Lab Units 08/05/23  0622 08/04/23  1733   SODIUM mmol/L 135 134*   POTASSIUM mmol/L 4.2 3.9   CHLORIDE mmol/L 106 100   CO2 mmol/L 23 26   ANION GAP mmol/L 6 8   BUN mg/dL 14 10   CREATININE mg/dL 1.04 1.15   EGFR ml/min/1.73sq m 78 69   CALCIUM mg/dL 9.1 9.8   MAGNESIUM mg/dL 2.1 1.8*     Results from last 7 days   Lab Units 08/04/23  1733   AST U/L 25   ALT U/L 46   ALK PHOS U/L 94   TOTAL PROTEIN g/dL 8.0   ALBUMIN g/dL 4.6 TOTAL BILIRUBIN mg/dL 0.55         Results from last 7 days   Lab Units 08/05/23  0622 08/04/23  1733   GLUCOSE RANDOM mg/dL 148* 90     Results from last 7 days   Lab Units 08/04/23  1733   HS TNI 0HR ng/L 3     Results from last 7 days   Lab Units 08/04/23  1733   LACTIC ACID mmol/L 1.1             Results from last 7 days   Lab Units 08/04/23  1733   BNP pg/mL 19       Results from last 7 days   Lab Units 08/04/23  1739 08/04/23  1733   BLOOD CULTURE  Received in Microbiology Lab. Culture in Progress. Received in Microbiology Lab. Culture in Progress.                    ED Treatment:   Medication Administration from 08/04/2023 1702 to 08/04/2023 2243       Date/Time Order Dose Route Action     08/04/2023 1745 EDT acetaminophen (TYLENOL) tablet 650 mg 650 mg Oral Given     08/04/2023 1746 EDT methylPREDNISolone sodium succinate (Solu-MEDROL) injection 80 mg 80 mg Intravenous Given     08/04/2023 1814 EDT iohexol (OMNIPAQUE) 350 MG/ML injection (SINGLE-DOSE) 100 mL 100 mL Intravenous Given     08/04/2023 2125 EDT vancomycin (VANCOCIN) IVPB (premix in dextrose) 1,000 mg 200 mL 1,000 mg Intravenous New Bag     08/04/2023 2056 EDT ipratropium-albuterol (DUO-NEB) 0.5-2.5 mg/3 mL inhalation solution 3 mL 3 mL Nebulization Given        Past Medical History:   Diagnosis Date   • Anemia    • Arthritis    • Asthma    • Cancer [facility])     prostate   • Colon polyp    • Elevated PSA    • Full dentures    • GERD (gastroesophageal reflux disease)    • Hemorrhoid    • Hyperlipidemia    • Hypertension    • Polycythemia vera (HCC)    • Sleep apnea     No CPAP   • Smoker    • TIA (transient ischemic attack)    • Transfusion history    • Wears glasses      Present on Admission:  • Polycythemia vera ([address])  • Other hyperlipidemia  • Benign localized prostatic hyperplasia with lower urinary tract symptoms (LUTS)  • Chronic GERD  • Hypertension      Admitting Diagnosis: Wheezing [R06.2]  Chest pain, unspecified [R07.9]  Cellulitis [L03.90]  Chest pain [R07.9]  Cellulitis with lymphangitis [L03.90]  Aortic ectasia, thoracic (HCC) [I77.810]  Age/Sex: 61 y.o. male     Admission Orders:  Heat to affected area  Elevated limb  SCD          Scheduled Medications:  ampicillin-sulbactam, 3 g, Intravenous, Q6H  aspirin, 81 mg, Oral, QAM  atorvastatin, 20 mg, Oral, Daily  famotidine, 40 mg, Oral, Daily  Fluticasone Furoate-Vilanterol, 1 puff, Inhalation, Daily  hydroxyurea, 500 mg, Oral, After Dinner  saccharomyces boulardii, 250 mg, Oral, BID  vancomycin, 1,000 mg, Intravenous, Q12H  verapamil, 120 mg, Oral, Daily      Continuous IV Infusions:  sodium chloride, 60 mL/hr, Intravenous, Continuous      PRN Meds:  acetaminophen, 650 mg, Oral, Q6H PRN  albuterol, 2.5 mg, Nebulization, Q4H PRN  ondansetron, 4 mg, Intravenous, Q6H PRN        IP CONSULT TO INFECTIOUS DISEASES  IP CONSULT TO PHARMACY    Network Utilization Review Department  ATTENTION: Please call with any questions or concerns to [phone] and carefully listen to the prompts so that you are directed to the right person. All voicemails are confidential.  Marie Ordoñez all requests for admission clinical reviews, approved or denied determinations and any other requests to dedicated fax number below belonging to the campus where the patient is receiving treatment.  List of dedicated fax numbers for the Facilities:  [place] DENIALS (Administrative/Medical Necessity) [phone] [address] (M[facility]/NICU/Pediatrics) [phone]   [address] [place] [address] Drive [phone]   [facility] [address] [facility] [phone]   [address] [facility] [address] [phone]   [facility] [address] [facility] [phone]   [address] [place] [address]  [address]  [phone]

## 2023-08-05 NOTE — ASSESSMENT & PLAN NOTE
· EKG sinus tach, no ischemic changes  · Troponin 3  · BNP 19  · Noncardiac chest pain  · Nuclear stress test in February 2023 was negative.   · Monitor symptoms

## 2023-08-05 NOTE — PROGRESS NOTES
Keturah Acevedo is a 61 y.o. male who is currently ordered Vancomycin IV with management by the Pharmacy Consult service. Relevant clinical data and objective / subjective history reviewed. Vancomycin Assessment:  Indication and Goal AUC/Trough: Soft tissue (goal -600, trough >10)  Clinical Status: stable  Micro:     Renal Function:  SCr: 1.15 mg/dL  CrCl: 83.1 mL/min  Renal replacement: Not on dialysis  Days of Therapy: 1  Current Dose:    Vancomycin Plan:  New Dosing: vancomycin 1000mg q12h  Estimated AUC: 521 mcg*hr/mL  Estimated Trough: 17.2 mcg/mL  Next Level: 8/6 0600  Renal Function Monitoring: Daily BMP and [place] will continue to follow closely for s/sx of nephrotoxicity, infusion reactions and appropriateness of therapy. BMP and CBC will be ordered per protocol. We will continue to follow the patient’s culture results and clinical progress daily.     Andrew Boudreaux, Pharmacist

## 2023-08-05 NOTE — ASSESSMENT & PLAN NOTE
· Patient presents with 48-hour history of erythema and swelling of the dorsum of the right hand with energetic spread of the right forearm. Was also febrile with leukocytosis in the ER. · Denies any injury or trauma to the affected area. No open cuts or sores. Denies any animal bites. Denies any insect bites.   · History of splenectomy; though again no history of animal bites    · Initially admitted on vancomycin and Unasyn  · We will de-escalate antibiotics to IV cefazolin  · Follow-up any additional ID recommendations  · Follow blood cultures

## 2023-08-05 NOTE — ASSESSMENT & PLAN NOTE
Patient presents with right wrist infection started 2 days ago, worsening, now with red streak up to right forearm noticed today. Patient unclear cause of infection,? Insect bite. Small dry openings on right wrist.  Reports intermittent chest pain for about 3 days. Denies history of MRSA. · Patient is status post splenectomy due to sports injury as a teenage. Immunocompromised. · Fever 100.6 in ED. WBC 10.93.  · Patient given vancomycin in ED. We will continue. We will add Unasyn. Consult pharmacy for [facility] management. · Communicated with hand surgery, recommend warm moist compresses 4 times daily.   · Follow-up blood cultures  · Repeat CBC with differential in the morning  · Consult ID

## 2023-08-05 NOTE — ASSESSMENT & PLAN NOTE
· Reported chest pressure on admission in the emergency room  · EKG with sinus tachycardia but no ischemic changes  · Troponin negative, BMP negative  · CTA dissection protocol - Stable ectasia of the ascending thoracic aorta measuring 40 mm. No acute aortic pathology. · Nuclear stress test in February 2023 was negative.     · Chest pain resolved today

## 2023-08-05 NOTE — CONSULTS
The patient's vancomycin therapy has been discontinued. Pharmacy will sign off now. Thank you for this consult.      Maureen Dale, Pharmacist

## 2023-08-05 NOTE — PLAN OF CARE
Problem: PAIN - ADULT  Goal: Verbalizes/displays adequate comfort level or baseline comfort level  Description: Interventions:  - Encourage patient to monitor pain and request assistance  - Assess pain using appropriate pain scale  - Administer analgesics based on type and severity of pain and evaluate response  - Implement non-pharmacological measures as appropriate and evaluate response  - Consider cultural and social influences on pain and pain management  - Notify physician/advanced practitioner if interventions unsuccessful or patient reports new pain  Outcome: Progressing     Problem: INFECTION - ADULT  Goal: Absence of fever/infection during neutropenic period  Description: INTERVENTIONS:  - Monitor WBC    Outcome: Progressing     Problem: DISCHARGE PLANNING  Goal: Discharge to home or other facility with appropriate resources  Description: INTERVENTIONS:  - Identify barriers to discharge w/patient and caregiver  - Arrange for needed discharge resources and transportation as appropriate  - Identify discharge learning needs (meds, wound care, etc.)  - Arrange for interpretive services to assist at discharge as needed  - Refer to Case Management Department for coordinating discharge planning if the patient needs post-hospital services based on physician/advanced practitioner order or complex needs related to functional status, cognitive ability, or social support system  Outcome: Progressing     Problem: Knowledge Deficit  Goal: Patient/family/caregiver demonstrates understanding of disease process, treatment plan, medications, and discharge instructions  Description: Complete learning assessment and assess knowledge base.   Interventions:  - Provide teaching at level of understanding  - Provide teaching via preferred learning methods  Outcome: Progressing     Problem: MUSCULOSKELETAL - ADULT  Goal: Maintain or return mobility to safest level of function  Description: INTERVENTIONS:  - Assess patient's ability to carry out ADLs; assess patient's baseline for ADL function and identify physical deficits which impact ability to perform ADLs (bathing, care of mouth/teeth, toileting, grooming, dressing, etc.)  - Assess/evaluate cause of self-care deficits   - Assess range of motion  - Assess patient's mobility  - Assess patient's need for assistive devices and provide as appropriate  - Encourage maximum independence but intervene and supervise when necessary  - Involve family in performance of ADLs  - Assess for home care needs following discharge   - Consider OT consult to assist with ADL evaluation and planning for discharge  - Provide patient education as appropriate  Outcome: Progressing  Goal: Maintain proper alignment of affected body part  Description: INTERVENTIONS:  - Support, maintain and protect limb and body alignment  - Provide patient/ family with appropriate education  Outcome: Progressing

## 2023-08-06 LAB — VANCOMYCIN TROUGH SERPL-MCNC: <5 UG/ML (ref 10–20)

## 2023-08-06 PROCEDURE — 94640 AIRWAY INHALATION TREATMENT: CPT

## 2023-08-06 PROCEDURE — 99232 SBSQ HOSP IP/OBS MODERATE 35: CPT | Performed by: INTERNAL MEDICINE

## 2023-08-06 PROCEDURE — 80202 ASSAY OF VANCOMYCIN: CPT | Performed by: FAMILY MEDICINE

## 2023-08-06 RX ADMIN — VERAPAMIL HYDROCHLORIDE 120 MG: 120 TABLET, FILM COATED, EXTENDED RELEASE ORAL at 08:08

## 2023-08-06 RX ADMIN — Medication 250 MG: at 18:26

## 2023-08-06 RX ADMIN — ASPIRIN 81 MG CHEWABLE TABLET 81 MG: 81 TABLET CHEWABLE at 08:08

## 2023-08-06 RX ADMIN — CEFAZOLIN SODIUM 2000 MG: 2 SOLUTION INTRAVENOUS at 11:14

## 2023-08-06 RX ADMIN — ATORVASTATIN CALCIUM 20 MG: 20 TABLET, FILM COATED ORAL at 08:08

## 2023-08-06 RX ADMIN — CEFAZOLIN SODIUM 2000 MG: 2 SOLUTION INTRAVENOUS at 02:32

## 2023-08-06 RX ADMIN — FAMOTIDINE 40 MG: 20 TABLET, FILM COATED ORAL at 08:08

## 2023-08-06 RX ADMIN — ACETAMINOPHEN 650 MG: 325 TABLET ORAL at 15:11

## 2023-08-06 RX ADMIN — HYDROXYUREA 500 MG: 500 CAPSULE ORAL at 18:27

## 2023-08-06 RX ADMIN — ALBUTEROL SULFATE 2.5 MG: 2.5 SOLUTION RESPIRATORY (INHALATION) at 20:35

## 2023-08-06 RX ADMIN — FLUTICASONE FUROATE AND VILANTEROL TRIFENATATE 1 PUFF: 100; 25 POWDER RESPIRATORY (INHALATION) at 08:08

## 2023-08-06 RX ADMIN — Medication 250 MG: at 08:08

## 2023-08-06 RX ADMIN — CEFAZOLIN SODIUM 2000 MG: 2 SOLUTION INTRAVENOUS at 19:55

## 2023-08-06 NOTE — ASSESSMENT & PLAN NOTE
Reported 2 episodes of painless rectal bleeding few days prior to admission. Dark red blood with some clots. Reports history of GI bleed 2 years ago. Had EGD and colonoscopy, underwent capsule endoscopy as well. 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 EGD with push endoscopy in January 2021 showed small AVM in distal duodenum. Colonoscopy at the same time showed 1 polyp, internal and external hemorrhoids. Colonoscopy in August 2022 showed long and tortuous colon, internal hemorrhoids.   · Avoid pharmacologic DVT prophylaxis for now  · Continue baby aspirin  · Hemoglobin stable on CBC

## 2023-08-06 NOTE — ASSESSMENT & PLAN NOTE
Patient presented with 48-hour history of erythema and swelling of the dorsum of the right hand with energetic spread of the right forearm. Was also febrile with leukocytosis in the ER. · Denies any injury or trauma to the affected area. No open cuts or sores. No animal or insect bites.   · History of splenectomy  · Erythema markedly improved with lymphangitic spread streaking resolved  · Was previously on IV cefazolin but currently on IV Unasyn pending blood cultures  · Leukocytosis resolved

## 2023-08-06 NOTE — ASSESSMENT & PLAN NOTE
On losartan and verapamil at home.     · BP previously soft with systolics ranging in the 944Q but currently has improved  · Continue verapamil

## 2023-08-06 NOTE — ASSESSMENT & PLAN NOTE
Patient denies history of COPD, reports history of asthma. Reports history of light smoking for about 15 years.    · Given neb treatment in ED due to wheezing  · Continue Breo, albuterol as needed

## 2023-08-06 NOTE — UTILIZATION REVIEW
Continued Stay Review    OBS 08-04-23 @ 2055 CONVERTED TO INPATIENT 08-06-23 FOR CONTINUATION FOR CARE FOR RIGHT WRIST CELLULITIS AND THE NEED TO CONTINUE IV ANTIBIOTICS     Inpatient Admission  Once        Transfer Service: Hospitalist    Question Answer Comment   Level of Care Med Surg    Estimated length of stay More than 2 Midnights    Certification I certify that inpatient services are medically necessary for this patient for a duration of greater than two midnights. See H&P and MD Progress Notes for additional information about the patient's course of treatment.         08/06/23 1618           Date:  08-06-23                         Current Patient Class: OBS  Current Level of Care: medical    HPI:57 y.o. male initially admitted on 08-04-23     Assessment/Plan: continue IV antibiotics for cellulitis of the right hand History of splenectomy patient still c/o pain Erythema over the dorsum of the right hand/wrist with lymphangitic spread up the right forearm, overall erythema mildly improved from the lines of demarcation from yesterday       Vital Signs:   Date/Time Temp Pulse Resp BP MAP (mmHg) SpO2 O2 Device Patient Position - Orthostatic VS   08/06/23 15:07:36 97.8 °F (36.6 °C) 76 -- 136/84 101 96 % -- --   08/06/23 08:05:38 97.5 °F (36.4 °C) 81 -- 135/85 102 96 % None (Room air) --   08/05/23 23:40:43 97.7 °F (36.5 °C) 83 16 140/85 103 95 % -- --   08/05/23 19:59:43 97.7 °F (36.5 °C) 73 18 113/71 85 94 % -- --   08/05/23 15:05:25 97.8 °F (36.6 °C) 74 -- 113/73 86 95 % -- --   08/05/23 07:34:38 97.6 °F (36.4 °C) 76 18 109/73 85 95 % None (Room air) Lying   08/05/23 03:12:52 97.5 °F (36.4 °C) 74 18 106/73 84 96 % --          Pertinent Labs/Diagnostic Results:   Results from last 7 days   Lab Units 08/04/23  1744   SARS-COV-2  Negative     Results from last 7 days   Lab Units 08/05/23  0622 08/04/23  1733   WBC Thousand/uL 10.60* 10.93*   HEMOGLOBIN g/dL 14.8 16.0   HEMATOCRIT % 42.3 45.8   PLATELETS Thousands/uL 341 342   NEUTROS ABS Thousands/µL 9.13* 7.82*         Results from last 7 days   Lab Units 08/05/23  0622 08/04/23  1733   SODIUM mmol/L 135 134*   POTASSIUM mmol/L 4.2 3.9   CHLORIDE mmol/L 106 100   CO2 mmol/L 23 26   ANION GAP mmol/L 6 8   BUN mg/dL 14 10   CREATININE mg/dL 1.04 1.15   EGFR ml/min/1.73sq m 78 69   CALCIUM mg/dL 9.1 9.8   MAGNESIUM mg/dL 2.1 1.8*     Results from last 7 days   Lab Units 08/04/23  1733   AST U/L 25   ALT U/L 46   ALK PHOS U/L 94   TOTAL PROTEIN g/dL 8.0   ALBUMIN g/dL 4.6   TOTAL BILIRUBIN mg/dL 0.55         Results from last 7 days   Lab Units 08/05/23  0622 08/04/23  1733   GLUCOSE RANDOM mg/dL 148* 90     Results from last 7 days   Lab Units 08/04/23  1733   HS TNI 0HR ng/L 3     Results from last 7 days   Lab Units 08/04/23  1733   LACTIC ACID mmol/L 1.1             Results from last 7 days   Lab Units 08/04/23  1733   BNP pg/mL 19         Results from last 7 days   Lab Units 08/04/23  1739 08/04/23  1733   BLOOD CULTURE  No Growth at 24 hrs. No Growth at 24 hrs. Results from last 7 days   Lab Units 08/06/23  0547   VANCOMYCIN TR ug/mL <5.0*       Medications:   Scheduled Medications:  aspirin, 81 mg, Oral, QAM  atorvastatin, 20 mg, Oral, Daily  cefazolin, 2,000 mg, Intravenous, Q8H  famotidine, 40 mg, Oral, Daily  Fluticasone Furoate-Vilanterol, 1 puff, Inhalation, Daily  hydroxyurea, 500 mg, Oral, After Dinner  saccharomyces boulardii, 250 mg, Oral, BID  verapamil, 120 mg, Oral, Daily      Continuous IV Infusions:     PRN Meds:  acetaminophen, 650 mg, Oral, Q6H PRN  albuterol, 2.5 mg, Nebulization, Q4H PRN  ondansetron, 4 mg, Intravenous, Q6H PRN        Discharge Plan: tbd     Network Utilization Review Department  ATTENTION: Please call with any questions or concerns to [phone] and carefully listen to the prompts so that you are directed to the right person.  All voicemails are confidential.  [facility] all requests for admission clinical reviews, approved or denied determinations and any other requests to dedicated fax number below belonging to the campus where the patient is receiving treatment.  List of dedicated fax numbers for the Facilities:  [place] DENIALS (Administrative/Medical Necessity) [phone] [address] (Maternity/NICU/Pediatrics) [phone]   [address] [place] [address] [phone]   [facility] [address] [facility] [phone]   [address] [place] [address] 207 [facility] [address] [phone]   [address] [facility] [address] [phone]

## 2023-08-06 NOTE — PROGRESS NOTES
[address]   Progress Note  Name: Georges Moreau  MRN: [id number]  Unit/Bed#: 2 [address] Date of Admission: 8/4/2023   Date of Service: 8/6/2023 I Hospital Day: 0    Assessment/Plan   * Cellulitis of right wrist  Assessment & Plan  · Patient presents with 48-hour history of erythema and swelling of the dorsum of the right hand with energetic spread of the right forearm. Was also febrile with leukocytosis in the ER. · Denies any injury or trauma to the affected area. No open cuts or sores. Denies any animal bites. Denies any insect bites. · History of splenectomy    · Erythema up the forearm nearly completely resolved, erythema over the wrist improving. · Continue with IV cefazolin 2 g every 8 hours  · Continue to monitor blood cultures  · Monitor WBC count and fever curve  · Disposition: If continued improvement possible DC home on p.o. ABX. Rectal bleeding  Assessment & Plan  · Reports 2 episodes of painless rectal bleeding a few days ago. Reports dark red blood with some clots. Reports history of GI bleed 2 years ago. Had EGD and colonoscopy, underwent capsule endoscopy as well. 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 EGD with push endoscopy in January 2021 showed small AVM in distal duodenum. Colonoscopy at the same time showed 1 polyp, internal and external hemorrhoids. Colonoscopy in August 2022 showed long and tortuous colon, internal hemorrhoids. 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 Reports history of light smoking for about 15 years. · Diffuse wheezing on auscultation. Wheezing at home per wife. Patient denies SOB. · Given neb treatment in ED.      · Will order Breo, albuterol as needed  · Monitor symptoms    History of TIA (transient ischemic attack)  Assessment & Plan  · Continue baby aspirin, Lipitor    Polycythemia vera (HCC)  Assessment & Plan  · Continue hydroxyurea and baby aspirin  · Follows hematology as outpatient    Benign localized prostatic hyperplasia with lower urinary tract symptoms (LUTS)  Assessment & Plan  · Status post prostatectomy    Hypertension  Assessment & Plan  · On losartan and verapamil at home. · BP has been soft with systolics ranging in the 710V  · Monitor off home antihypertensives            VTE Pharmacologic Prophylaxis:   Moderate Risk (Score 3-4) - Pharmacological DVT Prophylaxis Contraindicated. Sequential Compression Devices Ordered. Patient Centered Rounds: I performed bedside rounds with nursing staff today. Discussions with Specialists or Other Care Team Provider: DHEERAJ.     Education and Discussions with Family / Patient: Patient declined call to . Total Time Spent on Date of Encounter in care of patient: 35 minutes This time was spent on one or more of the following: performing physical exam; counseling and coordination of care; obtaining or reviewing history; documenting in the medical record; reviewing/ordering tests, medications or procedures; communicating with other healthcare professionals and discussing with patient's family/caregivers. Current Length of Stay: 0 day(s)  Current Patient Status: Observation   Certification Statement: The patient will continue to require additional inpatient hospital stay due to IV abx's  Discharge Plan: Anticipate discharge tomorrow to home. Code Status: Level 1 - Full Code    Subjective:   Patient seen and examined. Patient has remained afebrile over the last 24 hours. No new complaints. Erythema improving.   Still has pain in the wrist.    Objective:     Vitals:   Temp (24hrs), Av.7 °F (36.5 °C), Min:97.5 °F (36.4 °C), Max:97.8 °F (36.6 °C)    Temp:  [97.5 °F (36.4 °C)-97.8 °F (36.6 °C)] 97.5 °F (36.4 °C)  HR:  [73-83] 81  Resp:  [16-18] 16  BP: (113-140)/(71-85) 135/85  SpO2:  [94 %-96 %] 96 %  Body mass index is 28.62 kg/m². Input and Output Summary (last 24 hours): Intake/Output Summary (Last 24 hours) at 8/6/2023 1136  Last data filed at 8/6/2023 0805  Gross per 24 hour   Intake 700 ml   Output --   Net 700 ml       PHYSICAL EXAM:    Vitals signs reviewed  Constitutional   Awake and cooperative. NAD. Head/Neck   Normocephalic. Atraumatic. HEENT   No scleral icterus. EOMI. Heart   Regular rate and rhythm. No murmers. Lungs   Clear to auscultation bilaterally. Respirations unlaboured. Abdomen   Soft. Nontender. Nondistended. Skin   Skin color normal. No rashes. Extremities   No deformities. No peripheral edema. Erythema over the dorsum of the right hand/wrist with lymphangitic spread up the right forearm, overall erythema mildly improved from the lines of demarcation from yesterday. Neuro   Alert and oriented. No new deficits. Psych   Mood stable.  Affect normal.         Additional Data:     Labs:  Results from last 7 days   Lab Units 08/05/23  0622   WBC Thousand/uL 10.60*   HEMOGLOBIN g/dL 14.8   HEMATOCRIT % 42.3   PLATELETS Thousands/uL 341   NEUTROS PCT % 86*   LYMPHS PCT % 9*   MONOS PCT % 3*   EOS PCT % 0     Results from last 7 days   Lab Units 08/05/23  0622 08/04/23  1733   SODIUM mmol/L 135 134*   POTASSIUM mmol/L 4.2 3.9   CHLORIDE mmol/L 106 100   CO2 mmol/L 23 26   BUN mg/dL 14 10   CREATININE mg/dL 1.04 1.15   ANION GAP mmol/L 6 8   CALCIUM mg/dL 9.1 9.8   ALBUMIN g/dL  --  4.6   TOTAL BILIRUBIN mg/dL  --  0.55   ALK PHOS U/L  --  94   ALT U/L  --  46   AST U/L  --  25   GLUCOSE RANDOM mg/dL 148* 90                 Results from last 7 days   Lab Units 08/04/23  1733   LACTIC ACID mmol/L 1.1       Lines/Drains:  Invasive Devices     Peripheral Intravenous Line  Duration           Peripheral IV 08/04/23 Left Antecubital 1 day Peripheral IV 08/05/23 Left;Ventral (anterior) Forearm 1 day                           Imaging: No pertinent imaging reviewed. Recent Cultures (last 7 days):   Results from last 7 days   Lab Units 08/04/23  1739 08/04/23  1733   BLOOD CULTURE  No Growth at 24 hrs. No Growth at 24 hrs. Last 24 Hours Medication List:   Current Facility-Administered Medications   Medication Dose Route Frequency Provider Last Rate   • acetaminophen  650 mg Oral Q6H PRN LAUREN Pedro     • albuterol  2.5 mg Nebulization Q4H PRN LAUREN Pedro     • aspirin  81 mg Oral QAM LAUREN Pedro     • atorvastatin  20 mg Oral Daily LAUREN Pedro     • cefazolin  2,000 mg Intravenous Q8H Jess Kiser DO 2,000 mg (08/06/23 1114)   • famotidine  40 mg Oral Daily LAUREN Pedro     • Fluticasone Furoate-Vilanterol  1 puff Inhalation Daily LAUREN Pedro     • hydroxyurea  500 mg Oral After LAUREN Mobley     • ondansetron  4 mg Intravenous Q6H PRN LAUREN Pedro     • saccharomyces boulardii  250 mg Oral BID LAUREN Pedro     • verapamil  120 mg Oral Daily LAUREN Pedro          Today, Patient Was Seen By: Jess Kiser DO    **Please Note: This note may have been constructed using a voice recognition system. **

## 2023-08-07 PROBLEM — R78.81 POSITIVE BLOOD CULTURE: Status: ACTIVE | Noted: 2023-08-07

## 2023-08-07 LAB
ANION GAP SERPL CALCULATED.3IONS-SCNC: 6 MMOL/L
ATRIAL RATE: 106 BPM
BUN SERPL-MCNC: 14 MG/DL (ref 5–25)
CALCIUM SERPL-MCNC: 8.5 MG/DL (ref 8.4–10.2)
CHLORIDE SERPL-SCNC: 106 MMOL/L (ref 96–108)
CO2 SERPL-SCNC: 25 MMOL/L (ref 21–32)
CREAT SERPL-MCNC: 0.99 MG/DL (ref 0.6–1.3)
ERYTHROCYTE [DISTWIDTH] IN BLOOD BY AUTOMATED COUNT: 13.8 % (ref 11.6–15.1)
GFR SERPL CREATININE-BSD FRML MDRD: 83 ML/MIN/1.73SQ M
GLUCOSE SERPL-MCNC: 77 MG/DL (ref 65–140)
HCT VFR BLD AUTO: 41.6 % (ref 36.5–49.3)
HGB BLD-MCNC: 14.4 G/DL (ref 12–17)
MCH RBC QN AUTO: 39.2 PG (ref 26.8–34.3)
MCHC RBC AUTO-ENTMCNC: 34.6 G/DL (ref 31.4–37.4)
MCV RBC AUTO: 113 FL (ref 82–98)
P AXIS: 14 DEGREES
PLATELET # BLD AUTO: 313 THOUSANDS/UL (ref 149–390)
PMV BLD AUTO: 10.2 FL (ref 8.9–12.7)
POTASSIUM SERPL-SCNC: 4 MMOL/L (ref 3.5–5.3)
PR INTERVAL: 166 MS
QRS AXIS: 30 DEGREES
QRSD INTERVAL: 94 MS
QT INTERVAL: 338 MS
QTC INTERVAL: 448 MS
RBC # BLD AUTO: 3.67 MILLION/UL (ref 3.88–5.62)
SODIUM SERPL-SCNC: 137 MMOL/L (ref 135–147)
T WAVE AXIS: 32 DEGREES
VENTRICULAR RATE: 106 BPM
WBC # BLD AUTO: 6.44 THOUSAND/UL (ref 4.31–10.16)

## 2023-08-07 PROCEDURE — 99223 1ST HOSP IP/OBS HIGH 75: CPT | Performed by: INTERNAL MEDICINE

## 2023-08-07 PROCEDURE — 86666 EHRLICHIA ANTIBODY: CPT | Performed by: PHYSICIAN ASSISTANT

## 2023-08-07 PROCEDURE — 87468 ANAPLSMA PHGCYTOPHLM AMP PRB: CPT | Performed by: PHYSICIAN ASSISTANT

## 2023-08-07 PROCEDURE — 80048 BASIC METABOLIC PNL TOTAL CA: CPT | Performed by: INTERNAL MEDICINE

## 2023-08-07 PROCEDURE — 86618 LYME DISEASE ANTIBODY: CPT | Performed by: PHYSICIAN ASSISTANT

## 2023-08-07 PROCEDURE — 93010 ELECTROCARDIOGRAM REPORT: CPT | Performed by: INTERNAL MEDICINE

## 2023-08-07 PROCEDURE — 85027 COMPLETE CBC AUTOMATED: CPT | Performed by: INTERNAL MEDICINE

## 2023-08-07 PROCEDURE — 99232 SBSQ HOSP IP/OBS MODERATE 35: CPT | Performed by: INTERNAL MEDICINE

## 2023-08-07 PROCEDURE — 87207 SMEAR SPECIAL STAIN: CPT | Performed by: PHYSICIAN ASSISTANT

## 2023-08-07 RX ADMIN — ATORVASTATIN CALCIUM 20 MG: 20 TABLET, FILM COATED ORAL at 08:14

## 2023-08-07 RX ADMIN — AMPICILLIN SODIUM AND SULBACTAM SODIUM 3 G: 2; 1 INJECTION, POWDER, FOR SOLUTION INTRAMUSCULAR; INTRAVENOUS at 16:44

## 2023-08-07 RX ADMIN — Medication 250 MG: at 18:43

## 2023-08-07 RX ADMIN — CEFAZOLIN SODIUM 2000 MG: 2 SOLUTION INTRAVENOUS at 03:26

## 2023-08-07 RX ADMIN — Medication 250 MG: at 08:14

## 2023-08-07 RX ADMIN — VERAPAMIL HYDROCHLORIDE 120 MG: 120 TABLET, FILM COATED, EXTENDED RELEASE ORAL at 08:13

## 2023-08-07 RX ADMIN — FAMOTIDINE 40 MG: 20 TABLET, FILM COATED ORAL at 08:14

## 2023-08-07 RX ADMIN — HYDROXYUREA 500 MG: 500 CAPSULE ORAL at 18:44

## 2023-08-07 RX ADMIN — ASPIRIN 81 MG CHEWABLE TABLET 81 MG: 81 TABLET CHEWABLE at 08:14

## 2023-08-07 RX ADMIN — AMPICILLIN SODIUM AND SULBACTAM SODIUM 3 G: 2; 1 INJECTION, POWDER, FOR SOLUTION INTRAMUSCULAR; INTRAVENOUS at 23:01

## 2023-08-07 RX ADMIN — FLUTICASONE FUROATE AND VILANTEROL TRIFENATATE 1 PUFF: 100; 25 POWDER RESPIRATORY (INHALATION) at 08:14

## 2023-08-07 RX ADMIN — CEFAZOLIN SODIUM 2000 MG: 2 SOLUTION INTRAVENOUS at 12:13

## 2023-08-07 NOTE — ASSESSMENT & PLAN NOTE
· 1 out of 2 blood cultures with gram-negative rods on blood cultures from 8/4, possible anaerobe  · Unclear etiology  · ID following  · Currently on IV Unasyn

## 2023-08-07 NOTE — CONSULTS
Consultation - Infectious Disease   Leif Houston 61 y.o. male MRN: [id number]  Unit/Bed#: [address] [place] [address] Encounter: [id number]      IMPRESSION & RECOMMENDATIONS:   1. Cellulitis right arm, with lymphangitic spread. + sore puncture site at right wrist. Patient reports tick removal recently from arm.   -continues Cefazolin at present  -will transition back to Unasyn pending clinical follow up and culture results  -follow-up cultures and adjust antibiotics as needed  -monitor temperature and hemodynamics  -serial exam  -recheck CBC and CMP in a.m.  -check tick serologies     2. Gram Negative Bacteremia. Admission blood cultures 1 of 2 sets with delayed growth of GNR without target identified on [facility] Assay panel.  -antibiotics as above  -follow-up final blood cultures  -serial exam     3. Chest pain, POA. Clinically improved  8/4/2023 CTA chest/abdomen/pelvis: Stable ectasis of the ascending thoracic aorta measuring 40 mm.  -renal dose adjust antibiotic as needed  -volume management   -recheck BMP     4. S/p splenectomy. S/p teenager sports injury  -reports UTD on vaccines    Antibiotics:  Cefazolin D2    I have discussed the above management plan in detail with patient, RN, and Dr. Dominik Sheridan, of the primary service. I have spent a total time of 80 minutes on 08/07/23 in caring for this patient including Diagnostic results, Prognosis, Risks and benefits of tx options, Instructions for management, Patient and family education, Importance of tx compliance, Risk factor reductions, Impressions, Counseling / Coordination of care, Documenting in the medical record, Reviewing / ordering tests, medicine, procedures  , Obtaining or reviewing history   and Communicating with other healthcare professionals . Extensive review of the medical records in epic including review of the notes, radiographs, and laboratory results     HISTORY OF PRESENT ILLNESS:  Reason for Consult: 1. Cellulitis with lymphangitis 2.   Positive blood culture    HPI: Elly Armenta is a 61y.o. year old male with TIA, hypertension, polycythemia vera, splenectomy, BPH, GERD, asthma, sleep apnea who presented to the ER from PCP office 8/4/23 with right wrist redness worsening, now with red streak up to right forearm noticed at PCP. Patient had mild leukocytosis. Blood cultures were obtained, patient was started on vancomycin and Unasyn and then switched to cefazolin on admission. Infectious diseases being consulted for cellulitis with lymphangitis. Patient clinically was improving and plan for discharge today on pills when one of his blood cultures came out positive for gram-negative rods. Patient reports fever chills at home. Does report soreness and discomfort in his right wrist where there is a puncture wound. He is unsure if he got bit by an insect. He works at [employer] and he reports having exposure to spiders and he did have a tick crawling on him. He walks his dog near the baseball field in [place]. His dog has tick repellent on. He denies headache, dizziness, SOB, nausea vomiting diarrhea dysuria. Reported chest discomfort on admission but not denies this currently. REVIEW OF SYSTEMS:  A complete review of systems is negative other than that noted in the HPI.     PAST MEDICAL HISTORY:  Past Medical History:   Diagnosis Date   • Anemia    • Arthritis    • Asthma    • Cancer [facility])     prostate   • Colon polyp    • Elevated PSA    • Full dentures    • GERD (gastroesophageal reflux disease)    • Hemorrhoid    • Hyperlipidemia    • Hypertension    • Polycythemia vera ([address])    • Sleep apnea     No CPAP   • Smoker    • TIA (transient ischemic attack)    • Transfusion history    • Wears glasses      Past Surgical History:   Procedure Laterality Date   • ABDOMINAL ADHESION SURGERY N/A 11/17/2021    Procedure: Shelby On[place];  Surgeon: Caesar Gtz MD;  Location: [facility];  Service: Urology   • APPENDECTOMY     • COLONOSCOPY     • EGD     • FOOT SURGERY Right     bone removed-left foot-removal of blood clots from an injury   • OTHER SURGICAL HISTORY      bone removal right arm-abnormal growth of surface bone   • PROSTATE BIOPSY     • PROSTATECTOMY N/A 2021    Procedure: ROBOTIC ASSISTED LAPAROSCOPIC SIMPLE PROSTATECTOMY AND BLADDER NECK RECONSTRUCTION;  Surgeon: Yue Gallardo MD;  Location: [facility];  Service: Urology   • SPLENECTOMY      ruptured       FAMILY HISTORY:  Non-contributory    SOCIAL HISTORY:  Social History   Social History     Substance and Sexual Activity   Alcohol Use Not Currently     Social History     Substance and Sexual Activity   Drug Use No     Social History     Tobacco Use   Smoking Status Former   • Packs/day: 0.50   • Years: 10.00   • Total pack years: 5.00   • Types: Cigarettes   • Start date: 6/15/2011   • Quit date: 2021   • Years since quittin.4   Smokeless Tobacco Never       ALLERGIES:  No Known Allergies    MEDICATIONS:  All current active medications have been reviewed.     PHYSICAL EXAM:  Temp:  [97.7 °F (36.5 °C)-97.9 °F (36.6 °C)] 97.7 °F (36.5 °C)  HR:  [56-76] 56  Resp:  [16-18] 18  BP: (123-136)/(76-84) 126/76  SpO2:  [94 %-99 %] 94 %  Temp (24hrs), Av.8 °F (36.6 °C), Min:97.7 °F (36.5 °C), Max:97.9 °F (36.6 °C)  Current: Temperature: 97.7 °F (36.5 °C)    Intake/Output Summary (Last 24 hours) at 2023 1310  Last data filed at 2023 0800  Gross per 24 hour   Intake 420 ml   Output --   Net 420 ml       General Appearance:   61year-old pleasant male, resting comfortably in bed, appearing nontoxic, and in no distress   Head:  Normocephalic, without obvious abnormality, atraumatic   Eyes:  Conjunctiva pink and sclera anicteric, both eyes   Nose: Nares normal, mucosa normal, no drainage   Throat: Oropharynx moist without lesions   Neck: Supple, symmetrical, no adenopathy, no tenderness/mass/nodules   Back:   Symmetric, no curvature, ROM normal, no CVA tenderness   Lungs:   Clear to auscultation bilaterally, respirations unlabored   Chest Wall:  No tenderness or deformity   Heart:  RRR; no murmur, rub or gallop   Abdomen:   Soft, non-tender, non-distended, positive bowel sounds    Extremities: No cyanosis, clubbing or LE edema. Decreased right wrist ROM due to discomfort of inflamation, erythema fading within marked region of forearm   Skin: No rashes or lesions. No draining wounds noted. IV site nontender         Lymph nodes: Cervical, supraclavicular nodes normal   Neurologic: Alert and oriented times 3, extremity strength 5/5 and symmetric       LABS, IMAGING, & OTHER STUDIES:  Lab Results:  I have personally reviewed pertinent labs. Results from last 7 days   Lab Units 08/07/23  0433 08/05/23  0622 08/04/23  1733   WBC Thousand/uL 6.44 10.60* 10.93*   HEMOGLOBIN g/dL 14.4 14.8 16.0   PLATELETS Thousands/uL 313 341 342     Results from last 7 days   Lab Units 08/07/23  0433 08/05/23  0622 08/04/23  1733   SODIUM mmol/L 137 135 134*   POTASSIUM mmol/L 4.0 4.2 3.9   CHLORIDE mmol/L 106 106 100   CO2 mmol/L 25 23 26   BUN mg/dL 14 14 10   CREATININE mg/dL 0.99 1.04 1.15   EGFR ml/min/1.73sq m 83 78 69   CALCIUM mg/dL 8.5 9.1 9.8   AST U/L  --   --  25   ALT U/L  --   --  46   ALK PHOS U/L  --   --  94     Results from last 7 days   Lab Units 08/04/23  1739 08/04/23  1733   BLOOD CULTURE  No Growth at 48 hrs.  --    GRAM STAIN RESULT   --  Gram negative rods*                       Imaging Studies:   Imaging study reports and images in PACS reviewed personally. 8/4/2023 right wrist x-ray: No acute osseous abnormality. 8/4/2023 CTA chest/abdomen/pelvis: Stable ectasis of the ascending thoracic aorta measuring 40 mm. Other Studies:   I have personally reviewed pertinent reports.

## 2023-08-07 NOTE — RESPIRATORY THERAPY NOTE
COPD education complete, went over zones, medication and breathing exercises. Pt and wife verbalized understanding.

## 2023-08-07 NOTE — PROGRESS NOTES
[address]  Progress Note  Name: Aguilar Lopez  MRN: [id number]  Unit/Bed#: 2 [address] Date of Admission: 8/4/2023   Date of Service: 8/7/2023 I Hospital Day: 1    Assessment/Plan   * Cellulitis of right wrist  Assessment & Plan  · Patient presents with 48-hour history of erythema and swelling of the dorsum of the right hand with energetic spread of the right forearm. Was also febrile with leukocytosis in the ER. · Denies any injury or trauma to the affected area. No open cuts or sores. Denies any animal bites. Denies any insect bites. · History of splenectomy    · Erythema markedly improved with lymphangitic spread streaking resolved  · Much improved with IV cefazolin however will transition back to IV Unasyn until blood culture results  · Monitor WBC count and fever curve      Positive blood culture  Assessment & Plan  · 1 out of 2 blood cultures with gram-negative rods on blood cultures from 8/4  · Unclear etiology  · ID following  · Will discontinue cefazolin and place back on IV Unasyn until species and sensitivities available    Rectal bleeding  Assessment & Plan  · Reports 2 episodes of painless rectal bleeding a few days ago. Reports dark red blood with some clots. Reports history of GI bleed 2 years ago. Had EGD and colonoscopy, underwent capsule endoscopy as well. 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 EGD with push endoscopy in January 2021 showed small AVM in distal duodenum. Colonoscopy at the same time showed 1 polyp, internal and external hemorrhoids. Colonoscopy in August 2022 showed long and tortuous colon, internal hemorrhoids. 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   Reports history of light smoking for about 15 years. · Diffuse wheezing on auscultation. Wheezing at home per wife. Patient denies SOB. · Given neb treatment in ED. · Will order Breo, albuterol as needed  · Monitor symptoms    History of TIA (transient ischemic attack)  Assessment & Plan  · Continue baby aspirin, Lipitor    Polycythemia vera (HCC)  Assessment & Plan  · Continue hydroxyurea and baby aspirin  · Follows hematology as outpatient    Benign localized prostatic hyperplasia with lower urinary tract symptoms (LUTS)  Assessment & Plan  · Status post prostatectomy    Hypertension  Assessment & Plan  · On losartan and verapamil at home. · BP has been soft with systolics ranging in the 300R  · Monitor off home antihypertensives            VTE Pharmacologic Prophylaxis:   Moderate Risk (Score 3-4) - Pharmacological DVT Prophylaxis Contraindicated. Sequential Compression Devices Ordered. Patient Centered Rounds: I performed bedside rounds with nursing staff today. Discussions with Specialists or Other Care Team Provider: DHEERAJ.     Education and Discussions with Family / Patient: Patient declined call to . Total Time Spent on Date of Encounter in care of patient: 35 minutes This time was spent on one or more of the following: performing physical exam; counseling and coordination of care; obtaining or reviewing history; documenting in the medical record; reviewing/ordering tests, medications or procedures; communicating with other healthcare professionals and discussing with patient's family/caregivers. Current Length of Stay: 1 day(s)  Current Patient Status: Inpatient   Certification Statement: The patient will continue to require additional inpatient hospital stay due to IV abx's  Discharge Plan: Anticipate discharge tomorrow to home. Code Status: Level 1 - Full Code    Subjective:   Patient seen and examined. Erythema nearly completely resolved.   Still has some warmth and tenderness to the wrist but overall better. Afebrile. Objective:     Vitals:   Temp (24hrs), Av.7 °F (36.5 °C), Min:97.5 °F (36.4 °C), Max:97.9 °F (36.6 °C)    Temp:  [97.5 °F (36.4 °C)-97.9 °F (36.6 °C)] 97.5 °F (36.4 °C)  HR:  [56-67] 57  Resp:  [16-18] 18  BP: (123-133)/(76-84) 126/76  SpO2:  [94 %-99 %] 94 %  Body mass index is 28.62 kg/m². Input and Output Summary (last 24 hours): Intake/Output Summary (Last 24 hours) at 2023 1626  Last data filed at 2023 1300  Gross per 24 hour   Intake 645 ml   Output --   Net 645 ml       PHYSICAL EXAM:    Vitals signs reviewed  Constitutional   Awake and cooperative. NAD. Head/Neck   Normocephalic. Atraumatic. HEENT   No scleral icterus. EOMI. Heart   Regular rate and rhythm. No murmers. Lungs   Clear to auscultation bilaterally. Respirations unlaboured. Abdomen   Soft. Nontender. Nondistended. Skin   Skin color normal. No rashes. Extremities   No deformities. No peripheral edema. Erythema over the dorsum of the right wrist and right arm nearly completely resolved. There is still some warmth and tenderness to palpation on the wrist.   Neuro   Alert and oriented. No new deficits. Psych   Mood stable.  Affect normal.         Additional Data:     Labs:  Results from last 7 days   Lab Units 23  0622   WBC Thousand/uL 6.44 10.60*   HEMOGLOBIN g/dL 14.4 14.8   HEMATOCRIT % 41.6 42.3   PLATELETS Thousands/uL 313 341   NEUTROS PCT %  --  86*   LYMPHS PCT %  --  9*   MONOS PCT %  --  3*   EOS PCT %  --  0     Results from last 7 days   Lab Units 23  0433 08/05/23  0622 23  1733   SODIUM mmol/L 137   < > 134*   POTASSIUM mmol/L 4.0   < > 3.9   CHLORIDE mmol/L 106   < > 100   CO2 mmol/L 25   < > 26   BUN mg/dL 14   < > 10   CREATININE mg/dL 0.99   < > 1.15   ANION GAP mmol/L 6   < > 8   CALCIUM mg/dL 8.5   < > 9.8   ALBUMIN g/dL  --   --  4.6   TOTAL BILIRUBIN mg/dL  --   --  0.55   ALK PHOS U/L  --   --  94 ALT U/L  --   --  46   AST U/L  --   --  25   GLUCOSE RANDOM mg/dL 77   < > 90    < > = values in this interval not displayed. Results from last 7 days   Lab Units 08/04/23  1733   LACTIC ACID mmol/L 1.1       Lines/Drains:  Invasive Devices     Peripheral Intravenous Line  Duration           Peripheral IV 08/04/23 Left Antecubital 2 days                           Imaging: No pertinent imaging reviewed. Recent Cultures (last 7 days):   Results from last 7 days   Lab Units 08/04/23  1739 08/04/23  1733   BLOOD CULTURE  No Growth at 48 hrs.  --    GRAM STAIN RESULT   --  Gram negative rods*       Last 24 Hours Medication List:   Current Facility-Administered Medications   Medication Dose Route Frequency Provider Last Rate   • acetaminophen  650 mg Oral Q6H PRN LAUREN Pedro     • albuterol  2.5 mg Nebulization Q4H PRN LAUREN Pedro     • ampicillin-sulbactam  3 g Intravenous Q6H Aby Baum PA-C     • aspirin  81 mg Oral QAM LAUREN Pedro     • atorvastatin  20 mg Oral Daily LAUREN Pedro     • famotidine  40 mg Oral Daily LAUREN Pedro     • Fluticasone Furoate-Vilanterol  1 puff Inhalation Daily LAUREN Pedro     • hydroxyurea  500 mg Oral After LAUREN Mobley     • ondansetron  4 mg Intravenous Q6H PRN LAUREN Pedro     • saccharomyces boulardii  250 mg Oral BID LAUREN Pedro     • verapamil  120 mg Oral Daily LAUREN Pedro          Today, Patient Was Seen By: Roopa Kiser DO    **Please Note: This note may have been constructed using a voice recognition system. **

## 2023-08-08 PROBLEM — R07.9 CHEST PAIN: Status: RESOLVED | Noted: 2023-08-04 | Resolved: 2023-08-08

## 2023-08-08 LAB
% PARASITEMIA: 0
ANION GAP SERPL CALCULATED.3IONS-SCNC: 5 MMOL/L
B BURGDOR IGG+IGM SER QL IA: NEGATIVE
BUN SERPL-MCNC: 14 MG/DL (ref 5–25)
CALCIUM SERPL-MCNC: 8.7 MG/DL (ref 8.4–10.2)
CHLORIDE SERPL-SCNC: 106 MMOL/L (ref 96–108)
CO2 SERPL-SCNC: 26 MMOL/L (ref 21–32)
CREAT SERPL-MCNC: 0.94 MG/DL (ref 0.6–1.3)
ERYTHROCYTE [DISTWIDTH] IN BLOOD BY AUTOMATED COUNT: 13.4 % (ref 11.6–15.1)
GFR SERPL CREATININE-BSD FRML MDRD: 89 ML/MIN/1.73SQ M
GLUCOSE SERPL-MCNC: 90 MG/DL (ref 65–140)
HCT VFR BLD AUTO: 41.2 % (ref 36.5–49.3)
HGB BLD-MCNC: 14.3 G/DL (ref 12–17)
MCH RBC QN AUTO: 39 PG (ref 26.8–34.3)
MCHC RBC AUTO-ENTMCNC: 34.7 G/DL (ref 31.4–37.4)
MCV RBC AUTO: 112 FL (ref 82–98)
PARASITE BLD: NO
PLATELET # BLD AUTO: 328 THOUSANDS/UL (ref 149–390)
PMV BLD AUTO: 9.8 FL (ref 8.9–12.7)
POTASSIUM SERPL-SCNC: 3.9 MMOL/L (ref 3.5–5.3)
RBC # BLD AUTO: 3.67 MILLION/UL (ref 3.88–5.62)
SODIUM SERPL-SCNC: 137 MMOL/L (ref 135–147)
WBC # BLD AUTO: 6.44 THOUSAND/UL (ref 4.31–10.16)

## 2023-08-08 PROCEDURE — 85027 COMPLETE CBC AUTOMATED: CPT | Performed by: INTERNAL MEDICINE

## 2023-08-08 PROCEDURE — 99232 SBSQ HOSP IP/OBS MODERATE 35: CPT | Performed by: INTERNAL MEDICINE

## 2023-08-08 PROCEDURE — 99232 SBSQ HOSP IP/OBS MODERATE 35: CPT | Performed by: FAMILY MEDICINE

## 2023-08-08 PROCEDURE — 80048 BASIC METABOLIC PNL TOTAL CA: CPT | Performed by: INTERNAL MEDICINE

## 2023-08-08 RX ADMIN — AMPICILLIN SODIUM AND SULBACTAM SODIUM 3 G: 2; 1 INJECTION, POWDER, FOR SOLUTION INTRAMUSCULAR; INTRAVENOUS at 16:05

## 2023-08-08 RX ADMIN — AMPICILLIN SODIUM AND SULBACTAM SODIUM 3 G: 2; 1 INJECTION, POWDER, FOR SOLUTION INTRAMUSCULAR; INTRAVENOUS at 09:51

## 2023-08-08 RX ADMIN — FLUTICASONE FUROATE AND VILANTEROL TRIFENATATE 1 PUFF: 100; 25 POWDER RESPIRATORY (INHALATION) at 08:02

## 2023-08-08 RX ADMIN — ASPIRIN 81 MG CHEWABLE TABLET 81 MG: 81 TABLET CHEWABLE at 08:01

## 2023-08-08 RX ADMIN — AMPICILLIN SODIUM AND SULBACTAM SODIUM 3 G: 2; 1 INJECTION, POWDER, FOR SOLUTION INTRAMUSCULAR; INTRAVENOUS at 21:10

## 2023-08-08 RX ADMIN — HYDROXYUREA 500 MG: 500 CAPSULE ORAL at 17:20

## 2023-08-08 RX ADMIN — Medication 250 MG: at 17:20

## 2023-08-08 RX ADMIN — Medication 250 MG: at 08:01

## 2023-08-08 RX ADMIN — AMPICILLIN SODIUM AND SULBACTAM SODIUM 3 G: 2; 1 INJECTION, POWDER, FOR SOLUTION INTRAMUSCULAR; INTRAVENOUS at 04:31

## 2023-08-08 RX ADMIN — FAMOTIDINE 40 MG: 20 TABLET, FILM COATED ORAL at 08:01

## 2023-08-08 RX ADMIN — VERAPAMIL HYDROCHLORIDE 120 MG: 120 TABLET, FILM COATED, EXTENDED RELEASE ORAL at 08:01

## 2023-08-08 RX ADMIN — ATORVASTATIN CALCIUM 20 MG: 20 TABLET, FILM COATED ORAL at 08:01

## 2023-08-08 NOTE — PROGRESS NOTES
Progress Note - Infectious Disease   Gail Haas 61 y.o. male MRN: [id number]  Unit/Bed#: 2 [place] [address] Encounter: [id number]      Impression/Plan:  1. Cellulitis right arm, with lymphangitic spread. + sore puncture site at right wrist. Patient reports tick removal recently from arm. 8/7/ Lyme Ab negative, blood parasite smear negative  Clinically steadily improving  -continues Unasyn pending clinical follow up and culture results  -follow-up cultures and adjust antibiotic as indicated  -monitor temperature and hemodynamics  -serial exam  -follow am labs  -follow up pending tick serologies     2. Gram Negative Bacteremia. Admission blood cultures 1 of 2 sets with delayed growth of GNR without target identified on [facility] Assay panel. Being reincubated, possible anaerobe  -antibiotics as above  -follow-up final blood cultures  -serial exam     3. Chest pain, POA. Clinically improved  8/4/2023 CTA chest/abdomen/pelvis: Stable ectasis of the ascending thoracic aorta measuring 40 mm.  -serial exam  -supportive management per primary     4. S/p splenectomy. S/p teenager sports injury  -reports UTD on vaccines     5. Melena. CT A/P negative. 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 Colonoscopy 2021 and 2022 internal hemorrhoids seen. -monitor stool output  -consider translocation of gut in setting of #2     Antibiotics:  Unasyn D2     I have discussed the above management plan in detail with patient and wife at bedside, RN, and Dr. Sánchez Denis, of the primary service. Subjective:  Patient has no fever, chills, sweats overnight; no nausea, vomiting, diarrhea; no cough, shortness of breath; right wrist pain improving. Anxious to get home and back to work at [employer]. No BM in hospital to date. No new symptoms.     Objective:  Vitals:  Temp:  [97.3 °F (36.3 °C)-97.5 °F (36.4 °C)] 97.3 °F (36.3 °C)  HR:  [56-65] 56  Resp:  [18] 18  BP: (114-129)/(76-81) 114/79  SpO2:  [93 %-94 %] 93 %  Temp (24hrs), Av.4 °F (36.3 °C), Min:97.3 °F (36.3 °C), Max:97.5 °F (36.4 °C)  Current: Temperature: (!) 97.3 °F (36.3 °C)    Physical Exam:   General Appearance:  61year old male chronically debilitated, nontoxic appearance, no acute distress. Sitting at bedside   HEENT: Atraumatic normocephalic   Throat: Oropharynx moist. Poor dentition   Pulmonary:   Normal respiratory excursion without accessory muscle use   Cardiac:  RRR   Abdomen:   Soft, non-tender, non-distended   Extremities: No edema   : No maria, no SPT   Psychiatric: Awake, cooperative   Skin: No new rashes. IV site nontender. Right wrist/arm induration, erythema fading, increased ROM, less tenderness.        Labs, Imaging, & Other studies:   All pertinent labs and imaging studies were personally reviewed  Results from last 7 days   Lab Units 23  06   WBC Thousand/uL 6.44 6.44 10.60*   HEMOGLOBIN g/dL 14.3 14.4 14.8   PLATELETS Thousands/uL 328 313 341     Results from last 7 days   Lab Units 2340 23  0433 23  0622 23  1733   SODIUM mmol/L 137 137 135 134*   POTASSIUM mmol/L 3.9 4.0 4.2 3.9   CHLORIDE mmol/L 106 106 106 100   CO2 mmol/L 26 25 23 26   BUN mg/dL 14 14 14 10   CREATININE mg/dL 0.94 0.99 1.04 1.15   EGFR ml/min/1.73sq m 89 83 78 69   CALCIUM mg/dL 8.7 8.5 9.1 9.8   AST U/L  --   --   --  25   ALT U/L  --   --   --  46   ALK PHOS U/L  --   --   --  94     Results from last 7 days   Lab Units 23  1739 23  1733   BLOOD CULTURE  No Growth at 72 hrs.  --    GRAM STAIN RESULT   --  Gram negative rods*

## 2023-08-08 NOTE — PLAN OF CARE
Problem: INFECTION - ADULT  Goal: Absence of fever/infection during neutropenic period  Description: INTERVENTIONS:  - Monitor WBC    Outcome: Progressing     Problem: RESPIRATORY - ADULT  Goal: Achieves optimal ventilation and oxygenation  Description: INTERVENTIONS:  - Assess for changes in respiratory status  - Assess for changes in mentation and behavior  - Position to facilitate oxygenation and minimize respiratory effort  - Oxygen administered by appropriate delivery if ordered  - Initiate smoking cessation education as indicated  - Encourage broncho-pulmonary hygiene including cough, deep breathe, Incentive Spirometry  - Assess the need for suctioning and aspirate as needed  - Assess and instruct to report SOB or any respiratory difficulty  - Respiratory Therapy support as indicated  Outcome: Progressing

## 2023-08-09 VITALS
HEIGHT: 72 IN | RESPIRATION RATE: 20 BRPM | TEMPERATURE: 97.7 F | OXYGEN SATURATION: 94 % | BODY MASS INDEX: 28.58 KG/M2 | SYSTOLIC BLOOD PRESSURE: 133 MMHG | HEART RATE: 58 BPM | DIASTOLIC BLOOD PRESSURE: 83 MMHG | WEIGHT: 211 LBS

## 2023-08-09 LAB
ALL TARGETS: NOT DETECTED
ANION GAP SERPL CALCULATED.3IONS-SCNC: 7 MMOL/L
BACTERIA BLD CULT: ABNORMAL
BACTERIA BLD CULT: NORMAL
BUN SERPL-MCNC: 13 MG/DL (ref 5–25)
CALCIUM SERPL-MCNC: 8.9 MG/DL (ref 8.4–10.2)
CHLORIDE SERPL-SCNC: 106 MMOL/L (ref 96–108)
CO2 SERPL-SCNC: 24 MMOL/L (ref 21–32)
CREAT SERPL-MCNC: 0.96 MG/DL (ref 0.6–1.3)
ERYTHROCYTE [DISTWIDTH] IN BLOOD BY AUTOMATED COUNT: 13.5 % (ref 11.6–15.1)
GFR SERPL CREATININE-BSD FRML MDRD: 86 ML/MIN/1.73SQ M
GLUCOSE SERPL-MCNC: 87 MG/DL (ref 65–140)
GRAM STN SPEC: ABNORMAL
HCT VFR BLD AUTO: 42.7 % (ref 36.5–49.3)
HGB BLD-MCNC: 15.2 G/DL (ref 12–17)
MCH RBC QN AUTO: 39.8 PG (ref 26.8–34.3)
MCHC RBC AUTO-ENTMCNC: 35.6 G/DL (ref 31.4–37.4)
MCV RBC AUTO: 112 FL (ref 82–98)
PLATELET # BLD AUTO: 324 THOUSANDS/UL (ref 149–390)
PMV BLD AUTO: 9.9 FL (ref 8.9–12.7)
POTASSIUM SERPL-SCNC: 3.9 MMOL/L (ref 3.5–5.3)
RBC # BLD AUTO: 3.82 MILLION/UL (ref 3.88–5.62)
SODIUM SERPL-SCNC: 137 MMOL/L (ref 135–147)
WBC # BLD AUTO: 6.15 THOUSAND/UL (ref 4.31–10.16)

## 2023-08-09 PROCEDURE — 99232 SBSQ HOSP IP/OBS MODERATE 35: CPT | Performed by: INTERNAL MEDICINE

## 2023-08-09 PROCEDURE — 99239 HOSP IP/OBS DSCHRG MGMT >30: CPT | Performed by: FAMILY MEDICINE

## 2023-08-09 PROCEDURE — 80048 BASIC METABOLIC PNL TOTAL CA: CPT | Performed by: INTERNAL MEDICINE

## 2023-08-09 PROCEDURE — 85027 COMPLETE CBC AUTOMATED: CPT | Performed by: INTERNAL MEDICINE

## 2023-08-09 RX ORDER — SACCHAROMYCES BOULARDII 250 MG
250 CAPSULE ORAL 2 TIMES DAILY
Qty: 30 CAPSULE | Refills: 0 | Status: SHIPPED | OUTPATIENT
Start: 2023-08-09

## 2023-08-09 RX ORDER — AMOXICILLIN AND CLAVULANATE POTASSIUM 875; 125 MG/1; MG/1
1 TABLET, FILM COATED ORAL 2 TIMES DAILY
Qty: 12 TABLET | Refills: 0 | Status: SHIPPED | OUTPATIENT
Start: 2023-08-09 | End: 2023-08-15

## 2023-08-09 RX ORDER — ALBUTEROL SULFATE 90 UG/1
2 AEROSOL, METERED RESPIRATORY (INHALATION) EVERY 4 HOURS PRN
Qty: 18 G | Refills: 0 | Status: SHIPPED | OUTPATIENT
Start: 2023-08-09 | End: 2023-09-08

## 2023-08-09 RX ORDER — FLUTICASONE FUROATE AND VILANTEROL 100; 25 UG/1; UG/1
1 POWDER RESPIRATORY (INHALATION) DAILY
Qty: 60 BLISTER | Refills: 0 | Status: SHIPPED | OUTPATIENT
Start: 2023-08-10 | End: 2023-08-11

## 2023-08-09 RX ADMIN — FLUTICASONE FUROATE AND VILANTEROL TRIFENATATE 1 PUFF: 100; 25 POWDER RESPIRATORY (INHALATION) at 09:31

## 2023-08-09 RX ADMIN — AMPICILLIN SODIUM AND SULBACTAM SODIUM 3 G: 2; 1 INJECTION, POWDER, FOR SOLUTION INTRAMUSCULAR; INTRAVENOUS at 09:25

## 2023-08-09 RX ADMIN — ATORVASTATIN CALCIUM 20 MG: 20 TABLET, FILM COATED ORAL at 09:31

## 2023-08-09 RX ADMIN — VERAPAMIL HYDROCHLORIDE 120 MG: 120 TABLET, FILM COATED, EXTENDED RELEASE ORAL at 09:24

## 2023-08-09 RX ADMIN — AMPICILLIN SODIUM AND SULBACTAM SODIUM 3 G: 2; 1 INJECTION, POWDER, FOR SOLUTION INTRAMUSCULAR; INTRAVENOUS at 03:39

## 2023-08-09 RX ADMIN — FAMOTIDINE 40 MG: 20 TABLET, FILM COATED ORAL at 09:24

## 2023-08-09 RX ADMIN — ASPIRIN 81 MG CHEWABLE TABLET 81 MG: 81 TABLET CHEWABLE at 09:24

## 2023-08-09 RX ADMIN — Medication 250 MG: at 09:31

## 2023-08-09 NOTE — ASSESSMENT & PLAN NOTE
Reported chest pressure on admission in the emergency room  · EKG with sinus tachycardia but no ischemic changes  · Troponin negative, BMP negative  · CTA dissection protocol - Stable ectasia of the ascending thoracic aorta measuring 40 mm. No acute aortic pathology. · Nuclear stress test in February 2023 was negative.   · Chest pain resolved

## 2023-08-09 NOTE — PROGRESS NOTES
Progress Note - Infectious Disease   Gigi Flood 61 y.o. male MRN: [id number]  Unit/Bed#: 2 [place] [address] Encounter: [id number]      Impression/Plan:  1. Cellulitis right arm, with lymphangitic spread. + sore puncture site at right wrist. Patient reports tick removal recently from arm. 8/7/ Lyme Ab negative, blood parasite smear negative  Clinically steadily improving  -continues Unasyn pending clinical follow up and culture results  -follow-up cultures and adjust antibiotic as indicated  -monitor temperature and hemodynamics  -serial exam  -follow am labs  -follow up pending tick serologies     2. Gram Negative Bacteremia. Admission blood cultures 1 of 2 sets with delayed growth of GNR without target identified on [facility] Assay panel. Being reincubated, possible anaerobe  -antibiotics as above  -follow-up final blood cultures  -upon discharge, can transition to Augmentin 875 mg PO q 12 hours to complete total 10 day antibiotic course through 8/14/23     3. Chest pain, POA. Clinically improved  8/4/2023 CTA chest/abdomen/pelvis: Stable ectasis of the ascending thoracic aorta measuring 40 mm.  -serial exam  -supportive management per primary     4. S/p splenectomy. S/p teenager sports injury  -reports UTD on vaccines     5. Melena. CT A/P negative. 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 Colonoscopy 2021 and 2022 internal hemorrhoids seen. -monitor stool output  -consider translocation of gut in setting of #2     Antibiotics:  Unasyn D3 - abx D5     I have discussed the above management plan in detail with patient and wife at bedside, microbiologist, RN, and Dr. Aliza Quinn, of the primary service. Subjective:  Patient has no fever, chills, sweats overnight; no nausea, vomiting, diarrhea; no cough, shortness of breath; right wrist pain improving. Anxious to get home and back to work at [employer]. 1 BM this am, no blood in stool.  No new symptoms. Objective:  Vitals:  Temp:  [97.5 °F (36.4 °C)-97.7 °F (36.5 °C)] 97.7 °F (36.5 °C)  HR:  [54-74] 58  Resp:  [16-20] 20  BP: (124-135)/(83-85) 133/83  SpO2:  [94 %-95 %] 94 %  Temp (24hrs), Av.6 °F (36.4 °C), Min:97.5 °F (36.4 °C), Max:97.7 °F (36.5 °C)  Current: Temperature: 97.7 °F (36.5 °C)    Physical Exam:   General Appearance:  61year old male chronically debilitated, nontoxic appearance, no acute distress. Sitting at bedside   HEENT: Atraumatic normocephalic   Throat: Oropharynx moist. Poor dentition   Pulmonary:   Normal respiratory excursion without accessory muscle use   Cardiac:  RRR   Abdomen:   Soft, non-tender, non-distended   Extremities: No edema   : No maria, no SPT   Psychiatric: Awake, cooperative   Skin: No new rashes. IV site nontender. Right wrist/arm induration, erythema steadily fading, increased ROM, less tenderness. Labs, Imaging, & Other studies:   All pertinent labs and imaging studies were personally reviewed  Results from last 7 days   Lab Units 23  0603 23  0540 23  0433   WBC Thousand/uL 6.15 6.44 6.44   HEMOGLOBIN g/dL 15.2 14.3 14.4   PLATELETS Thousands/uL 324 328 313     Results from last 7 days   Lab Units 23  0603 23  0540 23  0433 23  0622 23  1733   SODIUM mmol/L 137 137 137   < > 134*   POTASSIUM mmol/L 3.9 3.9 4.0   < > 3.9   CHLORIDE mmol/L 106 106 106   < > 100   CO2 mmol/L 24 26 25   < > 26   BUN mg/dL 13 14 14   < > 10   CREATININE mg/dL 0.96 0.94 0.99   < > 1.15   EGFR ml/min/1.73sq m 86 89 83   < > 69   CALCIUM mg/dL 8.9 8.7 8.5   < > 9.8   AST U/L  --   --   --   --  25   ALT U/L  --   --   --   --  46   ALK PHOS U/L  --   --   --   --  94    < > = values in this interval not displayed. Results from last 7 days   Lab Units 23  1739 23  1733   BLOOD CULTURE  No Growth After 4 Days.   --    GRAM STAIN RESULT   --  Gram negative rods*

## 2023-08-09 NOTE — PROGRESS NOTES
[address]  Progress Note  Name: Bry Curiel  MRN: [id number]  Unit/Bed#: 2 [address] Date of Admission: 8/4/2023   Date of Service: 8/8/2023 I Hospital Day: 2    Assessment/Plan   * Cellulitis of right wrist  Assessment & Plan  Patient presented with 48-hour history of erythema and swelling of the dorsum of the right hand with energetic spread of the right forearm. Was also febrile with leukocytosis in the ER. · Denies any injury or trauma to the affected area. No open cuts or sores. No animal or insect bites. · History of splenectomy  · Erythema markedly improved with lymphangitic spread streaking resolved  · Was previously on IV cefazolin but currently on IV Unasyn pending blood cultures  · Leukocytosis resolved      Positive blood culture  Assessment & Plan  · 1 out of 2 blood cultures with gram-negative rods on blood cultures from 8/4, possible anaerobe  · Unclear etiology  · ID following  · Currently on IV Unasyn    Rectal bleeding  Assessment & Plan  Reported 2 episodes of painless rectal bleeding few days prior to admission. Dark red blood with some clots. Reports history of GI bleed 2 years ago. Had EGD and colonoscopy, underwent capsule endoscopy as well. 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 EGD with push endoscopy in January 2021 showed small AVM in distal duodenum. Colonoscopy at the same time showed 1 polyp, internal and external hemorrhoids. Colonoscopy in August 2022 showed long and tortuous colon, internal hemorrhoids. · Avoid pharmacologic DVT prophylaxis for now  · Continue baby aspirin  · Hemoglobin stable on CBC      Asthma  Assessment & Plan  Patient denies history of COPD, reports history of asthma. Reports history of light smoking for about 15 years.    · Given neb treatment in ED due to wheezing  · Continue Breo, albuterol as needed    History of TIA (transient ischemic attack)  Assessment & Plan  Continue 81mg aspirin, Lipitor    Sleep apnea  Assessment & Plan  Was never offered CPAP by provider per patient  · Follow-up PCP as outpatient. Polycythemia vera ([address])  Assessment & Plan  Continue hydroxyurea and baby aspirin  · Follows hematology as outpatient    . Benign localized prostatic hyperplasia with lower urinary tract symptoms (LUTS)  Assessment & Plan  · Status post prostatectomy. S/P splenectomy  Assessment & Plan  · Due to sports injury as a teenager. Chronic GERD  Assessment & Plan  · Continue Pepcid. Hypertension  Assessment & Plan  On losartan and verapamil at home. · BP previously soft with systolics ranging in the 197S but currently has improved  · Continue verapamil        Chest pain-resolved as of 8/8/2023  Assessment & Plan  Reported chest pressure on admission in the emergency room  · EKG with sinus tachycardia but no ischemic changes  · Troponin negative, BMP negative  · CTA dissection protocol - Stable ectasia of the ascending thoracic aorta measuring 40 mm. No acute aortic pathology. · Nuclear stress test in February 2023 was negative. · Chest pain resolved            VTE Pharmacologic Prophylaxis:   None    Patient Centered Rounds: I performed bedside rounds with nursing staff today. Discussions with Specialists or Other Care Team Provider: yes - ID    Education and Discussions with Family / Patient: Patient declined call to . Total Time Spent on Date of Encounter in care of patient: 40 min This time was spent on one or more of the following: performing physical exam; counseling and coordination of care; obtaining or reviewing history; documenting in the medical record; reviewing/ordering tests, medications or procedures; communicating with other healthcare professionals and discussing with patient's family/caregivers. Current Length of Stay: 2 day(s)  Current Patient Status: Inpatient   Certification Statement:  The patient will continue to require additional inpatient hospital stay due to Gram-negative bacteremia awaiting final ID and sensitivities  Discharge Plan: Anticipate discharge in 24-48 hrs to home. Code Status: Level 1 - Full Code    Subjective:     Patient denies any pain of his right hand/wrist area. Is feeling much better and hoping to be going home soon    Objective:     Vitals:   Temp (24hrs), Av.5 °F (36.4 °C), Min:97.3 °F (36.3 °C), Max:97.6 °F (36.4 °C)    Temp:  [97.3 °F (36.3 °C)-97.6 °F (36.4 °C)] 97.5 °F (36.4 °C)  HR:  [56-74] 61  Resp:  [16-18] 16  BP: (114-135)/(79-85) 134/84  SpO2:  [93 %-95 %] 95 %  Body mass index is 28.62 kg/m². Input and Output Summary (last 24 hours): Intake/Output Summary (Last 24 hours) at 2023  Last data filed at 2023 1701  Gross per 24 hour   Intake 700 ml   Output --   Net 700 ml       Physical Exam:   Physical Exam  Vitals reviewed. Constitutional:       General: He is not in acute distress. Appearance: He is not ill-appearing. HENT:      Head: Normocephalic and atraumatic. Eyes:      General:         Right eye: No discharge. Left eye: No discharge. Cardiovascular:      Rate and Rhythm: Normal rate and regular rhythm. Pulmonary:      Effort: Pulmonary effort is normal. No respiratory distress. Breath sounds: Normal breath sounds. No wheezing or rales. Abdominal:      General: Bowel sounds are normal. There is no distension. Palpations: Abdomen is soft. Tenderness: There is no abdominal tenderness. Musculoskeletal:      Comments: Right wrist area with minimal erythema. Minimal tenderness along the wrist joint   Neurological:      Mental Status: He is alert and oriented to person, place, and time.           Additional Data:     Labs:  Results from last 7 days   Lab Units 23  0540 23  0433 23  0622   WBC Thousand/uL 6.44   < > 10.60*   HEMOGLOBIN g/dL 14.3   < > 14.8   HEMATOCRIT % 41.2   < > 42.3   PLATELETS Thousands/uL 328   < > 341   NEUTROS PCT %  --   --  86*   LYMPHS PCT %  --   --  9*   MONOS PCT %  --   --  3*   EOS PCT %  --   --  0    < > = values in this interval not displayed. Results from last 7 days   Lab Units 08/08/23  0540 08/05/23  0622 08/04/23  1733   SODIUM mmol/L 137   < > 134*   POTASSIUM mmol/L 3.9   < > 3.9   CHLORIDE mmol/L 106   < > 100   CO2 mmol/L 26   < > 26   BUN mg/dL 14   < > 10   CREATININE mg/dL 0.94   < > 1.15   ANION GAP mmol/L 5   < > 8   CALCIUM mg/dL 8.7   < > 9.8   ALBUMIN g/dL  --   --  4.6   TOTAL BILIRUBIN mg/dL  --   --  0.55   ALK PHOS U/L  --   --  94   ALT U/L  --   --  46   AST U/L  --   --  25   GLUCOSE RANDOM mg/dL 90   < > 90    < > = values in this interval not displayed. Results from last 7 days   Lab Units 08/04/23  1733   LACTIC ACID mmol/L 1.1       Lines/Drains:  Invasive Devices     Peripheral Intravenous Line  Duration           Peripheral IV 08/07/23 Dorsal (posterior); Left Forearm <1 day                      Imaging: Reviewed radiology reports from this admission including: xray(s)    Recent Cultures (last 7 days):   Results from last 7 days   Lab Units 08/04/23  1739 08/04/23  1733   BLOOD CULTURE  No Growth at 72 hrs.  --    GRAM STAIN RESULT   --  Gram negative rods*       Last 24 Hours Medication List:   Current Facility-Administered Medications   Medication Dose Route Frequency Provider Last Rate   • acetaminophen  650 mg Oral Q6H PRN LAUREN Pedro     • albuterol  2.5 mg Nebulization Q4H PRN LAUREN Pedro     • ampicillin-sulbactam  3 g Intravenous Q6H Hazelbri Wilder PA-C 3 g (08/08/23 1605)   • aspirin  81 mg Oral QAM LAUREN Pedro     • atorvastatin  20 mg Oral Daily LAUREN Pedro     • famotidine  40 mg Oral Daily LAUREN Pedro     • Fluticasone Furoate-Vilanterol  1 puff Inhalation Daily LAUREN ePdro     • hydroxyurea  500 mg Oral After LAUREN Mobley     • ondansetron 4 mg Intravenous Q6H PRN LAUREN Pedro     • saccharomyces boulardii  250 mg Oral BID LAUREN Pedro     • verapamil  120 mg Oral Daily LAUREN Pedro          Today, Patient Was Seen By: Shaquille Denney DO    **Please Note: This note may have been constructed using a voice recognition system. **

## 2023-08-09 NOTE — UTILIZATION REVIEW
Continued Stay Review    Date: 08/09/2023                          Current Patient Class: Inpatient Current Level of Care: Med/Surg    HPI:57 y.o. male initially admitted on 08/06/2023    Assessment/Plan:   Cellulitis Right Wrist.  Bacteremia (Gram neg rods). Continue IV Abx. Follow up final blood cultures. CV diet. Up & OOB as tolerated. Apply warm moist compresses to right wrist q6h. Elevate right upper arm.         Vital Signs:   Date/Time Temp Pulse Resp BP MAP (mmHg) SpO2 O2 Device Patient Position - Orthostatic VS   08/09/23 07:48:47 97.7 °F (36.5 °C) 58 20 133/83 100 94 % None (Room air) Lying   08/08/23 23:37:54 97.6 °F (36.4 °C) 54 Abnormal  18 133/84 100 95 % -- --         Pertinent Labs/Diagnostic Results:   Results from last 7 days   Lab Units 08/04/23  1744   SARS-COV-2  Negative     Results from last 7 days   Lab Units 08/09/23  0603 08/08/23  0540 08/07/23  0433 08/05/23  0622 08/04/23  1733   WBC Thousand/uL 6.15 6.44 6.44 10.60* 10.93*   HEMOGLOBIN g/dL 15.2 14.3 14.4 14.8 16.0   HEMATOCRIT % 42.7 41.2 41.6 42.3 45.8   PLATELETS Thousands/uL 324 328 313 341 342   NEUTROS ABS Thousands/µL  --   --   --  9.13* 7.82*     Results from last 7 days   Lab Units 08/09/23  0603 08/08/23  0540 08/07/23  0433 08/05/23  0622 08/04/23  1733   SODIUM mmol/L 137 137 137 135 134*   POTASSIUM mmol/L 3.9 3.9 4.0 4.2 3.9   CHLORIDE mmol/L 106 106 106 106 100   CO2 mmol/L 24 26 25 23 26   ANION GAP mmol/L 7 5 6 6 8   BUN mg/dL 13 14 14 14 10   CREATININE mg/dL 0.96 0.94 0.99 1.04 1.15   EGFR ml/min/1.73sq m 86 89 83 78 69   CALCIUM mg/dL 8.9 8.7 8.5 9.1 9.8   MAGNESIUM mg/dL  --   --   --  2.1 1.8*     Results from last 7 days   Lab Units 08/04/23  1733   AST U/L 25   ALT U/L 46   ALK PHOS U/L 94   TOTAL PROTEIN g/dL 8.0   ALBUMIN g/dL 4.6   TOTAL BILIRUBIN mg/dL 0.55     Results from last 7 days   Lab Units 08/09/23  0603 08/08/23  0540 08/07/23  0433 08/05/23  0622 08/04/23  1733   GLUCOSE RANDOM mg/dL 87 90 77 148* 90     Results from last 7 days   Lab Units 08/04/23  1733   HS TNI 0HR ng/L 3     Results from last 7 days   Lab Units 08/04/23  1733   LACTIC ACID mmol/L 1.1     Results from last 7 days   Lab Units 08/04/23  1733   BNP pg/mL 19     Results from last 7 days   Lab Units 08/04/23  1739 08/04/23  1733   BLOOD CULTURE  No Growth After 4 Days. --    GRAM STAIN RESULT   --  Gram negative rods*     Results from last 7 days   Lab Units 08/06/23  0547   VANCOMYCIN TR ug/mL <5.0*       Medications:   Scheduled Medications:  ampicillin-sulbactam, 3 g, Intravenous, Q6H  aspirin, 81 mg, Oral, QAM  atorvastatin, 20 mg, Oral, Daily  famotidine, 40 mg, Oral, Daily  Fluticasone Furoate-Vilanterol, 1 puff, Inhalation, Daily  hydroxyurea, 500 mg, Oral, After Dinner  saccharomyces boulardii, 250 mg, Oral, BID  verapamil, 120 mg, Oral, Daily      Continuous IV Infusions:     PRN Meds:  acetaminophen, 650 mg, Oral, Q6H PRN  albuterol, 2.5 mg, Nebulization, Q4H PRN  ondansetron, 4 mg, Intravenous, Q6H PRN        Discharge Plan: D    Network Utilization Review Department  ATTENTION: Please call with any questions or concerns to [phone] and carefully listen to the prompts so that you are directed to the right person. All voicemails are confidential.  Rachael Rueda all requests for admission clinical reviews, approved or denied determinations and any other requests to dedicated fax number below belonging to the campus where the patient is receiving treatment.  List of dedicated fax numbers for the Facilities:  [place] DENIALS (Administrative/Medical Necessity) [phone]   [facility] (Maternity/NICU/Pediatrics) [address] [place] [address] [phone]   [facility] [address] [phone]   [facility] [address] [address] [facility] [address] [facility]  [address]  [phone]

## 2023-08-09 NOTE — DISCHARGE SUMMARY
[address]  Discharge- Lemuel Faster 1965, 61 y.o. male MRN: [id number]  Unit/Bed#: [address] [place] [address] Encounter: [id number]  Primary Care Provider: Tere Mccauley DO   Date and time admitted to hospital: 8/4/2023  5:07 PM    * Cellulitis of right wrist  Assessment & Plan  Patient presented with 48-hour history of erythema and swelling of the dorsum of the right hand with energetic spread of the right forearm. Was also febrile with leukocytosis in the ER. · Denied any injury or trauma to the affected area. No open cuts or sores. No animal or insect bites. · History of splenectomy  · Erythema markedly improved with lymphangitic spread streaking resolved  · Was previously on IV cefazolin --> IV Unasyn --> transitioned to augmentin on discharge   · Leukocytosis resolved  · Parasite smear/Lymes neg. Luis Miguel Skains pending at time of discharge      Positive blood culture  Assessment & Plan  · 1 out of 2 blood cultures with gram-negative rods on blood cultures from 8/4. Finalized to lactobacillus which is contaminant  · IV to po Abx as noted above    Rectal bleeding  Assessment & Plan  Reported 2 episodes of painless rectal bleeding few days prior to admission. Dark red blood with some clots. Reported history of GI bleed 2 years ago. Had EGD and colonoscopy, underwent capsule endoscopy as well. 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 EGD with push endoscopy in January 2021 showed small AVM in distal duodenum. Colonoscopy at the same time showed 1 polyp, internal and external hemorrhoids. Colonoscopy in August 2022 showed long and tortuous colon, internal hemorrhoids. · Continue 81 mg aspirin  · Hemoglobin stable on CBC      Asthma  Assessment & Plan  Patient denies history of COPD, reports history of asthma. Reports history of light smoking for about 15 years.    · Given neb treatment in ED due to wheezing  · Continue Breo, albuterol as needed on d/c and f/u with PCP    History of TIA (transient ischemic attack)  Assessment & Plan  Continue statin, 81mg aspirin    Sleep apnea  Assessment & Plan  Was never offered CPAP by provider per patient  · Follow-up PCP as outpatient    Polycythemia vera ([address])  Assessment & Plan  Continue hydroxyurea and baby aspirin  · Follows hematology as outpatient. .    Benign localized prostatic hyperplasia with lower urinary tract symptoms (LUTS)  Assessment & Plan  · Status post prostatectomy    S/P splenectomy  Assessment & Plan  · Due to sports injury as a teenager    Chronic GERD  Assessment & Plan  · Continue Pepcid    Hypertension  Assessment & Plan  On losartan and verapamil at home. · BP previously soft with systolics ranging in the 841P but currently has improved  · Continue verapamil  · Patient can check his BP at home and if starts to run high may need to restart losartan        Chest pain-resolved as of 8/8/2023  Assessment & Plan  Reported chest pressure on admission in the emergency room  · EKG with sinus tachycardia but no ischemic changes  · Troponin negative, BMP negative  · CTA dissection protocol - Stable ectasia of the ascending thoracic aorta measuring 40 mm. No acute aortic pathology. · Nuclear stress test in February 2023 was negative.   · Chest pain resolved     Medical Problems     Resolved Problems  Date Reviewed: 8/9/2023          Resolved    Chest pain 8/8/2023     Resolved by  Manuel Siddiqui DO        Discharging Physician / Practitioner: Manuel Siddiqui DO  PCP: Cady Lang DO  Admission Date:   Admission Orders (From admission, onward)     Ordered        08/06/23 1618  Inpatient Admission  Once            08/04/23 2055  Place in Observation  Once                      Discharge Date: 08/09/23    Consultations During Hospital Stay:  · ID    Procedures Performed:   · none    Significant Findings / Test Results:   · none    Incidental Findings:   · none    Test Results Pending at Discharge (will require follow up):   · Erlichia, anaplasma     Outpatient Tests Requested:  · none    Complications:  none    Reason for Admission: Right wrist/hand cellulitis    Hospital Course:   Dylon Slaughter is a 61 y.o. male patient who originally presented to the hospital on 8/4/2023 due to Right wrist/hand infection that started about 2 days prior to [facility]. Pt noticed streaking up to his right forearm on day of admission. Denied any insect bit. Reported intermittent chest pain but no fevers, chills. Patient was admitted and tx with IV abx. 1/2 blood culture came back positive and ID was consulted. Symptoms have improved post tx with IV abx and patient cleared by ID prior to discharge. D/C plan discussed in details with patient    Please see above list of diagnoses and related plan for additional information. Condition at Discharge: stable    Discharge Day Visit / Exam:   Subjective:  Reports good ROM of right wrist without any significant pain. Feels ready to go home    Vitals: Blood Pressure: 133/83 (08/09/23 0748)  Pulse: 58 (08/09/23 0748)  Temperature: 97.7 °F (36.5 °C) (08/09/23 0748)  Temp Source: Oral (08/09/23 0748)  Respirations: 20 (08/09/23 0748)  Height: 6' (182.9 cm) (08/04/23 2347)  Weight - Scale: 95.7 kg (211 lb) (08/04/23 2347)  SpO2: 94 % (08/09/23 0748)  Exam:   Physical Exam  Vitals reviewed. Constitutional:       General: He is not in acute distress. Appearance: He is not ill-appearing. HENT:      Head: Normocephalic and atraumatic. Eyes:      General:         Right eye: No discharge. Left eye: No discharge. Cardiovascular:      Rate and Rhythm: Normal rate and regular rhythm. Pulmonary:      Effort: Pulmonary effort is normal. No respiratory distress. Breath sounds: Normal breath sounds. No wheezing or rales. Abdominal:      General: Bowel sounds are normal. There is no distension.       Palpations: Abdomen is soft. Tenderness: There is no abdominal tenderness. Skin:     Comments: Right wrist/hand erythema, swelling, tenderness and ROM improved   Neurological:      Mental Status: He is alert and oriented to person, place, and time. Discussion with Family: Updated  (significant other) at bedside. Discharge instructions/Information to patient and family:   See after visit summary for information provided to patient and family. Provisions for Follow-Up Care:  See after visit summary for information related to follow-up care and any pertinent home health orders. Disposition:   Home    Planned Readmission: no     Discharge Statement:  I spent > 30 minutes discharging the patient. This time was spent on the day of discharge. I had direct contact with the patient on the day of discharge. Greater than 50% of the total time was spent examining patient, answering all patient questions, arranging and discussing plan of care with patient as well as directly providing post-discharge instructions. Additional time then spent on discharge activities. Discharge Medications:  See after visit summary for reconciled discharge medications provided to patient and/or family.       **Please Note: This note may have been constructed using a voice recognition system**

## 2023-08-10 ENCOUNTER — TRANSITIONAL CARE MANAGEMENT (OUTPATIENT)
Dept: FAMILY MEDICINE CLINIC | Facility: CLINIC | Age: 58
End: 2023-08-10

## 2023-08-10 ENCOUNTER — TELEPHONE (OUTPATIENT)
Dept: FAMILY MEDICINE CLINIC | Facility: CLINIC | Age: 58
End: 2023-08-10

## 2023-08-10 ENCOUNTER — PATIENT OUTREACH (OUTPATIENT)
Dept: FAMILY MEDICINE CLINIC | Facility: CLINIC | Age: 58
End: 2023-08-10

## 2023-08-10 DIAGNOSIS — Z71.89 COORDINATION OF COMPLEX CARE: Primary | ICD-10-CM

## 2023-08-10 NOTE — ASSESSMENT & PLAN NOTE
Patient presented with 48-hour history of erythema and swelling of the dorsum of the right hand with energetic spread of the right forearm. Was also febrile with leukocytosis in the ER. · Denied any injury or trauma to the affected area. No open cuts or sores. No animal or insect bites. · History of splenectomy  · Erythema markedly improved with lymphangitic spread streaking resolved  · Was previously on IV cefazolin --> IV Unasyn --> transitioned to augmentin on discharge   · Leukocytosis resolved  · Parasite smear/Lymes neg.  [facility] pending at time of discharge

## 2023-08-10 NOTE — ASSESSMENT & PLAN NOTE
· 1 out of 2 blood cultures with gram-negative rods on blood cultures from 8/4.  Finalized to lactobacillus which is contaminant  · IV to po Abx as noted above

## 2023-08-10 NOTE — ASSESSMENT & PLAN NOTE
Reported 2 episodes of painless rectal bleeding few days prior to admission. Dark red blood with some clots. Reported history of GI bleed 2 years ago. Had EGD and colonoscopy, underwent capsule endoscopy as well. 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 EGD with push endoscopy in January 2021 showed small AVM in distal duodenum. Colonoscopy at the same time showed 1 polyp, internal and external hemorrhoids. Colonoscopy in August 2022 showed long and tortuous colon, internal hemorrhoids.   · Continue 81 mg aspirin  · Hemoglobin stable on CBC

## 2023-08-10 NOTE — ASSESSMENT & PLAN NOTE
On losartan and verapamil at home.     · BP previously soft with systolics ranging in the 747L but currently has improved  · Continue verapamil  · Patient can check his BP at home and if starts to run high may need to restart losartan

## 2023-08-10 NOTE — ASSESSMENT & PLAN NOTE
Patient denies history of COPD, reports history of asthma. Reports history of light smoking for about 15 years.    · Given neb treatment in ED due to wheezing  · Continue Breo, albuterol as needed on d/c and f/u with PCP

## 2023-08-10 NOTE — PROGRESS NOTES
HRR referral received via IB. Chart reviewed. Patient was recently an IP at [facility] with R wrist cellulitis 8/4/23-8/9/23. Patient has a f/u apt with his PCP and Cardiologist tomorrow 8/11/23. This RNCM attempted to call patient. There was no answer and a detailed message was left with a request for a call back. RNCM to follow.

## 2023-08-11 ENCOUNTER — OFFICE VISIT (OUTPATIENT)
Dept: CARDIOLOGY CLINIC | Facility: CLINIC | Age: 58
End: 2023-08-11
Payer: COMMERCIAL

## 2023-08-11 ENCOUNTER — OFFICE VISIT (OUTPATIENT)
Dept: FAMILY MEDICINE CLINIC | Facility: CLINIC | Age: 58
End: 2023-08-11
Payer: COMMERCIAL

## 2023-08-11 VITALS
WEIGHT: 210.8 LBS | SYSTOLIC BLOOD PRESSURE: 124 MMHG | TEMPERATURE: 97.2 F | BODY MASS INDEX: 28.55 KG/M2 | RESPIRATION RATE: 17 BRPM | DIASTOLIC BLOOD PRESSURE: 82 MMHG | HEART RATE: 63 BPM | HEIGHT: 72 IN

## 2023-08-11 VITALS
WEIGHT: 211 LBS | SYSTOLIC BLOOD PRESSURE: 120 MMHG | OXYGEN SATURATION: 97 % | HEART RATE: 86 BPM | BODY MASS INDEX: 28.58 KG/M2 | HEIGHT: 72 IN | DIASTOLIC BLOOD PRESSURE: 80 MMHG

## 2023-08-11 DIAGNOSIS — L03.113 CELLULITIS OF RIGHT WRIST: ICD-10-CM

## 2023-08-11 DIAGNOSIS — D45 POLYCYTHEMIA VERA (HCC): ICD-10-CM

## 2023-08-11 DIAGNOSIS — I10 PRIMARY HYPERTENSION: ICD-10-CM

## 2023-08-11 DIAGNOSIS — M79.89 LEFT ARM SWELLING: Primary | ICD-10-CM

## 2023-08-11 DIAGNOSIS — E78.2 MIXED HYPERLIPIDEMIA: ICD-10-CM

## 2023-08-11 DIAGNOSIS — I10 ESSENTIAL HYPERTENSION: ICD-10-CM

## 2023-08-11 DIAGNOSIS — I71.20 THORACIC AORTIC ANEURYSM WITHOUT RUPTURE, UNSPECIFIED PART (HCC): Primary | ICD-10-CM

## 2023-08-11 PROCEDURE — 99496 TRANSJ CARE MGMT HIGH F2F 7D: CPT | Performed by: FAMILY MEDICINE

## 2023-08-11 PROCEDURE — 99214 OFFICE O/P EST MOD 30 MIN: CPT | Performed by: INTERNAL MEDICINE

## 2023-08-11 RX ORDER — BUDESONIDE AND FORMOTEROL FUMARATE DIHYDRATE 80; 4.5 UG/1; UG/1
2 AEROSOL RESPIRATORY (INHALATION) 2 TIMES DAILY
Qty: 10.2 G | Refills: 0 | Status: SHIPPED | OUTPATIENT
Start: 2023-08-11

## 2023-08-11 RX ORDER — SULFAMETHOXAZOLE AND TRIMETHOPRIM 800; 160 MG/1; MG/1
1 TABLET ORAL 2 TIMES DAILY
Qty: 14 TABLET | Refills: 0 | Status: SHIPPED | OUTPATIENT
Start: 2023-08-11 | End: 2023-08-18

## 2023-08-11 NOTE — TELEPHONE ENCOUNTER
----- Message from [facility], DO sent at 8/10/2023 12:09 AM EDT -----  Regarding: RE: Discharge  Thank you for allowing us to participate in the care of your patient, Eligah Ahumada, who was hospitalized from 8/4/2023 through 8/10/2023 with the admitting diagnosis of right wrist/hand cellulitis. Tx with IV unasyn here and transitioned to augmentin on discharge. Seen by ID as 1/2 Bcx came back (+) but finalized to lactobacillus which is contaminant      If you have any additional questions or would like to discuss further, please feel free to contact me.     [facility], [address] [facility], Hospitalist  [phone]

## 2023-08-11 NOTE — PROGRESS NOTES
Cardiology   Padma Griffith DO, Casi Castillo MD, Evaristo Shipley MD, Stefania Fowler MD, [facility] [place]  -------------------------------------------------------------------  [facility]  [address] [facility], [address] [place] [address]  [phone]    Cardiology Follow Up  Beverly Lara  1965  [id number]          Assessment/Plan:    1. Thoracic aortic aneurysm without rupture, unspecified part ([address])    2. Essential hypertension    3. Mixed hyperlipidemia      - Repeat chest CT in August 2024 for follow-up of thoracic aortic aneurysm. - continue aspirin.  -Lipid panel reviewed - cholesterol levels at goal.       Interval History:     Beverly Lara is 61 y.o. male here for follow up of aortic aneurysm, hypertension and dyslipidemia. He has intermittent episodes of chest pain and shortness of breath with exertion. He denies any LE edema, orthopnea or PND. Recently admitted to [address] [place] [address] with right hand cellulitis. A CT chest was ordered to rule out PE as he has a history of polycythemia vera. CT was negative for PE but he was noted to have a 41 mm fusiform ectasia of the ascending thoracic aorta. His previous diameter was 39 mm. Repeat CT done on August 2, 2023 was 40 mm. The patient has a history of syncope. He underwent cardiac workup in 2021 including echocardiogram, stress test and 48 hour Holter monitor. Cardiac workup was negative at that time.        The following portions of the patient's history were reviewed and updated as appropriate: allergies, current medications, past family history, past medical history, past social history, past surgical history, and problem list.       Current Outpatient Medications:   •  acetaminophen (TYLENOL) 325 mg tablet, Take 2 tablets (650 mg total) by mouth every 6 (six) hours as needed for mild pain, headaches or fever, Disp: 30 tablet, Rfl: 0  •  albuterol (PROVENTIL HFA,VENTOLIN HFA) 90 mcg/act inhaler, Inhale 2 puffs every 4 (four) hours as needed for wheezing or shortness of breath, Disp: 18 g, Rfl: 0  •  amoxicillin-clavulanate (AUGMENTIN) 875-125 mg per tablet, Take 1 tablet by mouth 2 (two) times a day for 6 days, Disp: 12 tablet, Rfl: 0  •  ASPIRIN 81 PO, Take by mouth every morning, Disp: , Rfl:   •  atorvastatin (LIPITOR) 20 mg tablet, Take 1 tablet (20 mg total) by mouth daily, Disp: 90 tablet, Rfl: 1  •  famotidine (PEPCID) 40 MG tablet, TAKE 1 TABLET BY MOUTH EVERY DAY, Disp: 90 tablet, Rfl: 0  •  Fluticasone Furoate-Vilanterol 100-25 mcg/actuation inhaler, Inhale 1 puff daily Rinse mouth after use. Do not start before August 10, 2023., Disp: 60 blister, Rfl: 0  •  hydroxyurea (HYDREA) 500 mg capsule, TAKE 1 CAPSULE (500 MG TOTAL) BY MOUTH EVERY EVENING, Disp: 90 capsule, Rfl: 1  •  saccharomyces boulardii (FLORASTOR) 250 mg capsule, Take 1 capsule (250 mg total) by mouth 2 (two) times a day, Disp: 30 capsule, Rfl: 0  •  verapamil (CALAN-SR) 120 mg CR tablet, Take 1 tablet (120 mg total) by mouth daily, Disp: 90 tablet, Rfl: 0        Review of Systems:  Review of Systems   Respiratory: Positive for shortness of breath. Cardiovascular: Positive for chest pain. Negative for palpitations and leg swelling. Musculoskeletal: Positive for arthralgias. All other systems reviewed and are negative. Physical Exam:  Vitals:  Vitals:    08/11/23 0759   BP: 120/80   BP Location: Right arm   Patient Position: Sitting   Cuff Size: Standard   Pulse: 86   SpO2: 97%   Weight: 95.7 kg (211 lb)   Height: 6' (1.829 m)     Physical Exam   Constitutional: He appears healthy. No distress. Eyes: Pupils are equal, round, and reactive to light. Conjunctivae are normal.   Neck: No JVD present. Cardiovascular: Normal rate, regular rhythm and normal heart sounds. Exam reveals no gallop and no friction rub. No murmur heard.   Pulmonary/Chest: Effort normal and breath sounds normal. He has no wheezes. He has no rales. Musculoskeletal:         General: No tenderness, deformity or edema. Cervical back: Normal range of motion and neck supple. Neurological: He is alert and oriented to person, place, and time. Skin: Skin is warm and dry. Cardiographics:  EKG: Personally reviewed Normal ECG  Last known EF: 55%    This note was completed in part utilizing M-Modal Fluency Direct Software. Grammatical errors, random word insertions, spelling mistakes, and incomplete sentences can be an occasional consequence of this system secondary to software limitations, ambient noise, and hardware issues. If you have any questions or concerns about the content, text, or information contained within the body of this dictation, please contact the provider for clarification.

## 2023-08-11 NOTE — PROGRESS NOTES
Assessment/Plan:    No problem-specific Assessment & Plan notes found for this encounter. P vera stable, f/u oncology    Right wrist cellulitis with negative cultures, responded to iv then po abx, on augmentin    Left focal forearm swelling   Phlebitis from IV vs abscess/cellulitis  Since occurred despite augmentin, add short course bactrim  F/u if no better    htn stable     Diagnoses and all orders for this visit:    Left arm swelling  -     sulfamethoxazole-trimethoprim (BACTRIM DS) 800-160 mg per tablet; Take 1 tablet by mouth 2 (two) times a day for 7 days    Polycythemia vera ([address])    Cellulitis of right wrist    Primary hypertension        Return if symptoms worsen or fail to improve. Subjective:      Patient ID: Celso Hicks is a 61 y.o. male. Chief Complaint   Patient presents with   • Transition of [address] f/u Cellulitis of right wrist Christennicanor Dale LPN         HPI  Still on augmentin  Better  New area on left mid volar forearm  Bum  Tender  Bigger? No fever  At iv site per pt  TCM Call     Date and time call was made  8/10/2023  9:20 AM    Hospital care reviewed  Records reviewed    Patient was hospitialized at  [facility]    Date of Admission  08/04/23    Date of discharge  08/09/23    Diagnosis  Cellulitis of right wrist    Disposition  Home    Were the patients medications reviewed and updated  Yes    Current Symptoms  Shortness of breath    Shortness of breath severity  Mild  Has inhaler with him nm. lpn      TCM Call     Post hospital issues  None    Should patient be enrolled in anticoag monitoring? No    Scheduled for follow up?   Yes    Patients specialists  Cardiologist    Cardiologist name  Dr Mer Mcallister    Urologist name  Dr Amaya Fox    Other specialists names  Dr Meka Hemphill    Other specialists contcat #  Dr Shahab Castillo    Did you obtain your prescribed medications  Yes    Do you need help managing your prescriptions or medications  No    Is transportation to your appointment needed  No    I have advised the patient to call PCP with any new or worsening symptoms  Suleiman Whipple, [address]; Friends    The type of support provided  Emotional; Physical    Do you have social support  Yes, as much as I need    Are you recieving any outpatient services  No    What type of services  Blood and iron transfusions    Are you recieving home care services  No    Are you using any community resources  No    Current waiver services  No    Have you fallen in the last 12 months  No    How many times  3 times no injuries    Interperter language line needed  No    Counseling  Patient    Counseling topics  Activities of daily living; Importance of RX compliance    Comments  Patient would like to discuss why they stopped his Losartan wile he was in the hospital. Jeromeofelia Celestin through all of the questions with the patient. He is aware to return to the hospital if his symptoms return or worsen. Suleiman Whipple, ROEL            The following portions of the patient's history were reviewed and updated as appropriate: allergies, current medications, past family history, past medical history, past social history, past surgical history and problem list.    Review of Systems   Constitutional: Negative for chills and fever.          Current Outpatient Medications   Medication Sig Dispense Refill   • acetaminophen (TYLENOL) 325 mg tablet Take 2 tablets (650 mg total) by mouth every 6 (six) hours as needed for mild pain, headaches or fever 30 tablet 0   • albuterol (PROVENTIL HFA,VENTOLIN HFA) 90 mcg/act inhaler Inhale 2 puffs every 4 (four) hours as needed for wheezing or shortness of breath 18 g 0   • amoxicillin-clavulanate (AUGMENTIN) 875-125 mg per tablet Take 1 tablet by mouth 2 (two) times a day for 6 days 12 tablet 0   • ASPIRIN 81 PO Take by mouth every morning     • atorvastatin (LIPITOR) 20 mg tablet Take 1 tablet (20 mg total) by mouth daily 90 tablet 1   • budesonide-formoterol (Symbicort) 80-4.5 MCG/ACT inhaler Inhale 2 puffs 2 (two) times a day Rinse mouth after use. 10.2 g 0   • famotidine (PEPCID) 40 MG tablet TAKE 1 TABLET BY MOUTH EVERY DAY 90 tablet 0   • hydroxyurea (HYDREA) 500 mg capsule TAKE 1 CAPSULE (500 MG TOTAL) BY MOUTH EVERY EVENING 90 capsule 1   • saccharomyces boulardii (FLORASTOR) 250 mg capsule Take 1 capsule (250 mg total) by mouth 2 (two) times a day 30 capsule 0   • sulfamethoxazole-trimethoprim (BACTRIM DS) 800-160 mg per tablet Take 1 tablet by mouth 2 (two) times a day for 7 days 14 tablet 0   • verapamil (CALAN-SR) 120 mg CR tablet Take 1 tablet (120 mg total) by mouth daily 90 tablet 0     No current facility-administered medications for this visit. Objective:    /82   Pulse 63   Temp (!) 97.2 °F (36.2 °C)   Resp 17   Ht 6' (1.829 m)   Wt 95.6 kg (210 lb 12.8 oz)   BMI 28.59 kg/m²        Physical Exam  Vitals and nursing note reviewed. Constitutional:       General: He is not in acute distress. Appearance: He is well-developed. He is not ill-appearing. HENT:      Head: Normocephalic. Right Ear: Tympanic membrane normal.      Left Ear: Tympanic membrane normal.   Eyes:      General: No scleral icterus. Conjunctiva/sclera: Conjunctivae normal.   Cardiovascular:      Rate and Rhythm: Normal rate and regular rhythm. Heart sounds: No murmur heard. Pulmonary:      Effort: Pulmonary effort is normal. No respiratory distress. Breath sounds: No wheezing. Abdominal:      General: There is no distension. Palpations: Abdomen is soft. Tenderness: There is no abdominal tenderness. Musculoskeletal:         General: Swelling present. No deformity. Cervical back: Neck supple. Left lower leg: No edema. Comments: Focal swelling approx 3cm left mid volar forearm  No dc or redness/warmth   Skin:     General: Skin is warm and dry. Coloration: Skin is not jaundiced or pale. Findings: No erythema or rash. Neurological:      Mental Status: He is alert. Motor: No weakness. Gait: Gait normal.   Psychiatric:         Mood and Affect: Mood normal.         Behavior: Behavior normal.         Thought Content:  Thought content normal.                Marcy Garcia DO

## 2023-08-12 LAB — A PHAGOCYTOPH DNA BLD QL NAA+PROBE: NEGATIVE

## 2023-08-16 LAB
A PHAGOCYTOPH IGG TITR SER IF: NEGATIVE {TITER}
A PHAGOCYTOPH IGM TITR SER IF: NEGATIVE {TITER}
E CHAFFEENSIS IGG TITR SER IF: NEGATIVE {TITER}
E CHAFFEENSIS IGM TITR SER IF: NEGATIVE {TITER}
RESULT/COMMENT: NORMAL

## 2023-08-17 ENCOUNTER — PATIENT OUTREACH (OUTPATIENT)
Dept: FAMILY MEDICINE CLINIC | Facility: CLINIC | Age: 58
End: 2023-08-17

## 2023-08-17 NOTE — PROGRESS NOTES
Chart reviewed and this RNCM attempted to call patient. There was no answer and a detailed message was left with a request for a call back. Out reach #2 and an UTR letter was sent via [address] [place] [address].     This RNCM removed self from care team.

## 2023-08-17 NOTE — LETTER
Date: 08/17/23  Dear Gigi Flood,   My name is Lin Cohen; I am a registered nurse care manager working with [facility]. I have not been able to reach you and would like to set a time that I can talk with you over the phone. My work is to help patients that have complex medical conditions get the care they need. This includes patients who may have been in the hospital or emergency room. Please call me with any questions you may have. I look forward to speaking with you. Sincerely,  Hakeem Hartman  [phone]  Outpatient Care Manager  Copy:  [facility]. [address] [place] [address]  [address] [facility] [id number].

## 2023-09-11 PROBLEM — Z12.5 PROSTATE CANCER SCREENING: Status: RESOLVED | Noted: 2023-07-13 | Resolved: 2023-09-11

## 2023-09-25 ENCOUNTER — TELEPHONE (OUTPATIENT)
Dept: HEMATOLOGY ONCOLOGY | Facility: CLINIC | Age: 58
End: 2023-09-25

## 2023-09-25 NOTE — TELEPHONE ENCOUNTER
Left message for patient to have labs completed prior to appointment with Breanna Carrera on 9/26/23.

## 2023-09-26 ENCOUNTER — APPOINTMENT (OUTPATIENT)
Dept: LAB | Facility: HOSPITAL | Age: 58
End: 2023-09-26
Payer: COMMERCIAL

## 2023-09-26 ENCOUNTER — OFFICE VISIT (OUTPATIENT)
Dept: HEMATOLOGY ONCOLOGY | Facility: CLINIC | Age: 58
End: 2023-09-26
Payer: COMMERCIAL

## 2023-09-26 VITALS
WEIGHT: 208 LBS | DIASTOLIC BLOOD PRESSURE: 82 MMHG | TEMPERATURE: 98 F | BODY MASS INDEX: 28.17 KG/M2 | HEART RATE: 78 BPM | RESPIRATION RATE: 18 BRPM | SYSTOLIC BLOOD PRESSURE: 142 MMHG | OXYGEN SATURATION: 99 % | HEIGHT: 72 IN

## 2023-09-26 DIAGNOSIS — R51.9 HEADACHE: ICD-10-CM

## 2023-09-26 DIAGNOSIS — Z90.81 S/P SPLENECTOMY: ICD-10-CM

## 2023-09-26 DIAGNOSIS — D45 POLYCYTHEMIA VERA (HCC): Primary | ICD-10-CM

## 2023-09-26 DIAGNOSIS — D45 POLYCYTHEMIA VERA (HCC): ICD-10-CM

## 2023-09-26 DIAGNOSIS — N40.1 BPH WITH OBSTRUCTION/LOWER URINARY TRACT SYMPTOMS: ICD-10-CM

## 2023-09-26 DIAGNOSIS — Z86.2 HX OF IRON DEFICIENCY ANEMIA: ICD-10-CM

## 2023-09-26 DIAGNOSIS — N13.8 BPH WITH OBSTRUCTION/LOWER URINARY TRACT SYMPTOMS: ICD-10-CM

## 2023-09-26 PROBLEM — C95.90 LEUKEMIA (HCC): Status: RESOLVED | Noted: 2023-08-04 | Resolved: 2023-09-26

## 2023-09-26 LAB
ALBUMIN SERPL BCP-MCNC: 4.1 G/DL (ref 3.5–5)
ALP SERPL-CCNC: 75 U/L (ref 34–104)
ALT SERPL W P-5'-P-CCNC: 32 U/L (ref 7–52)
ANION GAP SERPL CALCULATED.3IONS-SCNC: 5 MMOL/L
AST SERPL W P-5'-P-CCNC: 22 U/L (ref 13–39)
BASOPHILS # BLD AUTO: 0.08 THOUSANDS/ÂΜL (ref 0–0.1)
BASOPHILS NFR BLD AUTO: 1 % (ref 0–1)
BILIRUB SERPL-MCNC: 0.51 MG/DL (ref 0.2–1)
BUN SERPL-MCNC: 10 MG/DL (ref 5–25)
CALCIUM SERPL-MCNC: 9.2 MG/DL (ref 8.4–10.2)
CHLORIDE SERPL-SCNC: 104 MMOL/L (ref 96–108)
CO2 SERPL-SCNC: 27 MMOL/L (ref 21–32)
CREAT SERPL-MCNC: 0.92 MG/DL (ref 0.6–1.3)
EOSINOPHIL # BLD AUTO: 0.33 THOUSAND/ÂΜL (ref 0–0.61)
EOSINOPHIL NFR BLD AUTO: 6 % (ref 0–6)
ERYTHROCYTE [DISTWIDTH] IN BLOOD BY AUTOMATED COUNT: 13.8 % (ref 11.6–15.1)
GFR SERPL CREATININE-BSD FRML MDRD: 90 ML/MIN/1.73SQ M
GLUCOSE P FAST SERPL-MCNC: 98 MG/DL (ref 65–99)
HCT VFR BLD AUTO: 45 % (ref 36.5–49.3)
HGB BLD-MCNC: 15.9 G/DL (ref 12–17)
IMM GRANULOCYTES # BLD AUTO: 0.01 THOUSAND/UL (ref 0–0.2)
IMM GRANULOCYTES NFR BLD AUTO: 0 % (ref 0–2)
LYMPHOCYTES # BLD AUTO: 2.52 THOUSANDS/ÂΜL (ref 0.6–4.47)
LYMPHOCYTES NFR BLD AUTO: 46 % (ref 14–44)
MCH RBC QN AUTO: 40.2 PG (ref 26.8–34.3)
MCHC RBC AUTO-ENTMCNC: 35.3 G/DL (ref 31.4–37.4)
MCV RBC AUTO: 114 FL (ref 82–98)
MONOCYTES # BLD AUTO: 0.89 THOUSAND/ÂΜL (ref 0.17–1.22)
MONOCYTES NFR BLD AUTO: 16 % (ref 4–12)
NEUTROPHILS # BLD AUTO: 1.69 THOUSANDS/ÂΜL (ref 1.85–7.62)
NEUTS SEG NFR BLD AUTO: 31 % (ref 43–75)
NRBC BLD AUTO-RTO: 0 /100 WBCS
PLATELET # BLD AUTO: 378 THOUSANDS/UL (ref 149–390)
PMV BLD AUTO: 9.9 FL (ref 8.9–12.7)
POTASSIUM SERPL-SCNC: 3.8 MMOL/L (ref 3.5–5.3)
PROT SERPL-MCNC: 7 G/DL (ref 6.4–8.4)
RBC # BLD AUTO: 3.96 MILLION/UL (ref 3.88–5.62)
SODIUM SERPL-SCNC: 136 MMOL/L (ref 135–147)
WBC # BLD AUTO: 5.52 THOUSAND/UL (ref 4.31–10.16)

## 2023-09-26 PROCEDURE — 36415 COLL VENOUS BLD VENIPUNCTURE: CPT

## 2023-09-26 PROCEDURE — 80053 COMPREHEN METABOLIC PANEL: CPT

## 2023-09-26 PROCEDURE — 85025 COMPLETE CBC W/AUTO DIFF WBC: CPT

## 2023-09-26 PROCEDURE — 99215 OFFICE O/P EST HI 40 MIN: CPT | Performed by: PHYSICIAN ASSISTANT

## 2023-09-26 NOTE — PROGRESS NOTES
[facility] [id number]  Oncology Progress Note  Jose Page, 1965, [id number]  9/26/2023    Assessment/Plan:   1. Polycythemia vera ([address])  Jak2 +    This is a 59-year-old male with history of polycythemia vera. He does well on Hydrea and will continue on the regimen as outlined below. Patient will continue to get routine health screenings as well as annual flu/COVID and other vaccinations that are standard for age. Patient has completed zoster vaccinations. Patient has been doing quite well. We discussed getting blood work every 3 months and following up in 6 months with additional blood testing. Patient voiced understanding and agreement. He understands to contact the office with any questions or concerns. Regimen:  Hydrea 500 mg p.o. daily  81 mg aspirin p.o. daily    - CBC and differential; Future  - Comprehensive metabolic panel; Future  - CBC and differential; Future  - Comprehensive metabolic panel; Future  - Iron Panel (Includes Ferritin, Iron Sat%, Iron, and TIBC); Future    2. 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  - CBC and differential; Future  - Comprehensive metabolic panel; Future  - CBC and differential; Future    3. Hx of iron deficiency anemia  Stable. Patient will continue to be monitored via hemoglobin only. Unless becoming symptomatic.  - CBC and differential; Future  - Comprehensive metabolic panel; Future  - CBC and differential; Future  - Comprehensive metabolic panel; Future  - Iron Panel (Includes Ferritin, Iron Sat%, Iron, and TIBC); Future      The patient is scheduled for follow-up in approximately 6 months. Patient voiced agreement and understanding to the above. Patient knows to call the Hematology/Oncology office with any questions and concerns regarding the above.     Goals and Barriers:    Current Goal:   Prolong Survival from Cancer. Barriers: None. Patient's Capacity to Self Care:  Patient able to self care. Laura Kellogg PA-C  Medical Oncology/Hematology  [address]  ______________________________________________________________________________________________________________    Subjective     Chief Complaint   Patient presents with   • Follow-up       History of present illness/Cancer History:    This is a 58-year-old male who was admitted to the emergency room after having several weeks of shortness of breath, three episodes of syncope at home in January 2021.  Patient's past medical history significant for 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.     Patient had been previously worked up for abnormalities by primary care doctor who recommended that the patient follow-up with GI as well as with Cardiology for a stress test.  Unfortunately, patient's symptoms progressed and he presented to the emergency room. [employer] work demonstrated hemoglobin in the 7 gram/deciliter range.  Moreover, the patient was found to have a significant iron deficiency with a ferritin of three and an iron saturation of 1%.  Patient underwent three Venofer infusions daily in the hospital.  Patient's hemoglobin is in the 7 gram/deciliter range.       GI work up in 1/2021-2/2021:  GI has seen the patient in the hospital. Elvis Sr underwent colonoscopy in EGD that demonstrated questionable AVM in the small intestine.  This area was cauterized.  This area was not bleeding.  Capsule demonstrated angiectasis and follow up EGD- is planned on 2/26/2021.  This procedure did not demonstrate any signs of active bleeding.     Patient completed the remainder of Venofer treatments started in the hospital in February 2021.     03/23/21:    Patient notes overall feeling well. [employer] work was reviewed.  Patient's iron saturation increased to 7% and ferritin at 41.  Hemoglobin has completely supplemented however, MCV is still low.  Patient's platelet count is still elevated however this may be secondary to splenectomy and not necessarily to iron deficiency.   Patient has completed COVID vaccinations.     3/30/21:   Ferritin= 23, iron saturation = seven, TIBC 430,  Hemoglobin = 12.4, MCV 80,  Platelet count = 011,  PSA = 3.4     4/9-5/7/21:  5 Venofer 300 mg IV weekly.     05/03/21:  Urgent follow up; [facility] on 5/1/2021, ED visit, hemoglobin stable.  No bleeding today. 1 more IV iron treatment to go.  Iron studies requestion on labs from 5/1/21:iron sat supratheraputic.      5/12/21:  Colonoscopy was negative for signs of active bleeding.  Hemorrhoidal bleeding to blame for bright red blood per rectum.  Scope was advanced past the descending colon due to patient's.  CT colonoscopy diagnostic without contrast did not demonstrate any lesions.     06/03/21:  Patient is feeling better.  No more GI bleeding.  Hemoglobin = 15.1, platelet count 949 oral seven, MCV = 86, ferritin = 452, iron saturation = 30%, TIBC = 336     8/27/21:    Labs taken every four weeks demonstrate stability.  No GI bleeding.  Patient feels well.  Hemoglobin = 15.1, platelet count = 278, MCV = 93,  Iron saturation = 33, TIBC 324,  Ferritin = 155     11/1/2021: Feels well.  1- 3 day episode of Melena + Hematochezia.  Hemoglobin = 17.1, platelet count = 316, MCV = 96,  Iron saturation = 38, TIBC 344,  Ferritin = 149     2/8/2022:  WBC = 8.4, hemoglobin = 15.5, hematocrit = 49.6, platelet count = 290,  iron saturation = 34%, TIBC 364     02/10/22:  Patient reports having significant blood in his stool including dark, melena as well as hematochezia.  Patient has not made an appointment with GI doctor.  Discussed decreasing ferritin levels     5/6/22 WBC = 8.56, hemoglobin = 17.1, MCV = 92, platelet count = 733, OCDOQVOX = 65     5/13/2022:  Erythropoietin = 9.0, +JAK2 V617F Mutation, other studies did not reflex,  WBC = 14, hemoglobin = 17.7, hematocrit = 52, platelet count = 479     22 PSA = 0.7, CMP within normal limits with the exception of a mildly elevated potassium at 5.4, labs ordered by primary physician.     2021:Symptom Assessment Tool states that fatigue = 7, early satiety = 6, abdominal discomfort 6, inactivity = 5, problems with concentration 0, night sweats 9, itching 10, no answer for bone pain, no fever 0 and no unintentional weight loss in last six months.     2022: 1 phlebotomy, pre Hemoglobin = 16.8, hct=49.5     2022: WBC=11, hemoglobin =16.5, hct = 49.2, hvg=304; Started Hydrea 500 mg daily.  Phlebotomies stopped due to headache- worsening.      2022:  HCT = 48.1, Hemoglobin =16.8, plt 350     2022 hemoglobin = 15.1, hematocrit 42.2, plt =392    2023: WBC 5.52, hemoglobin 15.9, hematocrit 45, platelet count 451, , CMP unremarkable    Interval history: Continues to do well. Recent hospitalization secondary to sepsis from lactobacillus? .  Patient continues to do well, symptoms of polycythemia including pruritus and night sweats have significantly dissipated. Patient has good quality of life. ECO - Asymptomatic    Review of Systems   All other systems reviewed and are negative.       Current Outpatient Medications:   •  acetaminophen (TYLENOL) 325 mg tablet, Take 2 tablets (650 mg total) by mouth every 6 (six) hours as needed for mild pain, headaches or fever, Disp: 30 tablet, Rfl: 0  •  ASPIRIN 81 PO, Take by mouth every morning, Disp: , Rfl:   •  atorvastatin (LIPITOR) 20 mg tablet, Take 1 tablet (20 mg total) by mouth daily, Disp: 90 tablet, Rfl: 1  •  budesonide-formoterol (Symbicort) 80-4.5 MCG/ACT inhaler, Inhale 2 puffs 2 (two) times a day Rinse mouth after use., Disp: 10.2 g, Rfl: 0  •  famotidine (PEPCID) 40 MG tablet, TAKE 1 TABLET BY MOUTH EVERY DAY, Disp: 90 tablet, Rfl: 0  •  hydroxyurea (HYDREA) 500 mg capsule, TAKE 1 CAPSULE (500 MG TOTAL) BY MOUTH EVERY EVENING, Disp: 90 capsule, Rfl: 1  • saccharomyces boulardii (FLORASTOR) 250 mg capsule, Take 1 capsule (250 mg total) by mouth 2 (two) times a day, Disp: 30 capsule, Rfl: 0  •  verapamil (CALAN-SR) 120 mg CR tablet, TAKE 1 TABLET BY MOUTH EVERY DAY, Disp: 90 tablet, Rfl: 0  No Known Allergies    Advance Directive and Living Will:            Objective   /82 (BP Location: Right arm, Patient Position: Sitting, Cuff Size: Adult)   Pulse 78   Temp 98 °F (36.7 °C)   Resp 18   Ht 6' (1.829 m)   Wt 94.3 kg (208 lb)   SpO2 99%   BMI 28.21 kg/m²   Wt Readings from Last 6 Encounters:   09/26/23 94.3 kg (208 lb)   08/11/23 95.6 kg (210 lb 12.8 oz)   08/11/23 95.7 kg (211 lb)   08/04/23 95.7 kg (211 lb)   08/04/23 96.3 kg (212 lb 3.2 oz)   07/13/23 96.3 kg (212 lb 6.4 oz)       Physical Exam  Constitutional:       General: He is not in acute distress. Appearance: He is well-developed. HENT:      Head: Normocephalic and atraumatic. Right Ear: External ear normal.      Left Ear: External ear normal.      Nose: Nose normal.   Eyes:      General: No scleral icterus. Conjunctiva/sclera: Conjunctivae normal.   Cardiovascular:      Rate and Rhythm: Normal rate. Pulmonary:      Effort: No respiratory distress. Abdominal:      General: There is no distension. Palpations: Abdomen is soft. Skin:     Findings: No rash (on exposed skin. ). Neurological:      Mental Status: He is alert and oriented to person, place, and time. Psychiatric:         Thought Content:  Thought content normal.         Pertinent Laboratory Results and Imaging Review:  Appointment on 09/26/2023   Component Date Value Ref Range Status   • WBC 09/26/2023 5.52  4.31 - 10.16 Thousand/uL Final   • RBC 09/26/2023 3.96  3.88 - 5.62 Million/uL Final   • Hemoglobin 09/26/2023 15.9  12.0 - 17.0 g/dL Final   • Hematocrit 09/26/2023 45.0  36.5 - 49.3 % Final   • MCV 09/26/2023 114 (H)  82 - 98 fL Final   • MCH 09/26/2023 40.2 (H)  26.8 - 34.3 pg Final   • [facility] 09/26/2023 35.3  31.4 - 37.4 g/dL Final   • RDW 09/26/2023 13.8  11.6 - 15.1 % Final   • MPV 09/26/2023 9.9  8.9 - 12.7 fL Final   • Platelets 30/06/5754 378  149 - 390 Thousands/uL Final   • nRBC 09/26/2023 0  /100 WBCs Final   • Neutrophils Relative 09/26/2023 31 (L)  43 - 75 % Final   • Immat GRANS % 09/26/2023 0  0 - 2 % Final   • Lymphocytes Relative 09/26/2023 46 (H)  14 - 44 % Final   • Monocytes Relative 09/26/2023 16 (H)  4 - 12 % Final   • Eosinophils Relative 09/26/2023 6  0 - 6 % Final   • Basophils Relative 09/26/2023 1  0 - 1 % Final   • Neutrophils Absolute 09/26/2023 1.69 (L)  1.85 - 7.62 Thousands/µL Final   • Immature Grans Absolute 09/26/2023 0.01  0.00 - 0.20 Thousand/uL Final   • Lymphocytes Absolute 09/26/2023 2.52  0.60 - 4.47 Thousands/µL Final   • Monocytes Absolute 09/26/2023 0.89  0.17 - 1.22 Thousand/µL Final   • Eosinophils Absolute 09/26/2023 0.33  0.00 - 0.61 Thousand/µL Final   • Basophils Absolute 09/26/2023 0.08  0.00 - 0.10 Thousands/µL Final   • Sodium 09/26/2023 136  135 - 147 mmol/L Final   • Potassium 09/26/2023 3.8  3.5 - 5.3 mmol/L Final   • Chloride 09/26/2023 104  96 - 108 mmol/L Final   • CO2 09/26/2023 27  21 - 32 mmol/L Final   • ANION GAP 09/26/2023 5  mmol/L Final   • BUN 09/26/2023 10  5 - 25 mg/dL Final   • Creatinine 09/26/2023 0.92  0.60 - 1.30 mg/dL Final    Standardized to IDMS reference method   • Glucose, Fasting 09/26/2023 98  65 - 99 mg/dL Final   • Calcium 09/26/2023 9.2  8.4 - 10.2 mg/dL Final   • AST 09/26/2023 22  13 - 39 U/L Final   • ALT 09/26/2023 32  7 - 52 U/L Final    Specimen collection should occur prior to Sulfasalazine administration due to the potential for falsely depressed results.     • Alkaline Phosphatase 09/26/2023 75  34 - 104 U/L Final   • Total Protein 09/26/2023 7.0  6.4 - 8.4 g/dL Final   • Albumin 09/26/2023 4.1  3.5 - 5.0 g/dL Final   • Total Bilirubin 09/26/2023 0.51  0.20 - 1.00 mg/dL Final    Use of this assay is not recommended for patients undergoing treatment with eltrombopag due to the potential for falsely elevated results. N-acetyl-p-benzoquinone imine (metabolite of Acetaminophen) will generate erroneously low results in samples for patients that have taken an overdose of Acetaminophen.    • eGFR 09/26/2023 90  ml/min/1.73sq m Final         The following historical data was reviewed:  Past Medical History:   Diagnosis Date   • Anemia    • Arthritis    • Asthma    • Cancer ([address])     prostate   • Colon polyp    • Elevated PSA    • Full dentures    • GERD (gastroesophageal reflux disease)    • Hemorrhoid    • Hyperlipidemia    • Hypertension    • Polycythemia vera (HCC)    • Sleep apnea     No CPAP   • Smoker    • TIA (transient ischemic attack)    • Transfusion history    • Wears glasses      Past Surgical History:   Procedure Laterality Date   • ABDOMINAL ADHESION SURGERY N/A 11/17/2021    Procedure: LYSIS ADHESIONS LAPAROSCOPIC W ROBOT;  Surgeon: Malia Fernandez MD;  Location: [facility];  Service: Urology   • APPENDECTOMY     • COLONOSCOPY     • EGD     • FOOT SURGERY Right     bone removed-left foot-removal of blood clots from an injury   • OTHER SURGICAL HISTORY      bone removal right arm-abnormal growth of surface bone   • PROSTATE BIOPSY     • PROSTATECTOMY N/A 11/17/2021    Procedure: ROBOTIC ASSISTED LAPAROSCOPIC SIMPLE PROSTATECTOMY AND BLADDER NECK RECONSTRUCTION;  Surgeon: Malia Fernandez MD;  Location: [facility];  Service: Urology   • SPLENECTOMY      ruptured     Social History     Socioeconomic History   • Marital status:      Spouse name: Not on file   • Number of children: Not on file   • Years of education: Not on file   • Highest education level: Not on file   Occupational History   • Occupation: supervisor     Employer: [employer]   Tobacco Use   • Smoking status: Former     Packs/day: 0.50     Years: 10.00     Total pack years: 5.00     Types: Cigarettes     Start date: 6/15/2011     Quit date: 2/22/2021 Years since quittin.5   • Smokeless tobacco: Never   Vaping Use   • Vaping Use: Never used   Substance and Sexual Activity   • Alcohol use: Not Currently   • Drug use: No   • Sexual activity: Not on file   Other Topics Concern   • Not on file   Social History Narrative   • Not on file     Social Determinants of Health     Financial Resource Strain: Not on file   Food Insecurity: Not on file   Transportation Needs: Not on file   Physical Activity: Not on file   Stress: Not on file   Social Connections: Not on file   Intimate Partner Violence: Not on file   Housing Stability: Not on file     Family History   Problem Relation Age of Onset   • Dementia Father    • Heart disease Mother    • Hypertension Mother    • Hyperlipidemia Mother    • Hypertension Sister    • Hypertension Brother    • No Known Problems Daughter    • No Known Problems Son    • Cancer Paternal Grandfather         prostate   • No Known Problems Son        Please note: This report has been generated by a voice recognition software system. Therefore there may be syntax, spelling, and/or grammatical errors. Please call if you have any questions.

## 2023-10-19 ENCOUNTER — OFFICE VISIT (OUTPATIENT)
Dept: FAMILY MEDICINE CLINIC | Facility: CLINIC | Age: 58
End: 2023-10-19
Payer: COMMERCIAL

## 2023-10-19 VITALS
SYSTOLIC BLOOD PRESSURE: 130 MMHG | OXYGEN SATURATION: 98 % | HEART RATE: 80 BPM | RESPIRATION RATE: 20 BRPM | TEMPERATURE: 97.2 F | BODY MASS INDEX: 28.17 KG/M2 | DIASTOLIC BLOOD PRESSURE: 80 MMHG | HEIGHT: 72 IN | WEIGHT: 208 LBS

## 2023-10-19 DIAGNOSIS — D45 POLYCYTHEMIA VERA (HCC): ICD-10-CM

## 2023-10-19 DIAGNOSIS — E78.49 OTHER HYPERLIPIDEMIA: ICD-10-CM

## 2023-10-19 DIAGNOSIS — R06.2 WHEEZING: ICD-10-CM

## 2023-10-19 DIAGNOSIS — Z23 ENCOUNTER FOR IMMUNIZATION: ICD-10-CM

## 2023-10-19 DIAGNOSIS — G43.809 OTHER MIGRAINE WITHOUT STATUS MIGRAINOSUS, NOT INTRACTABLE: Primary | ICD-10-CM

## 2023-10-19 DIAGNOSIS — J45.40 MODERATE PERSISTENT ASTHMA WITHOUT COMPLICATION: ICD-10-CM

## 2023-10-19 DIAGNOSIS — G47.30 SLEEP APNEA, UNSPECIFIED TYPE: ICD-10-CM

## 2023-10-19 DIAGNOSIS — I10 PRIMARY HYPERTENSION: ICD-10-CM

## 2023-10-19 PROBLEM — G43.909 MIGRAINE: Status: ACTIVE | Noted: 2023-10-19

## 2023-10-19 PROCEDURE — 99215 OFFICE O/P EST HI 40 MIN: CPT | Performed by: FAMILY MEDICINE

## 2023-10-19 PROCEDURE — 90686 IIV4 VACC NO PRSV 0.5 ML IM: CPT

## 2023-10-19 PROCEDURE — 90471 IMMUNIZATION ADMIN: CPT

## 2023-10-19 RX ORDER — ALBUTEROL SULFATE 90 UG/1
2 AEROSOL, METERED RESPIRATORY (INHALATION) EVERY 6 HOURS PRN
COMMUNITY
End: 2023-10-19 | Stop reason: SDUPTHER

## 2023-10-19 RX ORDER — ATORVASTATIN CALCIUM 20 MG/1
20 TABLET, FILM COATED ORAL DAILY
Qty: 90 TABLET | Refills: 1 | Status: SHIPPED | OUTPATIENT
Start: 2023-10-19

## 2023-10-19 RX ORDER — ALBUTEROL SULFATE 90 UG/1
2 AEROSOL, METERED RESPIRATORY (INHALATION) EVERY 6 HOURS PRN
Qty: 18 G | Refills: 5 | Status: SHIPPED | OUTPATIENT
Start: 2023-10-19

## 2023-10-19 RX ORDER — BUDESONIDE AND FORMOTEROL FUMARATE DIHYDRATE 80; 4.5 UG/1; UG/1
2 AEROSOL RESPIRATORY (INHALATION) 2 TIMES DAILY
Qty: 10.2 G | Refills: 5 | Status: SHIPPED | OUTPATIENT
Start: 2023-10-19

## 2023-10-19 RX ORDER — TOPIRAMATE 25 MG/1
25 CAPSULE, COATED PELLETS ORAL
Qty: 90 CAPSULE | Refills: 1 | Status: SHIPPED | OUTPATIENT
Start: 2023-10-19

## 2023-10-19 NOTE — PROGRESS NOTES
Assessment/Plan:    No problem-specific Assessment & Plan notes found for this encounter. Migraine  Partial tension, stretching suggested  Trigger diary  Start topamax  Recheck 1m, risk of sedation aware    Htn stable    Asthma new  Started on symbicort at hospital  Feels better with it but using only qd and has breakthru  Start bid  Refill given    Hx of LALIT  Untreated  Advised can cause sx  Referral given to sleep center    P vera  Hematology f/u     Diagnoses and all orders for this visit:    Other migraine without status migrainosus, not intractable  -     topiramate (TOPAMAX) 25 mg sprinkle capsule; Take 1 capsule (25 mg total) by mouth daily at bedtime    Encounter for immunization  -     influenza vaccine, quadrivalent, 0.5 mL, preservative-free, for adult and pediatric patients 6 mos+ (AFLURIA, FLUARIX, FLULAVAL, FLUZONE)    Primary hypertension  -     verapamil (CALAN-SR) 120 mg CR tablet; Take 1 tablet (120 mg total) by mouth daily    Polycythemia vera (HCC)    Other hyperlipidemia  -     atorvastatin (LIPITOR) 20 mg tablet; Take 1 tablet (20 mg total) by mouth daily    Wheezing    Moderate persistent asthma without complication  -     budesonide-formoterol (Symbicort) 80-4.5 MCG/ACT inhaler; Inhale 2 puffs 2 (two) times a day Rinse mouth after use. -     albuterol (PROVENTIL HFA,VENTOLIN HFA) 90 mcg/act inhaler; Inhale 2 puffs every 6 (six) hours as needed for wheezing Rinse mouth after use    Sleep apnea, unspecified type  -     Ambulatory referral to Sleep Medicine; Future    Other orders  -     Discontinue: albuterol (PROVENTIL HFA,VENTOLIN HFA) 90 mcg/act inhaler; Inhale 2 puffs every 6 (six) hours as needed for wheezing        Return in about 1 month (around 11/19/2023) for Recheck. Subjective:      Patient ID: Jono Garibay is a 60 y.o. male.     Chief Complaint   Patient presents with    Follow-up     Medication review  [id number]       HPI  Getting headaches  3x/w  Throbbing  Frontal area  No neck pain  Migraine hx  Maxalt not effective in past  Tylenol is better  Thinks its stress    Does walk  Sleeps well  Snores  Notes pauses  LALIT dx in past, never offered cpap    On symbicort from ER  Used since August  2 puffs qam  Tamela abotu 3x/w    Low fat diet followed    The following portions of the patient's history were reviewed and updated as appropriate: allergies, current medications, past family history, past medical history, past social history, past surgical history and problem list.    Review of Systems   Constitutional:  Negative for chills and fever. Neurological:  Positive for headaches. Negative for syncope. Current Outpatient Medications   Medication Sig Dispense Refill    acetaminophen (TYLENOL) 325 mg tablet Take 2 tablets (650 mg total) by mouth every 6 (six) hours as needed for mild pain, headaches or fever 30 tablet 0    albuterol (PROVENTIL HFA,VENTOLIN HFA) 90 mcg/act inhaler Inhale 2 puffs every 6 (six) hours as needed for wheezing Rinse mouth after use 18 g 5    ASPIRIN 81 PO Take by mouth every morning      atorvastatin (LIPITOR) 20 mg tablet Take 1 tablet (20 mg total) by mouth daily 90 tablet 1    budesonide-formoterol (Symbicort) 80-4.5 MCG/ACT inhaler Inhale 2 puffs 2 (two) times a day Rinse mouth after use. 10.2 g 5    famotidine (PEPCID) 40 MG tablet TAKE 1 TABLET BY MOUTH EVERY DAY 90 tablet 0    hydroxyurea (HYDREA) 500 mg capsule TAKE 1 CAPSULE (500 MG TOTAL) BY MOUTH EVERY EVENING 90 capsule 1    saccharomyces boulardii (FLORASTOR) 250 mg capsule Take 1 capsule (250 mg total) by mouth 2 (two) times a day 30 capsule 0    topiramate (TOPAMAX) 25 mg sprinkle capsule Take 1 capsule (25 mg total) by mouth daily at bedtime 90 capsule 1    verapamil (CALAN-SR) 120 mg CR tablet Take 1 tablet (120 mg total) by mouth daily 90 tablet 1     No current facility-administered medications for this visit.        Objective:    /80   Pulse 80   Temp (!) 97.2 °F (36.2 °C)   Resp 20   Ht 6' (1.829 m)   Wt 94.3 kg (208 lb)   SpO2 98%   BMI 28.21 kg/m²        Physical Exam  Vitals and nursing note reviewed. Constitutional:       General: He is not in acute distress. Appearance: He is well-developed. He is not ill-appearing. HENT:      Head: Normocephalic. Right Ear: Tympanic membrane and ear canal normal. There is no impacted cerumen. Left Ear: Tympanic membrane and ear canal normal. There is no impacted cerumen. Nose: No congestion. Eyes:      General: No scleral icterus. Conjunctiva/sclera: Conjunctivae normal.   Neck:      Vascular: No carotid bruit. Cardiovascular:      Rate and Rhythm: Normal rate and regular rhythm. Heart sounds: No murmur heard. Pulmonary:      Effort: Pulmonary effort is normal. No respiratory distress. Breath sounds: No wheezing. Abdominal:      General: There is no distension. Palpations: Abdomen is soft. Tenderness: There is no abdominal tenderness. Musculoskeletal:         General: No deformity. Cervical back: Neck supple. Right lower leg: No edema. Left lower leg: No edema. Skin:     General: Skin is warm and dry. Coloration: Skin is not pale. Neurological:      General: No focal deficit present. Mental Status: He is alert. Cranial Nerves: No cranial nerve deficit. Motor: No weakness. Gait: Gait normal.   Psychiatric:         Mood and Affect: Mood normal.         Behavior: Behavior normal.         Thought Content: Thought content normal.         BMI Counseling: Body mass index is 28.21 kg/m². The BMI is above normal. Nutrition recommendations include decreasing portion sizes and moderation in carbohydrate intake. Exercise recommendations include exercising 3-5 times per week. No pharmacotherapy was ordered. Rationale for BMI follow-up plan is due to patient being overweight or obese.      Depression Screening and Follow-up Plan: Patient was screened for depression during today's encounter. They screened negative with a PHQ-2 score of 0.    41 minutes spent with patient and with chart review and documentation completion.          Mare Prom, DO

## 2023-11-05 DIAGNOSIS — D45 POLYCYTHEMIA VERA (HCC): ICD-10-CM

## 2023-11-05 DIAGNOSIS — K21.9 GASTROESOPHAGEAL REFLUX DISEASE WITHOUT ESOPHAGITIS: ICD-10-CM

## 2023-11-06 RX ORDER — FAMOTIDINE 40 MG/1
TABLET, FILM COATED ORAL
Qty: 90 TABLET | Refills: 0 | Status: SHIPPED | OUTPATIENT
Start: 2023-11-06

## 2023-11-06 RX ORDER — HYDROXYUREA 500 MG/1
500 CAPSULE ORAL EVERY EVENING
Qty: 90 CAPSULE | Refills: 1 | Status: SHIPPED | OUTPATIENT
Start: 2023-11-06

## 2023-11-18 PROBLEM — K62.5 RECTAL BLEEDING: Status: RESOLVED | Noted: 2023-08-05 | Resolved: 2023-11-18

## 2023-11-18 PROBLEM — L03.113 CELLULITIS OF RIGHT WRIST: Status: RESOLVED | Noted: 2023-08-04 | Resolved: 2023-11-18

## 2023-11-18 PROBLEM — R78.81 POSITIVE BLOOD CULTURE: Status: RESOLVED | Noted: 2023-08-07 | Resolved: 2023-11-18

## 2023-11-20 ENCOUNTER — OFFICE VISIT (OUTPATIENT)
Dept: FAMILY MEDICINE CLINIC | Facility: CLINIC | Age: 58
End: 2023-11-20
Payer: COMMERCIAL

## 2023-11-20 ENCOUNTER — HOSPITAL ENCOUNTER (OUTPATIENT)
Dept: RADIOLOGY | Age: 58
Discharge: HOME | End: 2023-11-20
Attending: FAMILY MEDICINE
Payer: COMMERCIAL

## 2023-11-20 VITALS
SYSTOLIC BLOOD PRESSURE: 130 MMHG | RESPIRATION RATE: 18 BRPM | WEIGHT: 207.4 LBS | HEART RATE: 85 BPM | BODY MASS INDEX: 28.13 KG/M2 | TEMPERATURE: 97.5 F | DIASTOLIC BLOOD PRESSURE: 78 MMHG

## 2023-11-20 DIAGNOSIS — G43.809 OTHER MIGRAINE WITHOUT STATUS MIGRAINOSUS, NOT INTRACTABLE: ICD-10-CM

## 2023-11-20 DIAGNOSIS — G81.91 RIGHT HEMIPLEGIA (HCC): Primary | ICD-10-CM

## 2023-11-20 DIAGNOSIS — I10 PRIMARY HYPERTENSION: ICD-10-CM

## 2023-11-20 DIAGNOSIS — J45.30 MILD PERSISTENT ASTHMA WITHOUT COMPLICATION: ICD-10-CM

## 2023-11-20 DIAGNOSIS — G81.91 RIGHT HEMIPLEGIA (HCC): ICD-10-CM

## 2023-11-20 PROCEDURE — G1004 CDSM NDSC: HCPCS

## 2023-11-20 PROCEDURE — 99215 OFFICE O/P EST HI 40 MIN: CPT | Performed by: FAMILY MEDICINE

## 2023-11-20 PROCEDURE — 70450 CT HEAD/BRAIN W/O DYE: CPT

## 2023-11-20 PROCEDURE — 72125 CT NECK SPINE W/O DYE: CPT

## 2023-11-20 RX ORDER — FLUTICASONE PROPIONATE AND SALMETEROL 250; 50 UG/1; UG/1
1 POWDER RESPIRATORY (INHALATION) 2 TIMES DAILY
Qty: 60 BLISTER | Refills: 5 | Status: SHIPPED | OUTPATIENT
Start: 2023-11-20 | End: 2024-05-18

## 2023-11-20 RX ORDER — TOPIRAMATE 25 MG/1
50 CAPSULE, COATED PELLETS ORAL
Qty: 60 CAPSULE | Refills: 5 | Status: SHIPPED | OUTPATIENT
Start: 2023-11-20

## 2023-11-20 NOTE — PATIENT INSTRUCTIONS
Please increase the topamax 25mg at bedtime to 50mg at bedtime to better control headaches and let me know how many headaches you are having in 1 month. Please increase your aspirin from 81mg/d to 162mg/d starting today.

## 2023-11-20 NOTE — PROGRESS NOTES
Assessment/Plan:    No problem-specific Assessment & Plan notes found for this encounter. Possible TIA vs CVA (has PCV) vs spinal cord pathology  STAT CT head and c spine  May need MRI in future if neg  If confirmed then will need further w/u  If neg then would at least increase ASA 81mg to 162mg/d    Htn stable    Migraines with some improvement  Increase topamax 25mg qhs to 50mg qhs  Report headache frequency in 1m    Asthma stable but change symbicort to wixela due to formulary    Please increase the topamax 25mg at bedtime to 50mg at bedtime to better control headaches and let me know how many headaches you are having in 1 month. Please increase your aspirin from 81mg/d to 162mg/d starting today. CT findings head/neck  No mass or cva  [facility] sent    41 minutes spent with patient and with chart review and documentation completion. Diagnoses and all orders for this visit:    Right hemiplegia ([address])  -     CT head wo contrast; Future  -     CT spine cervical wo contrast; Future    Primary hypertension    Other migraine without status migrainosus, not intractable  -     topiramate (TOPAMAX) 25 mg sprinkle capsule; Take 2 capsules (50 mg total) by mouth daily at bedtime    Mild persistent asthma without complication  -     Fluticasone-Salmeterol (Advair) 250-50 mcg/dose inhaler; Inhale 1 puff 2 (two) times a day Rinse mouth after use. No follow-ups on file. Subjective:      Patient ID: Kati Bernardo is a 60 y.o. male.     Chief Complaint   Patient presents with    Follow-up     [facility]  Waiting for sleep center to call him  Topamax tolerated  10 per month  Not groggy  Was 12/month  No trigger month  Less tense    Last week  Right hand stiffened  Right leg cramped all of sudden at same instant  No fall but had to sit down otherwise  Couldn't lift arm at time  Out of blue  Lasted 15 min  Did not go to ER  Right hand still not right    The following portions of the patient's history were reviewed and updated as appropriate: allergies, current medications, past family history, past medical history, past social history, past surgical history and problem list.    Review of Systems   Constitutional:  Negative for chills and fever. Respiratory:  Negative for shortness of breath. Cardiovascular:  Negative for chest pain. Neurological:  Positive for weakness and headaches. Negative for seizures, syncope, speech difficulty and numbness. Current Outpatient Medications   Medication Sig Dispense Refill    acetaminophen (TYLENOL) 325 mg tablet Take 2 tablets (650 mg total) by mouth every 6 (six) hours as needed for mild pain, headaches or fever 30 tablet 0    albuterol (PROVENTIL HFA,VENTOLIN HFA) 90 mcg/act inhaler Inhale 2 puffs every 6 (six) hours as needed for wheezing Rinse mouth after use 18 g 5    ASPIRIN 81 PO Take by mouth every morning      atorvastatin (LIPITOR) 20 mg tablet Take 1 tablet (20 mg total) by mouth daily 90 tablet 1    famotidine (PEPCID) 40 MG tablet TAKE 1 TABLET BY MOUTH EVERY DAY 90 tablet 0    Fluticasone-Salmeterol (Advair) 250-50 mcg/dose inhaler Inhale 1 puff 2 (two) times a day Rinse mouth after use. 60 blister 5    hydroxyurea (HYDREA) 500 mg capsule TAKE 1 CAPSULE (500 MG TOTAL) BY MOUTH EVERY EVENING 90 capsule 1    saccharomyces boulardii (FLORASTOR) 250 mg capsule Take 1 capsule (250 mg total) by mouth 2 (two) times a day 30 capsule 0    topiramate (TOPAMAX) 25 mg sprinkle capsule Take 2 capsules (50 mg total) by mouth daily at bedtime 60 capsule 5    verapamil (CALAN-SR) 120 mg CR tablet Take 1 tablet (120 mg total) by mouth daily 90 tablet 1     No current facility-administered medications for this visit. Objective:    /78   Pulse 85   Temp 97.5 °F (36.4 °C)   Resp 18   Wt 94.1 kg (207 lb 6.4 oz)   BMI 28.13 kg/m²        Physical Exam  Vitals and nursing note reviewed. Constitutional:       General: He is not in acute distress. Appearance: He is well-developed. He is not ill-appearing. HENT:      Head: Normocephalic. Right Ear: Tympanic membrane normal.      Left Ear: Tympanic membrane normal.   Eyes:      General: No scleral icterus. Conjunctiva/sclera: Conjunctivae normal.   Neck:      Vascular: No carotid bruit. Cardiovascular:      Rate and Rhythm: Normal rate and regular rhythm. Heart sounds: No murmur heard. Pulmonary:      Effort: Pulmonary effort is normal. No respiratory distress. Breath sounds: No wheezing. Abdominal:      Palpations: Abdomen is soft. Musculoskeletal:         General: No swelling or deformity. Cervical back: Neck supple. Right lower leg: No edema. Left lower leg: No edema. Skin:     General: Skin is warm and dry. Coloration: Skin is not jaundiced or pale. Neurological:      Mental Status: He is alert. Cranial Nerves: No cranial nerve deficit. Motor: Weakness present. Gait: Gait normal.      Deep Tendon Reflexes: Reflexes normal.      Comments: Right arm biceps 4/5  Right deltoid 4/5   Psychiatric:         Mood and Affect: Mood normal.         Behavior: Behavior normal.         Thought Content:  Thought content normal.                Bevely Bessemer, DO

## 2024-01-02 DIAGNOSIS — J45.30 MILD PERSISTENT ASTHMA WITHOUT COMPLICATION: ICD-10-CM

## 2024-01-04 DIAGNOSIS — J45.30 MILD PERSISTENT ASTHMA WITHOUT COMPLICATION: Primary | ICD-10-CM

## 2024-01-04 RX ORDER — FLUTICASONE PROPIONATE AND SALMETEROL 50; 250 UG/1; UG/1
POWDER RESPIRATORY (INHALATION)
Refills: 5 | OUTPATIENT
Start: 2024-01-04

## 2024-01-04 RX ORDER — FLUTICASONE PROPIONATE AND SALMETEROL 250; 50 UG/1; UG/1
1 POWDER RESPIRATORY (INHALATION) 2 TIMES DAILY
Qty: 60 BLISTER | Refills: 5 | Status: SHIPPED | OUTPATIENT
Start: 2024-01-04 | End: 2024-07-02

## 2024-01-14 PROBLEM — R97.20 ELEVATED PSA: Status: RESOLVED | Noted: 2020-03-12 | Resolved: 2024-01-14

## 2024-01-15 ENCOUNTER — OFFICE VISIT (OUTPATIENT)
Dept: FAMILY MEDICINE CLINIC | Facility: CLINIC | Age: 59
End: 2024-01-15
Payer: COMMERCIAL

## 2024-01-15 VITALS
DIASTOLIC BLOOD PRESSURE: 84 MMHG | SYSTOLIC BLOOD PRESSURE: 132 MMHG | WEIGHT: 207.2 LBS | HEART RATE: 86 BPM | TEMPERATURE: 98 F | RESPIRATION RATE: 18 BRPM | BODY MASS INDEX: 28.1 KG/M2

## 2024-01-15 DIAGNOSIS — Z13.1 SCREENING FOR DIABETES MELLITUS (DM): ICD-10-CM

## 2024-01-15 DIAGNOSIS — D45 POLYCYTHEMIA VERA (HCC): ICD-10-CM

## 2024-01-15 DIAGNOSIS — Z13.83 SCREENING FOR CARDIOVASCULAR, RESPIRATORY, AND GENITOURINARY DISEASES: ICD-10-CM

## 2024-01-15 DIAGNOSIS — G43.809 OTHER MIGRAINE WITHOUT STATUS MIGRAINOSUS, NOT INTRACTABLE: Primary | ICD-10-CM

## 2024-01-15 DIAGNOSIS — Z12.5 PROSTATE CANCER SCREENING: ICD-10-CM

## 2024-01-15 DIAGNOSIS — Z13.89 SCREENING FOR CARDIOVASCULAR, RESPIRATORY, AND GENITOURINARY DISEASES: ICD-10-CM

## 2024-01-15 DIAGNOSIS — I10 PRIMARY HYPERTENSION: ICD-10-CM

## 2024-01-15 DIAGNOSIS — R73.03 PREDIABETES: ICD-10-CM

## 2024-01-15 DIAGNOSIS — Z13.6 SCREENING FOR CARDIOVASCULAR, RESPIRATORY, AND GENITOURINARY DISEASES: ICD-10-CM

## 2024-01-15 PROCEDURE — 99214 OFFICE O/P EST MOD 30 MIN: CPT | Performed by: FAMILY MEDICINE

## 2024-01-15 RX ORDER — TOPIRAMATE 25 MG/1
CAPSULE, COATED PELLETS ORAL
Qty: 90 CAPSULE | Refills: 5 | Status: SHIPPED | OUTPATIENT
Start: 2024-01-15

## 2024-01-15 NOTE — PATIENT INSTRUCTIONS
To better control your headaches, please add 25mg of topamax in the morning to the 50mg you take at night for total of 75mg per day.

## 2024-01-15 NOTE — PROGRESS NOTES
Assessment/Plan:    No problem-specific Assessment & Plan notes found for this encounter.    Migraines still, about 6 per month  Increase topamax 50mg qhs to 25mg am and 50mg pm  Watch for sedation  F/u if no better    Pvera  F/u oncology    Prediabetes better    HLD stable on lipitor    Htn stable    Asthma stable on advair, prn ramya, about 1x/w    To better control your headaches, please add 25mg of topamax in the morning to the 50mg you take at night for total of 75mg per day.     Diagnoses and all orders for this visit:    Other migraine without status migrainosus, not intractable  -     topiramate (TOPAMAX) 25 mg sprinkle capsule; 1 in am, 2 in pm    Prostate cancer screening  -     PSA, Total Screen; Future    Prediabetes    Screening for cardiovascular, respiratory, and genitourinary diseases  -     Lipid Panel with Direct LDL reflex; Future    Screening for diabetes mellitus (DM)  -     Hemoglobin A1C; Future  -     Comprehensive metabolic panel; Future    Polycythemia vera (HCC)    Primary hypertension        Return in about 7 months (around 8/7/2024) for Annual physical.    Subjective:      Patient ID: Aurelio Rios is a 58 y.o. male.    Chief Complaint   Patient presents with    Follow-up     [facility]        HPI  On topamax for headaches, 6 per month, migraines  Not groggy on topamax 50mg qhs  Asthma stable    The following portions of the patient's history were reviewed and updated as appropriate: allergies, current medications, past family history, past medical history, past social history, past surgical history and problem list.    Review of Systems   Constitutional:  Negative for chills and fever.   Neurological:  Negative for dizziness and syncope.         Current Outpatient Medications   Medication Sig Dispense Refill    acetaminophen (TYLENOL) 325 mg tablet Take 2 tablets (650 mg total) by mouth every 6 (six) hours as needed for mild pain, headaches or fever 30 tablet 0    albuterol (PROVENTIL  HFA,VENTOLIN HFA) 90 mcg/act inhaler Inhale 2 puffs every 6 (six) hours as needed for wheezing Rinse mouth after use 18 g 5    ASPIRIN 81 PO Take by mouth every morning      atorvastatin (LIPITOR) 20 mg tablet Take 1 tablet (20 mg total) by mouth daily 90 tablet 1    famotidine (PEPCID) 40 MG tablet TAKE 1 TABLET BY MOUTH EVERY DAY 90 tablet 0    Fluticasone-Salmeterol (Advair) 250-50 mcg/dose inhaler Inhale 1 puff 2 (two) times a day Rinse mouth after use. 60 blister 5    hydroxyurea (HYDREA) 500 mg capsule TAKE 1 CAPSULE (500 MG TOTAL) BY MOUTH EVERY EVENING 90 capsule 1    saccharomyces boulardii (FLORASTOR) 250 mg capsule Take 1 capsule (250 mg total) by mouth 2 (two) times a day 30 capsule 0    topiramate (TOPAMAX) 25 mg sprinkle capsule 1 in am, 2 in pm 90 capsule 5    verapamil (CALAN-SR) 120 mg CR tablet Take 1 tablet (120 mg total) by mouth daily 90 tablet 1     No current facility-administered medications for this visit.       Objective:    /84   Pulse 86   Temp 98 °F (36.7 °C)   Resp 18   Wt 94 kg (207 lb 3.2 oz)   BMI 28.10 kg/m²        Physical Exam  Vitals and nursing note reviewed.   Constitutional:       Appearance: He is well-developed. He is not ill-appearing.   HENT:      Head: Normocephalic.      Right Ear: Tympanic membrane normal.      Left Ear: Tympanic membrane normal.   Eyes:      General: No scleral icterus.     Conjunctiva/sclera: Conjunctivae normal.   Cardiovascular:      Rate and Rhythm: Normal rate and regular rhythm.      Heart sounds: No murmur heard.  Pulmonary:      Effort: Pulmonary effort is normal. No respiratory distress.      Breath sounds: No wheezing.   Abdominal:      Palpations: Abdomen is soft.   Musculoskeletal:         General: No deformity.      Cervical back: Neck supple.      Right lower leg: No edema.      Left lower leg: No edema.   Skin:     General: Skin is warm and dry.      Coloration: Skin is not pale.   Neurological:      Mental Status: He is  alert.      Motor: No weakness.      Gait: Gait normal.   Psychiatric:         Mood and Affect: Mood normal.         Behavior: Behavior normal.         Thought Content: Thought content normal.                Channing Lopez DO

## 2024-02-04 DIAGNOSIS — K21.9 GASTROESOPHAGEAL REFLUX DISEASE WITHOUT ESOPHAGITIS: ICD-10-CM

## 2024-02-05 RX ORDER — FAMOTIDINE 40 MG/1
TABLET, FILM COATED ORAL
Qty: 90 TABLET | Refills: 0 | Status: SHIPPED | OUTPATIENT
Start: 2024-02-05

## 2024-02-21 PROBLEM — Z12.11 COLON CANCER SCREENING: Status: RESOLVED | Noted: 2020-02-05 | Resolved: 2024-02-21

## 2024-02-21 PROBLEM — Z00.00 HEALTHCARE MAINTENANCE: Status: RESOLVED | Noted: 2020-09-15 | Resolved: 2024-02-21

## 2024-03-08 ENCOUNTER — APPOINTMENT (OUTPATIENT)
Dept: LAB | Facility: HOSPITAL | Age: 59
End: 2024-03-08
Payer: COMMERCIAL

## 2024-03-08 DIAGNOSIS — Z86.2 HX OF IRON DEFICIENCY ANEMIA: ICD-10-CM

## 2024-03-08 DIAGNOSIS — D45 POLYCYTHEMIA VERA (HCC): ICD-10-CM

## 2024-03-08 DIAGNOSIS — Z90.81 S/P SPLENECTOMY: ICD-10-CM

## 2024-03-08 LAB
ALBUMIN SERPL BCP-MCNC: 4.2 G/DL (ref 3.5–5)
ALP SERPL-CCNC: 85 U/L (ref 34–104)
ALT SERPL W P-5'-P-CCNC: 33 U/L (ref 7–52)
ANION GAP SERPL CALCULATED.3IONS-SCNC: 5 MMOL/L
AST SERPL W P-5'-P-CCNC: 25 U/L (ref 13–39)
BASOPHILS # BLD AUTO: 0.08 THOUSANDS/ÂΜL (ref 0–0.1)
BASOPHILS NFR BLD AUTO: 2 % (ref 0–1)
BILIRUB SERPL-MCNC: 0.63 MG/DL (ref 0.2–1)
BUN SERPL-MCNC: 10 MG/DL (ref 5–25)
CALCIUM SERPL-MCNC: 8.9 MG/DL (ref 8.4–10.2)
CHLORIDE SERPL-SCNC: 106 MMOL/L (ref 96–108)
CO2 SERPL-SCNC: 28 MMOL/L (ref 21–32)
CREAT SERPL-MCNC: 0.93 MG/DL (ref 0.6–1.3)
EOSINOPHIL # BLD AUTO: 0.51 THOUSAND/ÂΜL (ref 0–0.61)
EOSINOPHIL NFR BLD AUTO: 11 % (ref 0–6)
ERYTHROCYTE [DISTWIDTH] IN BLOOD BY AUTOMATED COUNT: 14.4 % (ref 11.6–15.1)
FERRITIN SERPL-MCNC: 164 NG/ML (ref 24–336)
GFR SERPL CREATININE-BSD FRML MDRD: 89 ML/MIN/1.73SQ M
GLUCOSE P FAST SERPL-MCNC: 115 MG/DL (ref 65–99)
HCT VFR BLD AUTO: 45.1 % (ref 36.5–49.3)
HGB BLD-MCNC: 15.2 G/DL (ref 12–17)
IMM GRANULOCYTES # BLD AUTO: 0.01 THOUSAND/UL (ref 0–0.2)
IMM GRANULOCYTES NFR BLD AUTO: 0 % (ref 0–2)
IRON SATN MFR SERPL: 64 % (ref 15–50)
IRON SERPL-MCNC: 235 UG/DL (ref 50–212)
LYMPHOCYTES # BLD AUTO: 2.34 THOUSANDS/ÂΜL (ref 0.6–4.47)
LYMPHOCYTES NFR BLD AUTO: 48 % (ref 14–44)
MCH RBC QN AUTO: 38 PG (ref 26.8–34.3)
MCHC RBC AUTO-ENTMCNC: 33.7 G/DL (ref 31.4–37.4)
MCV RBC AUTO: 113 FL (ref 82–98)
MONOCYTES # BLD AUTO: 0.66 THOUSAND/ÂΜL (ref 0.17–1.22)
MONOCYTES NFR BLD AUTO: 14 % (ref 4–12)
NEUTROPHILS # BLD AUTO: 1.21 THOUSANDS/ÂΜL (ref 1.85–7.62)
NEUTS SEG NFR BLD AUTO: 25 % (ref 43–75)
NRBC BLD AUTO-RTO: 0 /100 WBCS
PLATELET # BLD AUTO: 386 THOUSANDS/UL (ref 149–390)
PMV BLD AUTO: 10.1 FL (ref 8.9–12.7)
POTASSIUM SERPL-SCNC: 4.2 MMOL/L (ref 3.5–5.3)
PROT SERPL-MCNC: 7.4 G/DL (ref 6.4–8.4)
RBC # BLD AUTO: 4 MILLION/UL (ref 3.88–5.62)
SODIUM SERPL-SCNC: 139 MMOL/L (ref 135–147)
TIBC SERPL-MCNC: 366 UG/DL (ref 250–450)
UIBC SERPL-MCNC: 131 UG/DL (ref 155–355)
WBC # BLD AUTO: 4.81 THOUSAND/UL (ref 4.31–10.16)

## 2024-03-08 PROCEDURE — 82728 ASSAY OF FERRITIN: CPT

## 2024-03-08 PROCEDURE — 36415 COLL VENOUS BLD VENIPUNCTURE: CPT

## 2024-03-08 PROCEDURE — 85025 COMPLETE CBC W/AUTO DIFF WBC: CPT

## 2024-03-08 PROCEDURE — 83540 ASSAY OF IRON: CPT

## 2024-03-08 PROCEDURE — 80053 COMPREHEN METABOLIC PANEL: CPT

## 2024-03-08 PROCEDURE — 83550 IRON BINDING TEST: CPT

## 2024-03-26 ENCOUNTER — OFFICE VISIT (OUTPATIENT)
Dept: HEMATOLOGY ONCOLOGY | Facility: CLINIC | Age: 59
End: 2024-03-26
Payer: COMMERCIAL

## 2024-03-26 VITALS
BODY MASS INDEX: 28.1 KG/M2 | HEIGHT: 72 IN | WEIGHT: 207.5 LBS | DIASTOLIC BLOOD PRESSURE: 68 MMHG | HEART RATE: 87 BPM | OXYGEN SATURATION: 98 % | RESPIRATION RATE: 18 BRPM | TEMPERATURE: 98.3 F | SYSTOLIC BLOOD PRESSURE: 130 MMHG

## 2024-03-26 DIAGNOSIS — Z90.81 S/P SPLENECTOMY: ICD-10-CM

## 2024-03-26 DIAGNOSIS — Z86.2 HX OF IRON DEFICIENCY ANEMIA: ICD-10-CM

## 2024-03-26 DIAGNOSIS — D45 POLYCYTHEMIA VERA (HCC): Primary | ICD-10-CM

## 2024-03-26 DIAGNOSIS — R04.0 EPISTAXIS: ICD-10-CM

## 2024-03-26 PROCEDURE — 99214 OFFICE O/P EST MOD 30 MIN: CPT | Performed by: PHYSICIAN ASSISTANT

## 2024-03-26 NOTE — PROGRESS NOTES
[facility] [place]  [address]  [place] [address]  Hematology Ambulatory Follow-Up  Aurelio Rios, 1965, [id number]  3/26/2024      Assessment and Plan   1. Polycythemia vera (HCC)  Jak2 +     This is a 58-year-old male with history of polycythemia vera.  He does well on Hydrea and will continue on the regimen as outlined below.      He had recent labwork completed on 3/8/2024: WBC 4.81, hemoglobin 15.2, hematocrit 45.1, , platelets 386, ferritin 164.    His hematocrit is at goal on current Hydrea dose.     We discussed getting blood work every 3 months and following up in 6 months with additional blood testing.  Patient voiced understanding and agreement.  He understands to contact the office with any questions or concerns.     Regimen:  Hydrea 500 mg p.o. daily  81 mg aspirin p.o. daily      2. 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     3. Hx of iron deficiency anemia  Stable.  Patient will continue to be monitored via hemoglobin only.  Unless becoming symptomatic.    4. Epistaxis  ?related to increase in aspirin. Recommend saline spray and discuss decreasing aspirin with PCP if symptoms persist.     The patient is scheduled for follow-up in approximately 6 months.     Patient voiced agreement and understanding to the above.   Patient knows to call the Hematology/Oncology office with any questions and concerns regarding the above.     Goals and Barriers:    Current Goal:   Prolong Survival from Cancer.     Barriers: None.       Patient's Capacity to Self Care:  Patient able to self care.    Subjective     Chief Complaint   Patient presents with    Follow-up    Polycythemia     History of present illness:   This is a 58-year-old male who tested positive for JAK2 V16F gene mutation on May 13, 2022.  He was started on Hydrea 500 mg daily in July 2022.     He is also on aspirin 81 mg.  He says that his  primary care physician recently increase this to twice daily.    Interval history: Patient presents today in follow-up.  He says that his primary care physician recently increased his aspirin 81 mg to twice daily.  He says since that time he has been experiencing nosebleeds more frequently.  He notes dry skin and a small rash on his right chest. He says Aquaphor topical was recommended to him but he has not tried this yet.  He denies chest pain or shortness of breath.  He denies any bleeding otherwise.  No excessive bruising.  No nausea, vomiting or bowel changes.  He admits to fatigue which is unchanged.    Review of Systems   Constitutional:  Positive for fatigue. Negative for activity change, appetite change and fever.   HENT:  Positive for nosebleeds.    Respiratory:  Negative for cough, choking and shortness of breath.    Cardiovascular:  Negative for chest pain, palpitations and leg swelling.   Gastrointestinal:  Negative for abdominal distention, abdominal pain, anal bleeding, blood in stool, constipation, diarrhea, nausea and vomiting.   Endocrine: Negative for cold intolerance.   Genitourinary:  Negative for hematuria.   Musculoskeletal:  Negative for myalgias.   Skin:  Negative for color change and pallor.   Allergic/Immunologic: Negative for immunocompromised state.   Neurological:  Negative for headaches.   Hematological:  Negative for adenopathy. Does not bruise/bleed easily.   All other systems reviewed and are negative.      Patient Active Problem List   Diagnosis    Primary hypertension    Other hyperlipidemia    Osteoarthritis of cervical spine    Chronic GERD    Other emphysema (HCC)    Crews's esophagus without dysplasia    Tubular adenoma of colon    Osteoarthritis, hip, bilateral    S/P splenectomy    Benign localized prostatic hyperplasia with lower urinary tract symptoms (LUTS)    BMI 28.0-28.9,adult    Overweight (BMI 25.0-29.9)    Immunization due    Lung nodule, multiple    Radiculopathy,  cervical    Fatty liver    S/P prostatectomy    Polycythemia vera (HCC)    Esophageal dysphagia    Sleep apnea    History of TIA (transient ischemic attack)    Asthma    Left arm swelling    Migraine    Right hemiplegia (HCC)     Past Medical History:   Diagnosis Date    Anemia     Arthritis     Asthma     Cancer (HCC)     prostate    Colon polyp     Elevated PSA     Full dentures     GERD (gastroesophageal reflux disease)     Hemorrhoid     Hyperlipidemia     Hypertension     Polycythemia vera (HCC)     Sleep apnea     No CPAP    Smoker     TIA (transient ischemic attack)     Transfusion history     Wears glasses      Past Surgical History:   Procedure Laterality Date    ABDOMINAL ADHESION SURGERY N/A 11/17/2021    Procedure: LYSIS ADHESIONS LAPAROSCOPIC W ROBOT;  Surgeon: Ronald Moses MD;  Location: [facility];  Service: Urology    APPENDECTOMY      COLONOSCOPY      EGD      FOOT SURGERY Right     bone removed-left foot-removal of blood clots from an injury    OTHER SURGICAL HISTORY      bone removal right arm-abnormal growth of surface bone    PROSTATE BIOPSY      PROSTATECTOMY N/A 11/17/2021    Procedure: ROBOTIC ASSISTED LAPAROSCOPIC SIMPLE PROSTATECTOMY AND BLADDER NECK RECONSTRUCTION;  Surgeon: Ronald Moses MD;  Location: [facility];  Service: Urology    SPLENECTOMY      ruptured     Family History   Problem Relation Age of Onset    Dementia Father     Heart disease Mother     Hypertension Mother     Hyperlipidemia Mother     Hypertension Sister     Hypertension Brother     No Known Problems Daughter     No Known Problems Son     Cancer Paternal Grandfather         prostate    No Known Problems Son      Social History     Socioeconomic History    Marital status:      Spouse name: None    Number of children: None    Years of education: None    Highest education level: None   Occupational History    Occupation: supervisor     Employer: [employer]   Tobacco Use    Smoking status: Former      Current packs/day: 0.00     Average packs/day: 0.5 packs/day for 10.0 years (5.0 ttl pk-yrs)     Types: Cigarettes     Start date: 6/15/2011     Quit date: 2/22/2021     Years since quitting: 3.0    Smokeless tobacco: Never   Vaping Use    Vaping status: Never Used   Substance and Sexual Activity    Alcohol use: Not Currently    Drug use: No    Sexual activity: None   Other Topics Concern    None   Social History Narrative    None     Social Determinants of Health     Financial Resource Strain: Not on file   Food Insecurity: Not on file   Transportation Needs: Not on file   Physical Activity: Not on file   Stress: Not on file   Social Connections: Not on file   Intimate Partner Violence: Not on file   Housing Stability: Not on file       Current Outpatient Medications:     acetaminophen (TYLENOL) 325 mg tablet, Take 2 tablets (650 mg total) by mouth every 6 (six) hours as needed for mild pain, headaches or fever, Disp: 30 tablet, Rfl: 0    albuterol (PROVENTIL HFA,VENTOLIN HFA) 90 mcg/act inhaler, Inhale 2 puffs every 6 (six) hours as needed for wheezing Rinse mouth after use, Disp: 18 g, Rfl: 5    ASPIRIN 81 PO, Take by mouth every morning, Disp: , Rfl:     atorvastatin (LIPITOR) 20 mg tablet, Take 1 tablet (20 mg total) by mouth daily, Disp: 90 tablet, Rfl: 1    famotidine (PEPCID) 40 MG tablet, TAKE 1 TABLET BY MOUTH EVERY DAY, Disp: 90 tablet, Rfl: 0    Fluticasone-Salmeterol (Advair) 250-50 mcg/dose inhaler, Inhale 1 puff 2 (two) times a day Rinse mouth after use., Disp: 60 blister, Rfl: 5    hydroxyurea (HYDREA) 500 mg capsule, TAKE 1 CAPSULE (500 MG TOTAL) BY MOUTH EVERY EVENING, Disp: 90 capsule, Rfl: 1    saccharomyces boulardii (FLORASTOR) 250 mg capsule, Take 1 capsule (250 mg total) by mouth 2 (two) times a day, Disp: 30 capsule, Rfl: 0    topiramate (TOPAMAX) 25 mg sprinkle capsule, 1 in am, 2 in pm, Disp: 90 capsule, Rfl: 5    verapamil (CALAN-SR) 120 mg CR tablet, Take 1 tablet (120 mg total) by mouth  daily, Disp: 90 tablet, Rfl: 1  No Known Allergies    Objective   Temp 98.3 °F (36.8 °C) (Temporal)   Ht 6' (1.829 m)   Wt 94.1 kg (207 lb 8 oz)   BMI 28.14 kg/m²    Physical Exam  Constitutional:       General: He is not in acute distress.     Appearance: He is well-developed and normal weight.   HENT:      Head: Normocephalic and atraumatic.      Right Ear: External ear normal.      Left Ear: External ear normal.      Nose: Nose normal.      Mouth/Throat:      Mouth: Mucous membranes are moist.   Eyes:      General: No scleral icterus.     Conjunctiva/sclera: Conjunctivae normal.   Cardiovascular:      Rate and Rhythm: Normal rate and regular rhythm.   Pulmonary:      Effort: Pulmonary effort is normal. No respiratory distress.      Breath sounds: Normal breath sounds.   Abdominal:      General: Abdomen is flat. There is no distension.      Palpations: Abdomen is soft.   Skin:     Findings: Rash (Patch of dry skin in right upper chest.) present.   Neurological:      Mental Status: He is alert and oriented to person, place, and time.   Psychiatric:         Mood and Affect: Mood normal.         Thought Content: Thought content normal.         Judgment: Judgment normal.     Extremities: No lower extremity edema bilaterally.    Result Review  GI work up in 1/2021-2/2021:  GI has seen the patient in the hospital.  Patient underwent colonoscopy in EGD that demonstrated questionable AVM in the small intestine.  This area was cauterized.  This area was not bleeding.  Capsule demonstrated angiectasis and follow up EGD- is planned on 2/26/2021.  This procedure did not demonstrate any signs of active bleeding.     Patient completed the remainder of Venofer treatments started in the hospital in February 2021.     03/23/21:    Patient notes overall feeling well.  Blood work was reviewed.  Patient's iron saturation increased to 7% and ferritin at 41.  Hemoglobin has completely supplemented however, MCV is still low.   Patient's platelet count is still elevated however this may be secondary to splenectomy and not necessarily to iron deficiency.   Patient has completed COVID vaccinations.     3/30/21:   Ferritin= 23, iron saturation = seven, TIBC 430,  Hemoglobin = 12.4, MCV 80,  Platelet count = 536,  PSA = 3.4     4/9-5/7/21:  5 Venofer 300 mg IV weekly.     05/03/21:  Urgent follow up; [facility] on 5/1/2021, ED visit, hemoglobin stable.  No bleeding today. 1 more IV iron treatment to go. Iron studies requestion on labs from 5/1/21:iron sat supratheraputic.      5/12/21:  Colonoscopy was negative for signs of active bleeding.  Hemorrhoidal bleeding to blame for bright red blood per rectum.  Scope was advanced past the descending colon due to patient's.  CT colonoscopy diagnostic without contrast did not demonstrate any lesions.     06/03/21:  Patient is feeling better.  No more GI bleeding.  Hemoglobin = 15.1, platelet count 420 oral seven, MCV = 86, ferritin = 452, iron saturation = 30%, TIBC = 336     8/27/21:    Labs taken every four weeks demonstrate stability.  No GI bleeding.  Patient feels well.  Hemoglobin = 15.1, platelet count = 372, MCV = 93,  Iron saturation = 33, TIBC 324,  Ferritin = 155     11/1/2021: Feels well.  1- 3 day episode of Melena + Hematochezia.  Hemoglobin = 17.1, platelet count = 402, MCV = 96,  Iron saturation = 38, TIBC 344,  Ferritin = 149     2/8/2022:  WBC = 8.4, hemoglobin = 15.5, hematocrit = 49.6, platelet count = 416,  iron saturation = 34%, TIBC 364     02/10/22:  Patient reports having significant blood in his stool including dark, melena as well as hematochezia.  Patient has not made an appointment with GI doctor.  Discussed decreasing ferritin levels     5/6/22 WBC = 8.56, hemoglobin = 17.1, MCV = 92, platelet count = 477, ferritin = 65     5/13/2022:  Erythropoietin = 9.0, +JAK2 V617F Mutation, other studies did not reflex,  WBC = 14, hemoglobin = 17.7, hematocrit = 52, platelet count = 479      5/31/22 PSA = 0.7, CMP within normal limits with the exception of a mildly elevated potassium at 5.4, labs ordered by primary physician.     6/21/2021:Symptom Assessment Tool states that fatigue = 7, early satiety = 6, abdominal discomfort 6, inactivity = 5, problems with concentration 0, night sweats 9, itching 10, no answer for bone pain, no fever 0 and no unintentional weight loss in last six months.     6/24/2022: 1 phlebotomy, pre Hemoglobin = 16.8, hct=49.5     7/19/2022: WBC=11, hemoglobin =16.5, hct = 49.2, kaj=301; Started Hydrea 500 mg daily.  Phlebotomies stopped due to headache- worsening.      9/20/2022:  HCT = 48.1, Hemoglobin =16.8, plt 350     12/16/2022 hemoglobin = 15.1, hematocrit 42.2, plt =392     9/26/2023: WBC 5.52, hemoglobin 15.9, hematocrit 45, platelet count 378, , CMP unremarkable    3/8/2024 WBC 4.81, hemoglobin 15.2, hematocrit 45.1, , platelets 386, ferritin 164  Please note:  This report has been generated by a voice recognition software system. Therefore there may be syntax, spelling, and/or grammatical errors. Please call if you have any questions.

## 2024-04-22 DIAGNOSIS — D45 POLYCYTHEMIA VERA (HCC): ICD-10-CM

## 2024-04-22 RX ORDER — HYDROXYUREA 500 MG/1
500 CAPSULE ORAL EVERY EVENING
Qty: 90 CAPSULE | Refills: 1 | Status: SHIPPED | OUTPATIENT
Start: 2024-04-22

## 2024-05-03 DIAGNOSIS — K21.9 GASTROESOPHAGEAL REFLUX DISEASE WITHOUT ESOPHAGITIS: ICD-10-CM

## 2024-05-06 RX ORDER — FAMOTIDINE 40 MG/1
TABLET, FILM COATED ORAL
Qty: 90 TABLET | Refills: 0 | Status: SHIPPED | OUTPATIENT
Start: 2024-05-06

## 2024-06-21 ENCOUNTER — TELEPHONE (OUTPATIENT)
Age: 59
End: 2024-06-21

## 2024-06-21 ENCOUNTER — HOSPITAL ENCOUNTER (EMERGENCY)
Facility: HOSPITAL | Age: 59
Discharge: HOME | End: 2024-06-21
Attending: EMERGENCY MEDICINE
Payer: COMMERCIAL

## 2024-06-21 ENCOUNTER — APPOINTMENT (EMERGENCY)
Dept: RADIOLOGY | Facility: HOSPITAL | Age: 59
End: 2024-06-21
Payer: COMMERCIAL

## 2024-06-21 VITALS
DIASTOLIC BLOOD PRESSURE: 80 MMHG | TEMPERATURE: 98.3 F | RESPIRATION RATE: 20 BRPM | OXYGEN SATURATION: 95 % | HEART RATE: 81 BPM | SYSTOLIC BLOOD PRESSURE: 139 MMHG

## 2024-06-21 DIAGNOSIS — J40 BRONCHITIS: Primary | ICD-10-CM

## 2024-06-21 LAB
2HR DELTA HS TROPONIN: 0 NG/L
ANION GAP SERPL CALCULATED.3IONS-SCNC: 9 MMOL/L (ref 4–13)
APTT PPP: 29 SECONDS (ref 23–37)
BASOPHILS # BLD AUTO: 0.09 THOUSANDS/ÂΜL (ref 0–0.1)
BASOPHILS NFR BLD AUTO: 1 % (ref 0–1)
BNP SERPL-MCNC: 18 PG/ML (ref 0–100)
BUN SERPL-MCNC: 10 MG/DL (ref 5–25)
CALCIUM SERPL-MCNC: 8.9 MG/DL (ref 8.4–10.2)
CARDIAC TROPONIN I PNL SERPL HS: 4 NG/L
CARDIAC TROPONIN I PNL SERPL HS: 4 NG/L
CHLORIDE SERPL-SCNC: 101 MMOL/L (ref 96–108)
CO2 SERPL-SCNC: 23 MMOL/L (ref 21–32)
CREAT SERPL-MCNC: 1.03 MG/DL (ref 0.6–1.3)
D DIMER PPP FEU-MCNC: 0.54 UG/ML FEU
EOSINOPHIL # BLD AUTO: 0.42 THOUSAND/ÂΜL (ref 0–0.61)
EOSINOPHIL NFR BLD AUTO: 4 % (ref 0–6)
ERYTHROCYTE [DISTWIDTH] IN BLOOD BY AUTOMATED COUNT: 15 % (ref 11.6–15.1)
FLUAV RNA RESP QL NAA+PROBE: NEGATIVE
FLUBV RNA RESP QL NAA+PROBE: NEGATIVE
GFR SERPL CREATININE-BSD FRML MDRD: 79 ML/MIN/1.73SQ M
GLUCOSE SERPL-MCNC: 123 MG/DL (ref 65–140)
HCT VFR BLD AUTO: 42.2 % (ref 36.5–49.3)
HGB BLD-MCNC: 14.5 G/DL (ref 12–17)
IMM GRANULOCYTES # BLD AUTO: 0.05 THOUSAND/UL (ref 0–0.2)
IMM GRANULOCYTES NFR BLD AUTO: 0 % (ref 0–2)
INR PPP: 0.99 (ref 0.84–1.19)
LACTATE SERPL-SCNC: 1.5 MMOL/L (ref 0.5–2)
LIPASE SERPL-CCNC: 28 U/L (ref 11–82)
LYMPHOCYTES # BLD AUTO: 2.7 THOUSANDS/ÂΜL (ref 0.6–4.47)
LYMPHOCYTES NFR BLD AUTO: 24 % (ref 14–44)
MAGNESIUM SERPL-MCNC: 1.8 MG/DL (ref 1.9–2.7)
MCH RBC QN AUTO: 38.5 PG (ref 26.8–34.3)
MCHC RBC AUTO-ENTMCNC: 34.4 G/DL (ref 31.4–37.4)
MCV RBC AUTO: 112 FL (ref 82–98)
MONOCYTES # BLD AUTO: 1.32 THOUSAND/ÂΜL (ref 0.17–1.22)
MONOCYTES NFR BLD AUTO: 12 % (ref 4–12)
NEUTROPHILS # BLD AUTO: 6.61 THOUSANDS/ÂΜL (ref 1.85–7.62)
NEUTS SEG NFR BLD AUTO: 59 % (ref 43–75)
NRBC BLD AUTO-RTO: 0 /100 WBCS
PLATELET # BLD AUTO: 388 THOUSANDS/UL (ref 149–390)
PMV BLD AUTO: 10.3 FL (ref 8.9–12.7)
POTASSIUM SERPL-SCNC: 3.6 MMOL/L (ref 3.5–5.3)
PROCALCITONIN SERPL-MCNC: 0.07 NG/ML
PROTHROMBIN TIME: 13.3 SECONDS (ref 11.6–14.5)
RBC # BLD AUTO: 3.77 MILLION/UL (ref 3.88–5.62)
RSV RNA RESP QL NAA+PROBE: NEGATIVE
SARS-COV-2 RNA RESP QL NAA+PROBE: NEGATIVE
SODIUM SERPL-SCNC: 133 MMOL/L (ref 135–147)
TSH SERPL DL<=0.05 MIU/L-ACNC: 1.66 UIU/ML (ref 0.45–4.5)
WBC # BLD AUTO: 11.19 THOUSAND/UL (ref 4.31–10.16)

## 2024-06-21 PROCEDURE — 83690 ASSAY OF LIPASE: CPT | Performed by: PHYSICIAN ASSISTANT

## 2024-06-21 PROCEDURE — 80048 BASIC METABOLIC PNL TOTAL CA: CPT | Performed by: PHYSICIAN ASSISTANT

## 2024-06-21 PROCEDURE — 83605 ASSAY OF LACTIC ACID: CPT | Performed by: PHYSICIAN ASSISTANT

## 2024-06-21 PROCEDURE — 99285 EMERGENCY DEPT VISIT HI MDM: CPT

## 2024-06-21 PROCEDURE — 93005 ELECTROCARDIOGRAM TRACING: CPT

## 2024-06-21 PROCEDURE — 85610 PROTHROMBIN TIME: CPT | Performed by: PHYSICIAN ASSISTANT

## 2024-06-21 PROCEDURE — 85025 COMPLETE CBC W/AUTO DIFF WBC: CPT | Performed by: PHYSICIAN ASSISTANT

## 2024-06-21 PROCEDURE — 71045 X-RAY EXAM CHEST 1 VIEW: CPT

## 2024-06-21 PROCEDURE — 84484 ASSAY OF TROPONIN QUANT: CPT | Performed by: PHYSICIAN ASSISTANT

## 2024-06-21 PROCEDURE — 96375 TX/PRO/DX INJ NEW DRUG ADDON: CPT

## 2024-06-21 PROCEDURE — 36415 COLL VENOUS BLD VENIPUNCTURE: CPT | Performed by: PHYSICIAN ASSISTANT

## 2024-06-21 PROCEDURE — 87040 BLOOD CULTURE FOR BACTERIA: CPT | Performed by: PHYSICIAN ASSISTANT

## 2024-06-21 PROCEDURE — 83735 ASSAY OF MAGNESIUM: CPT | Performed by: PHYSICIAN ASSISTANT

## 2024-06-21 PROCEDURE — 85730 THROMBOPLASTIN TIME PARTIAL: CPT | Performed by: PHYSICIAN ASSISTANT

## 2024-06-21 PROCEDURE — 96366 THER/PROPH/DIAG IV INF ADDON: CPT

## 2024-06-21 PROCEDURE — 99284 EMERGENCY DEPT VISIT MOD MDM: CPT | Performed by: PHYSICIAN ASSISTANT

## 2024-06-21 PROCEDURE — 84145 PROCALCITONIN (PCT): CPT | Performed by: PHYSICIAN ASSISTANT

## 2024-06-21 PROCEDURE — 0241U HB NFCT DS VIR RESP RNA 4 TRGT: CPT | Performed by: PHYSICIAN ASSISTANT

## 2024-06-21 PROCEDURE — 84443 ASSAY THYROID STIM HORMONE: CPT | Performed by: PHYSICIAN ASSISTANT

## 2024-06-21 PROCEDURE — 96365 THER/PROPH/DIAG IV INF INIT: CPT

## 2024-06-21 PROCEDURE — 83880 ASSAY OF NATRIURETIC PEPTIDE: CPT | Performed by: PHYSICIAN ASSISTANT

## 2024-06-21 PROCEDURE — 71250 CT THORAX DX C-: CPT

## 2024-06-21 PROCEDURE — 96368 THER/DIAG CONCURRENT INF: CPT

## 2024-06-21 PROCEDURE — 85379 FIBRIN DEGRADATION QUANT: CPT | Performed by: PHYSICIAN ASSISTANT

## 2024-06-21 RX ORDER — IPRATROPIUM BROMIDE AND ALBUTEROL SULFATE 2.5; .5 MG/3ML; MG/3ML
3 SOLUTION RESPIRATORY (INHALATION) 4 TIMES DAILY
Qty: 84 ML | Refills: 0 | Status: SHIPPED | OUTPATIENT
Start: 2024-06-21 | End: 2024-06-26 | Stop reason: SDUPTHER

## 2024-06-21 RX ORDER — METHYLPREDNISOLONE SODIUM SUCCINATE 125 MG/2ML
125 INJECTION, POWDER, LYOPHILIZED, FOR SOLUTION INTRAMUSCULAR; INTRAVENOUS ONCE
Status: COMPLETED | OUTPATIENT
Start: 2024-06-21 | End: 2024-06-21

## 2024-06-21 RX ORDER — SODIUM CHLORIDE 9 MG/ML
3 INJECTION INTRAVENOUS
Status: DISCONTINUED | OUTPATIENT
Start: 2024-06-21 | End: 2024-06-21 | Stop reason: HOSPADM

## 2024-06-21 RX ORDER — KETOROLAC TROMETHAMINE 30 MG/ML
15 INJECTION, SOLUTION INTRAMUSCULAR; INTRAVENOUS ONCE
Status: COMPLETED | OUTPATIENT
Start: 2024-06-21 | End: 2024-06-21

## 2024-06-21 RX ORDER — METHYLPREDNISOLONE 4 MG/1
TABLET ORAL
Qty: 21 TABLET | Refills: 0 | Status: SHIPPED | OUTPATIENT
Start: 2024-06-21 | End: 2024-06-26 | Stop reason: ALTCHOICE

## 2024-06-21 RX ORDER — AZITHROMYCIN 250 MG/1
TABLET, FILM COATED ORAL
Qty: 6 TABLET | Refills: 0 | Status: SHIPPED | OUTPATIENT
Start: 2024-06-21 | End: 2024-06-26 | Stop reason: ALTCHOICE

## 2024-06-21 RX ORDER — ASPIRIN 81 MG/1
324 TABLET, CHEWABLE ORAL ONCE
Status: COMPLETED | OUTPATIENT
Start: 2024-06-21 | End: 2024-06-21

## 2024-06-21 RX ORDER — IBUPROFEN 600 MG/1
600 TABLET ORAL EVERY 6 HOURS PRN
Qty: 30 TABLET | Refills: 0 | Status: SHIPPED | OUTPATIENT
Start: 2024-06-21

## 2024-06-21 RX ADMIN — PIPERACILLIN AND TAZOBACTAM 4.5 G: 4; .5 INJECTION, POWDER, FOR SOLUTION INTRAVENOUS at 15:44

## 2024-06-21 RX ADMIN — METHYLPREDNISOLONE SODIUM SUCCINATE 125 MG: 125 INJECTION, POWDER, FOR SOLUTION INTRAMUSCULAR; INTRAVENOUS at 17:24

## 2024-06-21 RX ADMIN — ASPIRIN 81 MG CHEWABLE TABLET 324 MG: 81 TABLET CHEWABLE at 15:41

## 2024-06-21 RX ADMIN — KETOROLAC TROMETHAMINE 15 MG: 30 INJECTION, SOLUTION INTRAMUSCULAR at 15:41

## 2024-06-21 RX ADMIN — SODIUM CHLORIDE, SODIUM LACTATE, POTASSIUM CHLORIDE, AND CALCIUM CHLORIDE 1000 ML: .6; .31; .03; .02 INJECTION, SOLUTION INTRAVENOUS at 15:48

## 2024-06-21 NOTE — TELEPHONE ENCOUNTER
Pt's SO called for an appt for pt. He has had a ST for two days. He is not having any other symptoms. I tried to warm transfer to office but no answer. There are no pcp virtual visits available. Please, call SO with an appt time. Thank you.

## 2024-06-21 NOTE — Clinical Note
Aurelio Rios was seen and treated in our emergency department on 6/21/2024.                Diagnosis:     Aurelio  .    He may return on this date:          If you have any questions or concerns, please don't hesitate to call.      Brandon Garcia MD    ______________________________           _______________          _______________  Hospital Representative                              Date                                Time

## 2024-06-21 NOTE — TELEPHONE ENCOUNTER
Offered appt at [facility] but patient refused. Said he will go to [facility] after he gets out of work. CAIN

## 2024-06-22 DIAGNOSIS — E78.49 OTHER HYPERLIPIDEMIA: ICD-10-CM

## 2024-06-22 RX ORDER — ATORVASTATIN CALCIUM 20 MG/1
20 TABLET, FILM COATED ORAL DAILY
Qty: 90 TABLET | Refills: 1 | Status: SHIPPED | OUTPATIENT
Start: 2024-06-22

## 2024-06-22 NOTE — ED PROVIDER NOTES
History  Chief Complaint   Patient presents with    Shortness of Breath     Pt c/o sob for 3 days, with a cough     Patient is a 58-year-old male with past medical history significant for polycythemia vera, hypertension, prostate cancer, hyperlipidemia, asthma, who presents for evaluation of 3 days of worsening cough, shortness of breath and myalgias.  Patient describes his cough as nonproductive.  He states he is short of breath with exertion but denies orthopnea or PND.  He has tried his inhalers at home without improvement in his symptoms.  He was unaware that he is febrile.  He endorses generalized myalgias as well as pleuritic chest pain in the right sternal border.  He specifically denies hemoptysis, wheezing, sore throat, diaphoresis, dizziness, lower extremity swelling or pain, or abdominal pain.      Shortness of Breath  Associated symptoms: cough and fever    Associated symptoms: no abdominal pain, no chest pain, no ear pain, no rash, no sore throat and no vomiting        Prior to Admission Medications   Prescriptions Last Dose Informant Patient Reported? Taking?   ASPIRIN 81 PO  Self Yes No   Sig: Take by mouth every morning   Fluticasone-Salmeterol (Advair) 250-50 mcg/dose inhaler  Self No No   Sig: Inhale 1 puff 2 (two) times a day Rinse mouth after use.   acetaminophen (TYLENOL) 325 mg tablet  Self No No   Sig: Take 2 tablets (650 mg total) by mouth every 6 (six) hours as needed for mild pain, headaches or fever   albuterol (PROVENTIL HFA,VENTOLIN HFA) 90 mcg/act inhaler  Self No No   Sig: Inhale 2 puffs every 6 (six) hours as needed for wheezing Rinse mouth after use   atorvastatin (LIPITOR) 20 mg tablet  Self No No   Sig: Take 1 tablet (20 mg total) by mouth daily   famotidine (PEPCID) 40 MG tablet   No No   Sig: TAKE 1 TABLET BY MOUTH EVERY DAY   hydroxyurea (HYDREA) 500 mg capsule   No No   Sig: TAKE 1 CAPSULE (500 MG TOTAL) BY MOUTH EVERY EVENING   saccharomyces boulardii (FLORASTOR) 250 mg  capsule  Self No No   Sig: Take 1 capsule (250 mg total) by mouth 2 (two) times a day   topiramate (TOPAMAX) 25 mg sprinkle capsule  Self No No   Si in am, 2 in pm   verapamil (CALAN-SR) 120 mg CR tablet  Self No No   Sig: Take 1 tablet (120 mg total) by mouth daily      Facility-Administered Medications: None       Past Medical History:   Diagnosis Date    Anemia     Arthritis     Asthma     Cancer (HCC)     prostate    Colon polyp     Elevated PSA     Full dentures     GERD (gastroesophageal reflux disease)     Hemorrhoid     Hyperlipidemia     Hypertension     Polycythemia vera (HCC)     Sleep apnea     No CPAP    Smoker     TIA (transient ischemic attack)     Transfusion history     Wears glasses        Past Surgical History:   Procedure Laterality Date    ABDOMINAL ADHESION SURGERY N/A 2021    Procedure: LYSIS ADHESIONS LAPAROSCOPIC W ROBOT;  Surgeon: Ronald Moses MD;  Location: [facility];  Service: Urology    APPENDECTOMY      COLONOSCOPY      EGD      FOOT SURGERY Right     bone removed-left foot-removal of blood clots from an injury    OTHER SURGICAL HISTORY      bone removal right arm-abnormal growth of surface bone    PROSTATE BIOPSY      PROSTATECTOMY N/A 2021    Procedure: ROBOTIC ASSISTED LAPAROSCOPIC SIMPLE PROSTATECTOMY AND BLADDER NECK RECONSTRUCTION;  Surgeon: Ronald Moses MD;  Location: [facility];  Service: Urology    SPLENECTOMY      ruptured       Family History   Problem Relation Age of Onset    Dementia Father     Heart disease Mother     Hypertension Mother     Hyperlipidemia Mother     Hypertension Sister     Hypertension Brother     No Known Problems Daughter     No Known Problems Son     Cancer Paternal Grandfather         prostate    No Known Problems Son      I have reviewed and agree with the history as documented.    E-Cigarette/Vaping    E-Cigarette Use Never User      E-Cigarette/Vaping Substances    Nicotine No     THC No     CBD No     Flavoring No      Other No     Unknown No      Social History     Tobacco Use    Smoking status: Former     Current packs/day: 0.00     Average packs/day: 0.5 packs/day for 10.0 years (5.0 ttl pk-yrs)     Types: Cigarettes     Start date: 6/15/2011     Quit date: 2/22/2021     Years since quitting: 3.3    Smokeless tobacco: Never   Vaping Use    Vaping status: Never Used   Substance Use Topics    Alcohol use: Not Currently    Drug use: No       Review of Systems   Constitutional:  Positive for activity change and fever. Negative for chills.   HENT:  Negative for ear pain and sore throat.    Eyes: Negative.  Negative for pain and visual disturbance.   Respiratory:  Positive for cough and shortness of breath.    Cardiovascular: Negative.  Negative for chest pain and palpitations.   Gastrointestinal: Negative.  Negative for abdominal pain and vomiting.   Endocrine: Negative.    Genitourinary:  Negative for dysuria and hematuria.   Musculoskeletal:  Positive for myalgias. Negative for arthralgias and back pain.   Skin:  Negative for color change and rash.   Allergic/Immunologic: Negative.    Neurological: Negative.  Negative for seizures and syncope.   Hematological: Negative.    Psychiatric/Behavioral: Negative.     All other systems reviewed and are negative.      Physical Exam  Physical Exam  Vitals and nursing note reviewed.   Constitutional:       General: He is not in acute distress.     Appearance: He is well-developed.   HENT:      Head: Normocephalic and atraumatic.      Mouth/Throat:      Mouth: Mucous membranes are moist.      Pharynx: Oropharynx is clear.   Eyes:      Extraocular Movements: Extraocular movements intact.      Conjunctiva/sclera: Conjunctivae normal.      Pupils: Pupils are equal, round, and reactive to light.   Neck:      Thyroid: No thyromegaly.      Vascular: No hepatojugular reflux or JVD.   Cardiovascular:      Rate and Rhythm: Normal rate and regular rhythm.      Heart sounds: No murmur heard.  Pulmonary:       Effort: Pulmonary effort is normal. No respiratory distress.      Breath sounds: Examination of the right-upper field reveals rhonchi. Examination of the right-middle field reveals rhonchi. Examination of the right-lower field reveals rhonchi. Decreased breath sounds and rhonchi present. No wheezing.   Chest:      Chest wall: Tenderness present.   Abdominal:      General: Bowel sounds are normal.      Palpations: Abdomen is soft.      Tenderness: There is no abdominal tenderness.   Musculoskeletal:         General: No swelling. Normal range of motion.      Cervical back: Normal range of motion and neck supple.      Right lower leg: No tenderness. No edema.      Left lower leg: No tenderness. No edema.   Skin:     General: Skin is warm and dry.      Capillary Refill: Capillary refill takes less than 2 seconds.   Neurological:      General: No focal deficit present.      Mental Status: He is alert and oriented to person, place, and time.   Psychiatric:         Mood and Affect: Mood normal.         Behavior: Behavior normal.         Vital Signs  ED Triage Vitals   Temperature Pulse Respirations Blood Pressure SpO2   06/21/24 1501 06/21/24 1501 06/21/24 1501 06/21/24 1501 06/21/24 1501   (!) 101.3 °F (38.5 °C) (!) 108 20 133/89 98 %      Temp Source Heart Rate Source Patient Position - Orthostatic VS BP Location FiO2 (%)   06/21/24 1832 06/21/24 1530 -- 06/21/24 1501 --   Oral Monitor  Right arm       Pain Score       06/21/24 1611       No Pain           Vitals:    06/21/24 1645 06/21/24 1700 06/21/24 1730 06/21/24 1800   BP: 143/89 140/76 133/84 139/80   Pulse: 86 81 78 81         Visual Acuity      ED Medications  Medications   sodium chloride (PF) 0.9 % injection 3 mL (has no administration in time range)   aspirin chewable tablet 324 mg (324 mg Oral Given 6/21/24 1541)   piperacillin-tazobactam (ZOSYN) IVPB 4.5 g (0 g Intravenous Stopped 6/21/24 1724)   ketorolac (TORADOL) injection 15 mg (15 mg Intravenous  Given 6/21/24 1541)   lactated ringers bolus 1,000 mL (0 mL Intravenous Stopped 6/21/24 1724)   methylPREDNISolone sodium succinate (Solu-MEDROL) injection 125 mg (125 mg Intravenous Given 6/21/24 1724)       Diagnostic Studies  Results Reviewed       Procedure Component Value Units Date/Time    HS Troponin I 2hr [[id number]]  (Normal) Collected: 06/21/24 1742    Lab Status: Final result Specimen: Blood from Arm, Left Updated: 06/21/24 1820     hs TnI 2hr 4 ng/L      Delta 2hr hsTnI 0 ng/L     FLU/RSV/COVID - if FLU/RSV clinically relevant [[id number]]  (Normal) Collected: 06/21/24 1524    Lab Status: Final result Specimen: Nares from Nose Updated: 06/21/24 1612     SARS-CoV-2 Negative     INFLUENZA A PCR Negative     INFLUENZA B PCR Negative     RSV PCR Negative    Narrative:      FOR PEDIATRIC PATIENTS - copy/paste COVID Guidelines URL to browser: [email or web address]    SARS-CoV-2 assay is a Nucleic Acid Amplification assay intended for the  qualitative detection of nucleic acid from SARS-CoV-2 in nasopharyngeal  swabs. Results are for the presumptive identification of SARS-CoV-2 RNA.    Positive results are indicative of infection with SARS-CoV-2, the virus  causing COVID-19, but do not rule out bacterial infection or co-infection  with other viruses. Laboratories within the [place] and its  territories are required to report all positive results to the appropriate  public health authorities. Negative results do not preclude SARS-CoV-2  infection and should not be used as the sole basis for treatment or other  patient management decisions. Negative results must be combined with  clinical observations, patient history, and epidemiological information.  This test has not been FDA cleared or approved.    This test has been authorized by FDA under an Emergency Use Authorization  (EUA). This test is only authorized for the duration of time the  declaration  that circumstances exist justifying the authorization of the  emergency use of an in vitro diagnostic tests for detection of SARS-CoV-2  virus and/or diagnosis of COVID-19 infection under section 564(b)(1) of  the Act, 21 U.S.C. 360bbb-3(b)(1), unless the authorization is terminated  or revoked sooner. The test has been validated but independent review by FDA  and CLIA is pending.    Test performed using [employer] GeneXpert: This RT-PCR assay targets N2,  a region unique to SARS-CoV-2. A conserved region in the E-gene was chosen  for pan-Sarbecovirus detection which includes SARS-CoV-2.    According to CMS-2020-01-R, this platform meets the definition of high-throughput technology.    Procalcitonin [[id number]]  (Normal) Collected: 06/21/24 1524    Lab Status: Final result Specimen: Blood from Arm, Right Updated: 06/21/24 1607     Procalcitonin 0.07 ng/ml     B-Type Natriuretic Peptide(BNP) [[id number]]  (Normal) Collected: 06/21/24 1524    Lab Status: Final result Specimen: Blood from Arm, Right Updated: 06/21/24 1605     BNP 18 pg/mL     HS Troponin 0hr (reflex protocol) [[id number]]  (Normal) Collected: 06/21/24 1524    Lab Status: Final result Specimen: Blood from Arm, Right Updated: 06/21/24 1605     hs TnI 0hr 4 ng/L     HS Troponin I 4hr [[id number]]     Lab Status: No result Specimen: Blood     Basic metabolic panel [[id number]]  (Abnormal) Collected: 06/21/24 1524    Lab Status: Final result Specimen: Blood from Arm, Right Updated: 06/21/24 1559     Sodium 133 mmol/L      Potassium 3.6 mmol/L      Chloride 101 mmol/L      CO2 23 mmol/L      ANION GAP 9 mmol/L      BUN 10 mg/dL      Creatinine 1.03 mg/dL      Glucose 123 mg/dL      Calcium 8.9 mg/dL      eGFR 79 ml/min/1.73sq m     Narrative:      National Kidney Disease Foundation guidelines for Chronic Kidney Disease (CKD):     Stage 1 with normal or high GFR (GFR > 90 mL/min/1.73 square meters)    Stage 2 Mild CKD (GFR = 60-89 mL/min/1.73 square meters)    Stage  3A Moderate CKD (GFR = 45-59 mL/min/1.73 square meters)    Stage 3B Moderate CKD (GFR = 30-44 mL/min/1.73 square meters)    Stage 4 Severe CKD (GFR = 15-29 mL/min/1.73 square meters)    Stage 5 End Stage CKD (GFR <15 mL/min/1.73 square meters)  Note: GFR calculation is accurate only with a steady state creatinine    Lipase [[id number]]  (Normal) Collected: 06/21/24 1524    Lab Status: Final result Specimen: Blood from Arm, Right Updated: 06/21/24 1559     Lipase 28 u/L     Magnesium [[id number]]  (Abnormal) Collected: 06/21/24 1524    Lab Status: Final result Specimen: Blood from Arm, Right Updated: 06/21/24 1559     Magnesium 1.8 mg/dL     Lactic acid [[id number]]  (Normal) Collected: 06/21/24 1524    Lab Status: Final result Specimen: Blood from Arm, Right Updated: 06/21/24 1559     LACTIC ACID 1.5 mmol/L     Narrative:      Result may be elevated if tourniquet was used during collection.    D-dimer, quantitative [[id number]]  (Abnormal) Collected: 06/21/24 1524    Lab Status: Final result Specimen: Blood from Arm, Right Updated: 06/21/24 1558     D-Dimer, Quant 0.54 ug/ml FEU     Narrative:      In the evaluation for possible pulmonary embolism, in the appropriate (Well's Score of 4 or less) patient, the age adjusted d-dimer cutoff for this patient can be calculated as:    Age x 0.01 (in ug/mL) for Age-adjusted D-dimer exclusion threshold for a patient over 50 years.    Protime-INR [[id number]]  (Normal) Collected: 06/21/24 1524    Lab Status: Final result Specimen: Blood from Arm, Right Updated: 06/21/24 1551     Protime 13.3 seconds      INR 0.99    APTT [[id number]]  (Normal) Collected: 06/21/24 1524    Lab Status: Final result Specimen: Blood from Arm, Right Updated: 06/21/24 1551     PTT 29 seconds     Blood culture #2 [[id number]] Collected: 06/21/24 1526    Lab Status: In process Specimen: Blood from Arm, Left Updated: 06/21/24 1539    CBC and differential [[id number]]  (Abnormal) Collected: 06/21/24 1524    Lab  Status: Final result Specimen: Blood from Arm, Right Updated: 06/21/24 1538     WBC 11.19 Thousand/uL      RBC 3.77 Million/uL      Hemoglobin 14.5 g/dL      Hematocrit 42.2 %       fL      MCH 38.5 pg      MCHC 34.4 g/dL      RDW 15.0 %      MPV 10.3 fL      Platelets 388 Thousands/uL      nRBC 0 /100 WBCs      Segmented % 59 %      Immature Grans % 0 %      Lymphocytes % 24 %      Monocytes % 12 %      Eosinophils Relative 4 %      Basophils Relative 1 %      Absolute Neutrophils 6.61 Thousands/µL      Absolute Immature Grans 0.05 Thousand/uL      Absolute Lymphocytes 2.70 Thousands/µL      Absolute Monocytes 1.32 Thousand/µL      Eosinophils Absolute 0.42 Thousand/µL      Basophils Absolute 0.09 Thousands/µL     Blood culture #1 [[id number]] Collected: 06/21/24 1524    Lab Status: In process Specimen: Blood from Arm, Right Updated: 06/21/24 1538    TSH, 3rd generation [[id number]] Collected: 06/21/24 1524    Lab Status: No result Specimen: Blood from Arm, Right     UA w Reflex to Microscopic w Reflex to Culture [[id number]]     Lab Status: No result Specimen: Urine                    CT chest without contrast   Final Result by Johnathan Killian MD (06/21 1658)      1. Scattered areas of subsegmental atelectasis in the lungs without airspace consolidation or other acute process   2. Ectasia of the ascending thoracic aorta measuring 4.0 cm, unchanged   3. Circumferential esophageal wall thickening, suggesting esophagitis. Correlate clinically. Nonemergent GI consultation is recommended.   4. Hepatomegaly with hepatic steatosis. Several nonspecific borderline prominent lymph nodes in the upper abdomen as above               Workstation performed: [id number]         X-ray chest 1 view portable    (Results Pending)              Procedures  Procedures         ED Course  ED Course as of 06/21/24 2016 Fri Jun 21, 2024   1506 Temperature(!): 101.3 °F (38.5 °C)                                             Medical  Decision Making  Problems Addressed:  Bronchitis: acute illness or injury    Amount and/or Complexity of Data Reviewed  Labs: ordered.  Radiology: ordered and independent interpretation performed.    Risk  OTC drugs.  Prescription drug management.             Disposition  Final diagnoses:   Bronchitis     Time reflects when diagnosis was documented in both MDM as applicable and the Disposition within this note       Time User Action Codes Description Comment    6/21/2024  5:18 PM KwakuFlower Shira [[id number]] Bronchitis           ED Disposition       ED Disposition   Discharge    Condition   Stable    Date/Time   Fri Jun 21, 2024  5:18 PM    Comment   Aurelio Rios discharge to home/self care.                   Follow-up Information       Follow up With Specialties Details Why Contact Info Additional Information    Channing Lopez, DO Family Medicine Go to  As needed [address] [facility] [address] [facility] [address]  [facility] [address]  [phone]       [facility] Emergency Medicine Go to  If symptoms worsen [address] [facility] [address]  [phone] [facility], [address] [place], [place], [address]            Discharge Medication List as of 6/21/2024  6:18 PM        START taking these medications    Details   azithromycin (ZITHROMAX) 250 mg tablet Take 2 tablets today then 1 tablet daily x 4 days, Normal      ibuprofen (MOTRIN) 600 mg tablet Take 1 tablet (600 mg total) by mouth every 6 (six) hours as needed for moderate pain or fever, Starting Fri 6/21/2024, Normal      ipratropium-albuterol (DUO-NEB) 0.5-2.5 mg/3 mL nebulizer solution Take 3 mL by nebulization 4 (four) times a day for 7 days, Starting Fri 6/21/2024, Until Fri 6/28/2024, Normal      methylPREDNISolone 4 MG tablet therapy pack Use as directed on package, Normal           CONTINUE these medications which have NOT CHANGED    Details   acetaminophen (TYLENOL) 325  mg tablet Take 2 tablets (650 mg total) by mouth every 6 (six) hours as needed for mild pain, headaches or fever, Starting Wed 1/29/2020, No Print      albuterol (PROVENTIL HFA,VENTOLIN HFA) 90 mcg/act inhaler Inhale 2 puffs every 6 (six) hours as needed for wheezing Rinse mouth after use, Starting Thu 10/19/2023, Normal      ASPIRIN 81 PO Take by mouth every morning, Historical Med      atorvastatin (LIPITOR) 20 mg tablet Take 1 tablet (20 mg total) by mouth daily, Starting Thu 10/19/2023, Normal      famotidine (PEPCID) 40 MG tablet TAKE 1 TABLET BY MOUTH EVERY DAY, Normal      Fluticasone-Salmeterol (Advair) 250-50 mcg/dose inhaler Inhale 1 puff 2 (two) times a day Rinse mouth after use., Starting Thu 1/4/2024, Until Tue 7/2/2024, Normal      hydroxyurea (HYDREA) 500 mg capsule TAKE 1 CAPSULE (500 MG TOTAL) BY MOUTH EVERY EVENING, Starting Mon 4/22/2024, Normal      saccharomyces boulardii (FLORASTOR) 250 mg capsule Take 1 capsule (250 mg total) by mouth 2 (two) times a day, Starting Wed 8/9/2023, Normal      topiramate (TOPAMAX) 25 mg sprinkle capsule 1 in am, 2 in pm, Normal      verapamil (CALAN-SR) 120 mg CR tablet Take 1 tablet (120 mg total) by mouth daily, Starting Thu 10/19/2023, Normal             No discharge procedures on file.    PDMP Review         Value Time User    PDMP Reviewed  Yes 11/17/2021  6:58 AM Ronald Moses MD            ED Provider  Electronically Signed by             Flower Ames PA-C  06/21/24 2016

## 2024-06-23 DIAGNOSIS — I10 PRIMARY HYPERTENSION: ICD-10-CM

## 2024-06-23 LAB
BACTERIA BLD CULT: NORMAL
BACTERIA BLD CULT: NORMAL

## 2024-06-24 LAB
ATRIAL RATE: 95 BPM
P AXIS: 4 DEGREES
PR INTERVAL: 172 MS
QRS AXIS: 14 DEGREES
QRSD INTERVAL: 104 MS
QT INTERVAL: 358 MS
QTC INTERVAL: 449 MS
T WAVE AXIS: 28 DEGREES
VENTRICULAR RATE: 95 BPM

## 2024-06-24 PROCEDURE — 93010 ELECTROCARDIOGRAM REPORT: CPT | Performed by: INTERNAL MEDICINE

## 2024-06-26 ENCOUNTER — OFFICE VISIT (OUTPATIENT)
Dept: FAMILY MEDICINE CLINIC | Facility: CLINIC | Age: 59
End: 2024-06-26
Payer: COMMERCIAL

## 2024-06-26 VITALS
HEIGHT: 72 IN | DIASTOLIC BLOOD PRESSURE: 86 MMHG | BODY MASS INDEX: 27.12 KG/M2 | RESPIRATION RATE: 18 BRPM | WEIGHT: 200.2 LBS | TEMPERATURE: 98.2 F | SYSTOLIC BLOOD PRESSURE: 130 MMHG | HEART RATE: 72 BPM

## 2024-06-26 DIAGNOSIS — J40 BRONCHITIS: Primary | ICD-10-CM

## 2024-06-26 DIAGNOSIS — J45.909 UNCOMPLICATED ASTHMA, UNSPECIFIED ASTHMA SEVERITY, UNSPECIFIED WHETHER PERSISTENT: ICD-10-CM

## 2024-06-26 DIAGNOSIS — J43.8 OTHER EMPHYSEMA (HCC): ICD-10-CM

## 2024-06-26 LAB
BACTERIA BLD CULT: NORMAL
BACTERIA BLD CULT: NORMAL

## 2024-06-26 PROCEDURE — 99214 OFFICE O/P EST MOD 30 MIN: CPT | Performed by: FAMILY MEDICINE

## 2024-06-26 RX ORDER — AMOXICILLIN AND CLAVULANATE POTASSIUM 875; 125 MG/1; MG/1
1 TABLET, FILM COATED ORAL EVERY 12 HOURS SCHEDULED
Qty: 14 TABLET | Refills: 0 | Status: SHIPPED | OUTPATIENT
Start: 2024-06-26 | End: 2024-07-03

## 2024-06-26 RX ORDER — IPRATROPIUM BROMIDE AND ALBUTEROL SULFATE 2.5; .5 MG/3ML; MG/3ML
3 SOLUTION RESPIRATORY (INHALATION) 4 TIMES DAILY
Qty: 84 ML | Refills: 0 | Status: SHIPPED | OUTPATIENT
Start: 2024-06-26 | End: 2024-07-03

## 2024-06-26 RX ORDER — PREDNISONE 10 MG/1
TABLET ORAL
Qty: 30 TABLET | Refills: 0 | Status: SHIPPED | OUTPATIENT
Start: 2024-06-26 | End: 2024-07-08

## 2024-06-26 NOTE — PROGRESS NOTES
Ambulatory Visit  Name: Aurelio Rios      : 1965      MRN: [id number]  Encounter Provider: Sue Lopez MD  Encounter Date: 2024   Encounter department: [facility]    Assessment & Plan   1. Bronchitis  -     amoxicillin-clavulanate (AUGMENTIN) 875-125 mg per tablet; Take 1 tablet by mouth every 12 (twelve) hours for 7 days  -     predniSONE 10 mg tablet; Take 4 pills/d for 3 days then 3 pills/d for 3 days, then 2 pills/d for 3 days then 1 pill/d for 3 days then stop  -     ipratropium-albuterol (DUO-NEB) 0.5-2.5 mg/3 mL nebulizer solution; Take 3 mL by nebulization 4 (four) times a day for 7 days  2. Other emphysema (HCC)  3. Uncomplicated asthma, unspecified asthma severity, unspecified whether persistent     He is here today for ER f/u after he was treated for bronchitis. Continues to have symptoms after completing medrol dose pack and azithromycin. Will start augmentin and switch to prednisone taper. Continue neb treatments q4h. Return if symptoms worsen or fail to improve.   History of Present Illness     HPI  Mr. Rios is here today for ER follow up visit after being seen at [facility] ER on 24 for shortness of breath and cough for the past week. CXR done with no acute abnormalities. CT chest showed no acute lung disease. Did show esophagitis, hepatic steatosis, borderline prominent lymph nodes in the upper abdomen.   He was prescribed neb treatments, azithromycin and steroids for symptoms.   Today reports persistent cough, some shortness of breath/chest discomfort.   He completed antibiotic course and tomorrow will be the last day of steroids. Has been doing neb treatments 4 times daily .  Does not feel better.     Review of Systems   Constitutional: Negative.    HENT: Negative.     Eyes: Negative.    Respiratory:  Positive for cough, chest tightness, shortness of breath and wheezing.    Cardiovascular: Negative.    Gastrointestinal: Negative.    Endocrine:  Negative.    Genitourinary: Negative.    Musculoskeletal: Negative.    Neurological: Negative.    Hematological: Negative.    Psychiatric/Behavioral: Negative.         Objective     /86   Pulse 72   Temp 98.2 °F (36.8 °C)   Resp 18   Ht 6' (1.829 m)   Wt 90.8 kg (200 lb 3.2 oz)   BMI 27.15 kg/m²     Physical Exam  Vitals and nursing note reviewed.   Constitutional:       General: He is not in acute distress.     Appearance: Normal appearance. He is well-developed.   HENT:      Head: Normocephalic and atraumatic.   Eyes:      Conjunctiva/sclera: Conjunctivae normal.   Cardiovascular:      Rate and Rhythm: Normal rate and regular rhythm.      Pulses: Normal pulses.      Heart sounds: Normal heart sounds. No murmur heard.  Pulmonary:      Effort: Pulmonary effort is normal. No respiratory distress.      Breath sounds: Normal breath sounds.   Musculoskeletal:         General: No swelling.      Cervical back: Neck supple.   Skin:     General: Skin is warm and dry.      Capillary Refill: Capillary refill takes less than 2 seconds.   Neurological:      Mental Status: He is alert.   Psychiatric:         Mood and Affect: Mood normal.       Administrative Statements

## 2024-07-13 DIAGNOSIS — G43.809 OTHER MIGRAINE WITHOUT STATUS MIGRAINOSUS, NOT INTRACTABLE: ICD-10-CM

## 2024-07-13 RX ORDER — TOPIRAMATE SPINKLE 25 MG/1
CAPSULE ORAL
Qty: 270 CAPSULE | Refills: 1 | Status: SHIPPED | OUTPATIENT
Start: 2024-07-13

## 2024-08-04 PROBLEM — G47.33 OSA (OBSTRUCTIVE SLEEP APNEA): Status: ACTIVE | Noted: 2023-08-04

## 2024-08-05 ENCOUNTER — APPOINTMENT (OUTPATIENT)
Dept: LAB | Facility: HOSPITAL | Age: 59
End: 2024-08-05
Attending: FAMILY MEDICINE
Payer: COMMERCIAL

## 2024-08-05 DIAGNOSIS — Z12.5 PROSTATE CANCER SCREENING: ICD-10-CM

## 2024-08-05 DIAGNOSIS — Z13.1 SCREENING FOR DIABETES MELLITUS (DM): ICD-10-CM

## 2024-08-05 DIAGNOSIS — Z13.83 SCREENING FOR CARDIOVASCULAR, RESPIRATORY, AND GENITOURINARY DISEASES: ICD-10-CM

## 2024-08-05 DIAGNOSIS — Z13.89 SCREENING FOR CARDIOVASCULAR, RESPIRATORY, AND GENITOURINARY DISEASES: ICD-10-CM

## 2024-08-05 DIAGNOSIS — Z13.6 SCREENING FOR CARDIOVASCULAR, RESPIRATORY, AND GENITOURINARY DISEASES: ICD-10-CM

## 2024-08-05 LAB
ALBUMIN SERPL BCG-MCNC: 4.1 G/DL (ref 3.5–5)
ALP SERPL-CCNC: 96 U/L (ref 34–104)
ALT SERPL W P-5'-P-CCNC: 29 U/L (ref 7–52)
ANION GAP SERPL CALCULATED.3IONS-SCNC: 5 MMOL/L (ref 4–13)
AST SERPL W P-5'-P-CCNC: 21 U/L (ref 13–39)
BILIRUB SERPL-MCNC: 0.38 MG/DL (ref 0.2–1)
BUN SERPL-MCNC: 12 MG/DL (ref 5–25)
CALCIUM SERPL-MCNC: 9.3 MG/DL (ref 8.4–10.2)
CHLORIDE SERPL-SCNC: 105 MMOL/L (ref 96–108)
CHOLEST SERPL-MCNC: 136 MG/DL
CO2 SERPL-SCNC: 29 MMOL/L (ref 21–32)
CREAT SERPL-MCNC: 1.02 MG/DL (ref 0.6–1.3)
EST. AVERAGE GLUCOSE BLD GHB EST-MCNC: 120 MG/DL
GFR SERPL CREATININE-BSD FRML MDRD: 80 ML/MIN/1.73SQ M
GLUCOSE P FAST SERPL-MCNC: 109 MG/DL (ref 65–99)
HBA1C MFR BLD: 5.8 %
HDLC SERPL-MCNC: 38 MG/DL
LDLC SERPL CALC-MCNC: 71 MG/DL (ref 0–100)
POTASSIUM SERPL-SCNC: 4.4 MMOL/L (ref 3.5–5.3)
PROT SERPL-MCNC: 7.1 G/DL (ref 6.4–8.4)
PSA SERPL-MCNC: 0.53 NG/ML (ref 0–4)
SODIUM SERPL-SCNC: 139 MMOL/L (ref 135–147)
TRIGL SERPL-MCNC: 135 MG/DL

## 2024-08-05 PROCEDURE — 80061 LIPID PANEL: CPT

## 2024-08-05 PROCEDURE — G0103 PSA SCREENING: HCPCS

## 2024-08-05 PROCEDURE — 80053 COMPREHEN METABOLIC PANEL: CPT

## 2024-08-05 PROCEDURE — 83036 HEMOGLOBIN GLYCOSYLATED A1C: CPT

## 2024-08-06 ENCOUNTER — OFFICE VISIT (OUTPATIENT)
Dept: FAMILY MEDICINE CLINIC | Facility: CLINIC | Age: 59
End: 2024-08-06
Payer: COMMERCIAL

## 2024-08-06 ENCOUNTER — NURSE TRIAGE (OUTPATIENT)
Dept: GASTROENTEROLOGY | Facility: CLINIC | Age: 59
End: 2024-08-06

## 2024-08-06 VITALS
HEART RATE: 84 BPM | TEMPERATURE: 96.6 F | SYSTOLIC BLOOD PRESSURE: 124 MMHG | HEIGHT: 70 IN | RESPIRATION RATE: 18 BRPM | BODY MASS INDEX: 29.29 KG/M2 | DIASTOLIC BLOOD PRESSURE: 86 MMHG | WEIGHT: 204.6 LBS

## 2024-08-06 DIAGNOSIS — I10 PRIMARY HYPERTENSION: ICD-10-CM

## 2024-08-06 DIAGNOSIS — Z00.00 HEALTHCARE MAINTENANCE: Primary | ICD-10-CM

## 2024-08-06 DIAGNOSIS — R73.03 PREDIABETES: ICD-10-CM

## 2024-08-06 DIAGNOSIS — D45 POLYCYTHEMIA VERA (HCC): ICD-10-CM

## 2024-08-06 DIAGNOSIS — Z12.11 COLON CANCER SCREENING: ICD-10-CM

## 2024-08-06 DIAGNOSIS — E78.49 OTHER HYPERLIPIDEMIA: ICD-10-CM

## 2024-08-06 DIAGNOSIS — J45.40 MODERATE PERSISTENT ASTHMA WITHOUT COMPLICATION: ICD-10-CM

## 2024-08-06 DIAGNOSIS — K20.90 ESOPHAGITIS: ICD-10-CM

## 2024-08-06 PROCEDURE — 99396 PREV VISIT EST AGE 40-64: CPT | Performed by: FAMILY MEDICINE

## 2024-08-06 RX ORDER — ALBUTEROL SULFATE 2.5 MG/3ML
2.5 SOLUTION RESPIRATORY (INHALATION) EVERY 6 HOURS PRN
Qty: 180 ML | Refills: 5 | Status: SHIPPED | OUTPATIENT
Start: 2024-08-06

## 2024-08-06 RX ORDER — SUCRALFATE ORAL 1 G/10ML
1 SUSPENSION ORAL 4 TIMES DAILY
Qty: 400 ML | Refills: 0 | Status: SHIPPED | OUTPATIENT
Start: 2024-08-06

## 2024-08-06 NOTE — PROGRESS NOTES
Assessment/Plan:    No problem-specific Assessment & Plan notes found for this encounter.    Cpe  Carafate for CT findings of esophagitis but needs GI f/u  Could try short term PPI in future to replace pepcid if sx recur after carafate    Headaches/htn stable on meds    P vera f/u oncology    Prediabetes aware, diet and exercise suggested     Diagnoses and all orders for this visit:    Healthcare maintenance    Colon cancer screening    Esophagitis  -     Ambulatory referral to Gastroenterology; Future  -     sucralfate (CARAFATE) 1 g/10 mL suspension; Take 10 mL (1 g total) by mouth 4 (four) times a day    Polycythemia vera (HCC)    Primary hypertension    Other hyperlipidemia    Prediabetes  -     Comprehensive metabolic panel; Future  -     Hemoglobin A1C; Future    Moderate persistent asthma without complication  -     albuterol (2.5 mg/3 mL) 0.083 % nebulizer solution; Take 3 mL (2.5 mg total) by nebulization every 6 (six) hours as needed for wheezing or shortness of breath              Return in about 6 months (around 2/6/2025) for Recheck.    Subjective:      Patient ID: Aurelio Rios is a 58 y.o. male.    Chief Complaint   Patient presents with    Physical Exam     YC       HPI  On pepcid 40mg qd for years  Esophagitis seen on CT and dysphagia symptoms for past month  No hemoptysis or hematemesis, no melena  Has hemorrhoids  Has hx of Barretts    3 headaches per month  Topamax helps  Verapamil w/o constipation  No excessively drowsiness    Some wt gain but was on steroid courses x 2  Labs reviewed  Prediabetes noted    Recent uri  Hx of asthma  Uses nebs for prn use    The following portions of the patient's history were reviewed and updated as appropriate: allergies, current medications, past family history, past medical history, past social history, past surgical history and problem list.    Review of Systems   Constitutional:  Negative for chills and fever.   Cardiovascular:  Negative for chest pain.     "     Current Outpatient Medications   Medication Sig Dispense Refill    acetaminophen (TYLENOL) 325 mg tablet Take 2 tablets (650 mg total) by mouth every 6 (six) hours as needed for mild pain, headaches or fever 30 tablet 0    albuterol (2.5 mg/3 mL) 0.083 % nebulizer solution Take 3 mL (2.5 mg total) by nebulization every 6 (six) hours as needed for wheezing or shortness of breath 180 mL 5    albuterol (PROVENTIL HFA,VENTOLIN HFA) 90 mcg/act inhaler Inhale 2 puffs every 6 (six) hours as needed for wheezing Rinse mouth after use 18 g 5    ASPIRIN 81 PO Take by mouth 2 (two) times a day      atorvastatin (LIPITOR) 20 mg tablet TAKE 1 TABLET BY MOUTH EVERY DAY 90 tablet 1    famotidine (PEPCID) 40 MG tablet TAKE 1 TABLET BY MOUTH EVERY DAY 90 tablet 0    Fluticasone-Salmeterol (Advair) 250-50 mcg/dose inhaler Inhale 1 puff 2 (two) times a day Rinse mouth after use. 60 blister 5    hydroxyurea (HYDREA) 500 mg capsule TAKE 1 CAPSULE (500 MG TOTAL) BY MOUTH EVERY EVENING 90 capsule 1    saccharomyces boulardii (FLORASTOR) 250 mg capsule Take 1 capsule (250 mg total) by mouth 2 (two) times a day 30 capsule 0    sucralfate (CARAFATE) 1 g/10 mL suspension Take 10 mL (1 g total) by mouth 4 (four) times a day 400 mL 0    topiramate (TOPAMAX) 25 mg sprinkle capsule TAKE 1 CAPSULE BY MOUTH IN THE MORNING AND 2 CAPSULES IN THE EVENING 270 capsule 1    verapamil (CALAN-SR) 120 mg CR tablet TAKE 1 TABLET BY MOUTH EVERY DAY 90 tablet 1    ibuprofen (MOTRIN) 600 mg tablet Take 1 tablet (600 mg total) by mouth every 6 (six) hours as needed for moderate pain or fever (Patient not taking: Reported on 8/6/2024) 30 tablet 0     No current facility-administered medications for this visit.       Objective:    /86   Pulse 84   Temp (!) 96.6 °F (35.9 °C) (Tympanic)   Resp 18   Ht 5' 10.28\" (1.785 m)   Wt 92.8 kg (204 lb 9.6 oz)   BMI 29.13 kg/m²        Physical Exam  Vitals and nursing note reviewed.   Constitutional:       " General: He is not in acute distress.     Appearance: He is well-developed. He is not ill-appearing.   HENT:      Head: Normocephalic.      Right Ear: Tympanic membrane and ear canal normal.      Left Ear: Tympanic membrane and ear canal normal.      Nose: No congestion.      Mouth/Throat:      Mouth: Mucous membranes are moist.   Eyes:      General: No scleral icterus.     Conjunctiva/sclera: Conjunctivae normal.   Cardiovascular:      Rate and Rhythm: Normal rate and regular rhythm.      Heart sounds: No murmur heard.  Pulmonary:      Effort: Pulmonary effort is normal. No respiratory distress.      Breath sounds: No wheezing.   Abdominal:      General: There is no distension.      Palpations: Abdomen is soft.      Tenderness: There is no abdominal tenderness.   Genitourinary:     Penis: Normal.       Testes: Normal.   Musculoskeletal:         General: No deformity.      Cervical back: Neck supple.      Right lower leg: No edema.      Left lower leg: No edema.   Skin:     General: Skin is warm and dry.      Coloration: Skin is not pale.   Neurological:      Mental Status: He is alert.      Motor: No weakness.      Gait: Gait normal.   Psychiatric:         Behavior: Behavior normal.         Thought Content: Thought content normal.                Channing Lopez DO

## 2024-08-06 NOTE — TELEPHONE ENCOUNTER
Patients GI provider:  Dr. Kaur    Number to return call: [phone]    Reason for call: Pt calling to see if his ER f/u can be moved up? States Dr. Lopez recommended it be moved up. Please advise.     Scheduled procedure/appointment date if applicable: Apt 11/19

## 2024-08-06 NOTE — TELEPHONE ENCOUNTER
"LOV 8/30/22 Dr Kaur, Procedure colon 10/28/22, EGD w/single balloon enteroscopy 2/26/21, Labs 8/5/24,  seen ED 6/21/24 CT chest noted esophagitis, hepatomegaly with hepatic steatosis.  Patient seen by pcp today and referred go ongoing esophagitis, dysphagia. Scheduled urgent visit 8/13/24.    Patient continues with symptoms, difficulty swallowing both liquids and solids. I advised taking medications as ordered at primary visit today, cut foods into very small pieces and chew thoroughly prior to swallowing. Take a sip of water prior to eating and administering medications. Open capsules if necessary to sprinkle on applesauce, check with pharmacist if any other medications could be crushed prior to administering. Patient advised to report to ED for worsening symptoms.    Reason for Disposition   Difficulty swallowing is a chronic symptom (recurrent or ongoing AND present > 4 weeks)    Answer Assessment - Initial Assessment Questions  1. SYMPTOM: \"Are you having difficulty swallowing liquids, solids, or both?\"      both  2. ONSET: \"When did the swallowing problems begin?\"       Three/four weeks ago  3. CAUSE: \"What do you think is causing the problem?\"       unknown  4. CHRONIC/RECURRENT: \"Is this a new problem for you?\"  If no, ask: \"How long have you had this problem?\" (e.g., days, weeks, months)       Never had swallowing issues in past  5. OTHER SYMPTOMS: \"Do you have any other symptoms?\" (e.g., difficulty breathing, sore throat, swollen tongue, chest pain)      denies  6. PREGNANCY: \"Is there any chance you are pregnant?\" \"When was your last menstrual period?\"      N/A    Protocols used: Swallowing Difficulty-ADULT-[place]    "

## 2024-08-06 NOTE — PATIENT INSTRUCTIONS
For esophagitis, please replace the famotidine with 10 day of sucralfate then go back to the famotidine afterwards.  If your symptoms recur, we could replace the famotidine with a stronger acid suppressor such as pantoprazol 40mg once a day for 1-2 months while having you see your Gastroenterologist for the CAT scan findings.

## 2024-08-07 DIAGNOSIS — R13.19 ESOPHAGEAL DYSPHAGIA: Primary | ICD-10-CM

## 2024-08-07 PROBLEM — R73.03 PREDIABETES: Status: ACTIVE | Noted: 2024-08-07

## 2024-08-13 ENCOUNTER — OFFICE VISIT (OUTPATIENT)
Dept: GASTROENTEROLOGY | Facility: CLINIC | Age: 59
End: 2024-08-13
Payer: COMMERCIAL

## 2024-08-13 ENCOUNTER — HOSPITAL ENCOUNTER (OUTPATIENT)
Dept: RADIOLOGY | Facility: HOSPITAL | Age: 59
Discharge: HOME | End: 2024-08-13

## 2024-08-13 VITALS
SYSTOLIC BLOOD PRESSURE: 157 MMHG | WEIGHT: 203.6 LBS | HEIGHT: 70 IN | HEART RATE: 81 BPM | DIASTOLIC BLOOD PRESSURE: 104 MMHG | BODY MASS INDEX: 29.15 KG/M2

## 2024-08-13 DIAGNOSIS — Z86.010 HISTORY OF ADENOMATOUS POLYP OF COLON: ICD-10-CM

## 2024-08-13 DIAGNOSIS — K21.9 GASTROESOPHAGEAL REFLUX DISEASE WITHOUT ESOPHAGITIS: ICD-10-CM

## 2024-08-13 DIAGNOSIS — K21.9 CHRONIC GERD: ICD-10-CM

## 2024-08-13 DIAGNOSIS — R13.19 ESOPHAGEAL DYSPHAGIA: Primary | ICD-10-CM

## 2024-08-13 DIAGNOSIS — R13.19 ESOPHAGEAL DYSPHAGIA: ICD-10-CM

## 2024-08-13 PROBLEM — Z86.0101 HISTORY OF ADENOMATOUS POLYP OF COLON: Status: ACTIVE | Noted: 2024-08-13

## 2024-08-13 PROCEDURE — 99214 OFFICE O/P EST MOD 30 MIN: CPT | Performed by: PHYSICIAN ASSISTANT

## 2024-08-13 RX ORDER — FAMOTIDINE 40 MG/1
40 TABLET, FILM COATED ORAL DAILY
Qty: 90 TABLET | Refills: 3 | Status: SHIPPED | OUTPATIENT
Start: 2024-08-13

## 2024-08-13 NOTE — LETTER
August 13, 2024     Channing Lopez, DO  [address]  [address]  [facility] [place] [address]    Patient: Aurelio Rios   YOB: 1965   Date of Visit: 8/13/2024       Dear Dr. Lopez:    Thank you for referring Aurelio Rios to me for evaluation. Below are my notes for this consultation.    If you have questions, please do not hesitate to call me. I look forward to following your patient along with you.         Sincerely,        Nader Trujillo PA-C        CC: No Recipients    Nader Trujillo PA-C  8/13/2024  3:32 PM  Incomplete  [facility] - Outpatient Progress Note  Aurelio Rios 58 y.o. male MRN: [id number]  Encounter: [id number]    Assessment and Plan    1. Esophageal dysphagia  -Plan EGD  - I have reviewed the risks of endoscopy which include but are not limited to; anesthesia complications, injury/perforation of the bowel, infection and bleeding.  Patient given the opportunity to review the full consent form.  -Would pursue barium swallow as well      2. Chronic GERD  - Patient is currently stable on famotidine 40 mg daily.   No current daytime or night time break through symptoms.  No pain or difficulty swallowing.  No unintentional weight loss.  Also recommend:  - avoid NSAIDS  - avoid eating within 3 hours of sleeping  - elevated head of bed 4-6 inches while sleeping  - avoid trigger foods  - recommend daily supply of calcium and vitamin D      3. History of adenomatous polyp of colon  -colonoscopy January 2021 with tubular adenoma removed and 1 year recall suggested due to poor prep  -Last attempted colonoscopy May 2021 due to iron deficiency anemia but unable to advance scope past descending colon due to significant diverticulosis and adhesions  - plan to repeat colonoscopy  - I have reviewed the risks of endoscopy which include but are not limited to; anesthesia complications, injury/perforation of the bowel, infection and bleeding.   Patient given the opportunity to review the full consent form.        --------------------------------------------------------------------------------------------------------------------    Chief Complaint: Dysphagia    HPI: Aurelio Rios is a 58 y.o. male with past medical history of anemia, osteoarthritis, asthma, history of adenomatous colon polyp, GERD, hyperlipidemia, hypertension who presents today for follow up for chief complaint of dysphagia.    Dysphagia  How long with symptoms -   Where does food feel like it gets stuck - mid chest  Solids same and liquids - solids worse  Need to regurgitate food - No   Pain with swallowing - No  History of seasonal or environmental allergies - No  History of Asthma - No  History of GERD - Yes  Unintentional weight loss - No   Previous esophageal imaging -none recently, but barium swallow has been ordered and just not done yet  Previous EGD - 2021 for anemia no esophageal strictures      Patient denies any nausea, vomiting, abdominal pain, unexpected weight loss, diarrhea, constipation, blood in stool, or black tarry stools.     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  Capsule endoscopy -February 2021 with bleeding seen in the mid small bowel  EGD with single balloon -February 2021 aborted due to dilated bowel and unable to anchor scope with balloon  Colonoscopy -May 2021 unable to get past left colon due to severe diverticulosis and adhesions   -Previous scope in January 2021 with tubular adenoma removed and poor bowel prep    Review of Systems:   General: negative for fatigue, fever, night sweats or unexpected weight loss  Psychological: negative for anxiety or depression  Ophthalmic: negative for blurry vision or scleral icterus  ENT: negative for headaches, sore throat or dysphagia  Hematological and Lymphatic: negative for pallor or swollen lymph nodes  Respiratory: negative for cough, shortness of breath or  wheezing  Cardiovascular: negative for chest pain, edema or murmur  Gastrointestinal: as mentioned in HPI  Genito-Urinary: negative for dysuria or incontinence  Musculoskeletal: negative for joint pain, joint stiffness or joint swelling  Dermatological: negative for pruritus, rash, or jaundice    Current Medications  Current Outpatient Medications   Medication Sig Dispense Refill   • acetaminophen (TYLENOL) 325 mg tablet Take 2 tablets (650 mg total) by mouth every 6 (six) hours as needed for mild pain, headaches or fever 30 tablet 0   • albuterol (2.5 mg/3 mL) 0.083 % nebulizer solution Take 3 mL (2.5 mg total) by nebulization every 6 (six) hours as needed for wheezing or shortness of breath 180 mL 5   • albuterol (PROVENTIL HFA,VENTOLIN HFA) 90 mcg/act inhaler Inhale 2 puffs every 6 (six) hours as needed for wheezing Rinse mouth after use 18 g 5   • ASPIRIN 81 PO Take by mouth 2 (two) times a day     • atorvastatin (LIPITOR) 20 mg tablet TAKE 1 TABLET BY MOUTH EVERY DAY 90 tablet 1   • famotidine (PEPCID) 40 MG tablet TAKE 1 TABLET BY MOUTH EVERY DAY 90 tablet 0   • hydroxyurea (HYDREA) 500 mg capsule TAKE 1 CAPSULE (500 MG TOTAL) BY MOUTH EVERY EVENING 90 capsule 1   • saccharomyces boulardii (FLORASTOR) 250 mg capsule Take 1 capsule (250 mg total) by mouth 2 (two) times a day 30 capsule 0   • sucralfate (CARAFATE) 1 g/10 mL suspension Take 10 mL (1 g total) by mouth 4 (four) times a day 400 mL 0   • topiramate (TOPAMAX) 25 mg sprinkle capsule TAKE 1 CAPSULE BY MOUTH IN THE MORNING AND 2 CAPSULES IN THE EVENING 270 capsule 1   • verapamil (CALAN-SR) 120 mg CR tablet TAKE 1 TABLET BY MOUTH EVERY DAY 90 tablet 1   • Fluticasone-Salmeterol (Advair) 250-50 mcg/dose inhaler Inhale 1 puff 2 (two) times a day Rinse mouth after use. 60 blister 5   • ibuprofen (MOTRIN) 600 mg tablet Take 1 tablet (600 mg total) by mouth every 6 (six) hours as needed for moderate pain or fever (Patient not taking: Reported on 8/6/2024) 30  tablet 0     No current facility-administered medications for this visit.       Past Medical History  Past Medical History:   Diagnosis Date   • Anemia    • Arthritis    • Asthma    • Cancer (HCC)     prostate   • Colon polyp    • Elevated PSA    • Full dentures    • GERD (gastroesophageal reflux disease)    • Hemorrhoid    • Hyperlipidemia    • Hypertension    • Polycythemia vera (HCC)    • Sleep apnea     No CPAP   • Smoker    • TIA (transient ischemic attack)    • Transfusion history    • Wears glasses        Past Surgical History  Past Surgical History:   Procedure Laterality Date   • ABDOMINAL ADHESION SURGERY N/A 11/17/2021    Procedure: LYSIS ADHESIONS LAPAROSCOPIC W ROBOT;  Surgeon: Ronald Moses MD;  Location: [facility];  Service: Urology   • APPENDECTOMY     • COLONOSCOPY     • EGD     • FOOT SURGERY Right     bone removed-left foot-removal of blood clots from an injury   • OTHER SURGICAL HISTORY      bone removal right arm-abnormal growth of surface bone   • PROSTATE BIOPSY     • PROSTATECTOMY N/A 11/17/2021    Procedure: ROBOTIC ASSISTED LAPAROSCOPIC SIMPLE PROSTATECTOMY AND BLADDER NECK RECONSTRUCTION;  Surgeon: Ronald Moses MD;  Location: [facility];  Service: Urology   • SPLENECTOMY      ruptured   • UPPER GASTROINTESTINAL ENDOSCOPY         Past Social History   Social History     Socioeconomic History   • Marital status:      Spouse name: None   • Number of children: None   • Years of education: None   • Highest education level: None   Occupational History   • Occupation: supervisor     Employer: [employer]   Tobacco Use   • Smoking status: Former     Current packs/day: 0.00     Average packs/day: 0.5 packs/day for 10.0 years (5.0 ttl pk-yrs)     Types: Cigarettes     Start date: 6/15/2011     Quit date: 2/22/2021     Years since quitting: 3.4     Passive exposure: Current   • Smokeless tobacco: Never   Vaping Use   • Vaping status: Never Used   Substance and Sexual Activity   •  "Alcohol use: Not Currently   • Drug use: No   • Sexual activity: None   Other Topics Concern   • None   Social History Narrative   • None     Social Determinants of Health     Financial Resource Strain: Not on file   Food Insecurity: Not on file   Transportation Needs: Not on file   Physical Activity: Not on file   Stress: Not on file   Social Connections: Not on file   Intimate Partner Violence: Not on file   Housing Stability: Not on file       Vital Signs  Vitals:    08/13/24 1526   BP: (!) 157/104   BP Location: Right arm   Patient Position: Sitting   Cuff Size: Standard   Pulse: 81   Weight: 92.4 kg (203 lb 9.6 oz)   Height: 5' 10.28\" (1.785 m)       Physical Exam:  General appearance: alert, cooperative, no distress  HEENT: normocephalic, anicteric, no eye erythema or discharge, no oropharyngeal thrush  Neck: supple  Lungs: CTA b/l, no rales, rhonchi, or wheezing, unlabored respirations  Heart: RRR, no murmur, rubs, or gallops  Abdomen: soft, non-tender, non-distended, normal bowel sounds, no masses or organomegaly  Rectal: deferred  Extremities: no cyanosis, clubbing, or edema  Musculoskeletal: normal gait  Skin: color and texture normal, no jaundice, no rashes or lesions  Psychiatric: alert and oriented, normal affect and behavior                                                          Nader Trujillo PA-C  8/13/2024  2:57 PM  Sign when Signing Visit  [facility] - Outpatient Progress Note  Aurelio Rios 58 y.o. male MRN: [id number]  Encounter: [id number]    Assessment and Plan    1. Esophageal dysphagia  -Plan EGD  - I have reviewed the risks of endoscopy which include but are not limited to; anesthesia complications, injury/perforation of the bowel, infection and bleeding.  Patient given the opportunity to review the full consent form.  -Would pursue barium swallow as well      2. Chronic GERD  - Patient is currently stable on famotidine " 40 mg daily.   No current daytime or night time break through symptoms.  No pain or difficulty swallowing.  No unintentional weight loss.  Also recommend:  - avoid NSAIDS  - avoid eating within 3 hours of sleeping  - elevated head of bed 4-6 inches while sleeping  - avoid trigger foods  - recommend daily supply of calcium and vitamin D      3. History of adenomatous polyp of colon  -colonoscopy January 2021 with tubular adenoma removed and 1 year recall suggested due to poor prep  -Last attempted colonoscopy May 2021 due to iron deficiency anemia but unable to advance scope past descending colon due to significant diverticulosis and adhesions      --------------------------------------------------------------------------------------------------------------------    Chief Complaint: Dysphagia    HPI: Aurelio Rios is a 58 y.o. male with past medical history of anemia, osteoarthritis, asthma, history of adenomatous colon polyp, GERD, hyperlipidemia, hypertension who presents today for follow up for chief complaint of dysphagia.    Dysphagia  How long with symptoms -   Where does food feel like it gets stuck - mid chest  Solids same and liquids - solids worse  Need to regurgitate food - No   Pain with swallowing - No  History of seasonal or environmental allergies - No  History of Asthma - No  History of GERD - Yes  Unintentional weight loss - No   Previous esophageal imaging -none recently, but barium swallow has been ordered and just not done yet  Previous EGD - 2021 for anemia no esophageal strictures      Patient denies any nausea, vomiting, abdominal pain, unexpected weight loss, diarrhea, constipation, blood in stool, or black tarry stools.     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  Capsule endoscopy -February 2021 with bleeding seen in the mid small bowel  EGD with single balloon -February 2021 aborted due to dilated bowel and unable to anchor scope with  balloon  Colonoscopy -May 2021 unable to get past left colon due to severe diverticulosis and adhesions   -Previous scope in January 2021 with tubular adenoma removed and poor bowel prep    Review of Systems:   General: negative for fatigue, fever, night sweats or unexpected weight loss  Psychological: negative for anxiety or depression  Ophthalmic: negative for blurry vision or scleral icterus  ENT: negative for headaches, sore throat or dysphagia  Hematological and Lymphatic: negative for pallor or swollen lymph nodes  Respiratory: negative for cough, shortness of breath or wheezing  Cardiovascular: negative for chest pain, edema or murmur  Gastrointestinal: as mentioned in HPI  Genito-Urinary: negative for dysuria or incontinence  Musculoskeletal: negative for joint pain, joint stiffness or joint swelling  Dermatological: negative for pruritus, rash, or jaundice    Current Medications  Current Outpatient Medications   Medication Sig Dispense Refill   • acetaminophen (TYLENOL) 325 mg tablet Take 2 tablets (650 mg total) by mouth every 6 (six) hours as needed for mild pain, headaches or fever 30 tablet 0   • albuterol (2.5 mg/3 mL) 0.083 % nebulizer solution Take 3 mL (2.5 mg total) by nebulization every 6 (six) hours as needed for wheezing or shortness of breath 180 mL 5   • albuterol (PROVENTIL HFA,VENTOLIN HFA) 90 mcg/act inhaler Inhale 2 puffs every 6 (six) hours as needed for wheezing Rinse mouth after use 18 g 5   • ASPIRIN 81 PO Take by mouth 2 (two) times a day     • atorvastatin (LIPITOR) 20 mg tablet TAKE 1 TABLET BY MOUTH EVERY DAY 90 tablet 1   • famotidine (PEPCID) 40 MG tablet TAKE 1 TABLET BY MOUTH EVERY DAY 90 tablet 0   • Fluticasone-Salmeterol (Advair) 250-50 mcg/dose inhaler Inhale 1 puff 2 (two) times a day Rinse mouth after use. 60 blister 5   • hydroxyurea (HYDREA) 500 mg capsule TAKE 1 CAPSULE (500 MG TOTAL) BY MOUTH EVERY EVENING 90 capsule 1   • ibuprofen (MOTRIN) 600 mg tablet Take 1  tablet (600 mg total) by mouth every 6 (six) hours as needed for moderate pain or fever (Patient not taking: Reported on 8/6/2024) 30 tablet 0   • saccharomyces boulardii (FLORASTOR) 250 mg capsule Take 1 capsule (250 mg total) by mouth 2 (two) times a day 30 capsule 0   • sucralfate (CARAFATE) 1 g/10 mL suspension Take 10 mL (1 g total) by mouth 4 (four) times a day 400 mL 0   • topiramate (TOPAMAX) 25 mg sprinkle capsule TAKE 1 CAPSULE BY MOUTH IN THE MORNING AND 2 CAPSULES IN THE EVENING 270 capsule 1   • verapamil (CALAN-SR) 120 mg CR tablet TAKE 1 TABLET BY MOUTH EVERY DAY 90 tablet 1     No current facility-administered medications for this visit.       Past Medical History  Past Medical History:   Diagnosis Date   • Anemia    • Arthritis    • Asthma    • Cancer (HCC)     prostate   • Colon polyp    • Elevated PSA    • Full dentures    • GERD (gastroesophageal reflux disease)    • Hemorrhoid    • Hyperlipidemia    • Hypertension    • Polycythemia vera (HCC)    • Sleep apnea     No CPAP   • Smoker    • TIA (transient ischemic attack)    • Transfusion history    • Wears glasses        Past Surgical History  Past Surgical History:   Procedure Laterality Date   • ABDOMINAL ADHESION SURGERY N/A 11/17/2021    Procedure: LYSIS ADHESIONS LAPAROSCOPIC W ROBOT;  Surgeon: Ronald Moses MD;  [facility]: [facility];  Service: Urology   • APPENDECTOMY     • COLONOSCOPY     • EGD     • FOOT SURGERY Right     bone removed-left foot-removal of blood clots from an injury   • OTHER SURGICAL HISTORY      bone removal right arm-abnormal growth of surface bone   • PROSTATE BIOPSY     • PROSTATECTOMY N/A 11/17/2021    Procedure: ROBOTIC ASSISTED LAPAROSCOPIC SIMPLE PROSTATECTOMY AND BLADDER NECK RECONSTRUCTION;  Surgeon: Ronald Moses MD;  Location: [facility];  Service: Urology   • SPLENECTOMY      ruptured       Past Social History   Social History     Socioeconomic History   • Marital status:      Spouse name: Not on  file   • Number of children: Not on file   • Years of education: Not on file   • Highest education level: Not on file   Occupational History   • Occupation: supervisor     Employer: [employer]   Tobacco Use   • Smoking status: Former     Current packs/day: 0.00     Average packs/day: 0.5 packs/day for 10.0 years (5.0 ttl pk-yrs)     Types: Cigarettes     Start date: 6/15/2011     Quit date: 2/22/2021     Years since quitting: 3.4     Passive exposure: Current   • Smokeless tobacco: Never   Vaping Use   • Vaping status: Never Used   Substance and Sexual Activity   • Alcohol use: Not Currently   • Drug use: No   • Sexual activity: Not on file   Other Topics Concern   • Not on file   Social History Narrative   • Not on file     Social Determinants of Health     Financial Resource Strain: Not on file   Food Insecurity: Not on file   Transportation Needs: Not on file   Physical Activity: Not on file   Stress: Not on file   Social Connections: Not on file   Intimate Partner Violence: Not on file   Housing Stability: Not on file       Vital Signs  There were no vitals filed for this visit.    Physical Exam:  General appearance: alert, cooperative, no distress  HEENT: normocephalic, anicteric, no eye erythema or discharge, no oropharyngeal thrush  Neck: supple  Lungs: CTA b/l, no rales, rhonchi, or wheezing, unlabored respirations  Heart: RRR, no murmur, rubs, or gallops  Abdomen: soft, non-tender, non-distended, normal bowel sounds, no masses or organomegaly  Rectal: deferred  Extremities: no cyanosis, clubbing, or edema  Musculoskeletal: normal gait  Skin: color and texture normal, no jaundice, no rashes or lesions  Psychiatric: alert and oriented, normal affect and behavior                                                          Nader Trujillo PA-C

## 2024-08-13 NOTE — PATIENT INSTRUCTIONS
Scheduled date of EGD/colonoscopy (as of today):09/24/24  Physician performing EGD/colonoscopy:Dr. Bruce  Location of EGD/colonoscopy:[facility]  Desired bowel prep reviewed with patient:Miralax, Dulcolax  Instructions reviewed with patient by:kristopher  Clearances:   n/a

## 2024-08-13 NOTE — PROGRESS NOTES
[facility] - Outpatient Progress Note  Aurelio Rios 58 y.o. male MRN: [id number]  Encounter: [id number]    Assessment and Plan    1. Esophageal dysphagia  -Plan EGD  - I have reviewed the risks of endoscopy which include but are not limited to; anesthesia complications, injury/perforation of the bowel, infection and bleeding.  Patient given the opportunity to review the full consent form.  -Would pursue barium swallow as well      2. Chronic GERD  - Patient is currently stable on famotidine 40 mg daily.   No current daytime or night time break through symptoms.  No pain or difficulty swallowing.  No unintentional weight loss.  Also recommend:  - avoid NSAIDS  - avoid eating within 3 hours of sleeping  - elevated head of bed 4-6 inches while sleeping  - avoid trigger foods  - recommend daily supply of calcium and vitamin D    3. Barretts  - 2021 for anemia, no Barretts biopsies taken      4. History of adenomatous polyp of colon  -colonoscopy January 2021 with tubular adenoma removed and 1 year recall suggested due to poor prep  -Last attempted colonoscopy May 2021 due to iron deficiency anemia but unable to advance scope past descending colon due to significant diverticulosis and adhesions  - plan to repeat colonoscopy  - I have reviewed the risks of endoscopy which include but are not limited to; anesthesia complications, injury/perforation of the bowel, infection and bleeding.  Patient given the opportunity to review the full consent form.        --------------------------------------------------------------------------------------------------------------------    Chief Complaint: Dysphagia    HPI: Aurelio Rios is a 58 y.o. male with past medical history of anemia, osteoarthritis, asthma, history of adenomatous colon polyp, GERD, nondysplastic Barretts, hyperlipidemia, hypertension who presents today for follow up for chief complaint of dysphagia.    Dysphagia  How long with  symptoms - 2 month  Where does food feel like it gets stuck - mid chest  Solids same and liquids - solids worse  Need to regurgitate food - No   Pain with swallowing - No  History of seasonal or environmental allergies - No  History of Asthma - Yes  History of GERD - Yes  Unintentional weight loss - No   Previous esophageal imaging -none recently, but barium swallow has been ordered and just not done yet  Previous EGD - 2021 for anemia no esophageal strictures      Patient denies any nausea, vomiting, abdominal pain, unexpected weight loss, diarrhea, constipation, blood in stool, or black tarry stools.     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  Capsule endoscopy -February 2021 with bleeding seen in the mid small bowel  EGD with single balloon -February 2021 aborted due to dilated bowel and unable to anchor scope with balloon  Colonoscopy -May 2021 unable to get past left colon due to severe diverticulosis and adhesions   -Previous scope in January 2021 with tubular adenoma removed and poor bowel prep    Review of Systems:   General: negative for fatigue, fever, night sweats or unexpected weight loss  Psychological: negative for anxiety or depression  Ophthalmic: negative for blurry vision or scleral icterus  ENT: negative for headaches, sore throat or dysphagia  Hematological and Lymphatic: negative for pallor or swollen lymph nodes  Respiratory: negative for cough, shortness of breath or wheezing  Cardiovascular: negative for chest pain, edema or murmur  Gastrointestinal: as mentioned in HPI  Genito-Urinary: negative for dysuria or incontinence  Musculoskeletal: negative for joint pain, joint stiffness or joint swelling  Dermatological: negative for pruritus, rash, or jaundice    Current Medications  Current Outpatient Medications   Medication Sig Dispense Refill   • acetaminophen (TYLENOL) 325 mg tablet Take 2 tablets (650 mg total) by mouth every 6 (six) hours as  needed for mild pain, headaches or fever 30 tablet 0   • albuterol (2.5 mg/3 mL) 0.083 % nebulizer solution Take 3 mL (2.5 mg total) by nebulization every 6 (six) hours as needed for wheezing or shortness of breath 180 mL 5   • albuterol (PROVENTIL HFA,VENTOLIN HFA) 90 mcg/act inhaler Inhale 2 puffs every 6 (six) hours as needed for wheezing Rinse mouth after use 18 g 5   • ASPIRIN 81 PO Take by mouth 2 (two) times a day     • atorvastatin (LIPITOR) 20 mg tablet TAKE 1 TABLET BY MOUTH EVERY DAY 90 tablet 1   • famotidine (PEPCID) 40 MG tablet Take 1 tablet (40 mg total) by mouth daily 90 tablet 3   • hydroxyurea (HYDREA) 500 mg capsule TAKE 1 CAPSULE (500 MG TOTAL) BY MOUTH EVERY EVENING 90 capsule 1   • saccharomyces boulardii (FLORASTOR) 250 mg capsule Take 1 capsule (250 mg total) by mouth 2 (two) times a day 30 capsule 0   • sucralfate (CARAFATE) 1 g/10 mL suspension Take 10 mL (1 g total) by mouth 4 (four) times a day 400 mL 0   • topiramate (TOPAMAX) 25 mg sprinkle capsule TAKE 1 CAPSULE BY MOUTH IN THE MORNING AND 2 CAPSULES IN THE EVENING 270 capsule 1   • verapamil (CALAN-SR) 120 mg CR tablet TAKE 1 TABLET BY MOUTH EVERY DAY 90 tablet 1   • Fluticasone-Salmeterol (Advair) 250-50 mcg/dose inhaler Inhale 1 puff 2 (two) times a day Rinse mouth after use. 60 blister 5   • ibuprofen (MOTRIN) 600 mg tablet Take 1 tablet (600 mg total) by mouth every 6 (six) hours as needed for moderate pain or fever (Patient not taking: Reported on 8/6/2024) 30 tablet 0     No current facility-administered medications for this visit.       Past Medical History  Past Medical History:   Diagnosis Date   • Anemia    • Arthritis    • Asthma    • Cancer (HCC)     prostate   • Colon polyp    • Elevated PSA    • Full dentures    • GERD (gastroesophageal reflux disease)    • Hemorrhoid    • Hyperlipidemia    • Hypertension    • Polycythemia vera (HCC)    • Sleep apnea     No CPAP   • Smoker    • TIA (transient ischemic attack)    •  Transfusion history    • Wears glasses        Past Surgical History  Past Surgical History:   Procedure Laterality Date   • ABDOMINAL ADHESION SURGERY N/A 11/17/2021    Procedure: LYSIS ADHESIONS LAPAROSCOPIC W ROBOT;  Surgeon: Ronald Moses MD;  [facility]: [facility];  Service: Urology   • APPENDECTOMY     • COLONOSCOPY     • EGD     • FOOT SURGERY Right     bone removed-left foot-removal of blood clots from an injury   • OTHER SURGICAL HISTORY      bone removal right arm-abnormal growth of surface bone   • PROSTATE BIOPSY     • PROSTATECTOMY N/A 11/17/2021    Procedure: ROBOTIC ASSISTED LAPAROSCOPIC SIMPLE PROSTATECTOMY AND BLADDER NECK RECONSTRUCTION;  Surgeon: Ronald Moses MD;  Location: [facility];  Service: Urology   • SPLENECTOMY      ruptured   • UPPER GASTROINTESTINAL ENDOSCOPY         Past Social History   Social History     Socioeconomic History   • Marital status:      Spouse name: None   • Number of children: None   • Years of education: None   • Highest education level: None   Occupational History   • Occupation: supervisor     Employer: [employer]   Tobacco Use   • Smoking status: Former     Current packs/day: 0.00     Average packs/day: 0.5 packs/day for 10.0 years (5.0 ttl pk-yrs)     Types: Cigarettes     Start date: 6/15/2011     Quit date: 2/22/2021     Years since quitting: 3.4     Passive exposure: Current   • Smokeless tobacco: Never   Vaping Use   • Vaping status: Never Used   Substance and Sexual Activity   • Alcohol use: Not Currently   • Drug use: No   • Sexual activity: None   Other Topics Concern   • None   Social History Narrative   • None     Social Determinants of Health     Financial Resource Strain: Not on file   Food Insecurity: Not on file   Transportation Needs: Not on file   Physical Activity: Not on file   Stress: Not on file   Social Connections: Not on file   Intimate Partner Violence: Not on file   Housing Stability: Not on file       Vital Signs  Vitals:     "08/13/24 1526   BP: (!) 157/104   BP Location: Right arm   Patient Position: Sitting   Cuff Size: Standard   Pulse: 81   Weight: 92.4 kg (203 lb 9.6 oz)   Height: 5' 10.28\" (1.785 m)       Physical Exam:  General appearance: alert, cooperative, no distress  HEENT: normocephalic, anicteric, no eye erythema or discharge, no oropharyngeal thrush  Neck: supple  Lungs: CTA b/l, no rales, rhonchi, or wheezing, unlabored respirations  Heart: RRR, no murmur, rubs, or gallops  Abdomen: soft, non-tender, non-distended, normal bowel sounds, no masses or organomegaly  Rectal: deferred  Extremities: no cyanosis, clubbing, or edema  Musculoskeletal: normal gait  Skin: color and texture normal, no jaundice, no rashes or lesions  Psychiatric: alert and oriented, normal affect and behavior                                                          Nader Trujillo PA-C  "

## 2024-08-22 ENCOUNTER — HOSPITAL ENCOUNTER (OUTPATIENT)
Dept: RADIOLOGY | Facility: HOSPITAL | Age: 59
Discharge: HOME | End: 2024-08-22
Payer: COMMERCIAL

## 2024-08-22 DIAGNOSIS — R13.19 ESOPHAGEAL DYSPHAGIA: ICD-10-CM

## 2024-08-22 PROCEDURE — 74220 X-RAY XM ESOPHAGUS 1CNTRST: CPT

## 2024-09-05 PROBLEM — Z00.00 HEALTHCARE MAINTENANCE: Status: RESOLVED | Noted: 2024-08-06 | Resolved: 2024-09-05

## 2024-09-09 ENCOUNTER — ANESTHESIA EVENT (OUTPATIENT)
Dept: ANESTHESIOLOGY | Facility: HOSPITAL | Age: 59
End: 2024-09-09

## 2024-09-09 ENCOUNTER — ANESTHESIA (OUTPATIENT)
Dept: ANESTHESIOLOGY | Facility: HOSPITAL | Age: 59
End: 2024-09-09

## 2024-09-19 ENCOUNTER — APPOINTMENT (OUTPATIENT)
Dept: LAB | Facility: HOSPITAL | Age: 59
End: 2024-09-19
Payer: COMMERCIAL

## 2024-09-19 DIAGNOSIS — D45 POLYCYTHEMIA VERA (HCC): ICD-10-CM

## 2024-09-19 LAB
ALBUMIN SERPL BCG-MCNC: 4.2 G/DL (ref 3.5–5)
ALP SERPL-CCNC: 79 U/L (ref 34–104)
ALT SERPL W P-5'-P-CCNC: 30 U/L (ref 7–52)
ANION GAP SERPL CALCULATED.3IONS-SCNC: 6 MMOL/L (ref 4–13)
AST SERPL W P-5'-P-CCNC: 23 U/L (ref 13–39)
BASOPHILS # BLD MANUAL: 0.22 THOUSAND/UL (ref 0–0.1)
BASOPHILS NFR MAR MANUAL: 4 % (ref 0–1)
BILIRUB SERPL-MCNC: 0.49 MG/DL (ref 0.2–1)
BUN SERPL-MCNC: 9 MG/DL (ref 5–25)
CALCIUM SERPL-MCNC: 8.9 MG/DL (ref 8.4–10.2)
CHLORIDE SERPL-SCNC: 104 MMOL/L (ref 96–108)
CO2 SERPL-SCNC: 29 MMOL/L (ref 21–32)
CREAT SERPL-MCNC: 0.95 MG/DL (ref 0.6–1.3)
EOSINOPHIL # BLD MANUAL: 0.67 THOUSAND/UL (ref 0–0.4)
EOSINOPHIL NFR BLD MANUAL: 12 % (ref 0–6)
ERYTHROCYTE [DISTWIDTH] IN BLOOD BY AUTOMATED COUNT: 15.5 % (ref 11.6–15.1)
FERRITIN SERPL-MCNC: 82 NG/ML (ref 24–336)
GFR SERPL CREATININE-BSD FRML MDRD: 86 ML/MIN/1.73SQ M
GLUCOSE P FAST SERPL-MCNC: 105 MG/DL (ref 65–99)
HCT VFR BLD AUTO: 46.4 % (ref 36.5–49.3)
HGB BLD-MCNC: 15.4 G/DL (ref 12–17)
IRON SATN MFR SERPL: 57 % (ref 15–50)
IRON SERPL-MCNC: 208 UG/DL (ref 50–212)
LG PLATELETS BLD QL SMEAR: PRESENT
LYMPHOCYTES # BLD AUTO: 2.57 THOUSAND/UL (ref 0.6–4.47)
LYMPHOCYTES # BLD AUTO: 38 % (ref 14–44)
MCH RBC QN AUTO: 37.7 PG (ref 26.8–34.3)
MCHC RBC AUTO-ENTMCNC: 33.2 G/DL (ref 31.4–37.4)
MCV RBC AUTO: 113 FL (ref 82–98)
MONOCYTES # BLD AUTO: 1.01 THOUSAND/UL (ref 0–1.22)
MONOCYTES NFR BLD: 18 % (ref 4–12)
NEUTROPHILS # BLD MANUAL: 1.12 THOUSAND/UL (ref 1.85–7.62)
NEUTS BAND NFR BLD MANUAL: 1 % (ref 0–8)
NEUTS SEG NFR BLD AUTO: 19 % (ref 43–75)
NRBC BLD AUTO-RTO: 1 /100 WBC (ref 0–2)
PLATELET # BLD AUTO: 397 THOUSANDS/UL (ref 149–390)
PLATELET BLD QL SMEAR: ABNORMAL
PMV BLD AUTO: 10.5 FL (ref 8.9–12.7)
POLYCHROMASIA BLD QL SMEAR: PRESENT
POTASSIUM SERPL-SCNC: 4.6 MMOL/L (ref 3.5–5.3)
PROT SERPL-MCNC: 7.1 G/DL (ref 6.4–8.4)
RBC # BLD AUTO: 4.09 MILLION/UL (ref 3.88–5.62)
RBC MORPH BLD: PRESENT
SODIUM SERPL-SCNC: 139 MMOL/L (ref 135–147)
TIBC SERPL-MCNC: 365 UG/DL (ref 250–450)
UIBC SERPL-MCNC: 157 UG/DL (ref 155–355)
VARIANT LYMPHS # BLD AUTO: 8 %
WBC # BLD AUTO: 5.59 THOUSAND/UL (ref 4.31–10.16)

## 2024-09-19 PROCEDURE — 85007 BL SMEAR W/DIFF WBC COUNT: CPT

## 2024-09-19 PROCEDURE — 83550 IRON BINDING TEST: CPT

## 2024-09-19 PROCEDURE — 85027 COMPLETE CBC AUTOMATED: CPT

## 2024-09-19 PROCEDURE — 82728 ASSAY OF FERRITIN: CPT

## 2024-09-19 PROCEDURE — 83540 ASSAY OF IRON: CPT

## 2024-09-19 PROCEDURE — 80053 COMPREHEN METABOLIC PANEL: CPT

## 2024-09-19 PROCEDURE — 36415 COLL VENOUS BLD VENIPUNCTURE: CPT

## 2024-09-24 ENCOUNTER — ANESTHESIA EVENT (OUTPATIENT)
Dept: GASTROENTEROLOGY | Facility: AMBULARY SURGERY CENTER | Age: 59
End: 2024-09-24
Payer: COMMERCIAL

## 2024-09-24 ENCOUNTER — HOSPITAL ENCOUNTER (OUTPATIENT)
Dept: GASTROENTEROLOGY | Facility: AMBULARY SURGERY CENTER | Age: 59
Setting detail: OUTPATIENT SURGERY
Discharge: HOME | End: 2024-09-24
Attending: INTERNAL MEDICINE
Payer: COMMERCIAL

## 2024-09-24 VITALS
OXYGEN SATURATION: 94 % | DIASTOLIC BLOOD PRESSURE: 66 MMHG | SYSTOLIC BLOOD PRESSURE: 102 MMHG | WEIGHT: 213 LBS | RESPIRATION RATE: 18 BRPM | BODY MASS INDEX: 30.32 KG/M2 | TEMPERATURE: 96.6 F | HEART RATE: 73 BPM

## 2024-09-24 DIAGNOSIS — Z86.010 HISTORY OF ADENOMATOUS POLYP OF COLON: ICD-10-CM

## 2024-09-24 DIAGNOSIS — R13.19 ESOPHAGEAL DYSPHAGIA: ICD-10-CM

## 2024-09-24 DIAGNOSIS — K21.9 CHRONIC GERD: ICD-10-CM

## 2024-09-24 DIAGNOSIS — Z86.0101 HISTORY OF ADENOMATOUS POLYP OF COLON: ICD-10-CM

## 2024-09-24 PROCEDURE — 88305 TISSUE EXAM BY PATHOLOGIST: CPT | Performed by: STUDENT IN AN ORGANIZED HEALTH CARE EDUCATION/TRAINING PROGRAM

## 2024-09-24 PROCEDURE — 43239 EGD BIOPSY SINGLE/MULTIPLE: CPT | Performed by: INTERNAL MEDICINE

## 2024-09-24 PROCEDURE — G0121 COLON CA SCRN NOT HI RSK IND: HCPCS | Performed by: INTERNAL MEDICINE

## 2024-09-24 RX ORDER — GLYCOPYRROLATE 0.2 MG/ML
INJECTION INTRAMUSCULAR; INTRAVENOUS AS NEEDED
Status: DISCONTINUED | OUTPATIENT
Start: 2024-09-24 | End: 2024-09-24

## 2024-09-24 RX ORDER — SODIUM CHLORIDE, SODIUM LACTATE, POTASSIUM CHLORIDE, CALCIUM CHLORIDE 600; 310; 30; 20 MG/100ML; MG/100ML; MG/100ML; MG/100ML
125 INJECTION, SOLUTION INTRAVENOUS CONTINUOUS
Status: DISCONTINUED | OUTPATIENT
Start: 2024-09-24 | End: 2024-09-28 | Stop reason: HOSPADM

## 2024-09-24 RX ORDER — PROPOFOL 10 MG/ML
INJECTION, EMULSION INTRAVENOUS CONTINUOUS PRN
Status: DISCONTINUED | OUTPATIENT
Start: 2024-09-24 | End: 2024-09-24

## 2024-09-24 RX ORDER — PROPOFOL 10 MG/ML
INJECTION, EMULSION INTRAVENOUS AS NEEDED
Status: DISCONTINUED | OUTPATIENT
Start: 2024-09-24 | End: 2024-09-24

## 2024-09-24 RX ORDER — SODIUM CHLORIDE, SODIUM LACTATE, POTASSIUM CHLORIDE, CALCIUM CHLORIDE 600; 310; 30; 20 MG/100ML; MG/100ML; MG/100ML; MG/100ML
INJECTION, SOLUTION INTRAVENOUS CONTINUOUS PRN
Status: DISCONTINUED | OUTPATIENT
Start: 2024-09-24 | End: 2024-09-24

## 2024-09-24 RX ORDER — LIDOCAINE HYDROCHLORIDE 20 MG/ML
INJECTION, SOLUTION EPIDURAL; INFILTRATION; INTRACAUDAL; PERINEURAL AS NEEDED
Status: DISCONTINUED | OUTPATIENT
Start: 2024-09-24 | End: 2024-09-24

## 2024-09-24 RX ADMIN — PROPOFOL 50 MG: 10 INJECTION, EMULSION INTRAVENOUS at 09:56

## 2024-09-24 RX ADMIN — PROPOFOL 50 MG: 10 INJECTION, EMULSION INTRAVENOUS at 09:58

## 2024-09-24 RX ADMIN — PROPOFOL 30 MG: 10 INJECTION, EMULSION INTRAVENOUS at 10:01

## 2024-09-24 RX ADMIN — PROPOFOL 30 MG: 10 INJECTION, EMULSION INTRAVENOUS at 10:07

## 2024-09-24 RX ADMIN — GLYCOPYRROLATE 0.2 MG: 0.2 INJECTION, SOLUTION INTRAMUSCULAR; INTRAVENOUS at 10:06

## 2024-09-24 RX ADMIN — SODIUM CHLORIDE, SODIUM LACTATE, POTASSIUM CHLORIDE, AND CALCIUM CHLORIDE: .6; .31; .03; .02 INJECTION, SOLUTION INTRAVENOUS at 09:45

## 2024-09-24 RX ADMIN — LIDOCAINE HYDROCHLORIDE 100 MG: 20 INJECTION, SOLUTION EPIDURAL; INFILTRATION; INTRACAUDAL; PERINEURAL at 09:54

## 2024-09-24 RX ADMIN — PROPOFOL 100 MCG/KG/MIN: 10 INJECTION, EMULSION INTRAVENOUS at 10:04

## 2024-09-24 RX ADMIN — SODIUM CHLORIDE, SODIUM LACTATE, POTASSIUM CHLORIDE, AND CALCIUM CHLORIDE 125 ML/HR: .6; .31; .03; .02 INJECTION, SOLUTION INTRAVENOUS at 09:35

## 2024-09-24 RX ADMIN — PROPOFOL 30 MG: 10 INJECTION, EMULSION INTRAVENOUS at 10:04

## 2024-09-24 RX ADMIN — PROPOFOL 150 MG: 10 INJECTION, EMULSION INTRAVENOUS at 09:54

## 2024-09-24 NOTE — ANESTHESIA PREPROCEDURE EVALUATION
Procedure:  COLONOSCOPY  EGD    Relevant Problems   CARDIO   (+) Migraine   (+) Other hyperlipidemia   (+) Primary hypertension      GI/HEPATIC   (+) Chronic GERD   (+) Esophageal dysphagia   (+) Fatty liver      /RENAL   (+) Benign localized prostatic hyperplasia with lower urinary tract symptoms (LUTS)      MUSCULOSKELETAL   (+) Osteoarthritis of cervical spine   (+) Osteoarthritis, hip, bilateral      NEURO/PSYCH   (+) Migraine   (+) Right hemiplegia (HCC)      PULMONARY   (+) Asthma   (+) LALIT (obstructive sleep apnea)   (+) Other emphysema (HCC)      Other   (+) Polycythemia vera (HCC)        Physical Exam    Airway    Mallampati score: II  TM Distance: >3 FB  Neck ROM: full     Dental   No notable dental hx     Cardiovascular  Cardiovascular exam normal    Pulmonary  Pulmonary exam normal     Other Findings        Anesthesia Plan  ASA Score- 2     Anesthesia Type- IV sedation with anesthesia with ASA Monitors.         Additional Monitors:     Airway Plan:            Plan Factors-Exercise tolerance (METS): >4 METS.    Chart reviewed.   Existing labs reviewed. Patient summary reviewed.    Patient is not a current smoker.      Obstructive sleep apnea risk education given perioperatively.        Induction-     Postoperative Plan-     Perioperative Resuscitation Plan - Level 1 - Full Code.       Informed Consent- Anesthetic plan and risks discussed with patient.  I personally reviewed this patient with the CRNA. Discussed and agreed on the Anesthesia Plan with the CRNA..

## 2024-09-24 NOTE — H&P
History and Physical - [facility]  Aurelio Rios 59 y.o. male MRN: [id number]        HPI: 59-year-old male with history of GERD, small Crews's, colon polyps was seen in the office because of issues with swallowing difficulty.    Historical Information   Past Medical History:   Diagnosis Date    Anemia     Arthritis     Asthma     Cancer (HCC)     leukemia    Colon polyp     Elevated PSA     Full dentures     GERD (gastroesophageal reflux disease)     Hemorrhoid     Hyperlipidemia     Hypertension     Polycythemia vera (HCC)     Sleep apnea     No CPAP    Smoker     TIA (transient ischemic attack)     Transfusion history     Wears glasses      Past Surgical History:   Procedure Laterality Date    ABDOMINAL ADHESION SURGERY N/A 11/17/2021    Procedure: LYSIS ADHESIONS LAPAROSCOPIC W ROBOT;  Surgeon: Ronald Moses MD;  [facility];  Service: Urology    APPENDECTOMY      COLONOSCOPY      EGD      FOOT SURGERY Right     bone removed-left foot-removal of blood clots from an injury    OTHER SURGICAL HISTORY      bone removal right arm-abnormal growth of surface bone    PROSTATE BIOPSY      PROSTATECTOMY N/A 11/17/2021    Procedure: ROBOTIC ASSISTED LAPAROSCOPIC SIMPLE PROSTATECTOMY AND BLADDER NECK RECONSTRUCTION;  Surgeon: Ronald Moses MD;  [facility];  Service: Urology    SPLENECTOMY      ruptured    UPPER GASTROINTESTINAL ENDOSCOPY       Social History   Social History     Substance and Sexual Activity   Alcohol Use Not Currently     Social History     Substance and Sexual Activity   Drug Use No     Social History     Tobacco Use   Smoking Status Former    Current packs/day: 0.00    Average packs/day: 0.5 packs/day for 10.0 years (5.0 ttl pk-yrs)    Types: Cigarettes    Start date: 6/15/2011    Quit date: 2/22/2021    Years since quitting: 3.5    Passive exposure: Current   Smokeless Tobacco Never     Family History   Problem Relation Age of Onset    Dementia Father      Heart disease Mother     Hypertension Mother     Hyperlipidemia Mother     Hypertension Sister     Hypertension Brother     No Known Problems Daughter     No Known Problems Son     Cancer Paternal Grandfather         prostate    No Known Problems Son        Meds/Allergies     Not in a hospital admission.    No Known Allergies    Objective     Blood pressure 134/87, pulse 81, temperature (!) 96.6 °F (35.9 °C), temperature source Temporal, resp. rate 18, weight 96.6 kg (213 lb), SpO2 98%.    Physical Exam:    Chest- CTA  Heart- RRR  Abdomen- NT/ND  Extremities- No edema    ASSESSMENT:     GERD, Crews's, dysphagia, history of colon polyps    PLAN:    EGD and colonoscopy

## 2024-09-24 NOTE — ANESTHESIA POSTPROCEDURE EVALUATION
Post-Op Assessment Note    CV Status:  Stable  Pain Score: 0    Pain management: adequate       Mental Status:  Sleepy   Hydration Status:  Stable   PONV Controlled:  None   Airway Patency:  Patent  Two or more mitigation strategies used for obstructive sleep apnea   Post Op Vitals Reviewed: Yes    No anethesia notable event occurred.    Staff: CRNA               BP   121/70   Temp 98   Pulse 78   Resp 16   SpO2 99

## 2024-09-25 ENCOUNTER — TELEPHONE (OUTPATIENT)
Dept: GASTROENTEROLOGY | Facility: AMBULARY SURGERY CENTER | Age: 59
End: 2024-09-25

## 2024-09-25 NOTE — TELEPHONE ENCOUNTER
----- Message from Tai Bruce MD sent at 9/24/2024 10:16 AM EDT -----    Please reschedule for colonoscopy with 2-day bowel prep

## 2024-09-25 NOTE — TELEPHONE ENCOUNTER
Patient returned phone call to schedule repeat colonoscopy.    Scheduled date of colonoscopy (as of today): 10/25/24  Physician performing colonoscopy:   Location of colonoscopy: [facility]  Bowel prep reviewed with patient: 2 day Golytle reviewed ans sent via [facility]  Instructions reviewed with patient by: md  Clearances:     Message here states [facility]. Colonoscopy completed yesterday was at [facility]. Please advise if the colonoscopy is supposed to be hospital setting.  Patient will also need Golytely sent to [facility] in [employer] in [place]

## 2024-09-26 DIAGNOSIS — D12.6 TUBULAR ADENOMA OF COLON: Primary | ICD-10-CM

## 2024-09-26 PROCEDURE — 88305 TISSUE EXAM BY PATHOLOGIST: CPT | Performed by: STUDENT IN AN ORGANIZED HEALTH CARE EDUCATION/TRAINING PROGRAM

## 2024-10-01 ENCOUNTER — TELEPHONE (OUTPATIENT)
Dept: GASTROENTEROLOGY | Facility: AMBULARY SURGERY CENTER | Age: 59
End: 2024-10-01

## 2024-10-01 DIAGNOSIS — K22.70 BARRETT'S ESOPHAGUS WITHOUT DYSPLASIA: Primary | ICD-10-CM

## 2024-10-01 RX ORDER — PANTOPRAZOLE SODIUM 40 MG/1
40 TABLET, DELAYED RELEASE ORAL DAILY
Qty: 30 TABLET | Refills: 11 | Status: SHIPPED | OUTPATIENT
Start: 2024-10-01

## 2024-10-01 NOTE — TELEPHONE ENCOUNTER
----- Message from Tai Bruce MD sent at 10/1/2024  1:44 PM EDT -----  Please call pt regarding lower esophageal biopsies are positive for Crews's and changing Pepcid to pantoprazole.    Repeat EGD in 2 years        Hi Cristiano,     Lower esophageal biopsies are positive for Crews's esophagus as discussed after the procedure.  We need to change Pepcid to pantoprazole 40 mg daily which I am sending the prescription to your pharmacy.  Recommend repeat upper endoscopy in a couple of years     Please feel free to call our office at [phone] with any questions.     Thanks,  Dr. Bruce

## 2024-10-10 ENCOUNTER — ANESTHESIA EVENT (OUTPATIENT)
Dept: ANESTHESIOLOGY | Facility: HOSPITAL | Age: 59
End: 2024-10-10

## 2024-10-10 ENCOUNTER — ANESTHESIA (OUTPATIENT)
Dept: ANESTHESIOLOGY | Facility: HOSPITAL | Age: 59
End: 2024-10-10

## 2024-10-17 ENCOUNTER — APPOINTMENT (EMERGENCY)
Dept: RADIOLOGY | Facility: HOSPITAL | Age: 59
End: 2024-10-17
Payer: COMMERCIAL

## 2024-10-17 ENCOUNTER — HOSPITAL ENCOUNTER (EMERGENCY)
Facility: HOSPITAL | Age: 59
Discharge: HOME | End: 2024-10-17
Attending: STUDENT IN AN ORGANIZED HEALTH CARE EDUCATION/TRAINING PROGRAM
Payer: COMMERCIAL

## 2024-10-17 VITALS
HEART RATE: 88 BPM | RESPIRATION RATE: 20 BRPM | WEIGHT: 213 LBS | HEIGHT: 70 IN | BODY MASS INDEX: 30.49 KG/M2 | DIASTOLIC BLOOD PRESSURE: 100 MMHG | TEMPERATURE: 98.7 F | SYSTOLIC BLOOD PRESSURE: 149 MMHG | OXYGEN SATURATION: 98 %

## 2024-10-17 DIAGNOSIS — M79.605 LEFT LEG PAIN: Primary | ICD-10-CM

## 2024-10-17 PROCEDURE — 73552 X-RAY EXAM OF FEMUR 2/>: CPT

## 2024-10-17 PROCEDURE — 99284 EMERGENCY DEPT VISIT MOD MDM: CPT

## 2024-10-17 PROCEDURE — 93971 EXTREMITY STUDY: CPT

## 2024-10-17 PROCEDURE — 72125 CT NECK SPINE W/O DYE: CPT

## 2024-10-17 PROCEDURE — 70450 CT HEAD/BRAIN W/O DYE: CPT

## 2024-10-17 PROCEDURE — 99285 EMERGENCY DEPT VISIT HI MDM: CPT | Performed by: STUDENT IN AN ORGANIZED HEALTH CARE EDUCATION/TRAINING PROGRAM

## 2024-10-17 PROCEDURE — 73502 X-RAY EXAM HIP UNI 2-3 VIEWS: CPT

## 2024-10-17 NOTE — ED PROVIDER NOTES
Time reflects when diagnosis was documented in both MDM as applicable and the Disposition within this note       Time User Action Codes Description Comment    10/17/2024  7:26 PM Karly Pittmann Add [M79.605] Left leg pain           ED Disposition       ED Disposition   Discharge    Condition   Stable    Date/Time   u Oct 17, 2024  6:52 PM    Comment   Aurelio Rios discharge to home/self care.                   Assessment & Plan       Medical Decision Making  Patient is a 59 y.o. male who presents to the ED for left leg pain after a fall 3 days ago.  Patient is nontoxic, well-appearing.     Differential includes but is not limited to: Fracture, contusion.  Presentation not consistent with dislocation.  Doubt intracranial bleeding but patient ultimately did hit his head on his arms/ground during the fall so this is on the differential.    Plan: CT head, CT C-spine, left femur x-ray, reassess                   Amount and/or Complexity of Data Reviewed  Radiology: ordered and independent interpretation performed.        ED Course as of 10/17/24 2332   Thu Oct 17, 2024   1927 Pt reevaluated.  Resting comfortably.  Duplex ultimately ordered to rule out blood clot which was negative.  Discussed negative workup with patient.  Will discharge.  Return precautions discussed.  Patient verbalized understanding and agreed to plan of care.       Medications - No data to display    ED Risk Strat Scores                           SBIRT 22yo+      Flowsheet Row Most Recent Value   Initial Alcohol Screen: US AUDIT-C     1. How often do you have a drink containing alcohol? 0 Filed at: 10/17/2024 1711   2. How many drinks containing alcohol do you have on a typical day you are drinking?  0 Filed at: 10/17/2024 1711   3a. Male UNDER 65: How often do you have five or more drinks on one occasion? 0 Filed at: 10/17/2024 1711   3b. FEMALE Any Age, or MALE 65+: How often do you have 4 or more drinks on one occassion? 0 Filed at:  10/17/2024 1711   Audit-C Score 0 Filed at: 10/17/2024 1711   DIANE: How many times in the past year have you...    Used an illegal drug or used a prescription medication for non-medical reasons? Never Filed at: 10/17/2024 1711                            History of Present Illness       Chief Complaint   Patient presents with    Leg Pain     Patient fell few days ago while out in his yard, injuring his left upper leg, now is very painful and difficult to ambulate       Past Medical History:   Diagnosis Date    Anemia     Arthritis     Asthma     Cancer (HCC)     leukemia    Colon polyp     Elevated PSA     Full dentures     GERD (gastroesophageal reflux disease)     Hemorrhoid     Hyperlipidemia     Hypertension     Polycythemia vera (HCC)     Sleep apnea     No CPAP    Smoker     TIA (transient ischemic attack)     Transfusion history     Wears glasses       Past Surgical History:   Procedure Laterality Date    ABDOMINAL ADHESION SURGERY N/A 11/17/2021    Procedure: LYSIS ADHESIONS LAPAROSCOPIC W ROBOT;  Surgeon: Ronald Moses MD;  Location: [facility];  Service: Urology    APPENDECTOMY      COLONOSCOPY      EGD      FOOT SURGERY Right     bone removed-left foot-removal of blood clots from an injury    OTHER SURGICAL HISTORY      bone removal right arm-abnormal growth of surface bone    PROSTATE BIOPSY      PROSTATECTOMY N/A 11/17/2021    Procedure: ROBOTIC ASSISTED LAPAROSCOPIC SIMPLE PROSTATECTOMY AND BLADDER NECK RECONSTRUCTION;  Surgeon: Ronald Moses MD;  Location: [facility];  Service: Urology    SPLENECTOMY      ruptured    UPPER GASTROINTESTINAL ENDOSCOPY        Family History   Problem Relation Age of Onset    Dementia Father     Heart disease Mother     Hypertension Mother     Hyperlipidemia Mother     Hypertension Sister     Hypertension Brother     No Known Problems Daughter     No Known Problems Son     Cancer Paternal Grandfather         prostate    No Known Problems Son       Social History      Tobacco Use    Smoking status: Former     Current packs/day: 0.00     Average packs/day: 0.5 packs/day for 10.0 years (5.0 ttl pk-yrs)     Types: Cigarettes     Start date: 6/15/2011     Quit date: 2/22/2021     Years since quitting: 3.6     Passive exposure: Current    Smokeless tobacco: Never   Vaping Use    Vaping status: Never Used   Substance Use Topics    Alcohol use: Not Currently    Drug use: No      E-Cigarette/Vaping    E-Cigarette Use Never User       E-Cigarette/Vaping Substances    Nicotine No     THC No     CBD No     Flavoring No     Other No     Unknown No       I have reviewed and agree with the history as documented.     Patient is a 59-year-old male, past medical history including leukemia, who presents to the emergency room for left leg pain.  Patient fell 3 days ago after tripping.  States he was able to brace his fall with his hands.  Hit his left leg on a piece of metal.  States he did not present sooner because he was stubborn.  He has had progressive left upper leg pain since.  Pain is worse with ambulation.  No other modifying factors.  No associated symptoms.  Is on aspirin.  No other complaints or concerns.      Leg Pain      Review of Systems   Musculoskeletal:  Positive for gait problem.        Left leg pain   All other systems reviewed and are negative.          Objective       ED Triage Vitals [10/17/24 1709]   Temperature Pulse Blood Pressure Respirations SpO2 Patient Position - Orthostatic VS   98.7 °F (37.1 °C) 88 149/100 20 98 % Sitting      Temp Source Heart Rate Source BP Location FiO2 (%) Pain Score    Tympanic Monitor Right arm -- --      Vitals      Date and Time Temp Pulse SpO2 Resp BP Pain Score FACES Pain Rating User   10/17/24 1709 98.7 °F (37.1 °C) 88 98 % 20 149/100 -- -- MF            Physical Exam  Vitals and nursing note reviewed.   Constitutional:       General: He is not in acute distress.     Appearance: He is well-developed. He is not ill-appearing,  toxic-appearing or diaphoretic.   HENT:      Head: Normocephalic and atraumatic.      Right Ear: External ear normal.      Left Ear: External ear normal.      Nose: Nose normal.   Eyes:      General: Lids are normal. No scleral icterus.  Cardiovascular:      Rate and Rhythm: Normal rate and regular rhythm.      Heart sounds: Normal heart sounds. No murmur heard.     No friction rub. No gallop.   Pulmonary:      Effort: Pulmonary effort is normal. No respiratory distress.   Musculoskeletal:         General: No deformity. Normal range of motion.      Cervical back: Normal range of motion and neck supple.        Legs:    Skin:     General: Skin is warm and dry.   Neurological:      General: No focal deficit present.      Mental Status: He is alert.   Psychiatric:         Mood and Affect: Mood normal.         Behavior: Behavior normal.         Results Reviewed       None            XR femur 2 views LEFT   ED Interpretation by Sid Pittman DO (10/17 1917)   No acute osseous abnormality      XR hip/pelv 2-3 vws left if performed   ED Interpretation by Sid Pittman DO (10/17 1917)   No acute osseous abnormal      CT head without contrast   Final Interpretation by Yoshi Reddy MD (10/17 1819)      No acute intracranial abnormality.                  Workstation performed: [id number]         CT spine cervical without contrast   Final Interpretation by Yoshi Reddy MD (10/17 1822)      No cervical spine fracture or traumatic malalignment.                  Workstation performed: [id number]         VAS lower limb venous duplex study, unilateral/limited    (Results Pending)       Procedures    ED Medication and Procedure Management   Prior to Admission Medications   Prescriptions Last Dose Informant Patient Reported? Taking?   ASPIRIN 81 PO  Self Yes No   Sig: Take by mouth 2 (two) times a day   Fluticasone-Salmeterol (Advair) 250-50 mcg/dose inhaler  Self No No   Sig: Inhale 1 puff 2  (two) times a day Rinse mouth after use.   acetaminophen (TYLENOL) 325 mg tablet  Self No No   Sig: Take 2 tablets (650 mg total) by mouth every 6 (six) hours as needed for mild pain, headaches or fever   albuterol (2.5 mg/3 mL) 0.083 % nebulizer solution  Self No No   Sig: Take 3 mL (2.5 mg total) by nebulization every 6 (six) hours as needed for wheezing or shortness of breath   albuterol (PROVENTIL HFA,VENTOLIN HFA) 90 mcg/act inhaler  Self No No   Sig: Inhale 2 puffs every 6 (six) hours as needed for wheezing Rinse mouth after use   atorvastatin (LIPITOR) 20 mg tablet  Self No No   Sig: TAKE 1 TABLET BY MOUTH EVERY DAY   hydroxyurea (HYDREA) 500 mg capsule  Self No No   Sig: TAKE 1 CAPSULE (500 MG TOTAL) BY MOUTH EVERY EVENING   ibuprofen (MOTRIN) 600 mg tablet  Self No No   Sig: Take 1 tablet (600 mg total) by mouth every 6 (six) hours as needed for moderate pain or fever   Patient not taking: Reported on 8/6/2024   pantoprazole (PROTONIX) 40 mg tablet   No No   Sig: Take 1 tablet (40 mg total) by mouth daily   polyethylene glycol (GOLYTELY) 4000 mL solution   No No   Sig: Take 4,000 mL by mouth once for 1 dose   saccharomyces boulardii (FLORASTOR) 250 mg capsule  Self No No   Sig: Take 1 capsule (250 mg total) by mouth 2 (two) times a day   sucralfate (CARAFATE) 1 g/10 mL suspension  Self No No   Sig: Take 10 mL (1 g total) by mouth 4 (four) times a day   topiramate (TOPAMAX) 25 mg sprinkle capsule  Self No No   Sig: TAKE 1 CAPSULE BY MOUTH IN THE MORNING AND 2 CAPSULES IN THE EVENING   verapamil (CALAN-SR) 120 mg CR tablet  Self No No   Sig: TAKE 1 TABLET BY MOUTH EVERY DAY      Facility-Administered Medications: None     Discharge Medication List as of 10/17/2024  7:27 PM        CONTINUE these medications which have NOT CHANGED    Details   acetaminophen (TYLENOL) 325 mg tablet Take 2 tablets (650 mg total) by mouth every 6 (six) hours as needed for mild pain, headaches or fever, Starting Wed 1/29/2020, No  Print      albuterol (2.5 mg/3 mL) 0.083 % nebulizer solution Take 3 mL (2.5 mg total) by nebulization every 6 (six) hours as needed for wheezing or shortness of breath, Starting Tue 8/6/2024, Normal      albuterol (PROVENTIL HFA,VENTOLIN HFA) 90 mcg/act inhaler Inhale 2 puffs every 6 (six) hours as needed for wheezing Rinse mouth after use, Starting Thu 10/19/2023, Normal      ASPIRIN 81 PO Take by mouth 2 (two) times a day, Historical Med      atorvastatin (LIPITOR) 20 mg tablet TAKE 1 TABLET BY MOUTH EVERY DAY, Starting Sat 6/22/2024, Normal      Fluticasone-Salmeterol (Advair) 250-50 mcg/dose inhaler Inhale 1 puff 2 (two) times a day Rinse mouth after use., Starting Thu 1/4/2024, Until Tue 8/6/2024, Normal      hydroxyurea (HYDREA) 500 mg capsule TAKE 1 CAPSULE (500 MG TOTAL) BY MOUTH EVERY EVENING, Starting Mon 4/22/2024, Normal      ibuprofen (MOTRIN) 600 mg tablet Take 1 tablet (600 mg total) by mouth every 6 (six) hours as needed for moderate pain or fever, Starting Fri 6/21/2024, Normal      pantoprazole (PROTONIX) 40 mg tablet Take 1 tablet (40 mg total) by mouth daily, Starting Tue 10/1/2024, Normal      polyethylene glycol (GOLYTELY) 4000 mL solution Take 4,000 mL by mouth once for 1 dose, Starting Thu 9/26/2024, Normal      saccharomyces boulardii (FLORASTOR) 250 mg capsule Take 1 capsule (250 mg total) by mouth 2 (two) times a day, Starting Wed 8/9/2023, Normal      sucralfate (CARAFATE) 1 g/10 mL suspension Take 10 mL (1 g total) by mouth 4 (four) times a day, Starting Tue 8/6/2024, Normal      topiramate (TOPAMAX) 25 mg sprinkle capsule TAKE 1 CAPSULE BY MOUTH IN THE MORNING AND 2 CAPSULES IN THE EVENING, Normal      verapamil (CALAN-SR) 120 mg CR tablet TAKE 1 TABLET BY MOUTH EVERY DAY, Starting Sun 6/23/2024, Normal           No discharge procedures on file.  ED SEPSIS DOCUMENTATION   Time reflects when diagnosis was documented in both MDM as applicable and the Disposition within this note        Time User Action Codes Description Comment    10/17/2024  7:26 PM Sid Pittman Add [M79.605] Left leg pain                  Sid Pittman,   10/17/24 5530

## 2024-10-17 NOTE — DISCHARGE INSTRUCTIONS
You are seen in the emergency department for leg pain.  As discussed please take Tylenol at home as needed.  Please rest, ice, and elevate your leg.  Please follow-up with your family doctor.  Return to the emergency room for any new or concerning symptoms.

## 2024-10-18 PROCEDURE — 93971 EXTREMITY STUDY: CPT | Performed by: SURGERY

## 2024-10-24 RX ORDER — SODIUM CHLORIDE, SODIUM LACTATE, POTASSIUM CHLORIDE, CALCIUM CHLORIDE 600; 310; 30; 20 MG/100ML; MG/100ML; MG/100ML; MG/100ML
50 INJECTION, SOLUTION INTRAVENOUS CONTINUOUS
OUTPATIENT
Start: 2024-10-24

## 2024-10-24 RX ORDER — LIDOCAINE HYDROCHLORIDE 10 MG/ML
0.5 INJECTION, SOLUTION EPIDURAL; INFILTRATION; INTRACAUDAL; PERINEURAL ONCE AS NEEDED
OUTPATIENT
Start: 2024-10-24

## 2024-10-25 ENCOUNTER — TELEPHONE (OUTPATIENT)
Dept: OTHER | Facility: OTHER | Age: 59
End: 2024-10-25

## 2024-10-25 NOTE — TELEPHONE ENCOUNTER
A (CATHETER BLN COYOTE 3MM 220MM 150CM OTW ULTRA LOWPRFL) balloon was inflated in the left anterior tibial artery. Balloon removed in tact. The balloon was inflated at 2 jonathan for 80 seconds at 6/20/2023 10:01 AM. I called and spoke to RSC and informed pt cancelling procedure for today.  Will call pt to try to reschedule.

## 2024-10-29 NOTE — TELEPHONE ENCOUNTER
Called and spoke to pt and rescheduled to 11/12/24 with Dr Bruce.  Pt still has instructions and prep (2 day Golytely).

## 2024-11-01 ENCOUNTER — OFFICE VISIT (OUTPATIENT)
Dept: HEMATOLOGY ONCOLOGY | Facility: CLINIC | Age: 59
End: 2024-11-01
Payer: COMMERCIAL

## 2024-11-01 VITALS
TEMPERATURE: 98 F | DIASTOLIC BLOOD PRESSURE: 78 MMHG | BODY MASS INDEX: 28.06 KG/M2 | WEIGHT: 196 LBS | OXYGEN SATURATION: 98 % | RESPIRATION RATE: 18 BRPM | SYSTOLIC BLOOD PRESSURE: 122 MMHG | HEART RATE: 85 BPM | HEIGHT: 70 IN

## 2024-11-01 DIAGNOSIS — D45 POLYCYTHEMIA VERA (HCC): Primary | ICD-10-CM

## 2024-11-01 DIAGNOSIS — D50.8 OTHER IRON DEFICIENCY ANEMIA: ICD-10-CM

## 2024-11-01 PROCEDURE — 99213 OFFICE O/P EST LOW 20 MIN: CPT | Performed by: INTERNAL MEDICINE

## 2024-11-01 RX ORDER — HYDROXYUREA 500 MG/1
500 CAPSULE ORAL DAILY
Qty: 90 CAPSULE | Refills: 4 | Status: SHIPPED | OUTPATIENT
Start: 2024-11-01 | End: 2025-10-27

## 2024-11-01 NOTE — ASSESSMENT & PLAN NOTE
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  He does well on hydroxyurea (Hydrea) plus aspirin and will continue on this regimen. His hematocrit is at goal on current Hydrea dose.  His most recent CBC plus differential on 09/19/2024 showed a white blood cell count of 5.59, hemoglobin of 15.4 g/dL and platelet count of 397,000. The patient will continue treatment with hydroxyurea plus aspirin as well as CBC with differential every 3 months     Plan:  CBC plus differential in 4 months  Refilled for hydroxyurea 500 mg PO daily sent to the patient's pharmacy  Return to hematology oncology clinic for history and physical exam in 4 months

## 2024-11-01 NOTE — PROGRESS NOTES
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  He does well on hydroxyurea (Hydrea) plus aspirin and will continue on this regimen. His hematocrit is at goal on current Hydrea dose.  His most recent CBC plus differential on 2024 showed a white blood cell count of 5.59, hemoglobin of 15.4 g/dL and platelet count of 397,000. The patient will continue treatment with hydroxyurea plus aspirin as well as CBC with differential every 3 months     Plan:  CBC plus differential in 4 months  Refilled for hydroxyurea 500 mg PO daily sent to the patient's pharmacy  Return to hematology oncology clinic for history and physical exam in 4 months         Other iron deficiency anemia  The patient has prior history of iron deficiency anemia.  On 2024, serum iron profile study was within normal limits.  Iron deficiency is resolved.    Plan:  Periodic monitoring of hemoglobin and hematocrit       The Patient understands and agrees with my management recommendations and plan of care. I answered questions to his satisfaction.        History of Present Illness     59-year-old male with polycythemia vera, with a JAK2 V6 17F mutation identified in peripheral blood on 2024. 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  He does well on hydroxyurea (Hydrea). His hematocrit  is at goal on current hydroxyurea (Hydrea) dose.      Interim History: Today, Mr. Rios does not have new complaints.  He denies B symptoms such as drenching night sweats, clinical significant weight loss, and persistent fever.  He denies recent episodes of bleeding, clotting, cardiovascular or cerebrovascular events.  Overall, he does not have new systemic, gastrointestinal, genitourinary, cardiorespiratory, musculoskeletal, or neurologic symptoms.  He has good quality of life.  He has not normal functional status. His most recent CBC plus differential on 09/19/2024 showed a white blood cell count of 5.59, hemoglobin of 15.4 g/dL and platelet count of 397,000.        Review of Systems   Constitutional:  Negative for activity change, appetite change, chills, diaphoresis, fatigue, fever and unexpected weight change.   HENT:  Negative for congestion, dental problem, ear discharge, ear pain, facial swelling, hearing loss, mouth sores, nosebleeds, postnasal drip, rhinorrhea, sinus pain, sneezing, sore throat, tinnitus, trouble swallowing and voice change.    Eyes:  Negative for photophobia, pain, discharge, redness, itching and visual disturbance.   Respiratory:  Negative for apnea, cough, choking, chest tightness, shortness of breath, wheezing and stridor.    Cardiovascular:  Negative for chest pain, palpitations and leg swelling.   Gastrointestinal:  Negative for abdominal distention, abdominal pain, anal bleeding, blood in stool, constipation, diarrhea, nausea, rectal pain and vomiting.   Endocrine: Negative for cold intolerance and heat intolerance.   Genitourinary:  Negative for decreased urine volume, difficulty urinating, dysuria, enuresis, flank pain, frequency, hematuria and urgency.   Musculoskeletal:  Negative for arthralgias, back pain, gait problem, joint swelling, myalgias, neck pain and neck stiffness.   Skin:  Negative for color change, pallor, rash and wound.   Neurological:  Negative for dizziness,  tremors, seizures, syncope, facial asymmetry, speech difficulty, weakness, light-headedness, numbness and headaches.   Hematological:  Negative for adenopathy. Does not bruise/bleed easily.   Psychiatric/Behavioral:  Negative for behavioral problems, confusion, dysphoric mood and sleep disturbance. The patient is not nervous/anxious.          Objective     There were no vitals taken for this visit.    Physical Exam  Vitals reviewed.   Constitutional:       General: He is not in acute distress.     Appearance: Normal appearance. He is normal weight. He is not toxic-appearing.   HENT:      Head: Normocephalic and atraumatic.      Nose: Nose normal. No congestion.      Mouth/Throat:      Mouth: Mucous membranes are moist.      Pharynx: Oropharynx is clear. No oropharyngeal exudate or posterior oropharyngeal erythema.   Eyes:      General: No scleral icterus.     Extraocular Movements: Extraocular movements intact.      Conjunctiva/sclera: Conjunctivae normal.      Pupils: Pupils are equal, round, and reactive to light.   Cardiovascular:      Rate and Rhythm: Normal rate and regular rhythm.      Pulses: Normal pulses.      Heart sounds: Normal heart sounds. No murmur heard.     No friction rub. No gallop.   Pulmonary:      Effort: Pulmonary effort is normal. No respiratory distress.      Breath sounds: Normal breath sounds. No stridor. No wheezing, rhonchi or rales.   Chest:      Chest wall: No tenderness.   Abdominal:      General: Bowel sounds are normal. There is no distension.      Palpations: Abdomen is soft. There is no mass.      Tenderness: There is no abdominal tenderness. There is no right CVA tenderness, left CVA tenderness, guarding or rebound.      Hernia: No hernia is present.   Musculoskeletal:         General: No swelling, tenderness or deformity. Normal range of motion.      Cervical back: Normal range of motion and neck supple. No rigidity or tenderness.      Right lower leg: No edema.      Left lower  leg: No edema.   Lymphadenopathy:      Cervical: No cervical adenopathy.   Skin:     General: Skin is warm and dry.      Capillary Refill: Capillary refill takes less than 2 seconds.      Coloration: Skin is not jaundiced or pale.      Findings: No bruising, erythema, lesion or rash.   Neurological:      General: No focal deficit present.      Mental Status: He is alert and oriented to person, place, and time. Mental status is at baseline.      Cranial Nerves: No cranial nerve deficit.      Sensory: No sensory deficit.      Motor: No weakness.      Coordination: Coordination normal.      Gait: Gait normal.      Deep Tendon Reflexes: Reflexes normal.   Psychiatric:         Mood and Affect: Mood normal.         Behavior: Behavior normal.         Thought Content: Thought content normal.

## 2024-11-07 ENCOUNTER — TELEPHONE (OUTPATIENT)
Dept: GASTROENTEROLOGY | Facility: CLINIC | Age: 59
End: 2024-11-07

## 2024-11-07 NOTE — TELEPHONE ENCOUNTER
lmom confirming pt's colonoscopy scheduled on 11/12/24 at [facility] with Dr Bruce.  Informed [facility] would be calling the day prior with the arrival time.  Informed of clear liquid diet day prior as well as the bowel cleansing preparation.  Informed would need a  the day of the procedure due to being under sedation. I asked pt to please call back if has not received instructions or if has any questions.

## 2024-11-12 ENCOUNTER — HOSPITAL ENCOUNTER (OUTPATIENT)
Dept: GASTROENTEROLOGY | Facility: AMBULARY SURGERY CENTER | Age: 59
Setting detail: OUTPATIENT SURGERY
Discharge: HOME | End: 2024-11-12
Attending: INTERNAL MEDICINE
Payer: COMMERCIAL

## 2024-11-12 ENCOUNTER — ANESTHESIA EVENT (OUTPATIENT)
Dept: GASTROENTEROLOGY | Facility: AMBULARY SURGERY CENTER | Age: 59
End: 2024-11-12
Payer: COMMERCIAL

## 2024-11-12 ENCOUNTER — TELEPHONE (OUTPATIENT)
Dept: GASTROENTEROLOGY | Facility: CLINIC | Age: 59
End: 2024-11-12

## 2024-11-12 VITALS
OXYGEN SATURATION: 99 % | DIASTOLIC BLOOD PRESSURE: 79 MMHG | RESPIRATION RATE: 18 BRPM | HEART RATE: 74 BPM | TEMPERATURE: 97.8 F | SYSTOLIC BLOOD PRESSURE: 123 MMHG

## 2024-11-12 DIAGNOSIS — Z86.0101 HISTORY OF ADENOMATOUS POLYP OF COLON: ICD-10-CM

## 2024-11-12 DIAGNOSIS — Z53.9 PROCEDURE NOT CARRIED OUT: ICD-10-CM

## 2024-11-12 DIAGNOSIS — Z86.0100 HISTORY OF COLON POLYPS: ICD-10-CM

## 2024-11-12 PROBLEM — I63.9 CVA (CEREBRAL VASCULAR ACCIDENT) (HCC): Status: ACTIVE | Noted: 2024-11-12

## 2024-11-12 PROCEDURE — 45378 DIAGNOSTIC COLONOSCOPY: CPT | Performed by: INTERNAL MEDICINE

## 2024-11-12 RX ORDER — LIDOCAINE HYDROCHLORIDE 10 MG/ML
0.5 INJECTION, SOLUTION EPIDURAL; INFILTRATION; INTRACAUDAL; PERINEURAL ONCE AS NEEDED
Status: DISCONTINUED | OUTPATIENT
Start: 2024-11-12 | End: 2024-11-16 | Stop reason: HOSPADM

## 2024-11-12 RX ORDER — PROPOFOL 10 MG/ML
INJECTION, EMULSION INTRAVENOUS CONTINUOUS PRN
Status: DISCONTINUED | OUTPATIENT
Start: 2024-11-12 | End: 2024-11-12

## 2024-11-12 RX ORDER — PROPOFOL 10 MG/ML
INJECTION, EMULSION INTRAVENOUS AS NEEDED
Status: DISCONTINUED | OUTPATIENT
Start: 2024-11-12 | End: 2024-11-12

## 2024-11-12 RX ORDER — SODIUM CHLORIDE, SODIUM LACTATE, POTASSIUM CHLORIDE, CALCIUM CHLORIDE 600; 310; 30; 20 MG/100ML; MG/100ML; MG/100ML; MG/100ML
50 INJECTION, SOLUTION INTRAVENOUS CONTINUOUS
Status: DISCONTINUED | OUTPATIENT
Start: 2024-11-12 | End: 2024-11-16 | Stop reason: HOSPADM

## 2024-11-12 RX ORDER — SODIUM CHLORIDE, SODIUM LACTATE, POTASSIUM CHLORIDE, CALCIUM CHLORIDE 600; 310; 30; 20 MG/100ML; MG/100ML; MG/100ML; MG/100ML
INJECTION, SOLUTION INTRAVENOUS CONTINUOUS PRN
Status: DISCONTINUED | OUTPATIENT
Start: 2024-11-12 | End: 2024-11-12

## 2024-11-12 RX ADMIN — PROPOFOL 100 MG: 10 INJECTION, EMULSION INTRAVENOUS at 10:53

## 2024-11-12 RX ADMIN — SODIUM CHLORIDE, SODIUM LACTATE, POTASSIUM CHLORIDE, AND CALCIUM CHLORIDE: .6; .31; .03; .02 INJECTION, SOLUTION INTRAVENOUS at 10:43

## 2024-11-12 RX ADMIN — PROPOFOL 100 MCG/KG/MIN: 10 INJECTION, EMULSION INTRAVENOUS at 10:53

## 2024-11-12 NOTE — ANESTHESIA PREPROCEDURE EVALUATION
Procedure:  COLONOSCOPY    Relevant Problems   CARDIO   (+) Migraine   (+) Other hyperlipidemia   (+) Primary hypertension      GI/HEPATIC   (+) Chronic GERD   (+) Esophageal dysphagia   (+) Fatty liver      /RENAL   (+) Benign localized prostatic hyperplasia with lower urinary tract symptoms (LUTS)      HEMATOLOGY   (+) Anemia      MUSCULOSKELETAL   (+) Osteoarthritis of cervical spine   (+) Osteoarthritis, hip, bilateral      NEURO/PSYCH   (+) CVA (cerebral vascular accident) (HCC)   (+) Migraine   (+) Right hemiplegia (HCC)      PULMONARY   (+) Asthma   (+) LALIT (obstructive sleep apnea)   (+) Other emphysema (HCC)      Other   (+) Polycythemia vera (HCC)        Physical Exam    Airway    Mallampati score: III  TM Distance: >3 FB  Neck ROM: full     Dental    upper dentures and lower dentures    Cardiovascular  Rhythm: regular, Rate: normal    Pulmonary   Breath sounds clear to auscultation    Other Findings        Anesthesia Plan  ASA Score- 3     Anesthesia Type- IV sedation with anesthesia with ASA Monitors.         Additional Monitors:     Airway Plan:            Plan Factors-    Chart reviewed.        Patient is not a current smoker.              Induction- intravenous.    Postoperative Plan-     Perioperative Resuscitation Plan - Level 1 - Full Code.       Informed Consent- Anesthetic plan and risks discussed with patient.  I personally reviewed this patient with the CRNA. Discussed and agreed on the Anesthesia Plan with the CRNA..

## 2024-11-12 NOTE — H&P
History and Physical -  [facility]  Aurelio Rios 59 y.o. male MRN: [id number]        HPI: 59-year-old male with history of colon polyps had incomplete colonoscopy couple of months ago.  He reports doing well.    Historical Information   Past Medical History:   Diagnosis Date    Anemia     Arthritis     Asthma     Cancer (HCC)     leukemia    Colon polyp     Elevated PSA     Full dentures     GERD (gastroesophageal reflux disease)     Hemorrhoid     Hyperlipidemia     Hypertension     Polycythemia vera (HCC)     Sleep apnea     No CPAP    Smoker     TIA (transient ischemic attack)     Transfusion history     Wears glasses      Past Surgical History:   Procedure Laterality Date    ABDOMINAL ADHESION SURGERY N/A 11/17/2021    Procedure: LYSIS ADHESIONS LAPAROSCOPIC W ROBOT;  Surgeon: Ronald Moses MD;  Location: [facility];  Service: Urology    APPENDECTOMY      COLONOSCOPY      EGD      FOOT SURGERY Right     bone removed-left foot-removal of blood clots from an injury    OTHER SURGICAL HISTORY      bone removal right arm-abnormal growth of surface bone    PROSTATE BIOPSY      PROSTATECTOMY N/A 11/17/2021    Procedure: ROBOTIC ASSISTED LAPAROSCOPIC SIMPLE PROSTATECTOMY AND BLADDER NECK RECONSTRUCTION;  Surgeon: Ronald Moses MD;  Location: [facility];  Service: Urology    SPLENECTOMY      ruptured    UPPER GASTROINTESTINAL ENDOSCOPY       Social History   Social History     Substance and Sexual Activity   Alcohol Use Not Currently     Social History     Substance and Sexual Activity   Drug Use No     Social History     Tobacco Use   Smoking Status Former    Current packs/day: 0.00    Average packs/day: 0.5 packs/day for 10.0 years (5.0 ttl pk-yrs)    Types: Cigarettes    Start date: 6/15/2011    Quit date: 2/22/2021    Years since quitting: 3.7    Passive exposure: Current   Smokeless Tobacco Never     Family History   Problem Relation Age of Onset    Dementia Father     Heart disease  Mother     Hypertension Mother     Hyperlipidemia Mother     Hypertension Sister     Hypertension Brother     No Known Problems Daughter     No Known Problems Son     Cancer Paternal Grandfather         prostate    No Known Problems Son        Meds/Allergies     Not in a hospital admission.    No Known Allergies    Objective     Blood pressure 122/92, pulse 89, temperature 97.8 °F (36.6 °C), temperature source Temporal, resp. rate 18, SpO2 99%.    Physical Exam:    Chest- CTA  Heart- RRR  Abdomen- NT/ND  Extremities- No edema    ASSESSMENT:     History of colon polyps    PLAN:    Colonoscopy

## 2024-11-12 NOTE — TELEPHONE ENCOUNTER
Dr Bruce suggests that pt really try to have done tomorrow since already went through all of this. Called and spoke to pt and suggested that he should slow down on the prep doing every 30-40 mins and to drink through a straw to try to help from feeling sick.  Pt said he would try.

## 2024-11-12 NOTE — TELEPHONE ENCOUNTER
I lmom informing pt that he would be called a little later today with his arrival time for tomorrow and that I wanted to be sure that he does have the instructions on how to prepare.  I asked pt to please call me back if has any questions.

## 2024-11-12 NOTE — TELEPHONE ENCOUNTER
Pt's S.O Jeannette calling, Pt cannot take the prep tonight because he has been dry heaving since he returned home and he is feeling very sick. He is supposed to come back tomorrow to redo the scope but says he wont be able to as he will not be able to get this down. My next available is not til 11.27- I haven't made a change to this yet as I wanted to loop in the office, seeing as  wanted him to come right back for a repeat. Please advise?

## 2024-11-12 NOTE — ANESTHESIA POSTPROCEDURE EVALUATION
Post-Op Assessment Note    CV Status:  Stable  Pain Score: 0    Pain management: adequate       Mental Status:  Alert and awake   Hydration Status:  Euvolemic   PONV Controlled:  Controlled   Airway Patency:  Patent     Post Op Vitals Reviewed: Yes    No anethesia notable event occurred.    Staff: Anesthesiologist, CRNA           Last Filed PACU Vitals:  Vitals Value Taken Time   Temp     Pulse 78    /67    Resp 16    SpO2 99        Modified Torsten:  Activity: 2 (11/12/2024  9:59 AM)  Respiration: 2 (11/12/2024  9:59 AM)  Circulation: 2 (11/12/2024  9:59 AM)  Consciousness: 2 (11/12/2024  9:59 AM)  Oxygen Saturation: 2 (11/12/2024  9:59 AM)  Modified Torsten Score: 10 (11/12/2024  9:59 AM)

## 2024-11-12 NOTE — TELEPHONE ENCOUNTER
----- Message from Kourtney PURI sent at 11/12/2024 12:17 PM EST -----  Regarding: call pt to inform  ChatMD Kourtney Mota, PUNEET Oconnell,    Please put him on the schedule for colonoscopy tomorrow again.  Poor bowel prep today and I sent another gallon of GoLytely    Thank you  Dr. Bruce

## 2024-11-13 ENCOUNTER — HOSPITAL ENCOUNTER (OUTPATIENT)
Dept: GASTROENTEROLOGY | Facility: AMBULARY SURGERY CENTER | Age: 59
Setting detail: OUTPATIENT SURGERY
Discharge: HOME | End: 2024-11-13
Attending: INTERNAL MEDICINE | Admitting: INTERNAL MEDICINE
Payer: COMMERCIAL

## 2024-11-13 ENCOUNTER — ANESTHESIA EVENT (OUTPATIENT)
Dept: GASTROENTEROLOGY | Facility: AMBULARY SURGERY CENTER | Age: 59
End: 2024-11-13
Payer: COMMERCIAL

## 2024-11-13 VITALS
HEART RATE: 69 BPM | OXYGEN SATURATION: 97 % | DIASTOLIC BLOOD PRESSURE: 75 MMHG | SYSTOLIC BLOOD PRESSURE: 107 MMHG | RESPIRATION RATE: 18 BRPM

## 2024-11-13 DIAGNOSIS — K59.04 CHRONIC IDIOPATHIC CONSTIPATION: Primary | ICD-10-CM

## 2024-11-13 DIAGNOSIS — Z86.0101 HX OF ADENOMATOUS COLONIC POLYPS: ICD-10-CM

## 2024-11-13 DIAGNOSIS — Z86.0100 HX OF COLONIC POLYPS: ICD-10-CM

## 2024-11-13 DIAGNOSIS — Z53.8 PROCEDURE NOT CARRIED OUT FOR OTHER REASONS: ICD-10-CM

## 2024-11-13 PROCEDURE — G0105 COLORECTAL SCRN; HI RISK IND: HCPCS | Performed by: INTERNAL MEDICINE

## 2024-11-13 RX ORDER — ONDANSETRON 2 MG/ML
INJECTION INTRAMUSCULAR; INTRAVENOUS AS NEEDED
Status: DISCONTINUED | OUTPATIENT
Start: 2024-11-13 | End: 2024-11-13

## 2024-11-13 RX ORDER — SODIUM CHLORIDE, SODIUM LACTATE, POTASSIUM CHLORIDE, CALCIUM CHLORIDE 600; 310; 30; 20 MG/100ML; MG/100ML; MG/100ML; MG/100ML
INJECTION, SOLUTION INTRAVENOUS CONTINUOUS PRN
Status: DISCONTINUED | OUTPATIENT
Start: 2024-11-13 | End: 2024-11-13

## 2024-11-13 RX ORDER — PROPOFOL 10 MG/ML
INJECTION, EMULSION INTRAVENOUS AS NEEDED
Status: DISCONTINUED | OUTPATIENT
Start: 2024-11-13 | End: 2024-11-13

## 2024-11-13 RX ORDER — PROPOFOL 10 MG/ML
INJECTION, EMULSION INTRAVENOUS CONTINUOUS PRN
Status: DISCONTINUED | OUTPATIENT
Start: 2024-11-13 | End: 2024-11-13

## 2024-11-13 RX ORDER — LINACLOTIDE 290 UG/1
290 CAPSULE, GELATIN COATED ORAL DAILY
Qty: 30 CAPSULE | Refills: 11 | Status: SHIPPED | OUTPATIENT
Start: 2024-11-13 | End: 2025-11-08

## 2024-11-13 RX ORDER — LIDOCAINE HYDROCHLORIDE 10 MG/ML
INJECTION, SOLUTION EPIDURAL; INFILTRATION; INTRACAUDAL; PERINEURAL AS NEEDED
Status: DISCONTINUED | OUTPATIENT
Start: 2024-11-13 | End: 2024-11-13

## 2024-11-13 RX ADMIN — PROPOFOL 30 MG: 10 INJECTION, EMULSION INTRAVENOUS at 11:19

## 2024-11-13 RX ADMIN — ONDANSETRON 4 MG: 2 INJECTION INTRAMUSCULAR; INTRAVENOUS at 11:14

## 2024-11-13 RX ADMIN — PROPOFOL 30 MG: 10 INJECTION, EMULSION INTRAVENOUS at 11:21

## 2024-11-13 RX ADMIN — PROPOFOL 110 MCG/KG/MIN: 10 INJECTION, EMULSION INTRAVENOUS at 11:18

## 2024-11-13 RX ADMIN — PROPOFOL 30 MG: 10 INJECTION, EMULSION INTRAVENOUS at 11:20

## 2024-11-13 RX ADMIN — LIDOCAINE HYDROCHLORIDE 50 MG: 10 INJECTION, SOLUTION EPIDURAL; INFILTRATION; INTRACAUDAL; PERINEURAL at 11:18

## 2024-11-13 RX ADMIN — PROPOFOL 100 MG: 10 INJECTION, EMULSION INTRAVENOUS at 11:18

## 2024-11-13 RX ADMIN — SODIUM CHLORIDE, SODIUM LACTATE, POTASSIUM CHLORIDE, AND CALCIUM CHLORIDE: .6; .31; .03; .02 INJECTION, SOLUTION INTRAVENOUS at 11:09

## 2024-11-13 RX ADMIN — PROPOFOL 20 MG: 10 INJECTION, EMULSION INTRAVENOUS at 11:22

## 2024-11-13 NOTE — H&P
History and Physical -  [facility]  Aurelio Rios 59 y.o. male MRN: [id number]        HPI: 59-year-old male with history of colon polyps had a colonoscopy yesterday but it was incomplete due to the poor bowel prep.  He was given more bowel prep yesterday.    Historical Information   Past Medical History:   Diagnosis Date    Anemia     Arthritis     Asthma     Cancer (HCC)     leukemia    Colon polyp     Elevated PSA     Full dentures     GERD (gastroesophageal reflux disease)     Hemorrhoid     Hyperlipidemia     Hypertension     Polycythemia vera (HCC)     Sleep apnea     No CPAP    Smoker     TIA (transient ischemic attack)     Transfusion history     Wears glasses      Past Surgical History:   Procedure Laterality Date    ABDOMINAL ADHESION SURGERY N/A 11/17/2021    Procedure: LYSIS ADHESIONS LAPAROSCOPIC W ROBOT;  Surgeon: Ronald Moses MD;  Location: [facility];  Service: Urology    APPENDECTOMY      COLONOSCOPY      EGD      FOOT SURGERY Right     bone removed-left foot-removal of blood clots from an injury    OTHER SURGICAL HISTORY      bone removal right arm-abnormal growth of surface bone    PROSTATE BIOPSY      PROSTATECTOMY N/A 11/17/2021    Procedure: ROBOTIC ASSISTED LAPAROSCOPIC SIMPLE PROSTATECTOMY AND BLADDER NECK RECONSTRUCTION;  Surgeon: Ronald Moses MD;  Location: [facility];  Service: Urology    SPLENECTOMY      ruptured    UPPER GASTROINTESTINAL ENDOSCOPY       Social History   Social History     Substance and Sexual Activity   Alcohol Use Not Currently     Social History     Substance and Sexual Activity   Drug Use No     Social History     Tobacco Use   Smoking Status Former    Current packs/day: 0.00    Average packs/day: 0.5 packs/day for 10.0 years (5.0 ttl pk-yrs)    Types: Cigarettes    Start date: 6/15/2011    Quit date: 2/22/2021    Years since quitting: 3.7    Passive exposure: Current   Smokeless Tobacco Never     Family History   Problem Relation Age of  Onset    Dementia Father     Heart disease Mother     Hypertension Mother     Hyperlipidemia Mother     Hypertension Sister     Hypertension Brother     No Known Problems Daughter     No Known Problems Son     Cancer Paternal Grandfather         prostate    No Known Problems Son        Meds/Allergies     Not in a hospital admission.    No Known Allergies    Objective     There were no vitals taken for this visit.    Physical Exam:    Chest- CTA  Heart- RRR  Abdomen- NT/ND  Extremities- No edema    ASSESSMENT:     History of colon polyps    PLAN:    Colonoscopy

## 2024-11-13 NOTE — ANESTHESIA PREPROCEDURE EVALUATION
Procedure:  COLONOSCOPY    Relevant Problems   CARDIO   (+) Migraine   (+) Other hyperlipidemia   (+) Primary hypertension      GI/HEPATIC   (+) Chronic GERD   (+) Esophageal dysphagia   (+) Fatty liver      /RENAL   (+) Benign localized prostatic hyperplasia with lower urinary tract symptoms (LUTS)      HEMATOLOGY   (+) Anemia      MUSCULOSKELETAL   (+) Osteoarthritis of cervical spine   (+) Osteoarthritis, hip, bilateral      NEURO/PSYCH   (+) CVA (cerebral vascular accident) (HCC)   (+) Migraine   (+) Right hemiplegia (HCC)      PULMONARY   (+) Asthma   (+) LALIT (obstructive sleep apnea)   (+) Other emphysema (HCC)      Other   (+) Polycythemia vera (HCC)        Physical Exam    Airway    Mallampati score: III  TM Distance: >3 FB  Neck ROM: full     Dental   No notable dental hx     Cardiovascular      Pulmonary      Other Findings        Anesthesia Plan  ASA Score- 3     Anesthesia Type- IV sedation with anesthesia with ASA Monitors.         Additional Monitors:     Airway Plan:            Plan Factors-Exercise tolerance (METS): >4 METS.    Chart reviewed.    Patient summary reviewed.                  Induction- intravenous.    Postoperative Plan-     Perioperative Resuscitation Plan - Level 1 - Full Code.       Informed Consent- Anesthetic plan and risks discussed with patient.  I personally reviewed this patient with the CRNA. Discussed and agreed on the Anesthesia Plan with the CRNA..

## 2024-11-14 NOTE — ANESTHESIA POSTPROCEDURE EVALUATION
Post-Op Assessment Note    CV Status:  Stable    Pain management: adequate       Mental Status:  Alert and awake   Hydration Status:  Euvolemic   PONV Controlled:  Controlled   Airway Patency:  Patent     Post Op Vitals Reviewed: Yes    No anethesia notable event occurred.    Staff: Anesthesiologist           Last Filed PACU Vitals:  Vitals Value Taken Time   Temp     Pulse 69 11/13/24 1155   /75 11/13/24 1205   Resp 18 11/13/24 1205   SpO2 97 % 11/13/24 1205       Modified Torsten:  Activity: 2 (11/13/2024 12:05 PM)  Respiration: 2 (11/13/2024 12:05 PM)  Circulation: 2 (11/13/2024 12:05 PM)  Consciousness: 2 (11/13/2024 12:05 PM)  Oxygen Saturation: 2 (11/13/2024 12:05 PM)  Modified Torsten Score: 10 (11/13/2024 12:05 PM)

## 2024-12-18 DIAGNOSIS — I10 PRIMARY HYPERTENSION: ICD-10-CM

## 2024-12-18 DIAGNOSIS — E78.49 OTHER HYPERLIPIDEMIA: ICD-10-CM

## 2024-12-18 RX ORDER — VERAPAMIL HYDROCHLORIDE 120 MG/1
120 TABLET, FILM COATED, EXTENDED RELEASE ORAL DAILY
Qty: 90 TABLET | Refills: 1 | Status: SHIPPED | OUTPATIENT
Start: 2024-12-18

## 2024-12-18 RX ORDER — ATORVASTATIN CALCIUM 20 MG/1
20 TABLET, FILM COATED ORAL DAILY
Qty: 90 TABLET | Refills: 1 | Status: SHIPPED | OUTPATIENT
Start: 2024-12-18

## (undated) DEVICE — BLADELESS OBTURATOR: Brand: WECK VISTA

## (undated) DEVICE — FENESTRATED BIPOLAR FORCEPS: Brand: ENDOWRIST

## (undated) DEVICE — MONOPOLAR CURVED SCISSORS: Brand: ENDOWRIST

## (undated) DEVICE — ARM DRAPE

## (undated) DEVICE — INTENDED FOR TISSUE SEPARATION, AND OTHER PROCEDURES THAT REQUIRE A SHARP SURGICAL BLADE TO PUNCTURE OR CUT.: Brand: BARD-PARKER SAFETY BLADES SIZE 15, STERILE

## (undated) DEVICE — 40595 XL TRENDELENBURG POSITIONING KIT: Brand: 40595 XL TRENDELENBURG POSITIONING KIT

## (undated) DEVICE — SURGICEL 4 X 8

## (undated) DEVICE — SUT VICRYL 2-0 SH 27 IN UNDYED J417H

## (undated) DEVICE — INTENDED FOR TISSUE SEPARATION, AND OTHER PROCEDURES THAT REQUIRE A SHARP SURGICAL BLADE TO PUNCTURE OR CUT.: Brand: BARD-PARKER SAFETY BLADES SIZE 11, STERILE

## (undated) DEVICE — VISUALIZATION SYSTEM: Brand: CLEARIFY

## (undated) DEVICE — TISSUE RETRIEVAL SYSTEM: Brand: INZII RETRIEVAL SYSTEM

## (undated) DEVICE — Device: Brand: CENTURION

## (undated) DEVICE — ADHESIVE SKIN HIGH VISCOSITY EXOFIN 1ML

## (undated) DEVICE — SUT ETHILON 3-0 FS-1 18 IN 663G

## (undated) DEVICE — URIMETER 2500ML

## (undated) DEVICE — LUBRICANT SURGILUBE TUBE 4 OZ  FLIP TOP

## (undated) DEVICE — TROCAR: Brand: KII FIOS FIRST ENTRY

## (undated) DEVICE — GAUZE SPONGES,USP TYPE VII GAUZE, 12 PLY: Brand: CURITY

## (undated) DEVICE — LARGE NEEDLE DRIVER: Brand: ENDOWRIST

## (undated) DEVICE — JACKSON-PRATT 100CC BULB RESERVOIR: Brand: CARDINAL HEALTH

## (undated) DEVICE — GLOVE INDICATOR PI UNDERGLOVE SZ 8 BLUE

## (undated) DEVICE — LUBRICANT INST ELECTROLUBE ANTISTK WO PAD

## (undated) DEVICE — ENDOCATCH BAG 15MM

## (undated) DEVICE — CATH FOLEY 20FR 5ML 2 WAY SILICONE ELASTIMER

## (undated) DEVICE — GLOVE SRG BIOGEL ECLIPSE 7.5

## (undated) DEVICE — CANNULA SEAL

## (undated) DEVICE — TIP COVER ACCESSORY

## (undated) DEVICE — PROGRASP FORCEPS: Brand: ENDOWRIST

## (undated) DEVICE — Device

## (undated) DEVICE — HEM-O-LOK CLIP CARTRIDGE LARGE DA VINCI SI/XI

## (undated) DEVICE — CHLORAPREP HI-LITE 26ML ORANGE

## (undated) DEVICE — TROCAR PORT ACCESS 12 X120MM W/BLDLS OPTICAL TIP AIRSEAL

## (undated) DEVICE — SYRINGE 10ML LL

## (undated) DEVICE — SUT VLOC 90 3-0 CV-23 9 IN VLOCM0844

## (undated) DEVICE — SUT MONOCRYL 4-0 PS-2 27 IN Y426H

## (undated) DEVICE — ASTOUND STANDARD SURGICAL GOWN, XL: Brand: CONVERTORS

## (undated) DEVICE — SUT PROLENE 2-0 KS 30 IN 8623H

## (undated) DEVICE — ENDOPATH PNEUMONEEDLE INSUFFLATION NEEDLES WITH LUER LOCK CONNECTORS 120MM: Brand: ENDOPATH

## (undated) DEVICE — KIT, BETHLEHEM ROBOTIC PROST: Brand: CARDINAL HEALTH

## (undated) DEVICE — COLUMN DRAPE

## (undated) DEVICE — SUT VICRYL 2-0 CT-1 27 IN J259H

## (undated) DEVICE — AIRSEAL TUBE SMOKE EVAC LUMENX3 FILTERED

## (undated) DEVICE — SURGIFLO ENDOSCOPIC APPICATOR: Brand: ETHICON

## (undated) DEVICE — JP CHAN DRN SIL HUBLESS 19FR W/TRO: Brand: CARDINAL HEALTH

## (undated) DEVICE — 3000CC GUARDIAN II: Brand: GUARDIAN

## (undated) DEVICE — SUT PDS II 0 CT-1 27 IN Z340H